# Patient Record
Sex: FEMALE | Race: WHITE | Employment: FULL TIME | URBAN - NONMETROPOLITAN AREA
[De-identification: names, ages, dates, MRNs, and addresses within clinical notes are randomized per-mention and may not be internally consistent; named-entity substitution may affect disease eponyms.]

---

## 2017-10-05 ENCOUNTER — APPOINTMENT (OUTPATIENT)
Dept: INTERVENTIONAL RADIOLOGY/VASCULAR | Age: 47
End: 2017-10-05
Payer: COMMERCIAL

## 2017-10-05 ENCOUNTER — HOSPITAL ENCOUNTER (EMERGENCY)
Age: 47
Discharge: HOME | End: 2017-10-05
Attending: FAMILY MEDICINE
Payer: COMMERCIAL

## 2017-10-05 ENCOUNTER — APPOINTMENT (OUTPATIENT)
Dept: GENERAL RADIOLOGY | Age: 47
End: 2017-10-05
Payer: COMMERCIAL

## 2017-10-05 VITALS
TEMPERATURE: 97.9 F | DIASTOLIC BLOOD PRESSURE: 79 MMHG | SYSTOLIC BLOOD PRESSURE: 101 MMHG | RESPIRATION RATE: 17 BRPM | HEART RATE: 69 BPM | OXYGEN SATURATION: 100 %

## 2017-10-05 DIAGNOSIS — M79.604 PAIN OF RIGHT LOWER EXTREMITY: Primary | ICD-10-CM

## 2017-10-05 LAB
ANION GAP SERPL CALCULATED.3IONS-SCNC: 12 MEQ/L (ref 8–16)
BASOPHILS # BLD: 0.4 %
BASOPHILS ABSOLUTE: 0 THOU/MM3 (ref 0–0.1)
BUN BLDV-MCNC: 12 MG/DL (ref 7–22)
CALCIUM SERPL-MCNC: 9.1 MG/DL (ref 8.5–10.5)
CHLORIDE BLD-SCNC: 105 MEQ/L (ref 98–111)
CO2: 28 MEQ/L (ref 23–33)
CREAT SERPL-MCNC: 0.8 MG/DL (ref 0.4–1.2)
D-DIMER QUANTITATIVE: 378 NG/ML FEU (ref 0–500)
EKG ATRIAL RATE: 70 BPM
EKG P AXIS: 75 DEGREES
EKG P-R INTERVAL: 160 MS
EKG Q-T INTERVAL: 392 MS
EKG QRS DURATION: 68 MS
EKG QTC CALCULATION (BAZETT): 423 MS
EKG R AXIS: 66 DEGREES
EKG T AXIS: 74 DEGREES
EKG VENTRICULAR RATE: 70 BPM
EOSINOPHIL # BLD: 2.9 %
EOSINOPHILS ABSOLUTE: 0.2 THOU/MM3 (ref 0–0.4)
GFR SERPL CREATININE-BSD FRML MDRD: 76 ML/MIN/1.73M2
GLUCOSE BLD-MCNC: 89 MG/DL (ref 70–108)
HCT VFR BLD CALC: 38.3 % (ref 37–47)
HEMOGLOBIN: 13 GM/DL (ref 12–16)
LYMPHOCYTES # BLD: 27.2 %
LYMPHOCYTES ABSOLUTE: 1.7 THOU/MM3 (ref 1–4.8)
MCH RBC QN AUTO: 33 PG (ref 27–31)
MCHC RBC AUTO-ENTMCNC: 33.9 GM/DL (ref 33–37)
MCV RBC AUTO: 97.2 FL (ref 81–99)
MONOCYTES # BLD: 8.8 %
MONOCYTES ABSOLUTE: 0.5 THOU/MM3 (ref 0.4–1.3)
NUCLEATED RED BLOOD CELLS: 0 /100 WBC
OSMOLALITY CALCULATION: 287.9 MOSMOL/KG (ref 275–300)
PDW BLD-RTO: 13.6 % (ref 11.5–14.5)
PLATELET # BLD: 153 THOU/MM3 (ref 130–400)
PMV BLD AUTO: 9 MCM (ref 7.4–10.4)
POTASSIUM SERPL-SCNC: 4.4 MEQ/L (ref 3.5–5.2)
RBC # BLD: 3.94 MILL/MM3 (ref 4.2–5.4)
RBC # BLD: NORMAL 10*6/UL
SEG NEUTROPHILS: 60.7 %
SEGMENTED NEUTROPHILS ABSOLUTE COUNT: 3.7 THOU/MM3 (ref 1.8–7.7)
SODIUM BLD-SCNC: 145 MEQ/L (ref 135–145)
TOTAL CK: 60 U/L (ref 30–135)
TROPONIN T: < 0.01 NG/ML
TROPONIN T: < 0.01 NG/ML
WBC # BLD: 6.1 THOU/MM3 (ref 4.8–10.8)

## 2017-10-05 PROCEDURE — 99285 EMERGENCY DEPT VISIT HI MDM: CPT

## 2017-10-05 PROCEDURE — 71010 XR CHEST PORTABLE: CPT

## 2017-10-05 PROCEDURE — 85025 COMPLETE CBC W/AUTO DIFF WBC: CPT

## 2017-10-05 PROCEDURE — 82550 ASSAY OF CK (CPK): CPT

## 2017-10-05 PROCEDURE — 93971 EXTREMITY STUDY: CPT

## 2017-10-05 PROCEDURE — 36415 COLL VENOUS BLD VENIPUNCTURE: CPT

## 2017-10-05 PROCEDURE — 85379 FIBRIN DEGRADATION QUANT: CPT

## 2017-10-05 PROCEDURE — 93005 ELECTROCARDIOGRAM TRACING: CPT | Performed by: FAMILY MEDICINE

## 2017-10-05 PROCEDURE — 84484 ASSAY OF TROPONIN QUANT: CPT

## 2017-10-05 PROCEDURE — 80048 BASIC METABOLIC PNL TOTAL CA: CPT

## 2017-10-05 RX ORDER — MELOXICAM 15 MG/1
15 TABLET ORAL DAILY
COMMUNITY
End: 2022-09-02

## 2017-10-05 ASSESSMENT — PAIN SCALES - GENERAL
PAINLEVEL_OUTOF10: 8
PAINLEVEL_OUTOF10: 8

## 2017-10-05 ASSESSMENT — ENCOUNTER SYMPTOMS
ABDOMINAL PAIN: 0
EYE DISCHARGE: 0
SORE THROAT: 0
NAUSEA: 0
DIARRHEA: 0
VOMITING: 1
SHORTNESS OF BREATH: 0
BACK PAIN: 0
RHINORRHEA: 0
EYE PAIN: 0
WHEEZING: 0
COUGH: 0

## 2017-10-05 ASSESSMENT — PAIN DESCRIPTION - LOCATION
LOCATION: LEG
LOCATION: LEG

## 2017-10-05 ASSESSMENT — PAIN DESCRIPTION - FREQUENCY: FREQUENCY: CONTINUOUS

## 2017-10-05 ASSESSMENT — PAIN DESCRIPTION - ONSET: ONSET: ON-GOING

## 2017-10-05 ASSESSMENT — PAIN DESCRIPTION - ORIENTATION
ORIENTATION: RIGHT
ORIENTATION: RIGHT

## 2017-10-05 ASSESSMENT — PAIN DESCRIPTION - DESCRIPTORS: DESCRIPTORS: CONSTANT;THROBBING

## 2017-10-05 ASSESSMENT — PAIN DESCRIPTION - PROGRESSION: CLINICAL_PROGRESSION: NOT CHANGED

## 2017-10-05 ASSESSMENT — PAIN DESCRIPTION - PAIN TYPE
TYPE: ACUTE PAIN
TYPE: ACUTE PAIN

## 2017-10-05 NOTE — ED TRIAGE NOTES
Pt presents to ED with c/o rt leg pain rated 8/10. Pt also states she is having pain in her breast area rated 2/10, pt states she is already undergoing tests to see if she has breast cancer b/c the pain in her breast have been present for several months.

## 2017-10-05 NOTE — ED AVS SNAPSHOT
Patient Information     Patient Name GREGORIO Foster 1970      Care Provided at:     Name Address Phone       [address] [phone]            Your Visit    Here you will find information about your visit, including the reason for your visit. Please take this sheet with you when you visit your doctor or other health care provider in the future. It will help determine the best possible medical care for you at that time. If you have any questions once you leave the hospital, please call the department phone number listed below. Diagnoses this visit     Your diagnosis was PAIN OF RIGHT LOWER EXTREMITY. Visit Information     Date of Visit Department Dept Phone    10/5/2017 [facility] EMERGENCY DEPT [phone]      You were seen by     You were seen by Hamzah Castro MD.       Follow-up Appointments    Below is a list of your follow-up and future appointments. This may not be a complete list as you may have made appointments directly with providers that we are not aware of or your providers may have made some for you. Please call your providers to confirm appointments. It is important to keep your appointments. Please bring your current insurance card, photo ID, co-pay, and all medication bottles to your appointment. If self-pay, payment is expected at the time of service. Follow-up Information     Follow up with Pearl Baltazar MD. Schedule an appointment as soon as possible for a visit in 3 days. Specialty:  Family Medicine    Contact information:    [facility] [address]  [phone]        Preventive Care        Date Due    HIV screening is recommended for all people regardless of risk factors  aged 15-65 years at least once (lifetime) who have never been HIV tested.  3/6/1983    Tetanus Combination Vaccine (1 - Tdap) 3/6/1987 Pneumococcal Vaccine - Pneumovax for adults aged 19-64 years with: chronic heart disease, chronic lung disease, diabetes mellitus, alcoholism, chronic liver disease, or cigarette smoking. (1 of 1 - PPSV23) 3/6/1987    Pap Smear 3/6/1989    Cholesterol Screening 3/6/2008    Yearly Flu Vaccine (1) 9/1/2017                 Care Plan Once You Return Home    This section includes instructions you will need to follow once you leave the hospital.  Your care team will discuss these with you, so you and those caring for you know how to best care for your health needs at home. This section may also include educational information about certain health topics that may be of help to you. Important Information if you smoke or are exposed to smoking       SMOKING: QUIT SMOKING. THIS IS THE MOST IMPORTANT ACTION YOU CAN TAKE TO IMPROVE YOUR CURRENT AND FUTURE HEALTH. Call the [address] [place] NOW ([phone])    Smoking harms nonsmokers. When nonsmokers are around people who smoke, they absorb nicotine, carbon monoxide, and other ingredients of tobacco smoke. DO NOT SMOKE AROUND CHILDREN     Children exposed to secondhand smoke are at an increased risk of:  Sudden Infant Death Syndrome (SIDS), acute respiratory infections, inflammation of the middle ear, and severe asthma. Over a longer time, it causes heart disease and lung cancer. There is no safe level of exposure to secondhand smoke. Important information for a smoker       SMOKING: QUIT SMOKING. THIS IS THE MOST IMPORTANT ACTION YOU CAN TAKE TO IMPROVE YOUR CURRENT AND FUTURE HEALTH. Call the [address] [facility] [place] [facility] ([phone])    Smoking harms nonsmokers. When nonsmokers are around people who smoke, they absorb nicotine, carbon monoxide, and other ingredients of tobacco smoke.      DO NOT SMOKE AROUND CHILDREN Children exposed to secondhand smoke are at an increased risk of:  Sudden Infant Death Syndrome (SIDS), acute respiratory infections, inflammation of the middle ear, and severe asthma. Over a longer time, it causes heart disease and lung cancer. There is no safe level of exposure to secondhand smoke. [employer] Signup     [employer] allows you to send messages to your doctor, view your test results, renew your prescriptions, schedule appointments, view visit notes, and more. How Do I Sign Up? 1. In your Internet browser, go to [email or web address]  2. Click on the Sign Up Now link in the Sign In box. You will see the New Member Sign Up page. 3. Enter your [employer] Access Code exactly as it appears below. You will not need to use this code after youve completed the sign-up process. If you do not sign up before the expiration date, you must request a new code. [employer] Access Code: [email or web address]  Expires: 12/4/2017  7:35 PM    4. Enter your Social Security Number ([id number]) and Date of Birth (mm/dd/yyyy) as indicated and click Submit. You will be taken to the next sign-up page. 5. Create a [employer] ID. This will be your [employer] login ID and cannot be changed, so think of one that is secure and easy to remember. 6. Create a [employer] password. You can change your password at any time. 7. Enter your Password Reset Question and Answer. This can be used at a later time if you forget your password. 8. Enter your e-mail address. You will receive e-mail notification when new information is available in [address]. 9. Click Sign Up. You can now view your medical record. Additional Information  If you have questions, please contact the physician practice where you receive care. Remember, [employer] is NOT to be used for urgent needs. For medical emergencies, dial 911. For questions regarding your [employer] account call [phone].  If you have a clinical question, please call your doctor's office. View your information online  ? Review your current list of  medications, immunization, and allergies. ? Review your future test results online . ? Review your discharge instructions provided by your caregivers at discharge    Certain functionality such as prescription refills, scheduling appointments or sending messages to your provider are not activated if your provider does not use [employer] in his/her office    For questions regarding your MyChart account call [phone]. If you have a clinical question, please call your doctor's office. The information on all pages of the After Visit Summary has been reviewed with me, the patient and/or responsible adult, by my health care provider(s). I had the opportunity to ask questions regarding this information. I understand I should dispose of my armband safely at home to protect my health information. A complete copy of the After Visit Summary has been given to me, the patient and/or responsible adult.          Patient Signature/Responsible Adult: ___________________________________    Nurse Signature: ___________________________________  Resident/MLP Signature: ___________________________________  Attending Signature: ___________________________________    Date:____________Time:____________

## 2017-10-05 NOTE — ED PROVIDER NOTES
[facility]  eMERGENCY dEPARTMENT eNCOUnter          CHIEF COMPLAINT       Chief Complaint   Patient presents with    Leg Pain       Nurses Notes reviewed and I agree except as noted in the HPI. HISTORY OF PRESENT ILLNESS    Lucy Gonzalez is a 50 y.o. female who presents to the Emergency Department for the evaluation of right leg pain. Patient states this pain started 6 days ago and has been worsening. Patient states 4 days ago she went to [facility], where they took X-rays that were normal. Patient states she has also seen her chiropractor 2 times since the start, with no relief. Patient states she had a stress test 3 days ago, and she was unable to handle walking on her leg for a long period of time. Patient reports associated symptoms to include vomiting, numbness in her toes, and chest pain. Patient states she has had this chest pain for 2 months, and describes it as stabbing intermittently. Patient denies recent fall, recent travel, or taking anticoagulants. Patient has a history of COPD and DVT in her arm. Patient reports she is supposed to have a left breast biopsy tomorrow to see if she has breast cancer. No other complaints at this time. The HPI was provided by the patient. REVIEW OF SYSTEMS     Review of Systems   Constitutional: Negative for appetite change, chills, fatigue and fever. HENT: Negative for congestion, ear pain, rhinorrhea and sore throat. Eyes: Negative for pain, discharge and visual disturbance. Respiratory: Negative for cough, shortness of breath and wheezing. Cardiovascular: Positive for chest pain (2 months intermittently). Negative for palpitations and leg swelling. Gastrointestinal: Positive for vomiting. Negative for abdominal pain, diarrhea and nausea. Genitourinary: Negative for difficulty urinating, dysuria and vaginal discharge. Musculoskeletal: Negative for arthralgias, back pain, joint swelling and neck pain. tenderness to the right calf with palpitation. Radiologic studies within the department revealed no acute findings. Laboratory work was reassuring. Ultrasound negative for DVT. The lab work as mentioned above negative d-dimer and negative troponin. Patient was advised to follow-up with her chiropractor/primary care physician for further management. Patient also is currently going under outpatient stress test to rule out coronary arteries problem. I observed the patient's condition to remain stable during the duration of the stay. I explained my proposed course of treatment to the patient, who was amenable to my treatment and discharge decisions. The patient was discharged home in stable condition, and the patient will return to the ED if the symptoms become more severe in nature or otherwise change. CRITICAL CARE:   None. CONSULTS:  None. PROCEDURES:  None. FINAL IMPRESSION      1. Pain of right lower extremity          DISPOSITION/PLAN   Patient was discharged in stable condition. Will return if symptoms change or worsen, or for any sign or symptom deemed emergent by the patient or family members. Follow up as an outpatient, sooner if symptoms warrant. PATIENT REFERRED TO:  Abena [facility]. [place] [address]  [phone]    Schedule an appointment as soon as possible for a visit in 3 days        DISCHARGE MEDICATIONS:  New Prescriptions    No medications on file       (Please note that portions of this note were completed with a voice recognition program.  Efforts were made to edit the dictations but occasionally words are mis-transcribed.)    Scribe:  Shantell Stephenson 10/5/17 5:03 PM Scribing for and in the presence of Alyson Diaz MD.    Signed by: Andria Kilgore.  10/5/17 7:36 PM    Provider:  I personally performed the services described in the documentation, reviewed and edited the documentation which was dictated to the scribe in my presence, and it accurately records my words and actions.     Nona Diana MD 10/5/17 7:36 PM       Nona Diana MD  10/06/17 8209

## 2017-10-23 ENCOUNTER — OFFICE VISIT (OUTPATIENT)
Dept: ONCOLOGY | Age: 47
End: 2017-10-23
Payer: COMMERCIAL

## 2017-10-23 ENCOUNTER — HOSPITAL ENCOUNTER (OUTPATIENT)
Dept: INFUSION THERAPY | Age: 47
Discharge: HOME | End: 2017-10-23
Payer: COMMERCIAL

## 2017-10-23 VITALS
HEIGHT: 68 IN | OXYGEN SATURATION: 99 % | DIASTOLIC BLOOD PRESSURE: 80 MMHG | WEIGHT: 170 LBS | TEMPERATURE: 97.2 F | HEART RATE: 77 BPM | BODY MASS INDEX: 25.76 KG/M2 | RESPIRATION RATE: 16 BRPM | SYSTOLIC BLOOD PRESSURE: 130 MMHG

## 2017-10-23 DIAGNOSIS — C77.9 PRIMARY MALIGNANT NEOPLASM OF LEFT BREAST WITH STAGE 2 NODAL METASTASIS PER AMERICAN JOINT COMMITTEE ON CANCER 7TH EDITION (N2) (HCC): Primary | ICD-10-CM

## 2017-10-23 DIAGNOSIS — C50.912 PRIMARY MALIGNANT NEOPLASM OF LEFT BREAST WITH STAGE 2 NODAL METASTASIS PER AMERICAN JOINT COMMITTEE ON CANCER 7TH EDITION (N2) (HCC): Primary | ICD-10-CM

## 2017-10-23 PROCEDURE — 99211 OFF/OP EST MAY X REQ PHY/QHP: CPT

## 2017-10-23 PROCEDURE — 99205 OFFICE O/P NEW HI 60 MIN: CPT | Performed by: INTERNAL MEDICINE

## 2017-10-23 RX ORDER — ALPRAZOLAM 0.5 MG/1
0.5 TABLET ORAL NIGHTLY PRN
COMMUNITY
Start: 2017-10-17

## 2017-10-23 RX ORDER — ALBUTEROL SULFATE 2.5 MG/3ML
2.5 SOLUTION RESPIRATORY (INHALATION) EVERY 4 HOURS PRN
COMMUNITY
Start: 2017-10-17

## 2017-10-23 RX ORDER — VENLAFAXINE HYDROCHLORIDE 75 MG/1
75 CAPSULE, EXTENDED RELEASE ORAL DAILY
COMMUNITY
Start: 2017-10-17

## 2017-10-23 RX ORDER — MELOXICAM 15 MG/1
15 TABLET ORAL
COMMUNITY
Start: 2017-10-17 | End: 2017-10-23 | Stop reason: CLARIF

## 2017-10-23 RX ORDER — BUTALBITAL, ACETAMINOPHEN AND CAFFEINE 50; 325; 40 MG/1; MG/1; MG/1
CAPSULE ORAL
COMMUNITY
Start: 2017-10-17

## 2017-10-23 RX ORDER — CYCLOBENZAPRINE HCL 5 MG
5 TABLET ORAL 3 TIMES DAILY PRN
COMMUNITY
Start: 2017-10-17

## 2017-10-23 RX ORDER — MOMETASONE FUROATE AND FORMOTEROL FUMARATE DIHYDRATE 200; 5 UG/1; UG/1
1 AEROSOL RESPIRATORY (INHALATION) 2 TIMES DAILY
COMMUNITY
Start: 2017-08-17 | End: 2019-06-11 | Stop reason: SDUPTHER

## 2017-10-23 NOTE — PROGRESS NOTES
[facility] PROFESSIONAL SERVS  [facility]  [address]  [address] [place] [address]  [address] [address]  Dept: [phone]  Dept Fax: [phone]: [phone]     Visit Date: 10/23/2017     Tea Almanza is a 50 y.o. female who presents today for:   Chief Complaint   Patient presents with   [facility] New Doctor     Breast cancer        HPI:   Destinee Haddad is a very pleasant 53-year-old female who comes to the clinic today for evaluation of a newly diagnosed invasive ductal carcinoma of her left breast. The patient noted a mass in the upper aspect of her left breast approximately 5 months ago. She thought it was a cyst which she has had before but since it persisted, she elected to have further evaluation. She did undergo a diagnostic mammogram at [facility] in [place], [place]. It showed a 3.8 x 2.4 x 2.7 cm malignant-appearing spiculated mass within the left breast at 11:00 position. Evaluation of the left axilla with US showed suspicious appearing lymph node with increased cortical thickening and replacement of fatty hilum. On October 9, 2017, she underwent a left breast mass and left lymph node ultrasound-guided core biopsy. The final path report did show an invasive ductal carcinoma which was ER positive at 95%, NJ positive at 95%, and a HER-2/breezy by IHC was 2+. The FISH test   Was still equivocal.  HER-2/CEP17 ratio was 1.7, however the average HER-2 copy number was 4. She met with the surgical oncologists and medical oncologists at the [address] [place] [address], however she decided to receive care closer to her home. She has not had any significant breast problems in the past except for cysts. She began menarche at age 13 and did have a BERYL in [address] [place] [address]. She's had 4 pregnancies with her 1st child being born at 21years of age. She does have a maternal aunt who had breast cancer at 72 and a paternal aunt who had breast cancer at 77.  The rest of her breast history is unremarkable. Today the patient reports that over the last 3-4 weeks she developed back pain. She also feels some sternal discomfort. She is a smoker, however she reports worsening dyspnea on exertion. Denies having any headaches. No abdominal pain. No weight loss.              HPI   Past Medical History:   Diagnosis Date    Acid reflux     Anxiety     Asthma     [facility]     COPD with asthma ([facility]     Depression     Depression     Dizziness - light-headed     HX OF:    Emphysema     History of chest pain     History of tinnitus     Joint pain     Numbness and tingling     Osteoarthritis     SOB (shortness of breath) on exertion       Past Surgical History:   Procedure Laterality Date    BLADDER SURGERY  2014    BREAST [facility]    CARDIAC CATHETERIZATION  2011    NO EVIDENCE OF PULMONARY HTN,NO EVIDENCE OF DD,NORMAL LVF    CARDIOVASCULAR STRESS TEST  2011    EF 60% MILD INFERIOR WALL REVERSIBLE STRESS-INDUCED ISCHEMIA      SECTION  1992    CHOLECYSTECTOMY  2015    CYST REMOVAL Right 2015    rt breast [facility]-benign breast cyst     ESOPHAGUS SURGERY  2014    HYSTERECTOMY  1999    TONSILLECTOMY  1973      Family History   Problem Relation Age of Onset    Heart Disease Mother     Hypertension Mother     Diabetes Mother     Heart Disease Father     No Known Problems Sister     No Known Problems Brother       Social History   Substance Use Topics    Smoking status: Current Every Day Smoker     Packs/day: 0.50     Years: 18.00    Smokeless tobacco: Never Used    Alcohol use Yes      Comment: SOCAIL      Current Outpatient Prescriptions   Medication Sig Dispense Refill    albuterol (PROVENTIL) (2.5 MG/3ML) 0.083% nebulizer solution Inhale 2.5 mg into the lungs every 4 hours as needed       butalbital-apap-caffeine -40 MG CAPS Take by mouth      ALPRAZolam (XANAX) 0.5 MG tablet Take 0.5 mg by mouth nightly as needed       cyclobenzaprine (FLEXERIL) 5 MG tablet Take 5 mg by mouth 3 times daily as needed       DULERA 200-5 MCG/ACT inhaler Inhale 1 puff into the lungs 2 times daily       venlafaxine (EFFEXOR XR) 75 MG extended release capsule Take 75 mg by mouth daily       meloxicam (MOBIC) 15 MG tablet Take 15 mg by mouth daily      Albuterol Sulfate (VENTOLIN HFA IN) Inhale 2 puffs into the lungs 4 times daily.  loratadine (CLARITIN) 10 MG tablet Take 10 mg by mouth daily. No current facility-administered medications for this visit. No Known Allergies   Health Maintenance   Topic Date Due    HIV screen  03/06/1983    DTaP/Tdap/Td vaccine (1 - Tdap) 03/06/1987    Pneumococcal highest risk (1 of 3 - PCV13) 03/06/1987    Cervical cancer screen  03/06/1989    Lipid screen  03/06/2008    Flu vaccine (1) 09/01/2017        Subjective:   Review of Systems   Constitutional: Negative for activity change, appetite change, chills, fatigue, fever and unexpected weight change. HENT: Negative for dental problem, facial swelling, hearing loss, mouth sores, nosebleeds, postnasal drip, sore throat, trouble swallowing and voice change. Eyes: Negative for redness, itching and visual disturbance. Respiratory: Positive for shortness of breath. Negative for cough, chest tightness and wheezing. Cardiovascular: Negative for chest pain, palpitations and leg swelling. Gastrointestinal: Negative for abdominal distention, abdominal pain, blood in stool, constipation, diarrhea, nausea and vomiting. Genitourinary: Negative for difficulty urinating, dysuria, flank pain, frequency and hematuria. Musculoskeletal: Positive for back pain. Negative for arthralgias, gait problem, joint swelling, myalgias and neck stiffness. Skin: Negative for color change and rash. Neurological: Negative for dizziness, seizures, syncope, speech difficulty, weakness, light-headedness, numbness and headaches. CALCIUM 9.1 10/05/2017 1700            Assessment/Plan:   1. This is a 77-year-old premenopausal patient was newly diagnosed ER positive, FL positive HER-2 equivocal left breast cancer. I had a lengthy discussion was the patient and her aunt about her current diagnosis. First we have to rule out metastatic disease. Since the patient is complaining of back pain and dyspnea on exertion she will have CT of the chest, abdomen/pelvis and bone scan. If those scans are negative, the patient should proceed with breast surgery first.  Due to extent of disease and biopsy-proven lymph node involvement, she should proceed with mastectomy and axillary lymph node dissection. Next we discussed the meaning of having equivocal HER-2. The only HER2 test results that are known to predict benefit from anti-HER2 therapy are IHC 3+ score or FISH >2 ratio. The other \"equivocal\" findings on FISH  are of unknown value in predicting benefit from therapy. Equivocal HER2 testing should trigger reflex HER2 testing using CHARLENE on the same specimen or a new test (using a different specimen with either IHC or CHARLENE). Neoadjuvant chemotherapy+ targeted therapy is not indicated for the patients with equivocal HER-2. After mastectomy we will repeat HER-2 evaluation on a different specimen. Since the patient has strong family history of breast cancer and she is diagnosed with breast cancer before age of 46 she's referred for genetic testing. 1. Primary malignant neoplasm of left breast with stage 2 raji metastasis per American Joint Committee on Cancer 7th edition (N2) [facility])  CT Chest W Contrast    CT ABDOMEN PELVIS W IV CONTRAST    NM BONE SCAN WHOLE BODY    Ambulatory referral to General Surgery    External Referral To Genetics        Plan:   No Follow-up on file.      Orders Placed:   Orders Placed This Encounter   Procedures    CT Chest W Contrast     Standing Status:   Future     Standing Expiration Date:   11/23/2017    CT

## 2017-10-23 NOTE — LETTER
[facility]  [address]  [address] [facility] [address]  [address] [facility] [address]  Phone: [phone]  Fax: [phone]    Nora Ignacio MD        October 23, 2017     Patient: Jennifer Sandhu   YOB: 1970   Date of Visit: 10/23/2017       To Whom It May Concern: It is my medical opinion that Kevin Grey should remain out of work until October 25, 2017 at this time. If you have any questions or concerns, please don't hesitate to call.     Sincerely,        Nora Ignacio MD

## 2017-10-24 ENCOUNTER — OFFICE VISIT (OUTPATIENT)
Dept: SURGERY | Age: 47
End: 2017-10-24
Payer: COMMERCIAL

## 2017-10-24 VITALS
RESPIRATION RATE: 16 BRPM | DIASTOLIC BLOOD PRESSURE: 70 MMHG | HEART RATE: 86 BPM | SYSTOLIC BLOOD PRESSURE: 112 MMHG | HEIGHT: 68 IN | TEMPERATURE: 97.2 F | BODY MASS INDEX: 26.07 KG/M2 | WEIGHT: 172 LBS | OXYGEN SATURATION: 99 %

## 2017-10-24 DIAGNOSIS — J44.9 COPD WITH ASTHMA (HCC): ICD-10-CM

## 2017-10-24 DIAGNOSIS — Z01.818 PRE-OP TESTING: ICD-10-CM

## 2017-10-24 DIAGNOSIS — C50.912 INFILTRATING DUCTAL CARCINOMA OF LEFT BREAST (HCC): Primary | ICD-10-CM

## 2017-10-24 DIAGNOSIS — M54.6 ACUTE MIDLINE THORACIC BACK PAIN: ICD-10-CM

## 2017-10-24 PROCEDURE — 99244 OFF/OP CNSLTJ NEW/EST MOD 40: CPT | Performed by: SURGERY

## 2017-10-24 ASSESSMENT — ENCOUNTER SYMPTOMS
DIARRHEA: 1
CONSTIPATION: 1
EYE REDNESS: 0
BLOOD IN STOOL: 0
COUGH: 1
SHORTNESS OF BREATH: 1
EYE PAIN: 0
BACK PAIN: 1
NAUSEA: 1
SINUS PRESSURE: 1
TROUBLE SWALLOWING: 1
SINUS PAIN: 1

## 2017-10-24 NOTE — PATIENT INSTRUCTIONS
Patient Education        Mastectomy: Before Your Surgery  What is a mastectomy? A mastectomy is surgery to remove a breast. There are many ways to do it. The type of surgery you have depends on your situation and if you plan to have surgery to reconstruct the breast.  When this surgery is done to cure or prevent cancer, the entire breast is removed. This includes the nipple. During surgery, the doctor may also check your lymph nodes. After surgery, you will probably stay overnight in the hospital. You may be able to go back to work or your normal routine in 3 to 6 weeks. It depends on the type of work you do. When you find out that you have cancer, you may feel many emotions and may need some help coping. Seek out family, friends, and counselors for support. You also can do things at home to make yourself feel better while you go through treatment. Call the [facility] ([phone]) or visit its website at [employer] [facility] for more information. Follow-up care is a key part of your treatment and safety. Be sure to make and go to all appointments, and call your doctor if you are having problems. It's also a good idea to know your test results and keep a list of the medicines you take. What happens before surgery? Surgery can be stressful. This information will help you understand what you can expect. And it will help you safely prepare for surgery. Preparing for surgery  · Understand exactly what surgery is planned, along with the risks, benefits, and other options. · Tell your doctors ALL the medicines, vitamins, supplements, and herbal remedies you take. Some of these can increase the risk of bleeding or interact with anesthesia. · If you take blood thinners, such as warfarin (Coumadin), clopidogrel (Plavix), or aspirin, be sure to talk to your doctor. He or she will tell you if you should stop taking these medicines before your surgery.  Make sure that you understand exactly what your

## 2017-10-24 NOTE — PROGRESS NOTES
Felipe Enriquez MD   General Surgery  New Patient Evaluation in Office  Pt Name: Hamzah Dawkins  Date of Birth 1970   Today's Date: 10/24/2017  Medical Record Number: [id number]  Referring Provider: Quique Vilchis MD  Primary Care Provider: Naseem Harley MD  Chief Complaint   Patient presents with   [facility] Surgical Consult     new pt-ref women's wellness-left breast        ASSESSMENT      1. Infiltrating ductal carcinoma of left breast ([address]    2. Pre-op testing    3. COPD with asthma ([address]    4. Acute midline thoracic back pain    5. T2 N1M? Staging studies pending     PLANS      1. Schedule Teresa Doip for  1. Patient is scheduled for CT scan chest abdomen and pelvis as well as bone scan to evaluate for distant metastatic disease. If negative for distant metastatic disease patient will be scheduled for left modified radical mastectomy. She declined consultation evaluation for reconstruction which was discussed. 2. In the office, I had a discussion with the patient regarding the treatment options for invasive breast cancer. We discussed lumpectomy and radiation versus mastectomy. We discussed the fact that there is no statistical difference in long term survival or recurrence between the two options. If she wanted to consider breast conservation and no distance of distant metastatic disease she should consider chemotherapy preoperatively. Otherwise mastectomy would be more appropriate. Had a lengthy discussion regarding axillary node dissection. 3. For lumpectomy, we covered the conduct of the operation, the possible use of needle localization preoperatively, the anesthetic options, the typical post op course and cosmetic outcome, and the complications including bleeding, infection, seroma, and reoperation for positive margins. 4. For mastectomy, we covered the conduct of the operation, the use of general anesthesia, the typical post op course and cosmetic outcome.  We also covered reconstruction mass was 3.8 cm. She did see a surgeon at [facility]. Her family physician recommended she go there. She got an opinion there as well. She does not want to receive her care there however she lives in [facility] and to go to [address] [place] [address] with her  being ill is very inconvenient for her. Prior to the biopsy she complained of new or changing breast lumps and denied nipple changes and nipple discharge. Risk assessment: She has a maternal aunt and a paternal aunt with a history of breast carcinoma. She is  and has 3 living children. She had her first child at age 21. She did not breast feed. She has no history of exogenous estrogen. She has had a hysterectomy her ovaries remain. She does complain of rather new acute midline thoracic back pain. Evaluation for distant metastatic disease is currently pending. Discussed the case with Dr. Alex Brito and coordinated care with her. The mass would be amenable to breast conservation if the patient has chemotherapy. It would leave quite a defect and mastectomy without chemotherapy as a first option was recommended. If she has evidence of distant metastatic disease she will start with chemotherapy.   Past Medical History  Past Medical History:   Diagnosis Date    Acid reflux     Anxiety     Asthma     [facility]     COPD with asthma ([address]     Depression     Depression     Dizziness - light-headed     HX OF:    Emphysema     History of chest pain     History of tinnitus     Joint pain     Numbness and tingling     Osteoarthritis     SOB (shortness of breath) on exertion        Past Surgical History  Past Surgical History:   Procedure Laterality Date    BLADDER SURGERY      BREAST BIOPSY  10/06/2017    Albert Tellez    CARDIAC CATHETERIZATION  2011    NO EVIDENCE OF PULMONARY HTN,NO EVIDENCE OF DD,NORMAL LVF    CARDIOVASCULAR STRESS TEST  2011    EF 60% MILD INFERIOR WALL REVERSIBLE STRESS-INDUCED ISCHEMIA [address]    CHOLECYSTECTOMY  2015    CYST REMOVAL Right 2015    rt breast [facility]-benign breast cyst     ESOPHAGUS SURGERY  2014    HYSTERECTOMY  1999    TONSILLECTOMY  1973      Family History  Family History   Problem Relation Age of Onset    Heart Disease Mother     Hypertension Mother     Diabetes Mother     Heart Disease Father     No Known Problems Sister     No Known Problems Brother      Cancer-related family history is not on file. Medications  Current Outpatient Prescriptions   Medication Sig Dispense Refill    albuterol (PROVENTIL) (2.5 MG/3ML) 0.083% nebulizer solution Inhale 2.5 mg into the lungs every 4 hours as needed       butalbital-apap-caffeine -40 MG CAPS Take by mouth      ALPRAZolam (XANAX) 0.5 MG tablet Take 0.5 mg by mouth nightly as needed       cyclobenzaprine (FLEXERIL) 5 MG tablet Take 5 mg by mouth 3 times daily as needed       DULERA 200-5 MCG/ACT inhaler Inhale 1 puff into the lungs 2 times daily       venlafaxine (EFFEXOR XR) 75 MG extended release capsule Take 75 mg by mouth daily       meloxicam (MOBIC) 15 MG tablet Take 15 mg by mouth daily      Albuterol Sulfate (VENTOLIN HFA IN) Inhale 2 puffs into the lungs 4 times daily.  loratadine (CLARITIN) 10 MG tablet Take 10 mg by mouth daily. No current facility-administered medications for this visit.       Allergies  No Known Allergies  Social History  Social History     Social History    Marital status:      Spouse name: N/A    Number of children: 4    Years of education: N/A     Occupational History          Social History Main Topics    Smoking status: Current Every Day Smoker     Packs/day: 0.50     Years: 18.00    Smokeless tobacco: Never Used    Alcohol use Yes      Comment: SOCAIL    Drug use: No    Sexual activity: Not on file     Other Topics Concern    Not on file     Social History Narrative    No narrative on file       Review of tenderness. Musculoskeletal: She exhibits deformity. She exhibits no edema. Lymphadenopathy:     She has no cervical adenopathy. Neurological: She is alert and oriented to person, place, and time. No cranial nerve deficit. Skin: Skin is warm and dry. No rash noted. She is not diaphoretic. No pallor. Multiple tattoos   Psychiatric: She has a normal mood and affect. Her behavior is normal.   Nursing note and vitals reviewed. Lab Results   Component Value Date    WBC 6.1 10/05/2017    HGB 13.0 10/05/2017    HCT 38.3 10/05/2017     10/05/2017     10/05/2017    K 4.4 10/05/2017     10/05/2017    CREATININE 0.8 10/05/2017    BUN 12 10/05/2017    CO2 28 10/05/2017   All outside imaging labs and pathology reviewed.

## 2017-10-24 NOTE — LETTER
4. For mastectomy, we covered the conduct of the operation, the use of general anesthesia, the typical post op course and cosmetic outcome. We also covered reconstruction options both immediate and delayed and referral was made if the patient desired reconstruction, or the use of a bra prosthesis. We also covered the possible complications including bleeding, infection, skin slough, seroma formation and others. 5. We also discussed staging and the importance of lymph node sampling. She has a biopsy proven lymph node metastasis and will require axillary dissection . We discussed the complications of axillary lymph node dissection and how the information is used in treatment decisions and prognosis. We also covered adjuvant chemotherapy and radiation therapy if they would be required. After these discussions, the patient's questions were answered and has elected to proceed with surgery. 6. Her need for genetic testing referral was calculated by questions asked during her mammograms and she was is not deemed to be a candidate for testing. For those who are deemed appropriate, referral to the genetic counseling service at [address] [place] [address] was made. If you have questions, please do not hesitate to call me. I look forward to following Poncho Kaur along with you.     Sincerely,          Aniyah Mejia MD

## 2017-10-25 ENCOUNTER — NURSE ONLY (OUTPATIENT)
Dept: WOMENS IMAGING | Age: 47
End: 2017-10-25

## 2017-10-25 ASSESSMENT — ENCOUNTER SYMPTOMS
CHEST TIGHTNESS: 0
TROUBLE SWALLOWING: 0
CONSTIPATION: 0
DIARRHEA: 0
VOMITING: 0
COUGH: 0
VOICE CHANGE: 0
SORE THROAT: 0
ABDOMINAL PAIN: 0
WHEEZING: 0
EYE REDNESS: 0
ABDOMINAL DISTENTION: 0
BLOOD IN STOOL: 0
COLOR CHANGE: 0
SHORTNESS OF BREATH: 1
BACK PAIN: 1
EYE ITCHING: 0
NAUSEA: 0
FACIAL SWELLING: 0

## 2017-10-26 ENCOUNTER — TELEPHONE (OUTPATIENT)
Dept: SURGERY | Age: 47
End: 2017-10-26

## 2017-10-31 RX ORDER — ALBUTEROL SULFATE 90 UG/1
1 AEROSOL, METERED RESPIRATORY (INHALATION) EVERY 6 HOURS PRN
COMMUNITY
Start: 2017-10-17 | End: 2019-06-11 | Stop reason: SDUPTHER

## 2017-10-31 NOTE — PROGRESS NOTES
In preparation for their surgical procedure above patient was screened for Obstructive Sleep Apnea (ODESSA) using the STOP-Bang Questionnaire by the Pre-Admission Testing department. This is a pre-surgical screening tool for patient safety and serves as a recommendation, this WILL NOT cause cancellation of surgery. STOP-Bang Questionnaire  * Do you currently see a pulmonologist?  No     If yes STOP, do not complete. Patient follows with  .    1. Do you snore loudly (able to be heard in the next room)? No    2. Do you often feel tired or sleepy during the daytime? No       3. Has anyone ever told you that you stop breathing during your sleep? No    4. Do you have or are you being treated for high blood pressure? No      5. BMI more than 35? BMI (Calculated): 26.2        No    6. Age over 48 years? 50 y.o. No    7. Neck Circumference greater than 17 inches for male or 16 inches for female? Measured           (visits only)            Not Applicable    8. Gender Male? No      TOTAL SCORE: 0    ODESSA - Low Risk : Yes to 0 - 2 questions  ODESSA - Intermediate Risk : Yes to 3 - 4 questions  ODESSA - High Risk : Yes to 5 - 8 questions    Adapted from:   STOP Questionnaire: A Tool to Screen Patients for Obstructive Sleep Apnea   Clearance MOHAN Mahoney.C.P.C., Nora Harkins M.B.B.S., Naomi Benjamin M.D., Ken Banda. Nancy Waldrop, Ph.D., KAITLYNN Grier.B.B.S., Lidia Obrien, M.Sc., Ottie Gitelman, M.D., Miguel Matamoros. PATRICIA Pineda.P.C.    Anesthesiology 2008; [id number] Copyright 2008, the [address] of [employer], [place] [employer].   ----------------------------------------------------------------------------------------------------------------

## 2017-11-01 ENCOUNTER — HOSPITAL ENCOUNTER (OUTPATIENT)
Dept: NUCLEAR MEDICINE | Age: 47
Discharge: HOME | End: 2017-11-01
Payer: COMMERCIAL

## 2017-11-01 ENCOUNTER — HOSPITAL ENCOUNTER (OUTPATIENT)
Dept: CT IMAGING | Age: 47
Discharge: HOME | End: 2017-11-01
Payer: COMMERCIAL

## 2017-11-01 DIAGNOSIS — C50.912 PRIMARY MALIGNANT NEOPLASM OF LEFT BREAST WITH STAGE 2 NODAL METASTASIS PER AMERICAN JOINT COMMITTEE ON CANCER 7TH EDITION (N2) (HCC): ICD-10-CM

## 2017-11-01 DIAGNOSIS — C77.9 PRIMARY MALIGNANT NEOPLASM OF LEFT BREAST WITH STAGE 2 NODAL METASTASIS PER AMERICAN JOINT COMMITTEE ON CANCER 7TH EDITION (N2) (HCC): ICD-10-CM

## 2017-11-01 PROCEDURE — A9503 TC99M MEDRONATE: HCPCS | Performed by: INTERNAL MEDICINE

## 2017-11-01 PROCEDURE — 74177 CT ABD & PELVIS W/CONTRAST: CPT

## 2017-11-01 PROCEDURE — 78306 BONE IMAGING WHOLE BODY: CPT

## 2017-11-01 PROCEDURE — 3430000000 HC RX DIAGNOSTIC RADIOPHARMACEUTICAL: Performed by: INTERNAL MEDICINE

## 2017-11-01 PROCEDURE — 71260 CT THORAX DX C+: CPT

## 2017-11-01 PROCEDURE — 6360000004 HC RX CONTRAST MEDICATION: Performed by: INTERNAL MEDICINE

## 2017-11-01 RX ORDER — TC 99M MEDRONATE 20 MG/10ML
25 INJECTION, POWDER, LYOPHILIZED, FOR SOLUTION INTRAVENOUS
Status: COMPLETED | OUTPATIENT
Start: 2017-11-01 | End: 2017-11-01

## 2017-11-01 RX ADMIN — IOPAMIDOL 85 ML: 755 INJECTION, SOLUTION INTRAVENOUS at 12:58

## 2017-11-01 RX ADMIN — Medication 26.3 MILLICURIE: at 07:40

## 2017-11-06 ENCOUNTER — ANESTHESIA EVENT (OUTPATIENT)
Dept: OPERATING ROOM | Age: 47
DRG: 582 | End: 2017-11-06
Payer: COMMERCIAL

## 2017-11-07 ENCOUNTER — HOSPITAL ENCOUNTER (INPATIENT)
Age: 47
LOS: 1 days | Discharge: HOME | DRG: 582 | End: 2017-11-08
Attending: SURGERY | Admitting: SURGERY
Payer: COMMERCIAL

## 2017-11-07 ENCOUNTER — ANESTHESIA (OUTPATIENT)
Dept: OPERATING ROOM | Age: 47
DRG: 582 | End: 2017-11-07
Payer: COMMERCIAL

## 2017-11-07 VITALS
OXYGEN SATURATION: 99 % | RESPIRATION RATE: 14 BRPM | TEMPERATURE: 96.1 F | DIASTOLIC BLOOD PRESSURE: 47 MMHG | SYSTOLIC BLOOD PRESSURE: 100 MMHG

## 2017-11-07 PROBLEM — C50.912 BREAST CANCER METASTASIZED TO AXILLARY LYMPH NODE, LEFT (HCC): Status: ACTIVE | Noted: 2017-11-07

## 2017-11-07 PROBLEM — C77.3 BREAST CANCER METASTASIZED TO AXILLARY LYMPH NODE, LEFT (HCC): Status: ACTIVE | Noted: 2017-11-07

## 2017-11-07 PROCEDURE — 3700000000 HC ANESTHESIA ATTENDED CARE: Performed by: SURGERY

## 2017-11-07 PROCEDURE — 3600000003 HC SURGERY LEVEL 3 BASE: Performed by: SURGERY

## 2017-11-07 PROCEDURE — 6360000002 HC RX W HCPCS: Performed by: SURGERY

## 2017-11-07 PROCEDURE — 0HTU0ZZ RESECTION OF LEFT BREAST, OPEN APPROACH: ICD-10-PCS | Performed by: SURGERY

## 2017-11-07 PROCEDURE — 6370000000 HC RX 637 (ALT 250 FOR IP)

## 2017-11-07 PROCEDURE — 6370000000 HC RX 637 (ALT 250 FOR IP): Performed by: SURGERY

## 2017-11-07 PROCEDURE — 88307 TISSUE EXAM BY PATHOLOGIST: CPT

## 2017-11-07 PROCEDURE — 6360000002 HC RX W HCPCS: Performed by: ANESTHESIOLOGY

## 2017-11-07 PROCEDURE — 07T60ZZ RESECTION OF LEFT AXILLARY LYMPHATIC, OPEN APPROACH: ICD-10-PCS | Performed by: SURGERY

## 2017-11-07 PROCEDURE — 94640 AIRWAY INHALATION TREATMENT: CPT

## 2017-11-07 PROCEDURE — 19307 MAST MOD RAD: CPT | Performed by: SURGERY

## 2017-11-07 PROCEDURE — 2500000003 HC RX 250 WO HCPCS: Performed by: NURSE ANESTHETIST, CERTIFIED REGISTERED

## 2017-11-07 PROCEDURE — 1200000000 HC SEMI PRIVATE

## 2017-11-07 PROCEDURE — 2580000003 HC RX 258: Performed by: SURGERY

## 2017-11-07 PROCEDURE — 7100000001 HC PACU RECOVERY - ADDTL 15 MIN: Performed by: SURGERY

## 2017-11-07 PROCEDURE — 2720000010 HC SURG SUPPLY STERILE: Performed by: SURGERY

## 2017-11-07 PROCEDURE — 3600000013 HC SURGERY LEVEL 3 ADDTL 15MIN: Performed by: SURGERY

## 2017-11-07 PROCEDURE — 2500000003 HC RX 250 WO HCPCS: Performed by: SURGERY

## 2017-11-07 PROCEDURE — 88309 TISSUE EXAM BY PATHOLOGIST: CPT

## 2017-11-07 PROCEDURE — A6252 ABSORPT DRG >16 <=48 W/O BDR: HCPCS | Performed by: SURGERY

## 2017-11-07 PROCEDURE — 2780000010 HC IMPLANT OTHER: Performed by: SURGERY

## 2017-11-07 PROCEDURE — 6360000002 HC RX W HCPCS: Performed by: NURSE ANESTHETIST, CERTIFIED REGISTERED

## 2017-11-07 PROCEDURE — 88377 M/PHMTRC ALYS ISHQUANT/SEMIQ: CPT

## 2017-11-07 PROCEDURE — 2580000003 HC RX 258: Performed by: NURSE ANESTHETIST, CERTIFIED REGISTERED

## 2017-11-07 PROCEDURE — 7100000000 HC PACU RECOVERY - FIRST 15 MIN: Performed by: SURGERY

## 2017-11-07 PROCEDURE — 3700000001 HC ADD 15 MINUTES (ANESTHESIA): Performed by: SURGERY

## 2017-11-07 RX ORDER — SODIUM CHLORIDE 9 MG/ML
INJECTION, SOLUTION INTRAVENOUS CONTINUOUS
Status: DISCONTINUED | OUTPATIENT
Start: 2017-11-07 | End: 2017-11-07

## 2017-11-07 RX ORDER — PROMETHAZINE HYDROCHLORIDE 25 MG/ML
12.5 INJECTION, SOLUTION INTRAMUSCULAR; INTRAVENOUS
Status: DISCONTINUED | OUTPATIENT
Start: 2017-11-07 | End: 2017-11-07 | Stop reason: HOSPADM

## 2017-11-07 RX ORDER — CYCLOBENZAPRINE HCL 10 MG
5 TABLET ORAL 3 TIMES DAILY PRN
Status: DISCONTINUED | OUTPATIENT
Start: 2017-11-07 | End: 2017-11-08 | Stop reason: HOSPADM

## 2017-11-07 RX ORDER — ACETAMINOPHEN 650 MG/1
650 SUPPOSITORY RECTAL EVERY 4 HOURS PRN
Status: DISCONTINUED | OUTPATIENT
Start: 2017-11-07 | End: 2017-11-08 | Stop reason: HOSPADM

## 2017-11-07 RX ORDER — FENTANYL CITRATE 50 UG/ML
50 INJECTION, SOLUTION INTRAMUSCULAR; INTRAVENOUS EVERY 5 MIN PRN
Status: DISCONTINUED | OUTPATIENT
Start: 2017-11-07 | End: 2017-11-07 | Stop reason: HOSPADM

## 2017-11-07 RX ORDER — VENLAFAXINE HYDROCHLORIDE 75 MG/1
75 CAPSULE, EXTENDED RELEASE ORAL DAILY
Status: DISCONTINUED | OUTPATIENT
Start: 2017-11-07 | End: 2017-11-08 | Stop reason: HOSPADM

## 2017-11-07 RX ORDER — PROPOFOL 10 MG/ML
INJECTION, EMULSION INTRAVENOUS PRN
Status: DISCONTINUED | OUTPATIENT
Start: 2017-11-07 | End: 2017-11-07 | Stop reason: SDUPTHER

## 2017-11-07 RX ORDER — BUPIVACAINE HYDROCHLORIDE AND EPINEPHRINE 5; 5 MG/ML; UG/ML
INJECTION, SOLUTION EPIDURAL; INTRACAUDAL; PERINEURAL PRN
Status: DISCONTINUED | OUTPATIENT
Start: 2017-11-07 | End: 2017-11-07 | Stop reason: HOSPADM

## 2017-11-07 RX ORDER — LABETALOL HYDROCHLORIDE 5 MG/ML
5 INJECTION, SOLUTION INTRAVENOUS EVERY 10 MIN PRN
Status: DISCONTINUED | OUTPATIENT
Start: 2017-11-07 | End: 2017-11-07 | Stop reason: HOSPADM

## 2017-11-07 RX ORDER — SODIUM CHLORIDE 0.9 % (FLUSH) 0.9 %
10 SYRINGE (ML) INJECTION EVERY 12 HOURS SCHEDULED
Status: DISCONTINUED | OUTPATIENT
Start: 2017-11-07 | End: 2017-11-07 | Stop reason: HOSPADM

## 2017-11-07 RX ORDER — MEPERIDINE HYDROCHLORIDE 25 MG/ML
25 INJECTION INTRAMUSCULAR; INTRAVENOUS; SUBCUTANEOUS
Status: COMPLETED | OUTPATIENT
Start: 2017-11-07 | End: 2017-11-07

## 2017-11-07 RX ORDER — MIDAZOLAM HYDROCHLORIDE 1 MG/ML
INJECTION INTRAMUSCULAR; INTRAVENOUS PRN
Status: DISCONTINUED | OUTPATIENT
Start: 2017-11-07 | End: 2017-11-07 | Stop reason: SDUPTHER

## 2017-11-07 RX ORDER — ALBUTEROL SULFATE 90 UG/1
1 AEROSOL, METERED RESPIRATORY (INHALATION) EVERY 6 HOURS PRN
Status: DISCONTINUED | OUTPATIENT
Start: 2017-11-07 | End: 2017-11-08 | Stop reason: HOSPADM

## 2017-11-07 RX ORDER — DEXAMETHASONE SODIUM PHOSPHATE 4 MG/ML
INJECTION, SOLUTION INTRA-ARTICULAR; INTRALESIONAL; INTRAMUSCULAR; INTRAVENOUS; SOFT TISSUE PRN
Status: DISCONTINUED | OUTPATIENT
Start: 2017-11-07 | End: 2017-11-07 | Stop reason: SDUPTHER

## 2017-11-07 RX ORDER — ACETAMINOPHEN 325 MG/1
650 TABLET ORAL EVERY 4 HOURS PRN
Status: DISCONTINUED | OUTPATIENT
Start: 2017-11-07 | End: 2017-11-08 | Stop reason: HOSPADM

## 2017-11-07 RX ORDER — SCOLOPAMINE TRANSDERMAL SYSTEM 1 MG/1
PATCH, EXTENDED RELEASE TRANSDERMAL
Status: DISCONTINUED
Start: 2017-11-07 | End: 2017-11-08 | Stop reason: HOSPADM

## 2017-11-07 RX ORDER — DIPHENHYDRAMINE HYDROCHLORIDE 50 MG/ML
12.5 INJECTION INTRAMUSCULAR; INTRAVENOUS
Status: DISCONTINUED | OUTPATIENT
Start: 2017-11-07 | End: 2017-11-07 | Stop reason: HOSPADM

## 2017-11-07 RX ORDER — FENTANYL CITRATE 50 UG/ML
25 INJECTION, SOLUTION INTRAMUSCULAR; INTRAVENOUS EVERY 5 MIN PRN
Status: DISCONTINUED | OUTPATIENT
Start: 2017-11-07 | End: 2017-11-07 | Stop reason: HOSPADM

## 2017-11-07 RX ORDER — HYDROCODONE BITARTRATE AND ACETAMINOPHEN 5; 325 MG/1; MG/1
2 TABLET ORAL EVERY 4 HOURS PRN
Status: DISCONTINUED | OUTPATIENT
Start: 2017-11-07 | End: 2017-11-08 | Stop reason: HOSPADM

## 2017-11-07 RX ORDER — ALBUTEROL SULFATE 2.5 MG/3ML
2.5 SOLUTION RESPIRATORY (INHALATION) EVERY 4 HOURS PRN
Status: DISCONTINUED | OUTPATIENT
Start: 2017-11-07 | End: 2017-11-08 | Stop reason: HOSPADM

## 2017-11-07 RX ORDER — SCOLOPAMINE TRANSDERMAL SYSTEM 1 MG/1
1 PATCH, EXTENDED RELEASE TRANSDERMAL ONCE
Status: DISCONTINUED | OUTPATIENT
Start: 2017-11-07 | End: 2017-11-08 | Stop reason: HOSPADM

## 2017-11-07 RX ORDER — SODIUM CHLORIDE 0.9 % (FLUSH) 0.9 %
10 SYRINGE (ML) INJECTION PRN
Status: DISCONTINUED | OUTPATIENT
Start: 2017-11-07 | End: 2017-11-08 | Stop reason: HOSPADM

## 2017-11-07 RX ORDER — MORPHINE SULFATE 2 MG/ML
2 INJECTION, SOLUTION INTRAMUSCULAR; INTRAVENOUS
Status: DISCONTINUED | OUTPATIENT
Start: 2017-11-07 | End: 2017-11-08 | Stop reason: HOSPADM

## 2017-11-07 RX ORDER — ONDANSETRON 2 MG/ML
4 INJECTION INTRAMUSCULAR; INTRAVENOUS EVERY 6 HOURS PRN
Status: DISCONTINUED | OUTPATIENT
Start: 2017-11-07 | End: 2017-11-08 | Stop reason: HOSPADM

## 2017-11-07 RX ORDER — LIDOCAINE HYDROCHLORIDE 20 MG/ML
INJECTION, SOLUTION INFILTRATION; PERINEURAL PRN
Status: DISCONTINUED | OUTPATIENT
Start: 2017-11-07 | End: 2017-11-07 | Stop reason: SDUPTHER

## 2017-11-07 RX ORDER — SODIUM CHLORIDE 0.9 % (FLUSH) 0.9 %
10 SYRINGE (ML) INJECTION EVERY 12 HOURS SCHEDULED
Status: DISCONTINUED | OUTPATIENT
Start: 2017-11-07 | End: 2017-11-08 | Stop reason: HOSPADM

## 2017-11-07 RX ORDER — MORPHINE SULFATE 4 MG/ML
4 INJECTION, SOLUTION INTRAMUSCULAR; INTRAVENOUS
Status: DISCONTINUED | OUTPATIENT
Start: 2017-11-07 | End: 2017-11-08 | Stop reason: HOSPADM

## 2017-11-07 RX ORDER — HYDROCODONE BITARTRATE AND ACETAMINOPHEN 5; 325 MG/1; MG/1
1 TABLET ORAL EVERY 4 HOURS PRN
Status: DISCONTINUED | OUTPATIENT
Start: 2017-11-07 | End: 2017-11-08 | Stop reason: HOSPADM

## 2017-11-07 RX ORDER — ONDANSETRON 2 MG/ML
INJECTION INTRAMUSCULAR; INTRAVENOUS PRN
Status: DISCONTINUED | OUTPATIENT
Start: 2017-11-07 | End: 2017-11-07 | Stop reason: SDUPTHER

## 2017-11-07 RX ORDER — SODIUM CHLORIDE 9 MG/ML
INJECTION, SOLUTION INTRAVENOUS CONTINUOUS
Status: DISCONTINUED | OUTPATIENT
Start: 2017-11-07 | End: 2017-11-08

## 2017-11-07 RX ORDER — SODIUM CHLORIDE, SODIUM LACTATE, POTASSIUM CHLORIDE, CALCIUM CHLORIDE 600; 310; 30; 20 MG/100ML; MG/100ML; MG/100ML; MG/100ML
INJECTION, SOLUTION INTRAVENOUS CONTINUOUS PRN
Status: DISCONTINUED | OUTPATIENT
Start: 2017-11-07 | End: 2017-11-07 | Stop reason: SDUPTHER

## 2017-11-07 RX ORDER — SODIUM CHLORIDE 0.9 % (FLUSH) 0.9 %
10 SYRINGE (ML) INJECTION PRN
Status: DISCONTINUED | OUTPATIENT
Start: 2017-11-07 | End: 2017-11-07 | Stop reason: HOSPADM

## 2017-11-07 RX ORDER — FENTANYL CITRATE 50 UG/ML
INJECTION, SOLUTION INTRAMUSCULAR; INTRAVENOUS PRN
Status: DISCONTINUED | OUTPATIENT
Start: 2017-11-07 | End: 2017-11-07 | Stop reason: SDUPTHER

## 2017-11-07 RX ORDER — SODIUM CHLORIDE 9 MG/ML
INJECTION, SOLUTION INTRAVENOUS CONTINUOUS PRN
Status: DISCONTINUED | OUTPATIENT
Start: 2017-11-07 | End: 2017-11-07 | Stop reason: SDUPTHER

## 2017-11-07 RX ORDER — ALPRAZOLAM 0.5 MG/1
0.5 TABLET ORAL NIGHTLY PRN
Status: DISCONTINUED | OUTPATIENT
Start: 2017-11-07 | End: 2017-11-08 | Stop reason: HOSPADM

## 2017-11-07 RX ADMIN — DEXAMETHASONE SODIUM PHOSPHATE 4 MG: 4 INJECTION, SOLUTION INTRAMUSCULAR; INTRAVENOUS at 08:20

## 2017-11-07 RX ADMIN — Medication 1 PUFF: at 21:03

## 2017-11-07 RX ADMIN — MORPHINE SULFATE 2 MG: 2 INJECTION, SOLUTION INTRAMUSCULAR; INTRAVENOUS at 19:59

## 2017-11-07 RX ADMIN — FENTANYL CITRATE 50 MCG: 50 INJECTION INTRAMUSCULAR; INTRAVENOUS at 08:50

## 2017-11-07 RX ADMIN — SODIUM CHLORIDE: 9 INJECTION, SOLUTION INTRAVENOUS at 08:00

## 2017-11-07 RX ADMIN — WATER 2 G: 1 INJECTION INTRAMUSCULAR; INTRAVENOUS; SUBCUTANEOUS at 17:30

## 2017-11-07 RX ADMIN — MORPHINE SULFATE 4 MG: 4 INJECTION INTRAVENOUS at 11:27

## 2017-11-07 RX ADMIN — HYDROCODONE BITARTRATE AND ACETAMINOPHEN 2 TABLET: 5; 325 TABLET ORAL at 17:38

## 2017-11-07 RX ADMIN — ONDANSETRON 4 MG: 2 INJECTION INTRAMUSCULAR; INTRAVENOUS at 09:00

## 2017-11-07 RX ADMIN — MIDAZOLAM HYDROCHLORIDE 2 MG: 1 INJECTION, SOLUTION INTRAMUSCULAR; INTRAVENOUS at 08:00

## 2017-11-07 RX ADMIN — HYDROMORPHONE HYDROCHLORIDE 0.5 MG: 1 INJECTION, SOLUTION INTRAMUSCULAR; INTRAVENOUS; SUBCUTANEOUS at 09:56

## 2017-11-07 RX ADMIN — MORPHINE SULFATE 2 MG: 2 INJECTION, SOLUTION INTRAMUSCULAR; INTRAVENOUS at 23:06

## 2017-11-07 RX ADMIN — SODIUM CHLORIDE: 9 INJECTION, SOLUTION INTRAVENOUS at 15:42

## 2017-11-07 RX ADMIN — LIDOCAINE HYDROCHLORIDE 100 MG: 20 INJECTION, SOLUTION INFILTRATION; PERINEURAL at 08:06

## 2017-11-07 RX ADMIN — MEPERIDINE HYDROCHLORIDE 25 MG: 25 INJECTION, SOLUTION INTRAMUSCULAR; INTRAVENOUS; SUBCUTANEOUS at 09:53

## 2017-11-07 RX ADMIN — WATER 2 G: 1 INJECTION INTRAMUSCULAR; INTRAVENOUS; SUBCUTANEOUS at 08:14

## 2017-11-07 RX ADMIN — SODIUM CHLORIDE, POTASSIUM CHLORIDE, SODIUM LACTATE AND CALCIUM CHLORIDE: 600; 310; 30; 20 INJECTION, SOLUTION INTRAVENOUS at 08:50

## 2017-11-07 RX ADMIN — HYDROMORPHONE HYDROCHLORIDE 0.5 MG: 1 INJECTION, SOLUTION INTRAMUSCULAR; INTRAVENOUS; SUBCUTANEOUS at 10:02

## 2017-11-07 RX ADMIN — FENTANYL CITRATE 50 MCG: 50 INJECTION INTRAMUSCULAR; INTRAVENOUS at 08:20

## 2017-11-07 RX ADMIN — PROPOFOL 200 MG: 10 INJECTION, EMULSION INTRAVENOUS at 08:06

## 2017-11-07 RX ADMIN — VENLAFAXINE HYDROCHLORIDE 75 MG: 75 CAPSULE, EXTENDED RELEASE ORAL at 19:59

## 2017-11-07 RX ADMIN — FENTANYL CITRATE 50 MCG: 50 INJECTION INTRAMUSCULAR; INTRAVENOUS at 10:09

## 2017-11-07 RX ADMIN — MORPHINE SULFATE 4 MG: 4 INJECTION INTRAVENOUS at 15:04

## 2017-11-07 RX ADMIN — FENTANYL CITRATE 50 MCG: 50 INJECTION INTRAMUSCULAR; INTRAVENOUS at 09:20

## 2017-11-07 RX ADMIN — SODIUM CHLORIDE: 9 INJECTION, SOLUTION INTRAVENOUS at 07:40

## 2017-11-07 RX ADMIN — FENTANYL CITRATE 50 MCG: 50 INJECTION INTRAMUSCULAR; INTRAVENOUS at 08:06

## 2017-11-07 RX ADMIN — FENTANYL CITRATE 25 MCG: 50 INJECTION INTRAMUSCULAR; INTRAVENOUS at 08:40

## 2017-11-07 RX ADMIN — FENTANYL CITRATE 25 MCG: 50 INJECTION INTRAMUSCULAR; INTRAVENOUS at 08:14

## 2017-11-07 ASSESSMENT — PAIN DESCRIPTION - DESCRIPTORS
DESCRIPTORS: SORE
DESCRIPTORS: SHARP
DESCRIPTORS: SORE
DESCRIPTORS: SORE
DESCRIPTORS: TENDER
DESCRIPTORS: SORE

## 2017-11-07 ASSESSMENT — PULMONARY FUNCTION TESTS
PIF_VALUE: 3
PIF_VALUE: 2
PIF_VALUE: 11
PIF_VALUE: 20
PIF_VALUE: 2
PIF_VALUE: 11
PIF_VALUE: 10
PIF_VALUE: 4
PIF_VALUE: 10
PIF_VALUE: 11
PIF_VALUE: 11
PIF_VALUE: 3
PIF_VALUE: 10
PIF_VALUE: 12
PIF_VALUE: 2
PIF_VALUE: 10
PIF_VALUE: 3
PIF_VALUE: 10
PIF_VALUE: 11
PIF_VALUE: 10
PIF_VALUE: 2
PIF_VALUE: 11
PIF_VALUE: 10
PIF_VALUE: 10
PIF_VALUE: 11
PIF_VALUE: 11
PIF_VALUE: 10
PIF_VALUE: 2
PIF_VALUE: 10
PIF_VALUE: 11
PIF_VALUE: 10
PIF_VALUE: 10
PIF_VALUE: 3
PIF_VALUE: 2
PIF_VALUE: 11
PIF_VALUE: 3
PIF_VALUE: 10
PIF_VALUE: 3
PIF_VALUE: 11
PIF_VALUE: 10
PIF_VALUE: 10
PIF_VALUE: 2
PIF_VALUE: 3
PIF_VALUE: 10
PIF_VALUE: 2
PIF_VALUE: 11
PIF_VALUE: 2
PIF_VALUE: 29
PIF_VALUE: 10
PIF_VALUE: 11
PIF_VALUE: 11
PIF_VALUE: 10
PIF_VALUE: 11
PIF_VALUE: 11
PIF_VALUE: 10
PIF_VALUE: 11
PIF_VALUE: 10
PIF_VALUE: 10
PIF_VALUE: 11
PIF_VALUE: 10
PIF_VALUE: 2
PIF_VALUE: 10
PIF_VALUE: 4
PIF_VALUE: 12
PIF_VALUE: 3
PIF_VALUE: -13
PIF_VALUE: 1
PIF_VALUE: 12
PIF_VALUE: 20
PIF_VALUE: 1
PIF_VALUE: 3
PIF_VALUE: 10
PIF_VALUE: 3
PIF_VALUE: 11
PIF_VALUE: 3
PIF_VALUE: 11
PIF_VALUE: 10
PIF_VALUE: 2
PIF_VALUE: 12
PIF_VALUE: 2
PIF_VALUE: 10
PIF_VALUE: 10
PIF_VALUE: 3

## 2017-11-07 ASSESSMENT — PAIN SCALES - GENERAL
PAINLEVEL_OUTOF10: 8
PAINLEVEL_OUTOF10: 10
PAINLEVEL_OUTOF10: 3
PAINLEVEL_OUTOF10: 4
PAINLEVEL_OUTOF10: 2
PAINLEVEL_OUTOF10: 10
PAINLEVEL_OUTOF10: 7
PAINLEVEL_OUTOF10: 5
PAINLEVEL_OUTOF10: 7
PAINLEVEL_OUTOF10: 2
PAINLEVEL_OUTOF10: 7

## 2017-11-07 ASSESSMENT — PAIN DESCRIPTION - PROGRESSION
CLINICAL_PROGRESSION: NOT CHANGED
CLINICAL_PROGRESSION: GRADUALLY IMPROVING

## 2017-11-07 ASSESSMENT — PAIN DESCRIPTION - PAIN TYPE
TYPE: SURGICAL PAIN

## 2017-11-07 ASSESSMENT — PAIN DESCRIPTION - ORIENTATION
ORIENTATION: LEFT

## 2017-11-07 ASSESSMENT — PAIN DESCRIPTION - LOCATION
LOCATION: CHEST
LOCATION: BREAST
LOCATION: BREAST

## 2017-11-07 ASSESSMENT — PAIN DESCRIPTION - ONSET
ONSET: ON-GOING
ONSET: ON-GOING

## 2017-11-07 ASSESSMENT — PAIN - FUNCTIONAL ASSESSMENT: PAIN_FUNCTIONAL_ASSESSMENT: 0-10

## 2017-11-07 ASSESSMENT — PAIN DESCRIPTION - FREQUENCY
FREQUENCY: CONTINUOUS
FREQUENCY: CONTINUOUS

## 2017-11-07 ASSESSMENT — LIFESTYLE VARIABLES: SMOKING_STATUS: 1

## 2017-11-07 ASSESSMENT — COPD QUESTIONNAIRES: CAT_SEVERITY: MODERATE

## 2017-11-07 NOTE — ANESTHESIA PRE PROCEDURE
Department of Anesthesiology  Preprocedure Note       Name:  Hugo Oconnell   Age:  50 y.o.  :  1970                                          MRN:  [id number]         Date:  2017      Surgeon: Laly Braswell):  Martin Brown MD    Procedure: Procedure(s):  LEFT MODIFIED RADICAL MASTECTOMY    Medications prior to admission:   Prior to Admission medications    Medication Sig Start Date End Date Taking?  Authorizing Provider   albuterol sulfate  (90 Base) MCG/ACT inhaler Inhale 1 puff into the lungs every 6 hours as needed for Wheezing or Shortness of Breath  10/17/17  Yes Historical Provider, MD   albuterol (PROVENTIL) (2.5 MG/3ML) 0.083% nebulizer solution Inhale 2.5 mg into the lungs every 4 hours as needed  10/17/17  Yes Historical Provider, MD   butalbital-apap-caffeine -40 MG CAPS Take by mouth 10/17/17  Yes Historical Provider, MD   ALPRAZolam Hadley Graces) 0.5 MG tablet Take 0.5 mg by mouth nightly as needed  10/17/17  Yes Historical Provider, MD Power Reynoso 200-5 MCG/ACT inhaler Inhale 1 puff into the lungs 2 times daily  17  Yes Historical Provider, MD   venlafaxine (EFFEXOR XR) 75 MG extended release capsule Take 75 mg by mouth daily  10/17/17  Yes Historical Provider, MD   meloxicam (MOBIC) 15 MG tablet Take 15 mg by mouth daily   Yes Historical Provider, MD   cyclobenzaprine (FLEXERIL) 5 MG tablet Take 5 mg by mouth 3 times daily as needed  10/17/17   Historical Provider, MD       Current medications:    Current Facility-Administered Medications   Medication Dose Route Frequency Provider Last Rate Last Dose    0.9 % sodium chloride infusion   Intravenous Continuous Martin Brown MD        sodium chloride flush 0.9 % injection 10 mL  10 mL Intravenous 2 times per day Martin Brown MD        sodium chloride flush 0.9 % injection 10 mL  10 mL Intravenous PRN Martin Brown MD        ceFAZolin (ANCEF) 2 g in sterile water 20 mL IV syringe  2 g Intravenous On Call to Thomas Santiago MD Allergies:  No Known Allergies    Problem List:    Patient Active Problem List   Diagnosis Code    Asthma J45.909    COPD with asthma ([facility] J44.9    Osteoarthritis M19.90    Depression F32.9    History of chest pain Z87.898    Pulmonary emphysema ([facility] J43.9    Acid reflux K21.9    History of tinnitus Z86.69    Depression F32.9    Anxiety F41.9    Joint pain M25.50    Numbness and tingling R20.0, R20.2    Light headed R42       Past Medical History:        Diagnosis Date    Acid reflux     Anxiety     Asthma     Breast cancer ([facility]     COPD with asthma ([facility]     Depression     Depression     Dizziness - light-headed     HX OF:    Emphysema     History of chest pain     History of tinnitus     Hx of blood clots     Joint pain     Numbness and tingling     Osteoarthritis     PONV (postoperative nausea and vomiting)     SOB (shortness of breath) on exertion        Past Surgical History:        Procedure Laterality Date    BLADDER SURGERY  2014    BREAST BIOPSY  10/06/2017    [facility] CATHETERIZATION  2011    NO EVIDENCE OF PULMONARY HTN,NO EVIDENCE OF DD,NORMAL LVF    CARDIOVASCULAR STRESS TEST  2011    EF 60% MILD INFERIOR WALL REVERSIBLE STRESS-INDUCED ISCHEMIA      SECTION  1992    CHOLECYSTECTOMY  2015    CYST REMOVAL Right 2015    rt breast [facility]-benign breast cyst     ESOPHAGUS SURGERY  2014    HYSTERECTOMY  1999    TONSILLECTOMY  1973       Social History:    Social History   Substance Use Topics    Smoking status: Current Every Day Smoker     Packs/day: 0.50     Years: 18.00     Types: Cigarettes    Smokeless tobacco: Never Used    Alcohol use Yes      Comment: SOCAIL                                Ready to quit: Not Answered  Counseling given: Not Answered      Vital Signs (Current):   Vitals:    10/31/17 1003 17 0700   BP:  107/69   Pulse:  73   Resp:  16   Temp:  97.6 °F (36.4 °C)   TempSrc:  Temporal CHF    ECG reviewed               Beta Blocker:  Not on Beta Blocker         Neuro/Psych:   (+) psychiatric history:            GI/Hepatic/Renal:   (+) GERD: well controlled,           Endo/Other: negative ROS                    Abdominal:           Vascular:                                      Anesthesia Plan      general     ASA 2       Induction: intravenous. MIPS: Postoperative opioids intended and Prophylactic antiemetics administered. Anesthetic plan and risks discussed with patient and spouse. Plan discussed with CRNA. Heidy Lancaster MD   11/7/2017

## 2017-11-07 NOTE — H&P
[address] [address]  History and Physical Update    Pt Name: Adrian Aguiar  MRN: [id number]  YOB: 1970  Date of evaluation: 11/7/2017    [x] I have examined the patient and reviewed the H&P/Consult and there are no changes to the patient or plans.     [] I have examined the patient and reviewed the H&P/Consult and have noted the following changes:        Vale Palafox MD  Electronically signed 11/7/2017 at 8:07 AM

## 2017-11-08 VITALS
TEMPERATURE: 97.8 F | BODY MASS INDEX: 25.79 KG/M2 | SYSTOLIC BLOOD PRESSURE: 106 MMHG | DIASTOLIC BLOOD PRESSURE: 59 MMHG | RESPIRATION RATE: 16 BRPM | HEIGHT: 68 IN | OXYGEN SATURATION: 93 % | HEART RATE: 59 BPM | WEIGHT: 170.19 LBS

## 2017-11-08 PROCEDURE — 6370000000 HC RX 637 (ALT 250 FOR IP): Performed by: SURGERY

## 2017-11-08 PROCEDURE — 94640 AIRWAY INHALATION TREATMENT: CPT

## 2017-11-08 PROCEDURE — A6252 ABSORPT DRG >16 <=48 W/O BDR: HCPCS

## 2017-11-08 PROCEDURE — 6360000002 HC RX W HCPCS: Performed by: SURGERY

## 2017-11-08 PROCEDURE — 99024 POSTOP FOLLOW-UP VISIT: CPT | Performed by: SURGERY

## 2017-11-08 PROCEDURE — 2580000003 HC RX 258: Performed by: SURGERY

## 2017-11-08 RX ORDER — HYDROCODONE BITARTRATE AND ACETAMINOPHEN 5; 325 MG/1; MG/1
1 TABLET ORAL EVERY 4 HOURS PRN
Qty: 42 TABLET | Refills: 0 | Status: SHIPPED | OUTPATIENT
Start: 2017-11-08 | End: 2017-11-15

## 2017-11-08 RX ADMIN — ENOXAPARIN SODIUM 40 MG: 40 INJECTION SUBCUTANEOUS at 08:22

## 2017-11-08 RX ADMIN — MORPHINE SULFATE 4 MG: 4 INJECTION INTRAVENOUS at 03:54

## 2017-11-08 RX ADMIN — HYDROCODONE BITARTRATE AND ACETAMINOPHEN 2 TABLET: 5; 325 TABLET ORAL at 07:34

## 2017-11-08 RX ADMIN — HYDROCODONE BITARTRATE AND ACETAMINOPHEN 2 TABLET: 5; 325 TABLET ORAL at 01:19

## 2017-11-08 RX ADMIN — SODIUM CHLORIDE: 9 INJECTION, SOLUTION INTRAVENOUS at 00:01

## 2017-11-08 RX ADMIN — WATER 2 G: 1 INJECTION INTRAMUSCULAR; INTRAVENOUS; SUBCUTANEOUS at 00:01

## 2017-11-08 RX ADMIN — Medication 1 PUFF: at 08:15

## 2017-11-08 ASSESSMENT — PAIN DESCRIPTION - PAIN TYPE
TYPE: SURGICAL PAIN

## 2017-11-08 ASSESSMENT — PAIN DESCRIPTION - ORIENTATION
ORIENTATION: LEFT

## 2017-11-08 ASSESSMENT — PAIN DESCRIPTION - PROGRESSION
CLINICAL_PROGRESSION: NOT CHANGED

## 2017-11-08 ASSESSMENT — PAIN DESCRIPTION - ONSET
ONSET: ON-GOING

## 2017-11-08 ASSESSMENT — PAIN DESCRIPTION - DESCRIPTORS
DESCRIPTORS: ACHING

## 2017-11-08 ASSESSMENT — PAIN DESCRIPTION - FREQUENCY
FREQUENCY: CONTINUOUS

## 2017-11-08 ASSESSMENT — PAIN SCALES - GENERAL
PAINLEVEL_OUTOF10: 7

## 2017-11-08 ASSESSMENT — PAIN DESCRIPTION - LOCATION
LOCATION: BREAST

## 2017-11-08 NOTE — OP NOTE
[address] [place] [address]                               OPERATIVE REPORT    PATIENT NAME: Justin Zuñiga                 :         1970  MED REC NO:   [id number]                           ROOM:       9052  ACCOUNT NO:   [de-identified]                           ADMIT DATE:  2017  PROVIDER:     Renato Kanner, M.D.    DATE OF PROCEDURE:  2017    SURGEON:  Renato Kanner, M.D. PREPROCEDURE DIAGNOSIS:  Invasive ductal carcinoma, left breast  T2N1M0, upper inner quadrant. POSTOPERATIVE DIAGNOSIS:  Invasive ductal carcinoma, left breast  T2N1M0, upper inner quadrant. PROCEDURE:  Left modified radical mastectomy. ANESTHESIA:  General.    ESTIMATED BLOOD LOSS:  20 mL. INDICATIONS:  The patient is a 64-year-old female who presented with  palpable mass in the upper inner quadrant of her left breast.  She  also had a palpable left axillary lymph node. Image-guided biopsies  were performed of the mass and the lymph node, was ER positive. CO  positive. HER-2 indeterminate. Staging studies revealed no evidence  of distant metastatic disease. She did have metastatic disease in the  palpable lymph node. The patient opted to proceed with mastectomy. She may consider delayed reconstruction. She has also seen Dr. Mary Watson of Medical Oncology. Risks of procedure were discussed. All  questions answered. She wishes to proceed. DESCRIPTION OF PROCEDURE:  The patient was brought to the operating  room and placed supine on the operating table. She had pneumatic  sequential compression devices on her lower extremities. She was  given Ancef intravenously. After induction of general anesthetic via  endotracheal intubation, left breast and axilla were prepped and  draped. Elliptical incision including the mass and the nipple areolar  complex was made and extended out to the axilla.   Superior and  inferior skin flaps were

## 2017-11-08 NOTE — PROGRESS NOTES
Discharge teaching and instructions for diagnosis/procedure of left radical mastectomy completed with patient using teachback method. AVS reviewed. Patient voiced understanding regarding prescriptions, follow up appointments, and care of self at home. Discharged in a wheelchair to  home with support per family. Patient demonstrated wound drain care to nurse.

## 2017-11-08 NOTE — PLAN OF CARE
Problem: Pain:  Goal: Pain level will decrease  Pain level will decrease   Outcome: Ongoing  Patient rating surgical breast pain 4/10, controlled to 2/10 with morphine. Meeting pain goal of 4/10    Problem: Respiratory  Goal: No pulmonary complications  Outcome: Ongoing  Lungs clear/diminished, no cough. 02 sats mid 90's on room air  Goal: O2 Sat > 90%  Outcome: Completed Date Met: 11/07/17    Goal: Supplemental O2 requirements decreased  Outcome: Completed Date Met: 11/07/17  02 weaned off  Goal: Pneumonia vaccine at discharge as needed  Outcome: Completed Date Met: 11/07/17      Problem: GI  Goal: No bowel complications  Outcome: Ongoing  Abdomen soft/non-tender, bowel sounds active. Patient is passing gas, denies nausea or vomiting    Problem:   Goal: Adequate urinary output  Outcome: Ongoing  Patient voiding at elast 30 ml/hr clear yellow urine    Problem: Safety:  Goal: Free from accidental physical injury  Free from accidental physical injury   Outcome: Ongoing  Patient free from falls this shift. Gait steady with 1 assist and iv pole. Alert and oriented x 4, using call light appropriately. Problem: Discharge Planning:  Goal: Patients continuum of care needs are met  Patients continuum of care needs are met   Outcome: Ongoing  Patient anticipates being discharged to home with , denies needs at this time. Comments: Care plan reviewed with patient . Patient verbalized  understanding of the plan of care and contribute to goal setting.

## 2017-11-10 NOTE — DISCHARGE SUMMARY
Discharge Summary     Patient Identification:  Rock Martins  : 1970  MRN: [id number]   Account: [de-identified]     Admit date: 2017  Discharge date: 2017   Attending provider: No att. providers found        Primary care provider: Abena Luis MD     Discharge Diagnoses:   Principal Problem:    Breast cancer metastasized to axillary lymph node, left [facility])       Hospital Course:   Rock Martins is a 50 y.o. female admitted to [facility] on 2017 for surgical treatment of left breast cancer. Patient presented with a palpable mass in her left breast about 4 months ago. She had imaging and biopsy performed in [place]. Pathology revealed an invasive ductal carcinoma. Core biopsy demonstrated an intermediate grade invasive ductal carcinoma. ER positive, W[place] positive, Her 2 breezy was indeterminate. On THE MASS WAS 3.8 CM. SHE WAS ALSO SEEN BY DR. Lul Toledo. STAGING STUDIES REVEALED NO EVIDENCE OF DISTANT METASTATIC DISEASE. PATIENT OPTED FOR MASTECTOMY. CHEMOTHERAPY PLANNED TO FOLLOW SURGICAL TREATMENT. She underwent a left modified radical mastectomy. She had no complications. She was discharged home on post operative day #1 with good pain control. She had one drain. Pathology pending at time of discharge. She declined home health at discharge.         Discharge Medications:   [facility] Medication Instructions CVN:[id number]    Printed on:11/10/17 1402   Medication Information                      albuterol (PROVENTIL) (2.5 MG/3ML) 0.083% nebulizer solution  Inhale 2.5 mg into the lungs every 4 hours as needed              albuterol sulfate  (90 Base) MCG/ACT inhaler  Inhale 1 puff into the lungs every 6 hours as needed for Wheezing or Shortness of Breath              ALPRAZolam (XANAX) 0.5 MG tablet  Take 0.5 mg by mouth nightly as needed              butalbital-apap-caffeine -40 MG CAPS  Take by mouth CT scans at this facility use dose modulation, iterative reconstruction, and/or weight-based dosing when appropriate to reduce radiation dose to as low as reasonably achievable. FINDINGS: Thyroid gland is normal. There is no mediastinal or hilar adenopathy. There is no axillary lymphadenopathy. Heart size normal no pericardial effusion. Calcified granuloma left lower lung. No lung masses are identified. 2 mm nodule right upper lobe on the prior examination is not currently identified. Mild apical parenchymal scarring. No infiltrates or effusions. Upper abdomen Please refer to the dedicated exam done the same day Bones No suspicious bone lesions. Unremarkable CT chest stable. **This report has been created using voice recognition software. It may contain minor errors which are inherent in voice recognition technology. ** Final report electronically signed by Dr. Ruddy Millard on 11/1/2017 3:35 PM    Ct Abdomen Pelvis W Iv Contrast    Result Date: 11/1/2017  PROCEDURE: CT ABDOMEN PELVIS W IV CONTRAST CLINICAL INFORMATION: Primary malignant neoplasm of left breast with stage 2 raji metastasis per American Joint Committee on Cancer 7th edition (N2) [facility]), Primary malignant neoplasm of left breast with stage 2 raji metastasis per American Joint Committee on Cancer 7th . COMPARISON: No prior study. TECHNIQUE: Images of the abdomen and pelvis after IV and oral contrast. Multiplanar reconstructions. [id number]. All CT scans at this facility use dose modulation, iterative reconstruction, and/or weight-based dosing when appropriate to reduce radiation dose to as low as reasonably achievable. FINDINGS: Lung bases Please refer to the dedicated exam of the same day. Abdomen pelvis The liver is unremarkable. Prior cholecystectomy. Tiny low-density lesion in the spleen midportion. Measures 5 mm. This can't be further assessed likely a small cyst possibly hemangioma medical static disease is unlikely but not entirely excluded. The pancreas is normal The adrenals and kidneys are normal. Minimal atherosclerotic plaquing of the distal aorta. There is no retroperitoneal or mesenteric adenopathy identified. Pelvis Left ovary is prominent and has a 2 x 1.1 cm peripheral low density lesion consistent with a physiologic cyst. Urinary bladder is unremarkable. No abnormal fluid collections. Appendix is not definitively identified. Bowel loops are of normal caliber. Bones There are no suspicious bone lesions. Probable physiologic cyst left ovary. Tiny low-density lesion in the spleen as detailed above. Unremarkable CT scan abdomen pelvis otherwise. **This report has been created using voice recognition software. It may contain minor errors which are inherent in voice recognition technology. ** Final report electronically signed by Dr. Loulou Davis on 11/1/2017 3:43 PM    Nm Bone Scan Whole Body    Result Date: 11/1/2017  PROCEDURE: NM BONE SCAN WHOLE BODY CLINICAL INFORMATION: Left breast cancer Radiopharmaceutical: 26.3 mCi technetium 99m MDP, intravenously. TECHNIQUE: Delayed and anterior posterior whole-body planar images were obtained following radiopharmaceutical administration. COMPARISON: None FINDINGS: There are no foci of abnormal radiotracer accumulation in the visualized skeleton to suggest osseous metastatic disease. Inter orbital, maxillary and mandibular activity is likely infectious or inflammatory. Probable degenerative arthropathic changes are present in the thoracolumbar spine, bilateral shoulders, elbows, wrists, hands, hips, knees, ankles and feet. Physiologic activity is present in the soft tissues, kidneys and urinary bladder. 1. No scintigraphic evidence of osseous metastatic disease. 2. Probable degenerative arthropathic changes as detailed above.  Final report electronically signed by Dr. Neil Hoang on 11/1/2017 1:34 PM    /9/2017 12:35 PM - Bakari Cleary Incoming Lab Results From [place] [address] Pathology     Lee Velez               [id number]  Assoc.                                              Page 1 of 7 [phone]  [facility], [place] [address]                                                      PROC: 2017  [facility]                                    RECV: 2017  [address]                                    RPTD: 2017  [facility], [place] [address]                      MRN:  [id number]    LOC: 5E                      ACCT: [de-identified]  SEX: F                      : 1970  AGE: 50 Y                         PATHOLOGY REPORT                      ATTN: TANNER Bills                  REQ: TANNER BENNETT        Clinical Information: LEFT INVASIVE DUCTAL CARCINOMA    FINAL DIAGNOSIS:  A. Left breast with portion of axillary contents, mastectomy:      Invasive ductal carcinoma with lobular features involving nipple      and all 4 quadrants of breast.  Maximum tumor dimension estimated      6.8 cm. pT3.  Margins negative.      3 of 9 axillary lymph nodes positive for metastatic carcinoma,      pN1a. B. Left level II axillary lymph nodes, dissection:   5 lymph nodes negative for metastatic carcinoma.       Specimen:  A) EXCISION OF BREAST, LEFT  B) LYMPH NODE(S), LEFT LEVEL 2 AXILLARY      Gross Examination:  A - The container is labeled Madeline Olvera, left breast.  Received  fresh is a modified radical mastectomy.  There are attached axillary  lymph nodes.  The specimen measures 16 x 15 x about 6 cm.  The skin  surface is tan.  There is a firm palpable nodule at the nipple.  The  deep margin is inked blue.  Sections through the specimen reveals a  well-circumscribed firm white nodule just superior to the nipple.  The  nodule grossly measures 0.9 x 0.9 x about 1.6 cm.  Additional sections  through the specimen reveal a yellow fatty cut surface. Benjamen Lipps is a  second larger mass just superior to the nipple and extending grossly  into both the upper inner and upper outer quadrants.  This mass grossly  measures 4 x 2.4 x 4 cm.  This larger mass is 1.5 cm from the deep  margin.   Just inferior to the larger mass in the lower outer quadrant  is a third area which is yellow-white and firm to palpation.  No  discrete well-circumscribed mass is grossly identified.  This area  measures 2.2 x about 1.8 x 1.5 cm and is 2 cm from the second described  larger nodule.  Tissue between these two areas is firm, possibly  representing the same lesion.  In the lower outer quadrant are  additional areas of denser fibrous tissue which are firm to palpation. No discrete additional masses.  The entire area involving the three  described lesions grossly occupy a space of approximately 6.8 x 4.5 x  about 4.5 cm.  Sections through the axillary tail reveal several firm  tentatively identified lymph nodes.  Representative sections are  submitted as follows:  #1 through #3 - nipple mass;  #4 - deep margin;  #5 through #7 - second described larger mass;  #8 - smaller lower outer  quadrant mass;  #9 - tissue between second largest mass and lower outer  quadrant mass;  #10 - tissue between largest mass and nipple lesion;  #11 - lower outer quadrant;  #12 - upper inner quadrant;  #13 - lower  inner quadrant;  #14 through #16 - lymph nodes.   ss. Fixative:  10% neutral buffered formalin  Tissue removal time: 8:56  Tissue fixation time: 9:13  Total fixation time:  34 hr 47 min    B - The container is labeled [facility], level 2 axillary node. Received fresh is a fragment of yellow fatty tissue measuring 5.5 x 3 x  1 cm.  Sections through the fragment reveal five lymph nodes.  The  nodes are submitted in two cassettes.  ss.    AMG/MICAH:iglesia    Microscopic Examination:  A. Procedure: Total mastectomy including skin and nipple.   Lymph node sampling: Attached axillary dissection (partial)  Specimen laterality: Left  Tumor site: Upper outer quadrant, upper inner quadrant, lower outer  quadrant, lower inner quadrant, and (Collected: 11/7/2017 09:42) Provider Status: Reviewed   Reviewed By [place] V[place] MD on 11/10/2017 13:31   Susan Mendieta MD on 11/10/2017 09:39   Susan Mendieta MD on 11/10/2017 09:39   Routing History     Priority Sent On From To Message Type    11/10/2017  9:39 AM MD Jani Lewis MD Results    11/9/2017 12:35 PM Evin, Ariadne Eng Incoming Lab Results From Kumar Mojica MD          Discharge condition: good  Disposition: Home  Time spent on discharge: 20 minutes    Electronically signed by Susan Mendieta MD on 11/10/2017 at 2:02 PM

## 2017-11-13 ENCOUNTER — OFFICE VISIT (OUTPATIENT)
Dept: SURGERY | Age: 47
End: 2017-11-13

## 2017-11-13 VITALS
RESPIRATION RATE: 18 BRPM | BODY MASS INDEX: 25.46 KG/M2 | OXYGEN SATURATION: 98 % | TEMPERATURE: 96 F | WEIGHT: 168 LBS | SYSTOLIC BLOOD PRESSURE: 104 MMHG | HEIGHT: 68 IN | HEART RATE: 100 BPM | DIASTOLIC BLOOD PRESSURE: 70 MMHG

## 2017-11-13 DIAGNOSIS — C50.912 INFILTRATING DUCTAL CARCINOMA OF LEFT BREAST (HCC): Primary | ICD-10-CM

## 2017-11-13 PROCEDURE — 99024 POSTOP FOLLOW-UP VISIT: CPT | Performed by: SURGERY

## 2017-11-13 NOTE — PROGRESS NOTES
Maite Diaz MD  General Surgery  Postprocedure Evaluation in Office  Pt Name: Rock Martins  Date of Birth 1970   Today's Date: 11/13/2017  Medical Record Number: [id number]  Primary Care Provider: Abena Luis MD  Chief Complaint   Patient presents with   [employer] Post-Op Check     s/p Left modified radical mastectomy-11/7/17    Drain check/removal     ASSESSMENT      1. Infiltrating ductal carcinoma of left breast (HCC)     T3 N1 aM0 invasive ductal carcinoma with lobular features involving all 4 quadrants 3 out of 14lymph nodes positive for metastatic disease     PLANS       1. Pathology reviewed with the patient who understands. All questions were answered. Requested her 2to be run on surgical specimen. Equivocal on biopsies. 2. Follow up: Return in about 2 weeks (around 11/27/2017). 3.  Drain removed  4. Patient scheduled to follow-up with medical oncology. Celeste Craft is seen today for post-op follow-up. She is status post Left modified radical mastectomy. She is tolerating a regular diet, having regular bowel movements. Symptoms and activity have gradually improved compared to preoperative. The surgical site is clean and has no drainage. Pain is fairly significant in the left axilla. I think this is probably neuropathic. She was instructed in range of motion exercises. I counseled her if it doesn't improve we will refer her to physical/occupational therapy.   Medications    Current Outpatient Prescriptions:     HYDROcodone-acetaminophen (NORCO) 5-325 MG per tablet, Take 1 tablet by mouth every 4 hours as needed for Pain ., Disp: 42 tablet, Rfl: 0    albuterol sulfate  (90 Base) MCG/ACT inhaler, Inhale 1 puff into the lungs every 6 hours as needed for Wheezing or Shortness of Breath , Disp: , Rfl:     albuterol (PROVENTIL) (2.5 MG/3ML) 0.083% nebulizer solution, Inhale 2.5 mg into the lungs every 4 hours as needed , Disp: , Rfl:     butalbital-apap-caffeine -40 MG CAPS, Take by mouth, Disp: , Rfl:     ALPRAZolam (XANAX) 0.5 MG tablet, Take 0.5 mg by mouth nightly as needed , Disp: , Rfl:     cyclobenzaprine (FLEXERIL) 5 MG tablet, Take 5 mg by mouth 3 times daily as needed , Disp: , Rfl:     DULERA 200-5 MCG/ACT inhaler, Inhale 1 puff into the lungs 2 times daily , Disp: , Rfl:     venlafaxine (EFFEXOR XR) 75 MG extended release capsule, Take 75 mg by mouth daily , Disp: , Rfl:     meloxicam (MOBIC) 15 MG tablet, Take 15 mg by mouth daily, Disp: , Rfl:   Allergies    No Known Allergies    Review of Systems  History obtained from the patient. Constitutional: Denies any fever, chills, fatigue. Has some headaches  Wound: Denies any rash, skin color changes or wound problems. Has pain in her left arm no swelling  Resp: Denies any cough, shortness of breath. CV: Denies any chest pain, orthopnea or syncope. GI: Denies any nausea, vomiting, blood in the stool, complains of constipation    OBJECTIVE     VITALS: /70 (Site: Right Arm, Position: Sitting, Cuff Size: Medium Adult)   Pulse 100   Temp 96 °F (35.6 °C) (Tympanic)   Resp 18   Ht 5' 8\" (1.727 m)   Wt 168 lb (76.2 kg)   SpO2 98%   Breastfeeding? No   BMI 25.54 kg/m²     CONSTITUTIONAL: Alert and oriented times 3, no acute distress and cooperative to examination. SKIN: Skin color, texture, turgor normal. No rashes or lesions. INCISION: wound margins intact and healing well. No signs of infection. No drainage. NECK: Soft, trachea midline and straight  BREAST: Mastectomy left No evidence of seroma or hematoma. LUNGS: Chest expands equally bilaterally upon respiration, no accessory muscle used. Ausculation reveals no wheezes, rales or rhonchi. CARDIOVASCULAR: Heart sounds are normal.  Regular rate and rhythm without murmur, gallop or rub. NEUROLOGIC: No sensory or motor nerve irritation  EXTREMITIES: no cyanosis, no clubbing and no edema. Decreased range of motion left upper extremity. quadrant, and nipple. Tumor size: 6.8 x 4.5 x 4.5 cm (estimated)  Histologic type: Invasive mammary carcinoma of no special type (ductal,  not otherwise specified) with lobular features. Histologic grade:      Glandular differentiation - score 3 (less than 10% of tumor area      forming glandular/tubular structures)      Nuclear pleomorphism-score 3      Mitotic rate-score 2      Overall grade: Grade 3  Tumor focality: Multifocal invasive carcinoma (contiguous and  noncontiguous)  Ductal carcinoma in situ: DCIS is present.  Negative for extensive  intraductal component. Margins:      Margins uninvolved by invasive carcinoma.   Distance from closest margin: 15 mm, deep.   Margins uninvolved by DCIS.   Distance from closest margin: 15 mm, deep  Lymph nodes:   Total number of lymph nodes examined (sentinel and non-sentinel)   Specimen A - 9 lymph nodes   Specimen B (left level II axillary lymph nodes)-5 lymph nodes  Number of lymph nodes with macrometastases : 3 (specimen A). Extranodal extension: Not identified    Pathologic staging:  Primary tumor:   pT3 - tumor greater than 50 mm in greatest dimension. Regional lymph nodes:      pN1a - metastases in 1-3 axillary lymph nodes, at least one  metastasis greater than 2 mm  Ancillary studies: A panel of prognostic indicators was performed on a  previous left breast biopsy at [facility] in [place]. B. 5 lymph nodes are negative for metastatic carcinoma.  These are  included in the total number of lymph nodes reported in the synoptic  with specimen A. [id number]  [id number]                                                      <Sign Out Dr. Navdeep James M.D., F.C.A.P.       [facility]/ [address] [address]  Printed on:  11/9/2017  [facility]  Minnie Trujillo, [facility] [employer]  Original print date: 11/09/2017   Lab and Collection     Surgical Pathology on 11/7/2017

## 2017-11-21 ENCOUNTER — HOSPITAL ENCOUNTER (OUTPATIENT)
Dept: INFUSION THERAPY | Age: 47
Discharge: HOME | End: 2017-11-21
Payer: COMMERCIAL

## 2017-11-21 ENCOUNTER — NURSE ONLY (OUTPATIENT)
Dept: ONCOLOGY | Age: 47
End: 2017-11-21

## 2017-11-21 ENCOUNTER — OFFICE VISIT (OUTPATIENT)
Dept: ONCOLOGY | Age: 47
End: 2017-11-21
Payer: COMMERCIAL

## 2017-11-21 VITALS
WEIGHT: 167.6 LBS | HEIGHT: 68 IN | HEART RATE: 64 BPM | RESPIRATION RATE: 18 BRPM | BODY MASS INDEX: 25.4 KG/M2 | DIASTOLIC BLOOD PRESSURE: 77 MMHG | TEMPERATURE: 97 F | SYSTOLIC BLOOD PRESSURE: 114 MMHG | OXYGEN SATURATION: 100 %

## 2017-11-21 DIAGNOSIS — Z17.0 MALIGNANT NEOPLASM OF OVERLAPPING SITES OF LEFT BREAST IN FEMALE, ESTROGEN RECEPTOR POSITIVE (HCC): ICD-10-CM

## 2017-11-21 DIAGNOSIS — C50.912 BREAST CANCER METASTASIZED TO AXILLARY LYMPH NODE, LEFT (HCC): Primary | ICD-10-CM

## 2017-11-21 DIAGNOSIS — C50.812 MALIGNANT NEOPLASM OF OVERLAPPING SITES OF LEFT BREAST IN FEMALE, ESTROGEN RECEPTOR POSITIVE (HCC): ICD-10-CM

## 2017-11-21 DIAGNOSIS — C77.3 BREAST CANCER METASTASIZED TO AXILLARY LYMPH NODE, LEFT (HCC): Primary | ICD-10-CM

## 2017-11-21 DIAGNOSIS — M79.602 LEFT ARM PAIN: ICD-10-CM

## 2017-11-21 PROCEDURE — 99215 OFFICE O/P EST HI 40 MIN: CPT | Performed by: INTERNAL MEDICINE

## 2017-11-21 PROCEDURE — 99211 OFF/OP EST MAY X REQ PHY/QHP: CPT

## 2017-11-21 RX ORDER — DIPHENHYDRAMINE HYDROCHLORIDE 50 MG/ML
50 INJECTION INTRAMUSCULAR; INTRAVENOUS ONCE
Status: CANCELLED | OUTPATIENT
Start: 2017-12-13 | End: 2017-12-13

## 2017-11-21 RX ORDER — HEPARIN SODIUM (PORCINE) LOCK FLUSH IV SOLN 100 UNIT/ML 100 UNIT/ML
500 SOLUTION INTRAVENOUS PRN
Status: CANCELLED | OUTPATIENT
Start: 2017-12-13

## 2017-11-21 RX ORDER — SODIUM CHLORIDE 9 MG/ML
INJECTION, SOLUTION INTRAVENOUS CONTINUOUS
Status: CANCELLED | OUTPATIENT
Start: 2017-12-13

## 2017-11-21 RX ORDER — TRAMADOL HYDROCHLORIDE 50 MG/1
50 TABLET ORAL EVERY 6 HOURS PRN
Qty: 60 TABLET | Refills: 0 | Status: SHIPPED | OUTPATIENT
Start: 2017-11-21 | End: 2018-01-24 | Stop reason: SDUPTHER

## 2017-11-21 RX ORDER — SODIUM CHLORIDE 9 MG/ML
INJECTION, SOLUTION INTRAVENOUS ONCE
Status: CANCELLED | OUTPATIENT
Start: 2017-12-13 | End: 2017-12-13

## 2017-11-21 RX ORDER — METHYLPREDNISOLONE SODIUM SUCCINATE 125 MG/2ML
125 INJECTION, POWDER, LYOPHILIZED, FOR SOLUTION INTRAMUSCULAR; INTRAVENOUS ONCE
Status: CANCELLED | OUTPATIENT
Start: 2017-12-13 | End: 2017-12-13

## 2017-11-21 RX ORDER — SODIUM CHLORIDE 0.9 % (FLUSH) 0.9 %
5 SYRINGE (ML) INJECTION PRN
Status: CANCELLED | OUTPATIENT
Start: 2017-12-13

## 2017-11-21 RX ORDER — SODIUM CHLORIDE 0.9 % (FLUSH) 0.9 %
10 SYRINGE (ML) INJECTION PRN
Status: CANCELLED | OUTPATIENT
Start: 2017-12-13

## 2017-11-21 RX ORDER — DOXORUBICIN HYDROCHLORIDE 2 MG/ML
60 INJECTION, SOLUTION INTRAVENOUS ONCE
Status: CANCELLED | OUTPATIENT
Start: 2017-12-13

## 2017-11-21 RX ORDER — 0.9 % SODIUM CHLORIDE 0.9 %
10 VIAL (ML) INJECTION ONCE
Status: CANCELLED | OUTPATIENT
Start: 2017-12-13 | End: 2017-12-13

## 2017-11-21 RX ORDER — PALONOSETRON 0.05 MG/ML
0.25 INJECTION, SOLUTION INTRAVENOUS ONCE
Status: CANCELLED | OUTPATIENT
Start: 2017-12-13

## 2017-11-21 ASSESSMENT — ENCOUNTER SYMPTOMS
ABDOMINAL DISTENTION: 0
COUGH: 0
FACIAL SWELLING: 0
NAUSEA: 0
SORE THROAT: 0
BACK PAIN: 1
TROUBLE SWALLOWING: 0
CONSTIPATION: 0
EYE ITCHING: 0
DIARRHEA: 0
EYE REDNESS: 0
BLOOD IN STOOL: 0
VOICE CHANGE: 0
VOMITING: 0
SHORTNESS OF BREATH: 1
CHEST TIGHTNESS: 0
COLOR CHANGE: 0
WHEEZING: 0
ABDOMINAL PAIN: 0

## 2017-11-21 NOTE — PROGRESS NOTES
[facility] PROFESSIONAL SERVS  [facility] OF [facility]  [facility] [address]  [address] [address],[address]  [address] [address]  Dept: [phone]  Dept Fax: [phone]: [phone]     Visit Date: 11/21/2017     Tea Almanza is a 50 y.o. female who presents today for:   Chief Complaint   Patient presents with    Follow-up     Breast Ca    Results     RV Scans        HPI:   Destinee Haddad is a very pleasant 64-year-old female who comes to the clinic today for evaluation of a newly diagnosed invasive ductal carcinoma of her left breast. The patient noted a mass in the upper aspect of her left breast approximately 5 months ago. She thought it was a cyst which she has had before but since it persisted, she elected to have further evaluation. She did undergo a diagnostic mammogram at [facility] in [place], [place]. It showed a 3.8 x 2.4 x 2.7 cm malignant-appearing spiculated mass within the left breast at 11:00 position. Evaluation of the left axilla with US showed suspicious appearing lymph node with increased cortical thickening and replacement of fatty hilum. On October 9, 2017, she underwent a left breast mass and left lymph node ultrasound-guided core biopsy. The final path report did show an invasive ductal carcinoma which was ER positive at 95%, AK positive at 95%, and a HER-2/breezy by IHC was 2+. The FISH test   Was still equivocal.  HER-2/CEP17 ratio was 1.7, however the average HER-2 copy number was 4. She met with the surgical oncologists and medical oncologists at the [facility], however she decided to receive care closer to her home. She has not had any significant breast problems in the past except for cysts. She began menarche at age 13 and did have a BERYL in 34. She's had 4 pregnancies with her 1st child being born at 21years of age. She does have a maternal aunt who had breast cancer at 72 and a paternal aunt who had breast cancer at 77.  The rest of her breast history is REVERSIBLE STRESS-INDUCED ISCHEMIA      SECTION  1992    CHOLECYSTECTOMY  2015    CYST REMOVAL Right 2015    rt breast [facility]-benign breast cyst     ESOPHAGUS SURGERY  2014    HYSTERECTOMY  1999    MASTECTOMY Left 2017    LEFT MODIFIED RADICAL MASTECTOMY performed by Mali Ba MD at [address] [place] [address],[address]      Family History   Problem Relation Age of Onset    Heart Disease Mother     Hypertension Mother     Diabetes Mother     Heart Disease Father     No Known Problems Sister     No Known Problems Brother       Social History   Substance Use Topics    Smoking status: Current Every Day Smoker     Packs/day: 0.50     Years: 18.00     Types: Cigarettes    Smokeless tobacco: Never Used    Alcohol use Yes      Comment: SOCAIL      Current Outpatient Prescriptions   Medication Sig Dispense Refill    traMADol (ULTRAM) 50 MG tablet Take 1 tablet by mouth every 6 hours as needed for Pain . 60 tablet 0    albuterol sulfate  (90 Base) MCG/ACT inhaler Inhale 1 puff into the lungs every 6 hours as needed for Wheezing or Shortness of Breath       albuterol (PROVENTIL) (2.5 MG/3ML) 0.083% nebulizer solution Inhale 2.5 mg into the lungs every 4 hours as needed       butalbital-apap-caffeine -40 MG CAPS Take by mouth      ALPRAZolam (XANAX) 0.5 MG tablet Take 0.5 mg by mouth nightly as needed       cyclobenzaprine (FLEXERIL) 5 MG tablet Take 5 mg by mouth 3 times daily as needed       DULERA 200-5 MCG/ACT inhaler Inhale 1 puff into the lungs 2 times daily       venlafaxine (EFFEXOR XR) 75 MG extended release capsule Take 75 mg by mouth daily       meloxicam (MOBIC) 15 MG tablet Take 15 mg by mouth daily       No current facility-administered medications for this visit.        No Known Allergies   Health Maintenance   Topic Date Due    HIV screen  1983    DTaP/Tdap/Td vaccine (1 - Tdap) 1987    Pneumococcal highest risk (1 of 3 - PCV13) 1987  Cervical cancer screen  03/06/1989    Lipid screen  03/06/2008    Flu vaccine (1) 09/01/2017        Subjective:   Review of Systems   Constitutional: Negative for activity change, appetite change, chills, fatigue, fever and unexpected weight change. HENT: Negative for dental problem, facial swelling, hearing loss, mouth sores, nosebleeds, postnasal drip, sore throat, trouble swallowing and voice change. Eyes: Negative for redness, itching and visual disturbance. Respiratory: Positive for shortness of breath. Negative for cough, chest tightness and wheezing. Cardiovascular: Negative for chest pain, palpitations and leg swelling. Gastrointestinal: Negative for abdominal distention, abdominal pain, blood in stool, constipation, diarrhea, nausea and vomiting. Genitourinary: Negative for difficulty urinating, dysuria, flank pain, frequency and hematuria. Musculoskeletal: Positive for back pain. Negative for arthralgias, gait problem, joint swelling, myalgias and neck stiffness. Skin: Negative for color change and rash. Neurological: Negative for dizziness, seizures, syncope, speech difficulty, weakness, light-headedness, numbness and headaches. Hematological: Negative for adenopathy. Does not bruise/bleed easily. Psychiatric/Behavioral: Negative for confusion and sleep disturbance. The patient is not nervous/anxious. Objective:   Physical Exam   Constitutional: She is oriented to person, place, and time. She appears well-developed and well-nourished. No distress. HENT:   Head: Normocephalic. Mouth/Throat: Oropharynx is clear and moist. No oropharyngeal exudate. Eyes: EOM are normal. Pupils are equal, round, and reactive to light. Right eye exhibits no discharge. Left eye exhibits no discharge. No scleral icterus. Neck: Normal range of motion. Neck supple. No JVD present. No tracheal deviation present. No thyromegaly present.    Cardiovascular: Normal rate and normal heart sounds. Exam reveals no gallop and no friction rub. No murmur heard. Pulmonary/Chest: Effort normal and breath sounds normal. No stridor. No respiratory distress. She has no wheezes. She has no rales. She exhibits no tenderness. Left breast exhibits mass (status post left mastectomy. Incision nicely healed). Abdominal: Soft. Bowel sounds are normal. She exhibits no distension and no mass. There is no tenderness. There is no rebound. Musculoskeletal: Normal range of motion. She exhibits no edema. Good range of motion in all four extremities. Lymphadenopathy:     She has no cervical adenopathy. Neurological: She is alert and oriented to person, place, and time. She has normal reflexes. No cranial nerve deficit. She exhibits normal muscle tone. Skin: Skin is warm. No rash noted. No erythema. Psychiatric: She has a normal mood and affect. Her behavior is normal. Judgment and thought content normal.   Vitals reviewed. /77 (Site: Right Arm, Position: Sitting, Cuff Size: Medium Adult)   Pulse 64   Temp 97 °F (36.1 °C) (Oral)   Resp 18   Ht 5' 7.99\" (1.727 m)   Wt 167 lb 9.6 oz (76 kg)   SpO2 100%   BMI 25.49 kg/m²      Imaging studies and labs:   Xr Chest Portable    Result Date: 10/5/2017  PROCEDURE: XR CHEST PORTABLE CLINICAL INFORMATION: Intermittent left-sided chest pain for 1 month. COPD. COMPARISON: CT thorax 8/23/2011 TECHNIQUE: A single mobile view of the chest was obtained. 1. Cardiac silhouette relatively small in size, consistent with a history of COPD. Small calcified granuloma left lung base, better seen on prior CT scan. 2. No acute findings. No infiltrates or effusions are seen. **This report has been created using voice recognition software. It may contain minor errors which are inherent in voice recognition technology. ** Final report electronically signed by Dr. Julia Fitch on 10/5/2017 4:34 PM    Vl Dup Lower Extremity Venous Right    Result Date: 10/5/2017  PROCEDURE: VL LOWER EXTREMITY VENOUS RIGHT CLINICAL INFORMATION: calf tenderness, attention DVT,  . COMPARISON: No prior study. TECHNIQUE: The right lower extremity deep venous system including the common femoral, profunda femoris, femoral vein, popliteal vein, anterior tibial, posterior tibial, and peroneal veins were assessed with the graded compression, gray scale, color and spectral duplex ultrasound FINDINGS: Normal compressibility augmentation and color flow in all of the deep venous system. Examination is negative for DVT **This report has been created using voice recognition software. It may contain minor errors which are inherent in voice recognition technology. ** Final report electronically signed by Dr. Doshi Pass on 10/5/2017 8:03 PM      Lab Results   Component Value Date    WBC 6.1 10/05/2017    HGB 13.0 10/05/2017    HCT 38.3 10/05/2017    MCV 97.2 10/05/2017     10/05/2017       Chemistry        Component Value Date/Time     10/05/2017 1700    K 4.4 10/05/2017 1700     10/05/2017 1700    CO2 28 10/05/2017 1700    BUN 12 10/05/2017 1700    CREATININE 0.8 10/05/2017 1700        Component Value Date/Time    CALCIUM 9.1 10/05/2017 1700            Assessment/Plan:   1. This is a 64-year-old premenopausal patient with newly diagnosed ER positive, MT positive HER-2 negative left breast cancer. She is status post left modified mastectomy with axillary lymph node dissection. Before her breast surgery the patient had CT scans that were negative for evidence of distant metastatic disease. She is diagnosed with pT3, pN1, M0 left breast cancer. I had a lengthy discussion was the patient and her significant other about stage of her breast cancer and recommended treatment. Since she had  lymph nodes involved and her tumor was pretty large, my recommendation is to proceed with adjuvant chemotherapy: 4 cycles of Adriamycin and Cytoxan followed by 12 weekly infusions of Taxol. After chemotherapy due to 3 lymph nodes involved she will benefit from postmastectomy radiation treatment. Her final step in breast cancer treatment will be adjuvant hormonal therapy. Since the patient has strong family history of breast cancer and she is diagnosed with breast cancer before age of 46 she had genetic testing that showed variant of unknown significance. The patient had echo in October 2017 that showed normal left ventricular ejection fraction. Next I discussed with the patient's schedule and possible side effects of chemotherapy. They include nausea and vomiting, risk of infection, hair loss. Long term side effects of chemotherapy with Adriamycin include risk of cardiomyopathy and secondary leukemia. The patient opted to have port placed. Referral made to a surgeon Dr. Coral Cedeno. Since she had full axillary lymph node dissection and she will proceed with radiation treatment, we're making referral to physical therapy for lymphedema assessment and prophylaxis. The patient had a hysterectomy however her ovaries were not removed. Therefore we will check her estrogen, LH and FSH on the return to clinic. The patient continues to have pain in the left arm I wrote prescription for Ultram. OARS prescription monitoring report for this patient was reviewed today. No signs of potential drug abuse or divergent fashion identified. I spent 40 minutes face-to-face with the patient and her family. Greater than 50% of time was spent on counseling and coordinating her care. Face to face counseling included prognosis, risks, benefits of treatment, compliance and risk reduction. 1. Breast cancer metastasized to axillary lymph node, left [facility])  Ambulatory referral to Physical Therapy    POC PANEL BMP W/IOCA    CBC Auto Differential    Hepatic Function Panel    [facility]   2. Malignant neoplasm of overlapping sites of left breast in female, estrogen receptor positive ([facility] [address]     3.

## 2017-11-21 NOTE — PATIENT INSTRUCTIONS
you will need antibiotics or the catheter will be removed. · Blockage or kinking of the catheter. Regular flushing of the catheter helps reduce blockage. A kinked catheter must be repositioned or replaced. · Pain. You may experience pain at the place where the catheter is inserted or where it lies under your skin. · Collapsed lung (pneumothorax). This is rare. It is most likely to happen during placement of a catheter in the chest.  · Shifting of the catheter. A catheter that has moved out of place can sometimes be repositioned. If this does not work, it must be replaced. What happens when you get a central venous catheter? Your catheter will be inserted by your doctor. Your doctor may use ultrasound to locate the blood vessel and help with the placement of the catheter. When you are in the hospital, a nursing team will take care of you and your CVC. Insertion and care of the CVC  Your nursing team will:  · Check the catheter site and dressing regularly. How often this is done depends on the situation. · Wash their hands before and after handling the catheter. · Replace the catheter when needed. · Clean or replace the catheter components when needed. Changing the dressing  Your team will:  · Use clean and proper materials for the dressing, which covers the catheter site. · Clean the insertion site and area whenever they change the dressing. · Replace the dressing when it is damp, loose, or dirty. It will be changed at least once a week. At home  If you go home with a CVC, your team will give you detailed instructions on how to care for it. In general:  · Always wash your hands before you touch your CVC. Make sure anyone who touches the catheter also washes his or her hands. · Try to keep the exit site dry. This can help prevent infection. When you shower, cover the site with waterproof material, such as plastic wrap. Be sure you cover both the exit site and the central line cap(s).   · Fasten or tape body.  Doxorubicin is used to treat different types of cancers that affect the breast, bladder, ovary, thyroid, stomach, lungs, bones, nerve tissues, muscles, joints, and soft tissues. Doxorubicin is also used to treat Hodgkin's disease and certain types of leukemia. Doxorubicin may also be used for purposes not listed in this medication guide. What should I discuss with my healthcare provider before receiving doxorubicin? You should not use this medicine if you are allergic to doxorubicin or similar medications (doxorubicin, daunorubicin, epirubicin, idarubicin, mitoxantrone), or if you have:  · an untreated or uncontrolled infection (including mouth sores);  · severe liver disease;  · severe heart problems; or  · if you have recently had a heart attack. To make sure doxorubicin is safe for you, tell your doctor if you have:  · liver or kidney disease;  · bone marrow suppression;  · heart disease or a history of heart failure; or  · if you have been treated before with doxorubicin, daunorubicin, epirubicin, idarubicin, or mitoxantrone. Tell your doctor about all other cancer medicines or radiation treatments you have received in the past.  Using doxorubicin may increase your risk of developing a bone marrow disease or other types of leukemia later in life. Ask your doctor about your specific risk. Do not use doxorubicin if you are pregnant. It could harm the unborn baby or cause birth defects. Use birth control to prevent pregnancy while you are using this medicine, whether you are a man or a woman. Doxorubicin use by either parent may cause birth defects. If you are a woman, you should avoid pregnancy while you are using this medicine and for at least 6 months after your last dose. If you are a man, use effective birth control if your sexual partner is able to get pregnant. An unborn baby can be harmed if a man fathers the child while he is using doxorubicin.  Keep using birth control for at least 6 months after your last dose. Tell your doctor right away if a pregnancy occurs while either the mother or the father is using doxorubicin. This medicine may affect fertility (your ability to have children), whether you are a man or a woman. Ask your doctor about your specific risk. Doxorubicin can pass into breast milk and may harm a nursing baby. You should not breast-feed while you are using doxorubicin. How is doxorubicin given? Doxorubicin is injected into a vein through an IV. A healthcare provider will give you this injection. Doxorubicin is sometimes given together with other cancer medications. You may be given other medications to prevent nausea, vomiting, or infections. Tell your caregivers if you feel any burning, pain, or swelling around the IV needle when doxorubicin is injected. If any of this medication accidentally gets on your skin, wash it thoroughly with soap and warm water. Doxorubicin can lower blood cells that help your body fight infections and help your blood to clot. Your blood will need to be tested often. Your cancer treatments may be delayed based on the results of these tests. What happens if I miss a dose? Call your doctor for instructions if you miss an appointment for your doxorubicin injection. What happens if I overdose? Since this medication is given by a healthcare professional in a medical setting, an overdose is unlikely to occur. What should I avoid while taking doxorubicin? Avoid being near people who are sick or have infections. Tell your doctor at once if you develop signs of infection. Avoid activities that may increase your risk of bleeding or injury. Use extra care to prevent bleeding while shaving or brushing your teeth. This medicine can pass into body fluids (urine, feces, vomit). For at least 48 hours after you receive a dose, avoid allowing your body fluids to come into contact with your hands or other surfaces.  Caregivers should wear rubber gloves while cleaning up a patient's body fluids, handling contaminated trash or laundry or changing diapers. Wash hands before and after removing gloves. Wash soiled clothing and linens separately from other laundry. Do not receive a \"live\" vaccine while using doxorubicin, or you could develop a serious infection. Live vaccines include measles, mumps, rubella (MMR), polio, rotavirus, typhoid, yellow fever, varicella (chickenpox), zoster (shingles), and nasal flu (influenza) vaccine. What are the possible side effects of doxorubicin? Get emergency medical help if you have signs of an allergic reaction: hives; difficult breathing; swelling of your face, lips, tongue, or throat. Some side effects may occur during the injection. Tell your caregiver right away if you feel dizzy, nauseated, light-headed, sweaty, or have a headache, chest tightness, back pain, trouble breathing, or swelling in your face. Call your doctor at once if you have:  · pain, blisters, or skin sores where the injection was given;  · missed menstrual periods;  · easy bruising, unusual bleeding (nose, mouth, vagina, or rectum), purple or red pinpoint spots under your skin;  · low white blood cell counts --fever, swollen gums, painful mouth sores, pain when swallowing, skin sores, cold or flu symptoms, cough, trouble breathing; or  · signs of heart problems --fast heartbeats, shortness of breath (even with mild exertion), swelling in your ankles or feet. Doxorubicin may cause your urine to turn a reddish-orange color. This side effect by itself is usually not harmful. However, call your doctor if you also have upper stomach pain, gilbert-colored stools, or jaundice (yellowing of your skin or the whites of your eyes). Common side effects may include:  · nausea, vomiting; or  · hair loss. This is not a complete list of side effects and others may occur. Call your doctor for medical advice about side effects.  You may report side effects to FDA at adverse effects. If you have questions about the drugs you are taking, check with your doctor, nurse or pharmacist.  Copyright 8536-2305 [address]: 7.06. Revision date: 12/28/2016. Care instructions adapted under license by [facility] ([facility]). If you have questions about a medical condition or this instruction, always ask your healthcare professional. [place] [address] any warranty or liability for your use of this information. Patient Education          paclitaxel  Pronunciation:  VANDANA gaviria  Brand:  Onxol  What is the most important information I should know about paclitaxel? Get emergency medical help if you have signs of an allergic reaction: hives; difficult breathing; feeling like you might pass out; swelling of your face, lips, tongue, or throat. Paclitaxel can lower blood cells that help your body fight infections and help your blood to clot. You may get an infection or bleed more easily. Call your doctor if you have unusual bruising or bleeding, or signs of infection (fever, chills, body aches). What is paclitaxel? Paclitaxel is a cancer medication that interferes with the growth and spread of cancer cells in the body. Paclitaxel is used to treat breast cancer, ovarian cancer, and lung cancer. It is also used to treat AIDS-related Kaposi's sarcoma. Paclitaxel may also be used for purposes not listed in this medication guide. What should I discuss with my healthcare provider before receiving paclitaxel? You should not receive this medication if you are allergic to paclitaxel, or to other medications that contain an ingredient called Cremophor EL (polyoxyethylated castor oil). This includes cyclosporine and teniposide. To make sure paclitaxel is safe for you, tell your doctor if you have:  · HIV, AIDS, or Kaposi's sarcoma;  · heart disease;  · high blood pressure; or  · liver disease. Do not receive paclitaxel if you are pregnant.  It could harm the unborn baby. Use effective birth control, and tell your doctor if you become pregnant during treatment. It is not known whether paclitaxel passes into breast milk or if it could harm a nursing baby. You should not breast-feed while you are using paclitaxel. How is paclitaxel given? Paclitaxel is injected into a vein through an IV. A healthcare provider will give you this injection. You may be given other medications to prevent an allergic reaction while you are receiving paclitaxel. Paclitaxel is usually given once every 3 weeks. Follow your doctor's dosing instructions very carefully. Your breathing, blood pressure, oxygen levels, kidney function, and other vital signs will be watched closely while you are receiving paclitaxel. Tell your caregivers if you feel any burning, pain, or swelling around the IV needle when paclitaxel is injected. Paclitaxel can lower blood cells that help your body fight infections and help your blood to clot. Your blood will need to be tested often. Your cancer treatments may be delayed based on the results of these tests. What happens if I miss a dose? Call your doctor for instructions if you miss an appointment for your paclitaxel injection. What happens if I overdose? Seek emergency medical attention or call the Poison Help line at 1-819.737.8965. What should I avoid while using paclitaxel? Drinking alcohol can increase certain side effects of paclitaxel. This medicine can pass into body fluids (urine, feces, vomit). For at least 48 hours after you receive a dose, avoid allowing your body fluids to come into contact with your hands or other surfaces. Caregivers should wear rubber gloves while cleaning up a patient's body fluids, handling contaminated trash or laundry or changing diapers. Wash hands before and after removing gloves. Wash soiled clothing and linens separately from other laundry. Avoid being near people who are sick or have infections.  Tell your doctor at once if you develop signs of infection. Avoid activities that may increase your risk of bleeding or injury. Use extra care to prevent bleeding while shaving or brushing your teeth. Do not receive a \"live\" vaccine while using paclitaxel. The vaccine may not work as well during this time, and may not fully protect you from disease. Live vaccines include measles, mumps, rubella (MMR), rotavirus, typhoid, yellow fever, varicella (chickenpox), zoster (shingles), and nasal flu (influenza) vaccine. What are the possible side effects of paclitaxel? Get emergency medical help if you have signs of an allergic reaction: hives; difficult breathing; feeling like you might pass out; swelling of your face, lips, tongue, or throat. Call your doctor at once if you have:  · fever, chills, body aches, flu symptoms, sores in your mouth and throat;  · pale skin, feeling light-headed or short of breath, rapid heart rate, trouble concentrating;  · easy bruising, unusual bleeding (nose, mouth, vagina, or rectum), purple or red pinpoint spots under your skin;  · flushing (warmth, redness, or tingly feeling);  · slow heart rate, feeling like you might pass out;  · seizure (convulsions);  · chest pain, dry cough, wheezing, feeling short of breath;  · numbness, tingling, or burning pain in your hands or feet;  · jaundice (yellowing of the skin or eyes); or  · severe redness or swelling, severe irritation, a hard lump, or skin changes where the injection was given. Common side effects may include:  · mild nausea, vomiting, diarrhea, constipation;  · weakness;  · joint or muscle pain;  · darkening of your skin or nails; or  · temporary hair loss. This is not a complete list of side effects and others may occur. Call your doctor for medical advice about side effects. You may report side effects to FDA at 2-919-FDA-3290. What other drugs will affect paclitaxel? Many drugs can interact with paclitaxel.  Not all possible interactions are listed patients and/or to serve consumers viewing this service as a supplement to, and not a substitute for, the expertise, skill, knowledge and judgment of healthcare practitioners. The absence of a warning for a given drug or drug combination in no way should be construed to indicate that the drug or drug combination is safe, effective or appropriate for any given patient. [facility] does not assume any responsibility for any aspect of healthcare administered with the aid of information [facility] provides. The information contained herein is not intended to cover all possible uses, directions, precautions, warnings, drug interactions, allergic reactions, or adverse effects. If you have questions about the drugs you are taking, check with your doctor, nurse or pharmacist.  Copyright 8731-6821 [address]: 4.04. Revision date: 8/4/2015. Care instructions adapted under license by [facility] ([facility]). If you have questions about a medical condition or this instruction, always ask your healthcare professional. [place] [address] any warranty or liability for your use of this information.

## 2017-11-27 ENCOUNTER — OFFICE VISIT (OUTPATIENT)
Dept: SURGERY | Age: 47
End: 2017-11-27
Payer: COMMERCIAL

## 2017-11-27 VITALS
DIASTOLIC BLOOD PRESSURE: 66 MMHG | HEIGHT: 68 IN | HEART RATE: 80 BPM | SYSTOLIC BLOOD PRESSURE: 120 MMHG | TEMPERATURE: 97.8 F | OXYGEN SATURATION: 97 % | WEIGHT: 168 LBS | RESPIRATION RATE: 16 BRPM | BODY MASS INDEX: 25.46 KG/M2

## 2017-11-27 DIAGNOSIS — Z98.890 POSTOPERATIVE STATE: ICD-10-CM

## 2017-11-27 DIAGNOSIS — C77.3 BREAST CANCER METASTASIZED TO AXILLARY LYMPH NODE, LEFT (HCC): Primary | ICD-10-CM

## 2017-11-27 DIAGNOSIS — C50.912 BREAST CANCER METASTASIZED TO AXILLARY LYMPH NODE, LEFT (HCC): Primary | ICD-10-CM

## 2017-11-27 DIAGNOSIS — N64.89 SEROMA OF BREAST: ICD-10-CM

## 2017-11-27 PROCEDURE — 19000 PUNCTURE ASPIR CYST BREAST: CPT | Performed by: SURGERY

## 2017-11-27 PROCEDURE — 99024 POSTOP FOLLOW-UP VISIT: CPT | Performed by: SURGERY

## 2017-11-27 NOTE — PATIENT INSTRUCTIONS
Patient Education        Implanted [facility] for Chemotherapy: Care Instructions  Your Care Instructions  An implanted port is a device placed, in most cases, under the skin of your chest below your collarbone. It is made of plastic, stainless steel, or titanium. The port is about the size of a quarter, but thicker. A thin, flexible tube called a catheter runs from the port into a large vein. A membrane (septum) similar to a pencil eraser is in the center of the port. A nurse uses a needle to put chemotherapy or other medicine and fluids through the septum into a blood vessel. A health professional also can take blood for tests through the port. An implanted port can be used for months. A special needle (called a Stearns needle) may stay in the port for a short time. The port and catheter need regular care to make sure they do not get blocked. Tell your doctor if you take aspirin or some other blood thinner. These medicines can increase the chance of bleeding inside your body. Follow-up care is a key part of your treatment and safety. Be sure to make and go to all appointments, and call your doctor if you are having problems. It's also a good idea to know your test results and keep a list of the medicines you take. How can you care for yourself at home? · You will probably need to take 1 day off from work and will be able return to normal activities shortly after. This depends on the type of work you do, why you have the port, and how you feel. · You probably will be able to bathe and swim. But you may need to avoid some activities if a Stearns needle is left in the port. Talk to your doctor about any limits on your activity. · Some clothes may rub the skin over the port. Do not wear a bra or suspenders that irritate your skin near the port. Do not have your blood pressure taken on the arm with the port. · You will get a medical alert card with information about your port. Carry this with you.  It will tell health care workers you have a port in case you need emergency care. · Your port will need regular flushing to keep it open. A nurse or other health professional will do this for you. When should you call for help? Call your doctor now or seek immediate medical care if:  · You have signs of infection, such as:  ¨ Increased pain, swelling, warmth, or redness near the port. ¨ Red streaks leading from the port. ¨ Pus draining from the port. ¨ A fever. · You have pain or swelling in your neck or arm. · You have trouble breathing. Watch closely for changes in your health, and be sure to contact your doctor if:  · You have any problems with your port. Where can you learn more? Go to https://Quick2LAUNCHeb.[employer]. org and sign in to your [employer] account. Enter [id number] in the [facility] box to learn more about \"Implanted [facility] PROVIDERS [facility] for Chemotherapy: Care Instructions. \"     If you do not have an account, please click on the \"Sign Up Now\" link. Current as of: March 20, 2017  Content Version: 11.3  © 8432-9584 [employer]. Care instructions adapted under license by [facility] ([facility]). If you have questions about a medical condition or this instruction, always ask your healthcare professional. [place] [address] any warranty or liability for your use of this information. Surgery scheduled 12/1/18. Arrive to [facility]'s 2nd floor registration desk at 8 am. Nothing to eat or drink after midnight. Bring a .

## 2017-11-28 ENCOUNTER — HOSPITAL ENCOUNTER (OUTPATIENT)
Age: 47
Discharge: HOME | End: 2017-11-28
Payer: COMMERCIAL

## 2017-11-28 ENCOUNTER — HOSPITAL ENCOUNTER (OUTPATIENT)
Dept: PHYSICAL THERAPY | Age: 47
Setting detail: THERAPIES SERIES
Discharge: HOME | End: 2017-11-28
Payer: COMMERCIAL

## 2017-11-28 DIAGNOSIS — C50.912 BREAST CANCER METASTASIZED TO AXILLARY LYMPH NODE, LEFT (HCC): ICD-10-CM

## 2017-11-28 DIAGNOSIS — C77.3 BREAST CANCER METASTASIZED TO AXILLARY LYMPH NODE, LEFT (HCC): ICD-10-CM

## 2017-11-28 LAB
ALBUMIN SERPL-MCNC: 4.3 G/DL (ref 3.5–5.1)
ALP BLD-CCNC: 50 U/L (ref 38–126)
ALT SERPL-CCNC: 10 U/L (ref 11–66)
ANION GAP SERPL CALCULATED.3IONS-SCNC: 17 MEQ/L (ref 8–16)
AST SERPL-CCNC: 12 U/L (ref 5–40)
BASOPHILS # BLD: 0.5 %
BASOPHILS ABSOLUTE: 0 THOU/MM3 (ref 0–0.1)
BILIRUB SERPL-MCNC: 0.2 MG/DL (ref 0.3–1.2)
BILIRUBIN DIRECT: < 0.2 MG/DL (ref 0–0.3)
BUN BLDV-MCNC: 12 MG/DL (ref 7–22)
CALCIUM SERPL-MCNC: 9.4 MG/DL (ref 8.5–10.5)
CHLORIDE BLD-SCNC: 107 MEQ/L (ref 98–111)
CO2: 26 MEQ/L (ref 23–33)
CREAT SERPL-MCNC: 0.7 MG/DL (ref 0.4–1.2)
EOSINOPHIL # BLD: 4 %
EOSINOPHILS ABSOLUTE: 0.2 THOU/MM3 (ref 0–0.4)
GFR SERPL CREATININE-BSD FRML MDRD: 89 ML/MIN/1.73M2
GLUCOSE BLD-MCNC: 83 MG/DL (ref 70–108)
HCT VFR BLD CALC: 40.1 % (ref 37–47)
HEMOGLOBIN: 13.6 GM/DL (ref 12–16)
LYMPHOCYTES # BLD: 32.9 %
LYMPHOCYTES ABSOLUTE: 1.8 THOU/MM3 (ref 1–4.8)
MCH RBC QN AUTO: 33.1 PG (ref 27–31)
MCHC RBC AUTO-ENTMCNC: 34 GM/DL (ref 33–37)
MCV RBC AUTO: 97.4 FL (ref 81–99)
MONOCYTES # BLD: 7.9 %
MONOCYTES ABSOLUTE: 0.4 THOU/MM3 (ref 0.4–1.3)
NUCLEATED RED BLOOD CELLS: 0 /100 WBC
PDW BLD-RTO: 12.7 % (ref 11.5–14.5)
PLATELET # BLD: 162 THOU/MM3 (ref 130–400)
PMV BLD AUTO: 8.9 MCM (ref 7.4–10.4)
POTASSIUM SERPL-SCNC: 4.4 MEQ/L (ref 3.5–5.2)
RBC # BLD: 4.12 MILL/MM3 (ref 4.2–5.4)
SEG NEUTROPHILS: 54.7 %
SEGMENTED NEUTROPHILS ABSOLUTE COUNT: 3 THOU/MM3 (ref 1.8–7.7)
SODIUM BLD-SCNC: 150 MEQ/L (ref 135–145)
TOTAL PROTEIN: 6.7 G/DL (ref 6.1–8)
WBC # BLD: 5.4 THOU/MM3 (ref 4.8–10.8)

## 2017-11-28 PROCEDURE — 97162 PT EVAL MOD COMPLEX 30 MIN: CPT

## 2017-11-28 PROCEDURE — 82248 BILIRUBIN DIRECT: CPT

## 2017-11-28 PROCEDURE — 85025 COMPLETE CBC W/AUTO DIFF WBC: CPT

## 2017-11-28 PROCEDURE — 36415 COLL VENOUS BLD VENIPUNCTURE: CPT

## 2017-11-28 PROCEDURE — 80053 COMPREHEN METABOLIC PANEL: CPT

## 2017-11-28 ASSESSMENT — PAIN SCALES - GENERAL: PAINLEVEL_OUTOF10: 3

## 2017-11-28 NOTE — PROGRESS NOTES
Mariann Gil MD  General Surgery  Postprocedure Evaluation in Office  Pt Name: Quin Skaggs  Date of Birth 1970   Today's Date: 11/27/2017  Medical Record Number: [id number]  Primary Care Provider: Pearl Baltazar MD  Chief Complaint   Patient presents with   [facility] Post-Op Check     s/p Left modified radical mastectomy 11/7/17-needs port      ASSESSMENT      1. Breast cancer metastasized to axillary lymph node, left ([address]    2. Postoperative state    3. Seroma of breast     T3 N1 aM0 invasive ductal carcinoma with lobular features involving all 4 quadrants 3 out of 14lymph nodes positive for metastatic disease     PLANS       1. Pathology reviewed with the patient who understands. HER-2 negative on surgical specimen  2. Schedule for port placement right sided per oncology requests for chemotherapy  3. Risks of procedure including but not limited to bleeding, infection, pneumothorax hemothorax and infection with need for removal of port were all discussed. All questions were answered. Jovita Castaneda is seen today for post-op follow-up. She is status post Left modified radical mastectomy. Discussed with the patient HER-2 negative. She's been seen by medical oncology and request was made for port placement for her chemotherapy. She had a small seroma left breast which was sterilely aspirated as well. Techniques and risks of port placement were discussed. All questions answered. Her range of motion has improved. She is being seen by the lymphedema clinic as well. She is seeing physical therapy as well. She denies any fever nor chills. She has had no redness of her wound.       Medications    Current Outpatient Prescriptions:     traMADol (ULTRAM) 50 MG tablet, Take 1 tablet by mouth every 6 hours as needed for Pain ., Disp: 60 tablet, Rfl: 0    albuterol sulfate  (90 Base) MCG/ACT inhaler, Inhale 1 puff into the lungs every 6 hours as needed for Wheezing or Shortness of Breath , Disp: , Rfl:     albuterol (PROVENTIL) (2.5 MG/3ML) 0.083% nebulizer solution, Inhale 2.5 mg into the lungs every 4 hours as needed , Disp: , Rfl:     butalbital-apap-caffeine -40 MG CAPS, Take by mouth, Disp: , Rfl:     ALPRAZolam (XANAX) 0.5 MG tablet, Take 0.5 mg by mouth nightly as needed , Disp: , Rfl:     cyclobenzaprine (FLEXERIL) 5 MG tablet, Take 5 mg by mouth 3 times daily as needed , Disp: , Rfl:     DULERA 200-5 MCG/ACT inhaler, Inhale 1 puff into the lungs 2 times daily , Disp: , Rfl:     venlafaxine (EFFEXOR XR) 75 MG extended release capsule, Take 75 mg by mouth daily , Disp: , Rfl:     meloxicam (MOBIC) 15 MG tablet, Take 15 mg by mouth daily, Disp: , Rfl:   Allergies    No Known Allergies    Review of Systems  History obtained from the patient. Constitutional: Denies any fever, chills, fatigue. Headaches have resolved. Wound: Denies any rash, skin color changes or wound problems. Pain in left arm has resolved. Resp: Denies any cough, shortness of breath. CV: Denies any chest pain, orthopnea or syncope. GI: Denies any nausea, vomiting, blood in the stool, complains of constipation    OBJECTIVE     VITALS: /66 (Site: Right Arm, Position: Sitting, Cuff Size: Medium Adult)   Pulse 80   Temp 97.8 °F (36.6 °C) (Tympanic)   Resp 16   Ht 5' 8\" (1.727 m)   Wt 168 lb (76.2 kg)   SpO2 97%   Breastfeeding? No   BMI 25.54 kg/m²     CONSTITUTIONAL: Alert and oriented times 3, no acute distress and cooperative to examination. SKIN: Skin color, texture, turgor normal. No rashes or lesions. INCISION: wound margins intact and healing well. No signs of infection. No drainage. NECK: Soft, trachea midline and straight  BREAST: Mastectomy left some seroma evident below flaps. LUNGS: Chest expands equally bilaterally upon respiration, no accessory muscle used. Ausculation reveals no wheezes, rales or rhonchi.   CARDIOVASCULAR: Heart sounds are normal.  Regular rate and rhythm without murmur, gallop or rub. NEUROLOGIC: No sensory or motor nerve irritation  EXTREMITIES: no cyanosis, no clubbing and no edema. Decreased range of motion left upper extremity. Narrative     [facility]     Yonathan Nguyen               [phone]  Assoc.                                              Page 1 of 1  [address], [place] [address]                                                      PROC: 2017  [facility]/ [facility]                                    RECV: 2017  [address]                                    RPTD: 2017  [address], [place] [address]                      MRN:  [id number]    LOC: 5E                      ACCT: [de-identified]  SEX: F                      : 1970  AGE: 50 Y                         PATHOLOGY REPORT                      ATTN: TANNER Serrano                  REQ: TANNER SABRINA        Clinical Information: LEFT INVASIVE DUCTAL CARCINOMA    FINAL DIAGNOSIS:  A. Left breast with portion of axillary contents, mastectomy:      Invasive ductal carcinoma with lobular features involving nipple      and all 4 quadrants of breast.  Maximum tumor dimension estimated      6.8 cm. pT3.  Margins negative.      3 of 9 axillary lymph nodes positive for metastatic carcinoma,      pN1a. B. Left level II axillary lymph nodes, dissection:   5 lymph nodes negative for metastatic carcinoma.       Specimen:  A) EXCISION OF BREAST, LEFT  B) LYMPH NODE(S), LEFT LEVEL 2 AXILLARY      Gross Examination:  A - The container is labeled Lula Ma, left breast.  Received  fresh is a modified radical mastectomy.  There are attached axillary  lymph nodes.  The specimen measures 16 x 15 x about 6 cm.  The skin  surface is tan.  There is a firm palpable nodule at the nipple.  The  deep margin is inked blue.  Sections through the specimen reveals a  well-circumscribed firm white nodule just superior to the nipple.  The  nodule grossly measures 0.9 x 0.9 x about 1.6 cm.  Additional Center  Printed on:  11/9/2017  [address]  Original print date: 11/09/2017   Lab and Collection     Surgical Pathology on 11/7/2017       Ambulatory Procedure Note    Patient name: Tess Larios  Medical Record Number: [id number]  Primary Care Physician: Dai Chavez MD    Date of Procedure: 11/27/2017    Pre-operative Diagnosis:Seroma left breast    Post-operative Diagnosis: Same    Procedure: Aspiration seroma left breast    Anesthesia: Local     Surgeons/Assistants: Olt    Estimated Blood Loss:2  ml    Complications: none immediately appreciated    Specimens: Clear seroma fluid disposed    Procedure: In the office, the pt was placed supine on the procedure table. Consent was given by the patient. The left breast mastectomy site was prepped and draped. Utilizing a 60 mL syringe with an 18-gauge needle. After prepping and draping. With the patient seated the seroma cavity was entered with a needle and aspirated approximately 50 mL's of clear yellow fluid. The needle was withdrawn. A Band-Aid dressing was applied. Patient tolerated the procedure well.         Sarah Cesar MD

## 2017-11-28 NOTE — PROGRESS NOTES
week. Had seroma drained yesterday and will go back on Friday to have port placed then. Reports pain extends down arm to forearm but also admits to trying to incorporate using her arm but has more pain by the end of the day. She admits to mild swelling at upper arm. Numbness reported at axilla and bicep region with pain at tricep region. She states the pain worsens with bathing, dressing, lifting and cleaning her home. Pain: Pain Assessment  Patient Currently in Pain: Yes  Pain Assessment: 0-10  Pain Level: 3 (at left arm, worst 7/10)    Social/Functional History and Current Status:  Isa Preston lives with their significant other in a multi-floor home, with the ability to perform ADL's on main floor with a level surface to enter. Task Previous Current   ADLs  Independent Modified Independent. - Pain and difficulty with washing hair and dressing   Homemaking Independent Modified Independent. - Pain and difficulty with left arm movements   Ambulation Independent Independent    Transfers Independent Independent    Driving Independent Modified Independent. 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 - Enjoys reading and spending time with grandchildren, has left arm pain with various activities   [address] [place] [address]. - Pain at left arm and guards when in the community. OBJECTIVE:   Posture: Protruded Head, Protracted Scapula, Guarding of left arm  Palpation: Tenderness reported at posterior/lateral aspect of left upper arm  Observation: No arm swing with ambulation. Mastectomy well healing with swelling evident at incision, distal and lateral to incision.     Range of Motion/Strength (Range of Motion in degrees) Right Left Comments   Shoulder Flexion PROM 158 60 R UE dominant   Shoulder ABDuction PROM 152 35    Shoulder Strength 4+/5 2-/5    Elbow Strength 5/5 3+/5    Hip Flexion AROM [facility]    Hip Strength 4+/5 4+/5    Knee Extension/Flexion AROM [facility]    Knee Strength 5/5 5/5    Ankle AROM [facility]    Ankle Strength 5/5 5/5      Circumferential Measurements: See below     Upper Extremity Circumferential Measurements    Right (cm) Left (cm) Comments   Met Heads 19 19    Ulnar Styloid Process 15.5 16.5    10 cm distal to Ulnar Styloid Process 23.5 23.5    Elbow Crease 25.5 25    15 cm proximal from Elbow Crease 33 33.5    Axilla 36 36      Reintegration to Normal Living Index   Scale: 0 (No Reintegration) <<<<>>>> 10 (Complete Reintegration)   1. I move around my living quarters as I feel necessary. 10   2. I move around my community as I feel necessary. 10   3. I am able to take trips out of town as I feel are necessary. 9   4. I am comfortable with how my self-care needs (dressing, feeding, toileting, bathing) are met. 3   5. I spend most of my days occupied in work activity that is necessary or important to me. 3   6. I am able to participate in recreational activities (hobbies, crafts, sports, reading, TV, computers, games, etc.) as I want to. 5   7. I participate in social activities with family, friends, and/or business acquaintances as is necessary or desirable to me. 5   8. I assume a role in my family which meets my needs and those of other family members. 5   9. In general, I am comfortable with my personal relationships. 10   10. In general, I am comfortable with myself when I am in the company of others. 10   11. I feel that I can deal with life events as they happen. 10   Total Score: (sum of points for all 11 items)/110 x100 73%   *The higher the score, the better the patients perceived reintegration.      Quick Dash total score 32, Disability score 47.73%      Activity Tolerance: Patient tolerated factors or comorbidities that impact plan of care - High Complexity: 3 or more personal factors or comorbidities. See history section above for details. Examination: Body structures and functions, activity limitations, participation restrictions; using standardized tests and measures - High Complexity: 4 or more body structures and functional, activity limitations and/or participation restrictions. See restrictions and objective section above for details. Clinical Presentation: Moderate - Evolving with Changing Characteristics: pain is fluctuating at times and extends distally without pattern of provocation    Decision Making: Moderate Complexity due to above reasons. Decision making was based on patient assessment and decision making process in determining plan of care and establishing reasonable expectations for measurable functional outcomes. Evaluation Complexity: Based on the findings of patient history, examination, clinical presentation, and decision making during this evaluation, the evaluation of Irma Weaver  is of medium complexity. Goals:  Patient goals : Improve left arm pain and strength, and shoulder ROM    Short term goals  Time Frame for Short term goals: 4 weeks  Short term goal 1: Pt to demo left shoulder PROM flexion improved from 60 deg to 110 deg for greater ease with grooming and bathing. Short term goal 2: Pt to demo left shoulder PROM abduction improved from 35 deg to 100 deg for greater ease with dressing. Short term goal 3: Pt to demo left shoulder strength improved from 2-/5 to 4-/5 for improved ease with completing household chores. Short term goal 4: Pt to demo left elbow strength improved from 3+/5 to 4+/5 for improved ease with carrying objects. Long term goals  Time Frame for Long term goals : 12 weeks  Long term goal 1: Pt to demo left shoulder PROM flexion improved from 60 deg to 155 deg for ease with reaching overhead.   Long term goal 2: Pt to demo

## 2017-12-01 ENCOUNTER — APPOINTMENT (OUTPATIENT)
Dept: GENERAL RADIOLOGY | Age: 47
End: 2017-12-01
Attending: SURGERY
Payer: COMMERCIAL

## 2017-12-01 ENCOUNTER — HOSPITAL ENCOUNTER (OUTPATIENT)
Age: 47
Setting detail: OUTPATIENT SURGERY
Discharge: HOME | End: 2017-12-01
Attending: SURGERY | Admitting: SURGERY
Payer: COMMERCIAL

## 2017-12-01 ENCOUNTER — ANESTHESIA EVENT (OUTPATIENT)
Dept: OPERATING ROOM | Age: 47
End: 2017-12-01
Payer: COMMERCIAL

## 2017-12-01 ENCOUNTER — ANESTHESIA (OUTPATIENT)
Dept: OPERATING ROOM | Age: 47
End: 2017-12-01
Payer: COMMERCIAL

## 2017-12-01 VITALS
OXYGEN SATURATION: 98 % | HEIGHT: 68 IN | BODY MASS INDEX: 25.13 KG/M2 | RESPIRATION RATE: 16 BRPM | DIASTOLIC BLOOD PRESSURE: 55 MMHG | SYSTOLIC BLOOD PRESSURE: 108 MMHG | HEART RATE: 76 BPM | WEIGHT: 165.8 LBS | TEMPERATURE: 97.7 F

## 2017-12-01 VITALS — SYSTOLIC BLOOD PRESSURE: 92 MMHG | DIASTOLIC BLOOD PRESSURE: 55 MMHG | TEMPERATURE: 96.8 F | OXYGEN SATURATION: 100 %

## 2017-12-01 PROCEDURE — 6360000002 HC RX W HCPCS

## 2017-12-01 PROCEDURE — 7100000010 HC PHASE II RECOVERY - FIRST 15 MIN: Performed by: SURGERY

## 2017-12-01 PROCEDURE — A6402 STERILE GAUZE <= 16 SQ IN: HCPCS | Performed by: SURGERY

## 2017-12-01 PROCEDURE — 2500000003 HC RX 250 WO HCPCS: Performed by: NURSE ANESTHETIST, CERTIFIED REGISTERED

## 2017-12-01 PROCEDURE — 2500000003 HC RX 250 WO HCPCS

## 2017-12-01 PROCEDURE — 6370000000 HC RX 637 (ALT 250 FOR IP): Performed by: SURGERY

## 2017-12-01 PROCEDURE — 3700000000 HC ANESTHESIA ATTENDED CARE: Performed by: SURGERY

## 2017-12-01 PROCEDURE — 7100000011 HC PHASE II RECOVERY - ADDTL 15 MIN: Performed by: SURGERY

## 2017-12-01 PROCEDURE — 6360000002 HC RX W HCPCS: Performed by: NURSE ANESTHETIST, CERTIFIED REGISTERED

## 2017-12-01 PROCEDURE — C1788 PORT, INDWELLING, IMP: HCPCS | Performed by: SURGERY

## 2017-12-01 PROCEDURE — 3600000002 HC SURGERY LEVEL 2 BASE: Performed by: SURGERY

## 2017-12-01 PROCEDURE — 3600000012 HC SURGERY LEVEL 2 ADDTL 15MIN: Performed by: SURGERY

## 2017-12-01 PROCEDURE — 77001 FLUOROGUIDE FOR VEIN DEVICE: CPT

## 2017-12-01 PROCEDURE — 3700000001 HC ADD 15 MINUTES (ANESTHESIA): Performed by: SURGERY

## 2017-12-01 PROCEDURE — 2580000003 HC RX 258: Performed by: SURGERY

## 2017-12-01 PROCEDURE — 77001 FLUOROGUIDE FOR VEIN DEVICE: CPT | Performed by: SURGERY

## 2017-12-01 PROCEDURE — 2500000003 HC RX 250 WO HCPCS: Performed by: SURGERY

## 2017-12-01 PROCEDURE — 2580000003 HC RX 258: Performed by: NURSE ANESTHETIST, CERTIFIED REGISTERED

## 2017-12-01 PROCEDURE — A6258 TRANSPARENT FILM >16<=48 IN: HCPCS | Performed by: SURGERY

## 2017-12-01 PROCEDURE — 36561 INSERT TUNNELED CV CATH: CPT | Performed by: SURGERY

## 2017-12-01 PROCEDURE — 6360000002 HC RX W HCPCS: Performed by: SURGERY

## 2017-12-01 PROCEDURE — 71010 XR CHEST PORTABLE: CPT

## 2017-12-01 DEVICE — PORT INFUS 8FR PLAS ATTCH OPN END POLYUR CATH SIL FILL SUT: Type: IMPLANTABLE DEVICE | Site: CHEST | Status: FUNCTIONAL

## 2017-12-01 RX ORDER — LIDOCAINE HYDROCHLORIDE 20 MG/ML
INJECTION, SOLUTION INFILTRATION; PERINEURAL PRN
Status: DISCONTINUED | OUTPATIENT
Start: 2017-12-01 | End: 2017-12-01 | Stop reason: SDUPTHER

## 2017-12-01 RX ORDER — ONDANSETRON 2 MG/ML
4 INJECTION INTRAMUSCULAR; INTRAVENOUS EVERY 6 HOURS PRN
Status: DISCONTINUED | OUTPATIENT
Start: 2017-12-01 | End: 2017-12-01 | Stop reason: HOSPADM

## 2017-12-01 RX ORDER — MIDAZOLAM HYDROCHLORIDE 1 MG/ML
INJECTION INTRAMUSCULAR; INTRAVENOUS PRN
Status: DISCONTINUED | OUTPATIENT
Start: 2017-12-01 | End: 2017-12-01 | Stop reason: SDUPTHER

## 2017-12-01 RX ORDER — CEFAZOLIN SODIUM 1 G/3ML
INJECTION, POWDER, FOR SOLUTION INTRAMUSCULAR; INTRAVENOUS PRN
Status: DISCONTINUED | OUTPATIENT
Start: 2017-12-01 | End: 2017-12-01 | Stop reason: SDUPTHER

## 2017-12-01 RX ORDER — TRAMADOL HYDROCHLORIDE 50 MG/1
50 TABLET ORAL EVERY 6 HOURS PRN
Status: DISCONTINUED | OUTPATIENT
Start: 2017-12-01 | End: 2017-12-01 | Stop reason: HOSPADM

## 2017-12-01 RX ORDER — HEPARIN SODIUM (PORCINE) LOCK FLUSH IV SOLN 100 UNIT/ML 100 UNIT/ML
SOLUTION INTRAVENOUS PRN
Status: DISCONTINUED | OUTPATIENT
Start: 2017-12-01 | End: 2017-12-01 | Stop reason: HOSPADM

## 2017-12-01 RX ORDER — LIDOCAINE HYDROCHLORIDE 10 MG/ML
INJECTION, SOLUTION EPIDURAL; INFILTRATION; INTRACAUDAL; PERINEURAL PRN
Status: DISCONTINUED | OUTPATIENT
Start: 2017-12-01 | End: 2017-12-01 | Stop reason: HOSPADM

## 2017-12-01 RX ORDER — PROPOFOL 10 MG/ML
INJECTION, EMULSION INTRAVENOUS PRN
Status: DISCONTINUED | OUTPATIENT
Start: 2017-12-01 | End: 2017-12-01 | Stop reason: SDUPTHER

## 2017-12-01 RX ORDER — SODIUM CHLORIDE 9 MG/ML
INJECTION, SOLUTION INTRAVENOUS CONTINUOUS PRN
Status: DISCONTINUED | OUTPATIENT
Start: 2017-12-01 | End: 2017-12-01 | Stop reason: SDUPTHER

## 2017-12-01 RX ORDER — ACETAMINOPHEN 325 MG/1
650 TABLET ORAL EVERY 4 HOURS PRN
Status: DISCONTINUED | OUTPATIENT
Start: 2017-12-01 | End: 2017-12-01 | Stop reason: HOSPADM

## 2017-12-01 RX ORDER — ONDANSETRON 2 MG/ML
INJECTION INTRAMUSCULAR; INTRAVENOUS PRN
Status: DISCONTINUED | OUTPATIENT
Start: 2017-12-01 | End: 2017-12-01 | Stop reason: SDUPTHER

## 2017-12-01 RX ORDER — MORPHINE SULFATE 2 MG/ML
2 INJECTION, SOLUTION INTRAMUSCULAR; INTRAVENOUS
Status: DISCONTINUED | OUTPATIENT
Start: 2017-12-01 | End: 2017-12-01 | Stop reason: HOSPADM

## 2017-12-01 RX ORDER — SODIUM CHLORIDE 0.9 % (FLUSH) 0.9 %
10 SYRINGE (ML) INJECTION PRN
Status: DISCONTINUED | OUTPATIENT
Start: 2017-12-01 | End: 2017-12-01 | Stop reason: HOSPADM

## 2017-12-01 RX ORDER — SODIUM CHLORIDE 0.9 % (FLUSH) 0.9 %
10 SYRINGE (ML) INJECTION EVERY 12 HOURS SCHEDULED
Status: DISCONTINUED | OUTPATIENT
Start: 2017-12-01 | End: 2017-12-01 | Stop reason: HOSPADM

## 2017-12-01 RX ORDER — PROPOFOL 10 MG/ML
INJECTION, EMULSION INTRAVENOUS CONTINUOUS PRN
Status: DISCONTINUED | OUTPATIENT
Start: 2017-12-01 | End: 2017-12-01 | Stop reason: SDUPTHER

## 2017-12-01 RX ORDER — ACETAMINOPHEN 650 MG/1
650 SUPPOSITORY RECTAL EVERY 4 HOURS PRN
Status: DISCONTINUED | OUTPATIENT
Start: 2017-12-01 | End: 2017-12-01 | Stop reason: HOSPADM

## 2017-12-01 RX ORDER — TRAMADOL HYDROCHLORIDE 50 MG/1
100 TABLET ORAL EVERY 6 HOURS PRN
Status: DISCONTINUED | OUTPATIENT
Start: 2017-12-01 | End: 2017-12-01 | Stop reason: HOSPADM

## 2017-12-01 RX ORDER — FENTANYL CITRATE 50 UG/ML
INJECTION, SOLUTION INTRAMUSCULAR; INTRAVENOUS PRN
Status: DISCONTINUED | OUTPATIENT
Start: 2017-12-01 | End: 2017-12-01 | Stop reason: SDUPTHER

## 2017-12-01 RX ORDER — SODIUM CHLORIDE 9 MG/ML
INJECTION, SOLUTION INTRAVENOUS ONCE
Status: COMPLETED | OUTPATIENT
Start: 2017-12-01 | End: 2017-12-01

## 2017-12-01 RX ORDER — SODIUM CHLORIDE 9 MG/ML
INJECTION, SOLUTION INTRAVENOUS CONTINUOUS
Status: DISCONTINUED | OUTPATIENT
Start: 2017-12-01 | End: 2017-12-01 | Stop reason: HOSPADM

## 2017-12-01 RX ADMIN — PROPOFOL 20 MG: 10 INJECTION, EMULSION INTRAVENOUS at 10:55

## 2017-12-01 RX ADMIN — FENTANYL CITRATE 50 MCG: 50 INJECTION INTRAMUSCULAR; INTRAVENOUS at 10:55

## 2017-12-01 RX ADMIN — PROPOFOL 120 MCG/KG/MIN: 10 INJECTION, EMULSION INTRAVENOUS at 10:52

## 2017-12-01 RX ADMIN — LIDOCAINE HYDROCHLORIDE 60 MG: 20 INJECTION, SOLUTION INFILTRATION; PERINEURAL at 10:52

## 2017-12-01 RX ADMIN — MIDAZOLAM HYDROCHLORIDE 2 MG: 1 INJECTION, SOLUTION INTRAMUSCULAR; INTRAVENOUS at 10:47

## 2017-12-01 RX ADMIN — SODIUM CHLORIDE: 9 INJECTION, SOLUTION INTRAVENOUS at 10:45

## 2017-12-01 RX ADMIN — SODIUM CHLORIDE: 9 INJECTION, SOLUTION INTRAVENOUS at 08:52

## 2017-12-01 RX ADMIN — TRAMADOL HYDROCHLORIDE 50 MG: 50 TABLET, FILM COATED ORAL at 12:21

## 2017-12-01 RX ADMIN — PROPOFOL 20 MG: 10 INJECTION, EMULSION INTRAVENOUS at 10:53

## 2017-12-01 RX ADMIN — ONDANSETRON 4 MG: 2 INJECTION INTRAMUSCULAR; INTRAVENOUS at 11:10

## 2017-12-01 RX ADMIN — FENTANYL CITRATE 50 MCG: 50 INJECTION INTRAMUSCULAR; INTRAVENOUS at 10:50

## 2017-12-01 RX ADMIN — CEFAZOLIN 1000 MG: 1 INJECTION, POWDER, FOR SOLUTION INTRAMUSCULAR; INTRAVENOUS; PARENTERAL at 11:05

## 2017-12-01 RX ADMIN — PROPOFOL 20 MG: 10 INJECTION, EMULSION INTRAVENOUS at 10:52

## 2017-12-01 RX ADMIN — SODIUM CHLORIDE: 9 INJECTION, SOLUTION INTRAVENOUS at 11:08

## 2017-12-01 ASSESSMENT — PULMONARY FUNCTION TESTS
PIF_VALUE: 0
PIF_VALUE: 1
PIF_VALUE: 0

## 2017-12-01 ASSESSMENT — ENCOUNTER SYMPTOMS: SHORTNESS OF BREATH: 1

## 2017-12-01 ASSESSMENT — PAIN - FUNCTIONAL ASSESSMENT: PAIN_FUNCTIONAL_ASSESSMENT: 0-10

## 2017-12-01 ASSESSMENT — PAIN SCALES - GENERAL
PAINLEVEL_OUTOF10: 5
PAINLEVEL_OUTOF10: 3
PAINLEVEL_OUTOF10: 0

## 2017-12-01 ASSESSMENT — LIFESTYLE VARIABLES: SMOKING_STATUS: 1

## 2017-12-01 NOTE — PROGRESS NOTES
Patient ambulated to the bathroom x 1 assist.  Able to void. Back to the room. Dr Sarmiento Holes in to see the patient. Also spoke with the spouse regarding healing time for the patient and showering instructions.

## 2017-12-01 NOTE — OP NOTE
introducer  sheath placed over-the-top of the guidewire, again in the superior vena cava  via fluoroscopic guidance. The guidewire and dilator were then removed. Flushed catheter was placed down through the center of the introducer again in the  superior vena cava via fluoroscopic guidance. The sheath was then removed. After infiltrating with local anesthesia a pocket was fashioned on the right anterior chest wall after skin incision was made with #15 scalpel blade. The catheter was tunneled into the pocket, cut to appropriate length and attached to the port with the provided flange. The port was secured to the chest wall with 2-0 vicryl suture. Both ports aspirated, flushed appropriately. The skin incisions were then closed using 4-0 Vicryl suture. The wound was then cleansed, sterile dressings were applied. The patient was brought out of sedation. The patient tolerated the procedure well without any immediate  complications evident. All sponge, instrument and needle counts were correct  at the end of the procedure. Postprocedure chest x-ray reveals was obtained  . Postop findings were discussed with the patient's family. she was given  discharge instructions. She already had prescriptions for analgesics. she  will follow up in my office in a 2 week period of time for evaluation.         Key Holliday MD  Electronically signed 12/1/2017 at 11:26 AM

## 2017-12-01 NOTE — PROGRESS NOTES
3013:  Patient admitted to [facility] room 4. Boyfriend at bedside. Arm bands applied. Bilat. Socks, SCD's applied. Call light in reach.

## 2017-12-01 NOTE — ANESTHESIA POSTPROCEDURE EVALUATION
Department of Anesthesiology  Postprocedure Note    Patient: Jessika Robb  MRN: [id number]  YOB: 1970  Date of evaluation: 12/1/2017  Time:  3:42 PM     Procedure Summary     Date:  12/01/17 Room / Location:  [facility] 01 / [facility]    Anesthesia Start:  1050 Anesthesia Stop:  1134    Procedure:  RIGHT SIDE SINGLE LUMEN POWERPORT INSERTION (Right Chest) Diagnosis:  (LEFT BREAST CANCER)    Surgeon:  Norma Perez MD Responsible Provider:  Bethanie Steen DO    Anesthesia Type:  MAC ASA Status:  3          Anesthesia Type: No value filed. Malcolm Phase I: Malcolm Score: 10    Malcolm Phase II: Malcolm Score: 10    Last vitals: Reviewed and per EMR flowsheets.        Anesthesia Post Evaluation    Patient location during evaluation: bedside  Patient participation: complete - patient participated  Level of consciousness: awake and alert  Pain score: 0  Airway patency: patent  Nausea & Vomiting: no nausea and no vomiting  Complications: no  Cardiovascular status: hemodynamically stable and blood pressure returned to baseline  Respiratory status: spontaneous ventilation, room air and acceptable  Hydration status: stable

## 2017-12-01 NOTE — ANESTHESIA PRE PROCEDURE
2 mg at 17 1047    fentaNYL (SUBLIMAZE) injection    PRN Rodney Crow, CRNA   50 mcg at 17 1055    propofol injection    PRN Rodney Crow, CRNA   20 mg at 17 1055    lidocaine 2 % injection    PRN Rodney Crow, CRNA   60 mg at 17 1052    propofol injection    Continuous PRN Rodney Crow, CRNA 54.1 mL/hr at 17 1052 120 mcg/kg/min at 17 1052    0.9 % sodium chloride infusion    Continuous PRN [place] Crow, CRNA 100 mL/hr at 17 1108      ondansetron (ZOFRAN) injection    PRN Rodney Crow, CRNA   4 mg at 17 1110       Allergies:  No Known Allergies    Problem List:    Patient Active Problem List   Diagnosis Code    Asthma J45.909    COPD with asthma ([facility] J44.9    Osteoarthritis M19.90    Depression F32.9    History of chest pain Z87.898    Pulmonary emphysema ([facility] J43.9    Acid reflux K21.9    History of tinnitus Z86.69    Depression F32.9    Anxiety F41.9    Joint pain M25.50    Numbness and tingling R20.0, R20.2    Light headed R42    Breast cancer metastasized to axillary lymph node, left ([facility] C50.912, C77.3       Past Medical History:        Diagnosis Date    Acid reflux     Anxiety     Asthma     Breast cancer ([facility]     COPD with asthma ([facility]     Depression     Depression     Dizziness - light-headed     HX OF:    Emphysema     History of chest pain     History of tinnitus     Hx of blood clots     Joint pain     Numbness and tingling     Osteoarthritis     PONV (postoperative nausea and vomiting)     SOB (shortness of breath) on exertion        Past Surgical History:        Procedure Laterality Date    BLADDER SURGERY  2014    BREAST BIOPSY  10/06/2017    [facility] CATHETERIZATION  2011    NO EVIDENCE OF PULMONARY HTN,NO EVIDENCE OF DD,NORMAL LVF    CARDIOVASCULAR STRESS TEST  2011    EF 60% MILD INFERIOR WALL REVERSIBLE STRESS-INDUCED ISCHEMIA      SECTION      PROT 6.7 11/28/2017    CALCIUM 9.4 11/28/2017    BILITOT 0.2 11/28/2017    ALKPHOS 50 11/28/2017    AST 12 11/28/2017    ALT 10 11/28/2017       POC Tests: No results for input(s): POCGLU, POCNA, POCK, POCCL, POCBUN, POCHEMO, POCHCT in the last 72 hours. Coags: No results found for: PROTIME, INR, APTT    HCG (If Applicable): No results found for: PREGTESTUR, PREGSERUM, HCG, HCGQUANT     ABGs: No results found for: PHART, PO2ART, HLM6UPG, YAE5CTR, BEART, Z4POOYGY     Type & Screen (If Applicable):  No results found for: LABABO, [address]    Anesthesia Evaluation  Patient summary reviewed and Nursing notes reviewed   history of anesthetic complications: PONV. Airway: Mallampati: II  TM distance: >3 FB   Neck ROM: full  Mouth opening: > = 3 FB Dental:          Pulmonary:   (+) COPD:  shortness of breath: chronic,  decreased breath sounds,  asthma: current smoker          Patient did not smoke on day of surgery. Cardiovascular:  Exercise tolerance: good (>4 METS),   (+) HERNANDEZ:,                   Neuro/Psych:   (+) psychiatric history:            GI/Hepatic/Renal:   (+) GERD:,           Endo/Other:              Pt had no PAT visit        ROS comment: Breast cancer Abdominal:           Vascular: negative vascular ROS. Anesthesia Plan      MAC     ASA 3       Induction: intravenous. MIPS: Postoperative opioids intended and Prophylactic antiemetics administered. Anesthetic plan and risks discussed with patient and spouse. Plan discussed with CRNA.                   Andrés Keane DO   12/1/2017

## 2017-12-05 ENCOUNTER — HOSPITAL ENCOUNTER (OUTPATIENT)
Dept: PHYSICAL THERAPY | Age: 47
Setting detail: THERAPIES SERIES
Discharge: HOME | End: 2017-12-05
Payer: COMMERCIAL

## 2017-12-07 ENCOUNTER — HOSPITAL ENCOUNTER (OUTPATIENT)
Dept: INFUSION THERAPY | Age: 47
Discharge: HOME | End: 2017-12-07
Payer: COMMERCIAL

## 2017-12-07 ENCOUNTER — HOSPITAL ENCOUNTER (OUTPATIENT)
Dept: INFUSION THERAPY | Age: 47
Discharge: HOME | End: 2017-12-07

## 2017-12-07 ENCOUNTER — HOSPITAL ENCOUNTER (OUTPATIENT)
Dept: PHYSICAL THERAPY | Age: 47
Setting detail: THERAPIES SERIES
Discharge: HOME | End: 2017-12-07
Payer: COMMERCIAL

## 2017-12-07 PROCEDURE — 97140 MANUAL THERAPY 1/> REGIONS: CPT

## 2017-12-07 PROCEDURE — 99212 OFFICE O/P EST SF 10 MIN: CPT

## 2017-12-07 PROCEDURE — 97110 THERAPEUTIC EXERCISES: CPT

## 2017-12-07 ASSESSMENT — PAIN SCALES - GENERAL: PAINLEVEL_OUTOF10: 2

## 2017-12-07 NOTE — PROGRESS NOTES
tolerable left shoulder range of motion. Swelling improved by end of session. Patient Education/Home Exercise Program:  Pendulums, rolls, retractions and table slides 2x per day in pain free range. To perform desensitization activities to left posterior upper arm for nerve pain. Response to Treatment:  Patient tolerated treatment well    Progression Toward Functional Goals: Progressing toward goals    Specific Interventions for Next Treatment:  Manual therapy to left arm and chest wall for lymphatic drainage and myofascial release (utilizing PORi protocol), gentle left shoulder stretches progressing to gentle strengthening as tolerated. Lymphedema education and precautions. Plan: Continue established plan of care    Goals:  Short term goals  Time Frame for Short term goals: 4 weeks  Short term goal 1: Pt to demo left shoulder PROM flexion improved from 60 deg to 110 deg for greater ease with grooming and bathing. Short term goal 2: Pt to demo left shoulder PROM abduction improved from 35 deg to 100 deg for greater ease with dressing. Short term goal 3: Pt to demo left shoulder strength improved from 2-/5 to 4-/5 for improved ease with completing household chores. Short term goal 4: Pt to demo left elbow strength improved from 3+/5 to 4+/5 for improved ease with carrying objects. Long term goals  Time Frame for Long term goals : 12 weeks  Long term goal 1: Pt to demo left shoulder PROM flexion improved from 60 deg to 155 deg for ease with reaching overhead. Long term goal 2: Pt to demo left shoulder PROM abduction improved from 35 deg to 150 deg for ease with dressing and grooming. Long term goal 3: Pt to demo left shoulder strength improved from 2-/5 to 4+/5 for full return to work and household chores. 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   Long term goal 5: Pt to demo sustained left arm girth measurments with return to previous level of activities with good understanding of lymphedema risk factors and precautions.          Smooth Cruz, DPT, [place] [id number] 12/7/2017

## 2017-12-07 NOTE — PLAN OF CARE
Problem: Intellectual/Education/Knowledge Deficit  Intervention: Verbal/written education provided  Chemotherapy Teaching     What is Chemotherapy   Drug action [x]   Method of Administration [x]   Handouts given []     Side Effects  Nausea/vomiting [x]   Diarrhea [x]   Fatigue [x]   Signs / Symptoms of infection [x]   Neutropenia [x]   Thrombocytopenia [x]   Alopecia [x]   neuropathy [x]   [place] diet &  the importance of fluids [x]       Micellaneous  Importance of nutrition [x]   Importance of oral hygiene [x]   When to call the MD [x]   Monitoring labs [x]   Use of supportive services []     Explanation of Drug Regimen / Frequency  Adriamycin, cytoxan and taxol     Comments  Verbalized understanding to drug,action,side effects and when to call MD       Goal: Teaching initiated upon admission  Outcome: Met This Shift  Patient verbalizes understanding to verbal information given on taxol, cytoxan and adriamycin,action and possible side effects. Aware to call MD if develop complications. Problem: Discharge Planning  Intervention: Interaction with patient/family and care team  Discuss discharge instructions, follow ups and when to call doctor. Goal: Knowledge of discharge instructions  Knowledge of discharge instructions    Outcome: Met This Shift  Verbalized understanding of discharge instructions, follow ups and when to call doctor    Comments: Care plan reviewed with patient. Patient verbalized understanding of the plan of care and contribute to goal setting.

## 2017-12-07 NOTE — PROGRESS NOTES
Routine Distress Screening  [facility] Outpatient Oncology distress screening measure administered on 17    Patient Assessment  Overall Distress Today on 0-10 scale:  Patient reports a \"3\"  Overall Distress past week on 0-10 scale: Patient indicates a \"4\"    Practical Issues:  Some financial issues related to not knowing how much of her treatment bills she will be responsible for after her insurance covers. Family Issues:  None discussed    Emotional Issues:  Patient's son  at the age of 34; the anniversary of his death coincides with the holidays. Patient also has a nephew who committed suicide 2 years ago during the holidays. Patient acknowledges her grief related to these events. Patient reports depression is at a \"2\" today and anxiety is at a \"4\"    Spiritual Concerns:  No concerns    Physical Issues: Patient continues to receive physical therapy    Additional Concerns: None    Intervention Plan:  Processed difficult feelings with patient. Provided patient with cancer resource guide and SW contact information. Contact information for [facility] for financial and emotional support also given. Will follow-up with patient after treatment begins.           Manasa Burch MSW,LAZARO

## 2017-12-12 ENCOUNTER — HOSPITAL ENCOUNTER (OUTPATIENT)
Dept: PHYSICAL THERAPY | Age: 47
Setting detail: THERAPIES SERIES
Discharge: HOME | End: 2017-12-12
Payer: COMMERCIAL

## 2017-12-12 PROCEDURE — 97140 MANUAL THERAPY 1/> REGIONS: CPT

## 2017-12-12 ASSESSMENT — PAIN SCALES - GENERAL: PAINLEVEL_OUTOF10: 3

## 2017-12-13 ENCOUNTER — HOSPITAL ENCOUNTER (OUTPATIENT)
Dept: INFUSION THERAPY | Age: 47
Discharge: HOME | End: 2017-12-13
Payer: COMMERCIAL

## 2017-12-13 ENCOUNTER — OFFICE VISIT (OUTPATIENT)
Dept: ONCOLOGY | Age: 47
End: 2017-12-13
Payer: COMMERCIAL

## 2017-12-13 VITALS
TEMPERATURE: 97.7 F | RESPIRATION RATE: 16 BRPM | SYSTOLIC BLOOD PRESSURE: 112 MMHG | DIASTOLIC BLOOD PRESSURE: 58 MMHG | OXYGEN SATURATION: 100 % | HEART RATE: 75 BPM

## 2017-12-13 VITALS
HEART RATE: 65 BPM | TEMPERATURE: 98.5 F | HEIGHT: 68 IN | OXYGEN SATURATION: 100 % | BODY MASS INDEX: 25.64 KG/M2 | DIASTOLIC BLOOD PRESSURE: 68 MMHG | SYSTOLIC BLOOD PRESSURE: 108 MMHG | WEIGHT: 169.2 LBS | RESPIRATION RATE: 16 BRPM

## 2017-12-13 DIAGNOSIS — Z17.0 MALIGNANT NEOPLASM OF LEFT BREAST IN FEMALE, ESTROGEN RECEPTOR POSITIVE, UNSPECIFIED SITE OF BREAST (HCC): ICD-10-CM

## 2017-12-13 DIAGNOSIS — Z51.11 CHEMOTHERAPY MANAGEMENT, ENCOUNTER FOR: ICD-10-CM

## 2017-12-13 DIAGNOSIS — C50.912 MALIGNANT NEOPLASM OF LEFT BREAST IN FEMALE, ESTROGEN RECEPTOR POSITIVE, UNSPECIFIED SITE OF BREAST (HCC): ICD-10-CM

## 2017-12-13 DIAGNOSIS — C50.912 BREAST CANCER METASTASIZED TO AXILLARY LYMPH NODE, LEFT (HCC): ICD-10-CM

## 2017-12-13 DIAGNOSIS — C77.3 BREAST CANCER METASTASIZED TO AXILLARY LYMPH NODE, LEFT (HCC): Primary | ICD-10-CM

## 2017-12-13 DIAGNOSIS — C77.3 BREAST CANCER METASTASIZED TO AXILLARY LYMPH NODE, LEFT (HCC): ICD-10-CM

## 2017-12-13 DIAGNOSIS — C50.912 BREAST CANCER METASTASIZED TO AXILLARY LYMPH NODE, LEFT (HCC): Primary | ICD-10-CM

## 2017-12-13 DIAGNOSIS — Z90.12 STATUS POST MASTECTOMY, LEFT: ICD-10-CM

## 2017-12-13 LAB
ALBUMIN SERPL-MCNC: 3.9 G/DL (ref 3.5–5.1)
ALP BLD-CCNC: 49 U/L (ref 38–126)
ALT SERPL-CCNC: 13 U/L (ref 11–66)
AST SERPL-CCNC: 12 U/L (ref 5–40)
BASINOPHIL, AUTOMATED: 0 % (ref 0–12)
BILIRUB SERPL-MCNC: 0.3 MG/DL (ref 0.3–1.2)
BILIRUBIN DIRECT: < 0.2 MG/DL (ref 0–0.3)
BUN, WHOLE BLOOD: 17 MG/DL (ref 8–26)
CHLORIDE, WHOLE BLOOD: 106 MEQ/L (ref 98–109)
CREATININE, WHOLE BLOOD: 0.7 MG/DL (ref 0.5–1.2)
EOSINOPHILS RELATIVE PERCENT: 4 % (ref 0–12)
GFR, ESTIMATED: > 90 ML/MIN/1.73M2
GLUCOSE, WHOLE BLOOD: 96 MG/DL (ref 70–108)
HCT VFR BLD CALC: 36.8 % (ref 37–47)
HEMOGLOBIN: 12.5 GM/DL (ref 12–16)
IONIZED CALCIUM, WHOLE BLOOD: 1.16 MMOL/L (ref 1.12–1.32)
LYMPHOCYTES # BLD: 29 % (ref 15–47)
MCH RBC QN AUTO: 33 PG (ref 27–31)
MCHC RBC AUTO-ENTMCNC: 33.9 GM/DL (ref 33–37)
MCV RBC AUTO: 97 FL (ref 81–99)
MONOCYTES: 7 % (ref 0–12)
PDW BLD-RTO: 10.6 % (ref 11.5–14.5)
PLATELET # BLD: 158 THOU/MM3 (ref 130–400)
PMV BLD AUTO: 8.5 MCM (ref 7.4–10.4)
POTASSIUM, WHOLE BLOOD: 3.9 MEQ/L (ref 3.5–4.9)
RBC # BLD: 3.78 MILL/MM3 (ref 4.2–5.4)
SEG NEUTROPHILS: 60 % (ref 43–75)
SODIUM, WHOLE BLOOD: 142 MEQ/L (ref 138–146)
TOTAL CO2, WHOLE BLOOD: 24 MEQ/L (ref 23–33)
TOTAL PROTEIN: 6.1 G/DL (ref 6.1–8)
WBC # BLD: 4.8 THOU/MM3 (ref 4.8–10.8)

## 2017-12-13 PROCEDURE — 96375 TX/PRO/DX INJ NEW DRUG ADDON: CPT

## 2017-12-13 PROCEDURE — 85025 COMPLETE CBC W/AUTO DIFF WBC: CPT

## 2017-12-13 PROCEDURE — 99215 OFFICE O/P EST HI 40 MIN: CPT | Performed by: INTERNAL MEDICINE

## 2017-12-13 PROCEDURE — G0463 HOSPITAL OUTPT CLINIC VISIT: HCPCS

## 2017-12-13 PROCEDURE — 96413 CHEMO IV INFUSION 1 HR: CPT

## 2017-12-13 PROCEDURE — 80076 HEPATIC FUNCTION PANEL: CPT

## 2017-12-13 PROCEDURE — 2580000003 HC RX 258: Performed by: INTERNAL MEDICINE

## 2017-12-13 PROCEDURE — 80047 BASIC METABLC PNL IONIZED CA: CPT

## 2017-12-13 PROCEDURE — 96367 TX/PROPH/DG ADDL SEQ IV INF: CPT

## 2017-12-13 PROCEDURE — 96411 CHEMO IV PUSH ADDL DRUG: CPT

## 2017-12-13 PROCEDURE — 36591 DRAW BLOOD OFF VENOUS DEVICE: CPT

## 2017-12-13 PROCEDURE — 6360000002 HC RX W HCPCS: Performed by: INTERNAL MEDICINE

## 2017-12-13 RX ORDER — LIDOCAINE AND PRILOCAINE 25; 25 MG/G; MG/G
CREAM TOPICAL
Qty: 30 G | Refills: 1 | Status: SHIPPED | OUTPATIENT
Start: 2017-12-13 | End: 2019-09-05 | Stop reason: ALTCHOICE

## 2017-12-13 RX ORDER — DEXAMETHASONE 4 MG/1
8 TABLET ORAL
Qty: 6 TABLET | Refills: 3 | Status: SHIPPED | OUTPATIENT
Start: 2017-12-14 | End: 2018-02-14 | Stop reason: SDUPTHER

## 2017-12-13 RX ORDER — SODIUM CHLORIDE 9 MG/ML
INJECTION, SOLUTION INTRAVENOUS ONCE
Status: COMPLETED | OUTPATIENT
Start: 2017-12-13 | End: 2017-12-13

## 2017-12-13 RX ORDER — SODIUM CHLORIDE 0.9 % (FLUSH) 0.9 %
10 SYRINGE (ML) INJECTION PRN
Status: DISCONTINUED | OUTPATIENT
Start: 2017-12-13 | End: 2017-12-14 | Stop reason: HOSPADM

## 2017-12-13 RX ORDER — SODIUM CHLORIDE 0.9 % (FLUSH) 0.9 %
20 SYRINGE (ML) INJECTION PRN
Status: CANCELLED | OUTPATIENT
Start: 2017-12-13

## 2017-12-13 RX ORDER — DOXORUBICIN HYDROCHLORIDE 2 MG/ML
60 INJECTION, SOLUTION INTRAVENOUS ONCE
Status: COMPLETED | OUTPATIENT
Start: 2017-12-13 | End: 2017-12-13

## 2017-12-13 RX ORDER — HEPARIN SODIUM (PORCINE) LOCK FLUSH IV SOLN 100 UNIT/ML 100 UNIT/ML
500 SOLUTION INTRAVENOUS PRN
Status: CANCELLED | OUTPATIENT
Start: 2017-12-13

## 2017-12-13 RX ORDER — HEPARIN SODIUM (PORCINE) LOCK FLUSH IV SOLN 100 UNIT/ML 100 UNIT/ML
500 SOLUTION INTRAVENOUS PRN
Status: DISCONTINUED | OUTPATIENT
Start: 2017-12-13 | End: 2017-12-14 | Stop reason: HOSPADM

## 2017-12-13 RX ORDER — SODIUM CHLORIDE 0.9 % (FLUSH) 0.9 %
10 SYRINGE (ML) INJECTION PRN
Status: CANCELLED | OUTPATIENT
Start: 2017-12-13

## 2017-12-13 RX ORDER — PALONOSETRON 0.05 MG/ML
0.25 INJECTION, SOLUTION INTRAVENOUS ONCE
Status: COMPLETED | OUTPATIENT
Start: 2017-12-13 | End: 2017-12-13

## 2017-12-13 RX ORDER — PROCHLORPERAZINE MALEATE 10 MG
10 TABLET ORAL EVERY 6 HOURS PRN
Qty: 30 TABLET | Refills: 1 | Status: SHIPPED | OUTPATIENT
Start: 2017-12-13 | End: 2018-01-05 | Stop reason: SDUPTHER

## 2017-12-13 RX ADMIN — Medication 10 ML: at 12:44

## 2017-12-13 RX ADMIN — CYCLOPHOSPHAMIDE 1140 MG: 1 INJECTION, POWDER, FOR SOLUTION INTRAVENOUS; ORAL at 11:45

## 2017-12-13 RX ADMIN — DEXAMETHASONE SODIUM PHOSPHATE 12 MG: 4 INJECTION, SOLUTION INTRAMUSCULAR; INTRAVENOUS at 11:20

## 2017-12-13 RX ADMIN — DOXORUBICIN HYDROCHLORIDE 114 MG: 2 INJECTION, SOLUTION INTRAVENOUS at 11:30

## 2017-12-13 RX ADMIN — SODIUM CHLORIDE 150 MG: 9 INJECTION, SOLUTION INTRAVENOUS at 10:30

## 2017-12-13 RX ADMIN — PALONOSETRON HYDROCHLORIDE 0.25 MG: 0.25 INJECTION INTRAVENOUS at 11:26

## 2017-12-13 RX ADMIN — Medication 10 ML: at 08:50

## 2017-12-13 RX ADMIN — SODIUM CHLORIDE: 900 INJECTION, SOLUTION INTRAVENOUS at 10:25

## 2017-12-13 RX ADMIN — Medication 10 ML: at 08:51

## 2017-12-13 RX ADMIN — Medication 500 UNITS: at 12:44

## 2017-12-13 ASSESSMENT — ENCOUNTER SYMPTOMS
NAUSEA: 0
SORE THROAT: 0
CHEST TIGHTNESS: 0
FACIAL SWELLING: 0
EYE REDNESS: 0
VOICE CHANGE: 0
SHORTNESS OF BREATH: 1
BLOOD IN STOOL: 0
BACK PAIN: 1
EYE ITCHING: 0
WHEEZING: 0
DIARRHEA: 0
ABDOMINAL DISTENTION: 0
ABDOMINAL PAIN: 0
VOMITING: 0
TROUBLE SWALLOWING: 0
CONSTIPATION: 0
COUGH: 0
COLOR CHANGE: 0

## 2017-12-13 ASSESSMENT — PAIN DESCRIPTION - LOCATION: LOCATION: CHEST

## 2017-12-13 ASSESSMENT — PAIN DESCRIPTION - PAIN TYPE: TYPE: SURGICAL PAIN

## 2017-12-13 ASSESSMENT — PAIN SCALES - GENERAL: PAINLEVEL_OUTOF10: 2

## 2017-12-13 ASSESSMENT — PAIN DESCRIPTION - DESCRIPTORS: DESCRIPTORS: ACHING

## 2017-12-13 ASSESSMENT — PAIN DESCRIPTION - ONSET: ONSET: ON-GOING

## 2017-12-13 ASSESSMENT — PAIN DESCRIPTION - ORIENTATION: ORIENTATION: LEFT

## 2017-12-13 ASSESSMENT — PAIN DESCRIPTION - PROGRESSION: CLINICAL_PROGRESSION: NOT CHANGED

## 2017-12-13 ASSESSMENT — PAIN DESCRIPTION - FREQUENCY: FREQUENCY: CONTINUOUS

## 2017-12-13 NOTE — PROGRESS NOTES
[facility] PROFESSIONAL SERVS  ONCOL[facility] SPECIALISTS OF [facility]  [address] [facility] [address]  [address] [address],[address]  [address] [address]  Dept: [phone]  Dept Fax: [phone]: [phone]     Visit Date: 12/13/2017     Nikole Uribe is a 50 y.o. female who presents today for:   Chief Complaint   Patient presents with    Follow-up     METS BREAST CA    Start of adjuvant AC    HPI:   Kiki Watson is a very pleasant 78-year-old female who comes to the clinic today for evaluation of a newly diagnosed invasive ductal carcinoma of her left breast. The patient noted a mass in the upper aspect of her left breast approximately 5 months ago. She thought it was a cyst which she has had before but since it persisted, she elected to have further evaluation. She did undergo a diagnostic mammogram at [facility] in [place], [place]. It showed a 3.8 x 2.4 x 2.7 cm malignant-appearing spiculated mass within the left breast at 11:00 position. Evaluation of the left axilla with US showed suspicious appearing lymph node with increased cortical thickening and replacement of fatty hilum. On October 9, 2017, she underwent a left breast mass and left lymph node ultrasound-guided core biopsy. The final path report did show an invasive ductal carcinoma which was ER positive at 95%, OH positive at 95%, and a HER-2/breezy by IHC was 2+. The FISH test   Was still equivocal.  HER-2/CEP17 ratio was 1.7, however the average HER-2 copy number was 4. She met with the surgical oncologists and medical oncologists at the [facility], however she decided to receive care closer to her home. She has not had any significant breast problems in the past except for cysts. She began menarche at age 13 and did have a BERYL in 34. She's had 4 pregnancies with her 1st child being born at 21years of age. She does have a maternal aunt who had breast cancer at 72 and a paternal aunt who had breast cancer at 77.  The rest of her breast history is unremarkable. The patient had metastatic workup: CT of the chest, abdomen and bone scan were negative for evidence of metastatic disease. On November 7, 2017 she underwent right breast mastectomy with axillary lymph node dissection. Final pathology report showed: FINAL DIAGNOSIS:  A. Left breast with portion of axillary contents, mastectomy:      Invasive ductal carcinoma with lobular features involving nipple      and all 4 quadrants of breast.  Maximum tumor dimension estimated      6.8 cm. pT3.  Margins negative.      3 of 9 axillary lymph nodes positive for metastatic carcinoma,      pN1a. B. Left level II axillary lymph nodes, dissection:   5 lymph nodes negative for metastatic carcinoma. BREAST BIOMARKERS (performed on Block A5)*  Inform HER-2 Dual CHARLENE ([address])  ( X )  Nonamplified - HER2/CEP17 ratio  < 2.0 AND average HER2         copy number  < 4.0 signals/cell    Interim history on December 13, 2017:  The patient presents to [address] [place] [address] to start adjuvant [facility] [place], operated by [facility]. She underwent succesful and uncomplicated Port placement. She reports much improved pain in her left arm. She denies having any heaviness or swelling of the left arm. Mastectomy incision is healing nicely. She denies having feveres, no recent infection.     HPI   Past Medical History:   Diagnosis Date    Acid reflux     Anxiety     Asthma     Breast cancer ([address]     COPD with asthma ([address]     Depression     Depression     Dizziness - light-headed     HX OF:    Emphysema     History of chest pain     History of tinnitus     Hx of blood clots     Joint pain     Numbness and tingling     Osteoarthritis     PONV (postoperative nausea and vomiting)     SOB (shortness of breath) on exertion       Past Surgical History:   Procedure Laterality Date    BLADDER SURGERY  2014    BREAST BIOPSY  10/06/2017    Rosy Bachelor    CARDIAC CATHETERIZATION  05/24/2011    NO EVIDENCE OF PULMONARY HTN,NO EVIDENCE (EFFEXOR XR) 75 MG extended release capsule Take 75 mg by mouth daily       meloxicam (MOBIC) 15 MG tablet Take 15 mg by mouth daily       No current facility-administered medications for this visit. Facility-Administered Medications Ordered in Other Visits   Medication Dose Route Frequency Provider Last Rate Last Dose    sodium chloride flush 0.9 % injection 10 mL  10 mL Intercatheter PRN May Johnson MD        heparin flush 100 UNIT/ML injection 500 Units  500 Units Intercatheter PRN May Johnson MD          No Known Allergies   Health Maintenance   Topic Date Due    HIV screen  03/06/1983    DTaP/Tdap/Td vaccine (1 - Tdap) 03/06/1987    Pneumococcal highest risk (1 of 3 - PCV13) 03/06/1987    Cervical cancer screen  03/06/1989    Lipid screen  03/06/2008    Flu vaccine (1) 09/01/2017    Breast cancer screen  10/06/2018        Subjective:   Review of Systems   Constitutional: Negative for activity change, appetite change, chills, fatigue, fever and unexpected weight change. HENT: Negative for dental problem, facial swelling, hearing loss, mouth sores, nosebleeds, postnasal drip, sore throat, trouble swallowing and voice change. Eyes: Negative for redness, itching and visual disturbance. Respiratory: Positive for shortness of breath. Negative for cough, chest tightness and wheezing. Cardiovascular: Negative for chest pain, palpitations and leg swelling. Gastrointestinal: Negative for abdominal distention, abdominal pain, blood in stool, constipation, diarrhea, nausea and vomiting. Genitourinary: Negative for difficulty urinating, dysuria, flank pain, frequency and hematuria. Musculoskeletal: Positive for back pain. Negative for arthralgias, gait problem, joint swelling, myalgias and neck stiffness. Skin: Negative for color change and rash. Neurological: Negative for dizziness, seizures, syncope, speech difficulty, weakness, light-headedness, numbness and headaches.    Hematological: Negative for adenopathy. Does not bruise/bleed easily. Psychiatric/Behavioral: Negative for confusion and sleep disturbance. The patient is not nervous/anxious. Objective:   Physical Exam   Constitutional: She is oriented to person, place, and time. She appears well-developed and well-nourished. No distress. HENT:   Head: Normocephalic. Mouth/Throat: Oropharynx is clear and moist. No oropharyngeal exudate. Eyes: EOM are normal. Pupils are equal, round, and reactive to light. Right eye exhibits no discharge. Left eye exhibits no discharge. No scleral icterus. Neck: Normal range of motion. Neck supple. No JVD present. No tracheal deviation present. No thyromegaly present. Cardiovascular: Normal rate and normal heart sounds. Exam reveals no gallop and no friction rub. No murmur heard. Pulmonary/Chest: Effort normal and breath sounds normal. No stridor. No respiratory distress. She has no wheezes. She has no rales. She exhibits no tenderness. Left breast exhibits mass (status post left mastectomy. Incision nicely healed). Abdominal: Soft. Bowel sounds are normal. She exhibits no distension and no mass. There is no tenderness. There is no rebound. Musculoskeletal: Normal range of motion. She exhibits no edema. Good range of motion in all four extremities. Lymphadenopathy:     She has no cervical adenopathy. Neurological: She is alert and oriented to person, place, and time. She has normal reflexes. No cranial nerve deficit. She exhibits normal muscle tone. Skin: Skin is warm. No rash noted. No erythema. Psychiatric: She has a normal mood and affect. Her behavior is normal. Judgment and thought content normal.   Vitals reviewed.      /68 (Site: Right Arm, Position: Sitting, Cuff Size: Medium Adult)   Pulse 65   Temp 98.5 °F (36.9 °C) (Oral)   Resp 16   Ht 5' 7.99\" (1.727 m)   Wt 169 lb 3.2 oz (76.7 kg)   SpO2 100%   BMI 25.73 kg/m²      Imaging studies and labs:     Lab WNL, she underwent uncomplicated port placement, therefore we will proceed with first infusion of AC. Side effects of chemotherapy were reviewed again, they include nausea and vomiting, risk of infection, hair loss. Long term side effects of chemotherapy with Adriamycin include risk of cardiomyopathy and secondary leukemia. For ACIN prophylaxis, the patient was instructed to take Dexamethasone 8 mg daily for 3 days starting tomorow. For delayed nausea, she received prescription for Compazine with instruction to take every 6 hours if needed. The patient was instructed to call us with temperature above 100.4 and uncontrolled nausea/vomiting. I spent 40 minutes face-to-face with the patient and her family. Greater than 50% of time was spent on counseling and coordinating her care. Face to face counseling included prognosis, risks, benefits of treatment, compliance and risk reduction.

## 2017-12-13 NOTE — PLAN OF CARE
Problem: Pain:  Intervention: Promote participation in pain management plan  Patient instructed to take pain med as prescribed and to call the physician if pain is uncontrolled. Goal: Control of acute pain  Control of acute pain   Outcome: Met This Shift  Patient rates surgical pain @ \"2\" upon admission and discharge. Problem: Intellectual/Education/Knowledge Deficit  Intervention: Verbal/written education provided  Chemotherapy Teaching     What is Chemotherapy   Drug action [x]   Method of Administration [x]   Handouts given []     Side Effects  Nausea/vomiting [x]   Diarrhea [x]   Fatigue [x]   Signs / Symptoms of infection [x]   Neutropenia [x]   Thrombocytopenia [x]   Alopecia [x]   neuropathy [x]   [place] diet &  the importance of fluids [x]       Micellaneous  Importance of nutrition [x]   Importance of oral hygiene [x]   When to call the MD [x]   Monitoring labs [x]   Use of supportive services []     Explanation of Drug Regimen / Frequency  Adriamycin and cytoxan cycle#1 day#1     Comments  Verbalized understanding to drug,action,side effects and when to call MD       Goal: Teaching initiated upon admission  Outcome: Met This Shift  Patient verbalizes understanding to verbal information given on adriamycin and cytoxan,action and possible side effects. Aware to call MD if develop complications. Problem: Discharge Planning  Intervention: Interaction with patient/family and care team  Provide discharge instructions. Goal: Knowledge of discharge instructions  Knowledge of discharge instructions     Outcome: Met This Shift  Verbalized understanding of discharge instructions, follow-up appointments, and when to call the physician. Problem: Infection - Central Venous Catheter-Associated Bloodstream Infection:  Intervention: Infection risk assessment  Discussed mediport maintenance, infection prevention, signs and symptoms of infection, and when to call the physician.  Labs drawn per mediport. Goal: Will show no infection signs and symptoms  Will show no infection signs and symptoms  Outcome: Met This Shift  Mediport site with no redness or warmth. Skin over port intact with no signs of breakdown noted. Patient verbalizes signs/symptoms of port infection and when to notify the physician. Comments: Care plan reviewed with patient and spouse. Patient and spouse verbalize understanding of the plan of care and contribute to goal setting.

## 2017-12-13 NOTE — PROGRESS NOTES
Labs drawn via central venous catheter, then patient escorted to exam room accompanied by Eulogio Prajapati

## 2017-12-13 NOTE — PROGRESS NOTES
Patient assessed for the following post chemotherapy:    Dizziness              No  Lightheadedness  No      Acute nausea/vomiting No  Headache   No  Chest pain/pressure  No  Rash/itching   No  Shortness of breath  No    Patient kept for 20 minutes observation post infusion chemotherapy. Patient tolerated chemotherapy treatment adriamycin and cytoxan infusions without any complications. Labs drawn per mediport. Last vital signs:   BP (!) 112/58   Pulse 75   Temp 97.7 °F (36.5 °C) (Oral)   Resp 16   SpO2 100%     Patient instructed if experience any of the above symptoms following today's infusion,he/she is to notify MD immediately or go to the emergency department. Discharge instructions given to patient. Verbalizes understanding. Ambulated off unit per self with spouse with belongings.

## 2017-12-15 ENCOUNTER — TELEPHONE (OUTPATIENT)
Dept: INFUSION THERAPY | Age: 47
End: 2017-12-15

## 2017-12-28 ENCOUNTER — HOSPITAL ENCOUNTER (OUTPATIENT)
Dept: PHYSICAL THERAPY | Age: 47
Setting detail: THERAPIES SERIES
Discharge: HOME | End: 2017-12-28
Payer: COMMERCIAL

## 2018-01-02 ENCOUNTER — HOSPITAL ENCOUNTER (OUTPATIENT)
Dept: PHYSICAL THERAPY | Age: 48
Setting detail: THERAPIES SERIES
Discharge: HOME | End: 2018-01-02
Payer: COMMERCIAL

## 2018-01-02 PROCEDURE — 97110 THERAPEUTIC EXERCISES: CPT

## 2018-01-02 PROCEDURE — 97140 MANUAL THERAPY 1/> REGIONS: CPT

## 2018-01-02 RX ORDER — METHYLPREDNISOLONE SODIUM SUCCINATE 125 MG/2ML
125 INJECTION, POWDER, LYOPHILIZED, FOR SOLUTION INTRAMUSCULAR; INTRAVENOUS ONCE
Status: CANCELLED | OUTPATIENT
Start: 2018-01-03 | End: 2018-01-03

## 2018-01-02 RX ORDER — SODIUM CHLORIDE 0.9 % (FLUSH) 0.9 %
10 SYRINGE (ML) INJECTION PRN
Status: CANCELLED | OUTPATIENT
Start: 2018-01-03

## 2018-01-02 RX ORDER — 0.9 % SODIUM CHLORIDE 0.9 %
10 VIAL (ML) INJECTION ONCE
Status: CANCELLED | OUTPATIENT
Start: 2018-01-03 | End: 2018-01-03

## 2018-01-02 RX ORDER — DOXORUBICIN HYDROCHLORIDE 2 MG/ML
110 INJECTION, SOLUTION INTRAVENOUS ONCE
Status: CANCELLED | OUTPATIENT
Start: 2018-01-03

## 2018-01-02 RX ORDER — SODIUM CHLORIDE 9 MG/ML
INJECTION, SOLUTION INTRAVENOUS CONTINUOUS
Status: CANCELLED | OUTPATIENT
Start: 2018-01-03

## 2018-01-02 RX ORDER — PALONOSETRON 0.05 MG/ML
0.25 INJECTION, SOLUTION INTRAVENOUS ONCE
Status: CANCELLED | OUTPATIENT
Start: 2018-01-03

## 2018-01-02 RX ORDER — SODIUM CHLORIDE 0.9 % (FLUSH) 0.9 %
5 SYRINGE (ML) INJECTION PRN
Status: CANCELLED | OUTPATIENT
Start: 2018-01-03

## 2018-01-02 RX ORDER — DIPHENHYDRAMINE HYDROCHLORIDE 50 MG/ML
50 INJECTION INTRAMUSCULAR; INTRAVENOUS ONCE
Status: CANCELLED | OUTPATIENT
Start: 2018-01-03 | End: 2018-01-03

## 2018-01-02 RX ORDER — SODIUM CHLORIDE 9 MG/ML
INJECTION, SOLUTION INTRAVENOUS ONCE
Status: CANCELLED | OUTPATIENT
Start: 2018-01-03 | End: 2018-01-03

## 2018-01-02 RX ORDER — HEPARIN SODIUM (PORCINE) LOCK FLUSH IV SOLN 100 UNIT/ML 100 UNIT/ML
500 SOLUTION INTRAVENOUS PRN
Status: CANCELLED | OUTPATIENT
Start: 2018-01-03

## 2018-01-02 ASSESSMENT — ENCOUNTER SYMPTOMS
TROUBLE SWALLOWING: 0
WHEEZING: 0
EYE ITCHING: 0
FACIAL SWELLING: 0
SHORTNESS OF BREATH: 1
CHEST TIGHTNESS: 0
NAUSEA: 0
VOMITING: 0
BLOOD IN STOOL: 0
ABDOMINAL PAIN: 0
SORE THROAT: 0
ABDOMINAL DISTENTION: 0
COLOR CHANGE: 0
COUGH: 0
BACK PAIN: 1
VOICE CHANGE: 0
CONSTIPATION: 0
DIARRHEA: 0
EYE REDNESS: 0

## 2018-01-02 ASSESSMENT — PAIN SCALES - GENERAL: PAINLEVEL_OUTOF10: 3

## 2018-01-02 NOTE — PROGRESS NOTES
-40 MG CAPS Take by mouth      ALPRAZolam (XANAX) 0.5 MG tablet Take 0.5 mg by mouth nightly as needed       cyclobenzaprine (FLEXERIL) 5 MG tablet Take 5 mg by mouth 3 times daily as needed       DULERA 200-5 MCG/ACT inhaler Inhale 1 puff into the lungs 2 times daily       venlafaxine (EFFEXOR XR) 75 MG extended release capsule Take 75 mg by mouth daily       meloxicam (MOBIC) 15 MG tablet Take 15 mg by mouth daily       No current facility-administered medications for this visit. Facility-Administered Medications Ordered in Other Visits   Medication Dose Route Frequency Provider Last Rate Last Dose    sodium chloride flush 0.9 % injection 10 mL  10 mL Intercatheter LAKIA Parra MD        heparin flush 100 UNIT/ML injection 500 Units  500 Units Intercatheter LAKIA Parra MD          No Known Allergies   Health Maintenance   Topic Date Due    HIV screen  03/06/1983    DTaP/Tdap/Td vaccine (1 - Tdap) 03/06/1987    Pneumococcal highest risk (1 of 3 - PCV13) 03/06/1987    Cervical cancer screen  03/06/1989    Lipid screen  03/06/2008    Flu vaccine (1) 09/01/2017    Breast cancer screen  10/06/2018        Subjective:   Review of Systems   Constitutional: Negative for activity change, appetite change, chills, fatigue, fever and unexpected weight change. HENT: Negative for dental problem, facial swelling, hearing loss, mouth sores, nosebleeds, postnasal drip, sore throat, trouble swallowing and voice change. Eyes: Negative for redness, itching and visual disturbance. Respiratory: Positive for shortness of breath. Negative for cough, chest tightness and wheezing. Cardiovascular: Negative for chest pain, palpitations and leg swelling. Gastrointestinal: Negative for abdominal distention, abdominal pain, blood in stool, constipation, diarrhea, nausea and vomiting. Genitourinary: Negative for difficulty urinating, dysuria, flank pain, frequency and hematuria.    Musculoskeletal:

## 2018-01-03 ENCOUNTER — NURSE ONLY (OUTPATIENT)
Dept: WOMENS IMAGING | Age: 48
End: 2018-01-03

## 2018-01-03 ENCOUNTER — HOSPITAL ENCOUNTER (OUTPATIENT)
Dept: INFUSION THERAPY | Age: 48
Discharge: HOME | End: 2018-01-03
Payer: COMMERCIAL

## 2018-01-03 ENCOUNTER — OFFICE VISIT (OUTPATIENT)
Dept: ONCOLOGY | Age: 48
End: 2018-01-03
Payer: COMMERCIAL

## 2018-01-03 VITALS
SYSTOLIC BLOOD PRESSURE: 110 MMHG | WEIGHT: 169.2 LBS | RESPIRATION RATE: 16 BRPM | TEMPERATURE: 98.3 F | OXYGEN SATURATION: 99 % | DIASTOLIC BLOOD PRESSURE: 62 MMHG | HEART RATE: 80 BPM | HEIGHT: 68 IN | BODY MASS INDEX: 25.64 KG/M2

## 2018-01-03 VITALS
SYSTOLIC BLOOD PRESSURE: 106 MMHG | TEMPERATURE: 98.1 F | RESPIRATION RATE: 16 BRPM | HEART RATE: 85 BPM | OXYGEN SATURATION: 98 % | DIASTOLIC BLOOD PRESSURE: 62 MMHG

## 2018-01-03 DIAGNOSIS — Z51.11 CHEMOTHERAPY MANAGEMENT, ENCOUNTER FOR: ICD-10-CM

## 2018-01-03 DIAGNOSIS — C50.912 BREAST CANCER METASTASIZED TO AXILLARY LYMPH NODE, LEFT (HCC): ICD-10-CM

## 2018-01-03 DIAGNOSIS — C50.912 BREAST CANCER METASTASIZED TO AXILLARY LYMPH NODE, LEFT (HCC): Primary | ICD-10-CM

## 2018-01-03 DIAGNOSIS — Z90.12 STATUS POST MASTECTOMY, LEFT: ICD-10-CM

## 2018-01-03 DIAGNOSIS — C77.3 BREAST CANCER METASTASIZED TO AXILLARY LYMPH NODE, LEFT (HCC): Primary | ICD-10-CM

## 2018-01-03 DIAGNOSIS — C77.3 BREAST CANCER METASTASIZED TO AXILLARY LYMPH NODE, LEFT (HCC): ICD-10-CM

## 2018-01-03 LAB
ALBUMIN SERPL-MCNC: 4 G/DL (ref 3.5–5.1)
ALP BLD-CCNC: 69 U/L (ref 38–126)
ALT SERPL-CCNC: 16 U/L (ref 11–66)
AST SERPL-CCNC: 12 U/L (ref 5–40)
BASINOPHIL, AUTOMATED: 1 % (ref 0–12)
BILIRUB SERPL-MCNC: < 0.2 MG/DL (ref 0.3–1.2)
BILIRUBIN DIRECT: < 0.2 MG/DL (ref 0–0.3)
BUN, WHOLE BLOOD: 13 MG/DL (ref 8–26)
CHLORIDE, WHOLE BLOOD: 104 MEQ/L (ref 98–109)
CREATININE, WHOLE BLOOD: 0.6 MG/DL (ref 0.5–1.2)
EOSINOPHILS RELATIVE PERCENT: 1 % (ref 0–12)
GFR, ESTIMATED: > 90 ML/MIN/1.73M2
GLUCOSE, WHOLE BLOOD: 103 MG/DL (ref 70–108)
HCT VFR BLD CALC: 34.4 % (ref 37–47)
HEMOGLOBIN: 11.8 GM/DL (ref 12–16)
IONIZED CALCIUM, WHOLE BLOOD: 1.18 MMOL/L (ref 1.12–1.32)
LYMPHOCYTES # BLD: 20 % (ref 15–47)
MCH RBC QN AUTO: 33.1 PG (ref 27–31)
MCHC RBC AUTO-ENTMCNC: 34.2 GM/DL (ref 33–37)
MCV RBC AUTO: 97 FL (ref 81–99)
MONOCYTES: 7 % (ref 0–12)
PDW BLD-RTO: 10.8 % (ref 11.5–14.5)
PLATELET # BLD: 252 THOU/MM3 (ref 130–400)
PMV BLD AUTO: 7.4 MCM (ref 7.4–10.4)
POTASSIUM, WHOLE BLOOD: 3.9 MEQ/L (ref 3.5–4.9)
RBC # BLD: 3.56 MILL/MM3 (ref 4.2–5.4)
SEG NEUTROPHILS: 72 % (ref 43–75)
SODIUM, WHOLE BLOOD: 141 MEQ/L (ref 138–146)
TOTAL CO2, WHOLE BLOOD: 26 MEQ/L (ref 23–33)
TOTAL PROTEIN: 6.7 G/DL (ref 6.1–8)
WBC # BLD: 6.7 THOU/MM3 (ref 4.8–10.8)

## 2018-01-03 PROCEDURE — 36591 DRAW BLOOD OFF VENOUS DEVICE: CPT

## 2018-01-03 PROCEDURE — 96411 CHEMO IV PUSH ADDL DRUG: CPT

## 2018-01-03 PROCEDURE — 96367 TX/PROPH/DG ADDL SEQ IV INF: CPT

## 2018-01-03 PROCEDURE — 99214 OFFICE O/P EST MOD 30 MIN: CPT | Performed by: INTERNAL MEDICINE

## 2018-01-03 PROCEDURE — 96413 CHEMO IV INFUSION 1 HR: CPT

## 2018-01-03 PROCEDURE — 80047 BASIC METABLC PNL IONIZED CA: CPT

## 2018-01-03 PROCEDURE — 80076 HEPATIC FUNCTION PANEL: CPT

## 2018-01-03 PROCEDURE — 2580000003 HC RX 258: Performed by: INTERNAL MEDICINE

## 2018-01-03 PROCEDURE — 85025 COMPLETE CBC W/AUTO DIFF WBC: CPT

## 2018-01-03 PROCEDURE — 6360000002 HC RX W HCPCS: Performed by: INTERNAL MEDICINE

## 2018-01-03 PROCEDURE — G0463 HOSPITAL OUTPT CLINIC VISIT: HCPCS

## 2018-01-03 PROCEDURE — 96375 TX/PRO/DX INJ NEW DRUG ADDON: CPT

## 2018-01-03 RX ORDER — HEPARIN SODIUM (PORCINE) LOCK FLUSH IV SOLN 100 UNIT/ML 100 UNIT/ML
500 SOLUTION INTRAVENOUS PRN
Status: DISCONTINUED | OUTPATIENT
Start: 2018-01-03 | End: 2018-01-04 | Stop reason: HOSPADM

## 2018-01-03 RX ORDER — SODIUM CHLORIDE 0.9 % (FLUSH) 0.9 %
10 SYRINGE (ML) INJECTION PRN
Status: DISCONTINUED | OUTPATIENT
Start: 2018-01-03 | End: 2018-01-04 | Stop reason: HOSPADM

## 2018-01-03 RX ORDER — HEPARIN SODIUM (PORCINE) LOCK FLUSH IV SOLN 100 UNIT/ML 100 UNIT/ML
500 SOLUTION INTRAVENOUS PRN
Status: CANCELLED | OUTPATIENT
Start: 2018-01-03

## 2018-01-03 RX ORDER — SODIUM CHLORIDE 0.9 % (FLUSH) 0.9 %
20 SYRINGE (ML) INJECTION PRN
Status: CANCELLED | OUTPATIENT
Start: 2018-01-03

## 2018-01-03 RX ORDER — PALONOSETRON 0.05 MG/ML
0.25 INJECTION, SOLUTION INTRAVENOUS ONCE
Status: COMPLETED | OUTPATIENT
Start: 2018-01-03 | End: 2018-01-03

## 2018-01-03 RX ORDER — DEXAMETHASONE 4 MG/1
8 TABLET ORAL
Qty: 12 TABLET | Refills: 0 | Status: SHIPPED | OUTPATIENT
Start: 2018-01-04 | End: 2018-01-07

## 2018-01-03 RX ORDER — SODIUM CHLORIDE 0.9 % (FLUSH) 0.9 %
10 SYRINGE (ML) INJECTION PRN
Status: CANCELLED | OUTPATIENT
Start: 2018-01-03

## 2018-01-03 RX ORDER — DOXORUBICIN HYDROCHLORIDE 2 MG/ML
110 INJECTION, SOLUTION INTRAVENOUS ONCE
Status: COMPLETED | OUTPATIENT
Start: 2018-01-03 | End: 2018-01-03

## 2018-01-03 RX ORDER — SODIUM CHLORIDE 9 MG/ML
INJECTION, SOLUTION INTRAVENOUS ONCE
Status: COMPLETED | OUTPATIENT
Start: 2018-01-03 | End: 2018-01-03

## 2018-01-03 RX ADMIN — PALONOSETRON HYDROCHLORIDE 0.25 MG: 0.25 INJECTION INTRAVENOUS at 10:53

## 2018-01-03 RX ADMIN — Medication 30 ML: at 09:20

## 2018-01-03 RX ADMIN — CYCLOPHOSPHAMIDE 1140 MG: 1 INJECTION, POWDER, FOR SOLUTION INTRAVENOUS; ORAL at 12:16

## 2018-01-03 RX ADMIN — Medication 10 ML: at 10:32

## 2018-01-03 RX ADMIN — DEXAMETHASONE SODIUM PHOSPHATE 12 MG: 4 INJECTION, SOLUTION INTRAMUSCULAR; INTRAVENOUS at 10:54

## 2018-01-03 RX ADMIN — Medication 10 ML: at 13:17

## 2018-01-03 RX ADMIN — SODIUM CHLORIDE: 9 INJECTION, SOLUTION INTRAVENOUS at 10:32

## 2018-01-03 RX ADMIN — DOXORUBICIN HYDROCHLORIDE 110 MG: 2 INJECTION, SOLUTION INTRAVENOUS at 12:02

## 2018-01-03 RX ADMIN — SODIUM CHLORIDE 150 MG: 0.9 INJECTION, SOLUTION INTRAVENOUS at 11:00

## 2018-01-03 RX ADMIN — Medication 500 UNITS: at 13:17

## 2018-01-03 ASSESSMENT — PAIN DESCRIPTION - PROGRESSION: CLINICAL_PROGRESSION: NOT CHANGED

## 2018-01-03 ASSESSMENT — PAIN DESCRIPTION - PAIN TYPE: TYPE: SURGICAL PAIN

## 2018-01-03 ASSESSMENT — PAIN DESCRIPTION - LOCATION: LOCATION: CHEST

## 2018-01-03 ASSESSMENT — PAIN DESCRIPTION - DESCRIPTORS: DESCRIPTORS: ACHING

## 2018-01-03 ASSESSMENT — PAIN SCALES - GENERAL
PAINLEVEL_OUTOF10: 2
PAINLEVEL_OUTOF10: 2

## 2018-01-03 ASSESSMENT — PAIN DESCRIPTION - FREQUENCY: FREQUENCY: INTERMITTENT

## 2018-01-03 ASSESSMENT — PAIN DESCRIPTION - ORIENTATION: ORIENTATION: LEFT

## 2018-01-03 ASSESSMENT — PAIN DESCRIPTION - ONSET: ONSET: ON-GOING

## 2018-01-03 NOTE — ONCOLOGY
Chemotherapy Administration    Pre-assessment Data: Antineoplastic Agents  Other:   See toxicity flow sheet for assessment [x]     Physician Notification of Concerns Related to Chemotherapy Administration:   Physician Notified Heydi Quintero / Time of Notification      Interventions:   Lab work assessed  [x]   Height / Weight verified for dose [x]   Current MAR reviewed [x]   Emergency drugs available as appropriate [x]   Anaphylaxis assessment completed [x]   Pre-medications administered as ordered [x]   Blood return noted upon initiation of chemotherapy [x]   Blood return noted each 1-2ml of a vesicant medication if given IV push [x]   Blood return noted each 2-3ml of a non-vesicant medication if given IV push []   Monitor for signs / symptoms of hypersensitivity reaction [x]   Chemotherapy orders (drug/dose/rate) verified by 2 Chemo certified RNs [x]   Monitor IV site and blood return throughout the infusion of the medication [x]   Document IV site checks on the IV assessment form [x]   Document chemotherapy teaching on the Patient Education tab [x]   Document patient verbalizes understanding of medications being administered [x]   If IV infiltration, see ONS Guidelines []   Other:      []

## 2018-01-03 NOTE — PLAN OF CARE
Problem: Infection - Central Venous Catheter-Associated Bloodstream Infection:  Intervention: Infection risk assessment  Instructed to monitor for signs/symptoms of infection at [address] [facility] and call MD if problems develop. Goal: Will show no infection signs and symptoms  Will show no infection signs and symptoms   Outcome: Met This Shift  Mediport site with no redness or warmth. Skin over port site intact with no signs of breakdown noted. Patient verbalizes signs/symptoms of port infection and when to notify the physician. Problem: Discharge Planning  Intervention: Interaction with patient/family and care team  Verbalized understanding of discharge instructions, follow-up appointments, and when to call the physician. Goal: Knowledge of discharge instructions  Knowledge of discharge instructions     Outcome: Met This Shift  Discuss understanding of discharge instructions,follow-up appointments, and when to call the physician. Problem: Intellectual/Education/Knowledge Deficit  Intervention: Verbal/written education provided  Chemotherapy Teaching     What is Chemotherapy   Drug action [x]   Method of Administration [x]   Handouts given []     Side Effects  Nausea/vomiting [x]   Diarrhea [x]   Fatigue [x]   Signs / Symptoms of infection [x]   Neutropenia [x]   Thrombocytopenia [x]   Alopecia [x]   neuropathy [x]   [place] diet &  the importance of fluids [x]       Micellaneous  Importance of nutrition [x]   Importance of oral hygiene [x]   When to call the MD [x]   Monitoring labs [x]   Use of supportive services []     Explanation of Drug Regimen / Frequency  Adriamycin/Cytoxan- C2D1     Comments  Verbalized understanding to drug,action,side effects and when to call MD       Goal: Teaching initiated upon admission  Outcome: Met This Shift  Patient verbalizes understanding to verbal information given on adriamycin/cytoxan,action and possible side effects. Aware to call MD if develop complications.      Comments: Care plan reviewed with patient . Patient  verbalize understanding of the plan of care and contribute to goal setting.

## 2018-01-03 NOTE — PROGRESS NOTES
Name: Raynelle Goldberg  : 1970  MRN: [id number]    Oncology Navigation Follow-Up Note    Contact Type:  Medical Oncology    Notes: Patient seen prior to visit with Dr. Arlyn Duenas. Emotional about loosing hair. Voices tender scalp. Encouraged to use baby shampoo on scalp. Emotional support given. Voices some nausea with cycle 1 chemo, 1 emesis. States \"I tolerated it pretty well. \"  Refer to Dr Mckenna Baum notes for further information. Will continue to follow.

## 2018-01-03 NOTE — PROGRESS NOTES
Lab specimen obtained from [facility] without any problems.  Ambulated off unit per self to examination room for appointment with Dr. Teresa Alejandre escorted by [place] [facility] [place]

## 2018-01-03 NOTE — PROGRESS NOTES
Patient assessed for the following post chemotherapy:    Dizziness   No  Lightheadedness  No      Acute nausea/vomiting No  Headache   No  Chest pain/pressure  No  Rash/itching   No  Shortness of breath  No    Patient kept for 20 minutes observation post infusion chemotherapy. Patient tolerated chemotherapy treatment Adriamycin/Cytoxan without any complications. Last vital signs:   /62   Pulse 85   Temp 98.1 °F (36.7 °C) (Oral)   Resp 16   SpO2 98%         Patient instructed if experience any of the above symptoms following today's infusion,he/she is to notify MD immediately or go to the emergency department. Discharge instructions given to patient. Verbalizes understanding. Ambulated off unit per self with belongings.

## 2018-01-05 RX ORDER — PROCHLORPERAZINE MALEATE 10 MG
10 TABLET ORAL EVERY 6 HOURS PRN
Qty: 30 TABLET | Refills: 1 | Status: SHIPPED | OUTPATIENT
Start: 2018-01-05 | End: 2018-12-13 | Stop reason: SDUPTHER

## 2018-01-05 NOTE — TELEPHONE ENCOUNTER
Patient is calling in stating that she needs another [facility] [facility] [place]. Patient picked up her last refill today and would like this so she has it for the next round of chemo.

## 2018-01-18 ENCOUNTER — HOSPITAL ENCOUNTER (OUTPATIENT)
Dept: PHYSICAL THERAPY | Age: 48
Setting detail: THERAPIES SERIES
Discharge: HOME | End: 2018-01-18
Payer: COMMERCIAL

## 2018-01-23 ENCOUNTER — HOSPITAL ENCOUNTER (OUTPATIENT)
Dept: PHYSICAL THERAPY | Age: 48
Setting detail: THERAPIES SERIES
Discharge: HOME | End: 2018-01-23
Payer: COMMERCIAL

## 2018-01-23 PROCEDURE — 97140 MANUAL THERAPY 1/> REGIONS: CPT

## 2018-01-23 ASSESSMENT — PAIN SCALES - GENERAL: PAINLEVEL_OUTOF10: 2

## 2018-01-23 NOTE — PROGRESS NOTES
153.9 Slight increase in last 2 measurements on left arm compared to right but stable from evaluation. Pt reports shirt sleeves feel tight. Patient Education/Home Exercise Program:  Monitor for change in swelling and educated to self massage for swelling at chest wall. Response to Treatment:  Patient tolerated treatment well    Progression Toward Functional Goals: Progressing toward goals    Specific Interventions for Next Treatment:  Manual therapy to left arm and chest wall for lymphatic drainage and myofascial release (utilizing PORi protocol), gentle left shoulder stretches progressing to gentle strengthening as tolerated. Lymphedema education and precautions. Plan: Continue established plan of care    Goals:  Short term goals  Time Frame for Short term goals: 4 weeks  Short term goal 1: Pt to demo left shoulder PROM flexion improved from 60 deg to 110 deg for greater ease with grooming and bathing. 136  Short term goal 2: Pt to demo left shoulder PROM abduction improved from 35 deg to 100 deg for greater ease with dressing. 80  Short term goal 3: Pt to demo left shoulder strength improved from 2-/5 to 4-/5 for improved ease with completing household chores. 4/5  Short term goal 4: Pt to demo left elbow strength improved from 3+/5 to 4+/5 for improved ease with carrying objects. 4+/5  Long term goals  Time Frame for Long term goals : 12 weeks  Long term goal 1: Pt to demo left shoulder PROM flexion improved from 60 deg to 155 deg for ease with reaching overhead. Long term goal 2: Pt to demo left shoulder PROM abduction improved from 35 deg to 150 deg for ease with dressing and grooming. Long term goal 3: Pt to demo left shoulder strength improved from 2-/5 to 4+/5 for full return to work and household chores. 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   Long term goal 5: Pt to demo sustained left arm girth

## 2018-01-24 ENCOUNTER — CLINICAL DOCUMENTATION (OUTPATIENT)
Dept: CASE MANAGEMENT | Age: 48
End: 2018-01-24

## 2018-01-24 ENCOUNTER — HOSPITAL ENCOUNTER (OUTPATIENT)
Dept: INFUSION THERAPY | Age: 48
Discharge: HOME | End: 2018-01-24
Payer: COMMERCIAL

## 2018-01-24 ENCOUNTER — OFFICE VISIT (OUTPATIENT)
Dept: ONCOLOGY | Age: 48
End: 2018-01-24
Payer: COMMERCIAL

## 2018-01-24 VITALS
SYSTOLIC BLOOD PRESSURE: 107 MMHG | BODY MASS INDEX: 26.56 KG/M2 | OXYGEN SATURATION: 98 % | HEIGHT: 67 IN | TEMPERATURE: 97.7 F | DIASTOLIC BLOOD PRESSURE: 60 MMHG | WEIGHT: 169.2 LBS | HEART RATE: 87 BPM | RESPIRATION RATE: 16 BRPM

## 2018-01-24 VITALS
HEIGHT: 68 IN | OXYGEN SATURATION: 98 % | TEMPERATURE: 98.3 F | WEIGHT: 169.2 LBS | RESPIRATION RATE: 16 BRPM | HEART RATE: 92 BPM | DIASTOLIC BLOOD PRESSURE: 62 MMHG | BODY MASS INDEX: 25.64 KG/M2 | SYSTOLIC BLOOD PRESSURE: 119 MMHG

## 2018-01-24 DIAGNOSIS — Z51.11 CHEMOTHERAPY MANAGEMENT, ENCOUNTER FOR: ICD-10-CM

## 2018-01-24 DIAGNOSIS — C77.3 BREAST CANCER METASTASIZED TO AXILLARY LYMPH NODE, LEFT (HCC): ICD-10-CM

## 2018-01-24 DIAGNOSIS — C50.912 BREAST CANCER METASTASIZED TO AXILLARY LYMPH NODE, LEFT (HCC): ICD-10-CM

## 2018-01-24 DIAGNOSIS — Z51.11 ENCOUNTER FOR CHEMOTHERAPY MANAGEMENT: Primary | ICD-10-CM

## 2018-01-24 LAB
ALBUMIN SERPL-MCNC: 3.8 G/DL (ref 3.5–5.1)
ALP BLD-CCNC: 49 U/L (ref 38–126)
ALT SERPL-CCNC: 13 U/L (ref 11–66)
AST SERPL-CCNC: 13 U/L (ref 5–40)
BASINOPHIL, AUTOMATED: 0 % (ref 0–12)
BILIRUB SERPL-MCNC: < 0.2 MG/DL (ref 0.3–1.2)
BILIRUBIN DIRECT: < 0.2 MG/DL (ref 0–0.3)
BUN, WHOLE BLOOD: 11 MG/DL (ref 8–26)
CHLORIDE, WHOLE BLOOD: 106 MEQ/L (ref 98–109)
CREATININE, WHOLE BLOOD: 0.7 MG/DL (ref 0.5–1.2)
EOSINOPHILS RELATIVE PERCENT: 1 % (ref 0–12)
GFR, ESTIMATED: > 90 ML/MIN/1.73M2
GLUCOSE, WHOLE BLOOD: 133 MG/DL (ref 70–108)
HCT VFR BLD CALC: 34.1 % (ref 37–47)
HEMOGLOBIN: 11.4 GM/DL (ref 12–16)
IONIZED CALCIUM, WHOLE BLOOD: 1.16 MMOL/L (ref 1.12–1.32)
LYMPHOCYTES # BLD: 19 % (ref 15–47)
MCH RBC QN AUTO: 32.6 PG (ref 27–31)
MCHC RBC AUTO-ENTMCNC: 33.4 GM/DL (ref 33–37)
MCV RBC AUTO: 98 FL (ref 81–99)
MONOCYTES: 8 % (ref 0–12)
PDW BLD-RTO: 12.4 % (ref 11.5–14.5)
PLATELET # BLD: 220 THOU/MM3 (ref 130–400)
PMV BLD AUTO: 7.8 MCM (ref 7.4–10.4)
POTASSIUM, WHOLE BLOOD: 3.7 MEQ/L (ref 3.5–4.9)
RBC # BLD: 3.49 MILL/MM3 (ref 4.2–5.4)
SEG NEUTROPHILS: 72 % (ref 43–75)
SODIUM, WHOLE BLOOD: 141 MEQ/L (ref 138–146)
TOTAL CO2, WHOLE BLOOD: 25 MEQ/L (ref 23–33)
TOTAL PROTEIN: 6.1 G/DL (ref 6.1–8)
WBC # BLD: 6.1 THOU/MM3 (ref 4.8–10.8)

## 2018-01-24 PROCEDURE — 36591 DRAW BLOOD OFF VENOUS DEVICE: CPT

## 2018-01-24 PROCEDURE — 99213 OFFICE O/P EST LOW 20 MIN: CPT | Performed by: PHYSICIAN ASSISTANT

## 2018-01-24 PROCEDURE — 80047 BASIC METABLC PNL IONIZED CA: CPT

## 2018-01-24 PROCEDURE — 80076 HEPATIC FUNCTION PANEL: CPT

## 2018-01-24 PROCEDURE — G0463 HOSPITAL OUTPT CLINIC VISIT: HCPCS

## 2018-01-24 PROCEDURE — 85025 COMPLETE CBC W/AUTO DIFF WBC: CPT

## 2018-01-24 PROCEDURE — 96367 TX/PROPH/DG ADDL SEQ IV INF: CPT

## 2018-01-24 PROCEDURE — 36593 DECLOT VASCULAR DEVICE: CPT

## 2018-01-24 PROCEDURE — 96417 CHEMO IV INFUS EACH ADDL SEQ: CPT

## 2018-01-24 PROCEDURE — 2580000003 HC RX 258: Performed by: INTERNAL MEDICINE

## 2018-01-24 PROCEDURE — 96411 CHEMO IV PUSH ADDL DRUG: CPT

## 2018-01-24 PROCEDURE — 96375 TX/PRO/DX INJ NEW DRUG ADDON: CPT

## 2018-01-24 PROCEDURE — 6360000002 HC RX W HCPCS: Performed by: INTERNAL MEDICINE

## 2018-01-24 RX ORDER — DIPHENHYDRAMINE HYDROCHLORIDE 50 MG/ML
50 INJECTION INTRAMUSCULAR; INTRAVENOUS ONCE
Status: CANCELLED | OUTPATIENT
Start: 2018-01-24 | End: 2018-01-24

## 2018-01-24 RX ORDER — HEPARIN SODIUM (PORCINE) LOCK FLUSH IV SOLN 100 UNIT/ML 100 UNIT/ML
500 SOLUTION INTRAVENOUS PRN
Status: DISCONTINUED | OUTPATIENT
Start: 2018-01-24 | End: 2018-01-25 | Stop reason: HOSPADM

## 2018-01-24 RX ORDER — TRAMADOL HYDROCHLORIDE 50 MG/1
50 TABLET ORAL EVERY 6 HOURS PRN
COMMUNITY
End: 2018-01-24 | Stop reason: SDUPTHER

## 2018-01-24 RX ORDER — SODIUM CHLORIDE 0.9 % (FLUSH) 0.9 %
10 SYRINGE (ML) INJECTION PRN
Status: CANCELLED | OUTPATIENT
Start: 2018-01-24

## 2018-01-24 RX ORDER — SODIUM CHLORIDE 0.9 % (FLUSH) 0.9 %
10 SYRINGE (ML) INJECTION PRN
Status: DISCONTINUED | OUTPATIENT
Start: 2018-01-24 | End: 2018-01-25 | Stop reason: HOSPADM

## 2018-01-24 RX ORDER — SODIUM CHLORIDE 0.9 % (FLUSH) 0.9 %
20 SYRINGE (ML) INJECTION PRN
Status: CANCELLED | OUTPATIENT
Start: 2018-01-24

## 2018-01-24 RX ORDER — METHYLPREDNISOLONE SODIUM SUCCINATE 125 MG/2ML
125 INJECTION, POWDER, LYOPHILIZED, FOR SOLUTION INTRAMUSCULAR; INTRAVENOUS ONCE
Status: CANCELLED | OUTPATIENT
Start: 2018-01-24 | End: 2018-01-24

## 2018-01-24 RX ORDER — SODIUM CHLORIDE 9 MG/ML
INJECTION, SOLUTION INTRAVENOUS ONCE
Status: COMPLETED | OUTPATIENT
Start: 2018-01-24 | End: 2018-01-24

## 2018-01-24 RX ORDER — 0.9 % SODIUM CHLORIDE 0.9 %
10 VIAL (ML) INJECTION ONCE
Status: CANCELLED | OUTPATIENT
Start: 2018-01-24 | End: 2018-01-24

## 2018-01-24 RX ORDER — PALONOSETRON 0.05 MG/ML
0.25 INJECTION, SOLUTION INTRAVENOUS ONCE
Status: COMPLETED | OUTPATIENT
Start: 2018-01-24 | End: 2018-01-24

## 2018-01-24 RX ORDER — HEPARIN SODIUM (PORCINE) LOCK FLUSH IV SOLN 100 UNIT/ML 100 UNIT/ML
500 SOLUTION INTRAVENOUS PRN
Status: CANCELLED | OUTPATIENT
Start: 2018-01-24

## 2018-01-24 RX ORDER — EPINEPHRINE 1 MG/ML
0.3 INJECTION, SOLUTION, CONCENTRATE INTRAVENOUS PRN
Status: CANCELLED | OUTPATIENT
Start: 2018-01-24

## 2018-01-24 RX ORDER — ONDANSETRON 4 MG/1
4 TABLET, ORALLY DISINTEGRATING ORAL EVERY 8 HOURS PRN
Qty: 12 TABLET | Refills: 0 | Status: SHIPPED | OUTPATIENT
Start: 2018-01-24 | End: 2018-02-14 | Stop reason: SDUPTHER

## 2018-01-24 RX ORDER — TRAMADOL HYDROCHLORIDE 50 MG/1
50 TABLET ORAL EVERY 6 HOURS PRN
Status: CANCELLED | OUTPATIENT
Start: 2018-01-24

## 2018-01-24 RX ORDER — DOXORUBICIN HYDROCHLORIDE 2 MG/ML
110 INJECTION, SOLUTION INTRAVENOUS ONCE
Status: COMPLETED | OUTPATIENT
Start: 2018-01-24 | End: 2018-01-24

## 2018-01-24 RX ORDER — TRAMADOL HYDROCHLORIDE 50 MG/1
50 TABLET ORAL EVERY 6 HOURS PRN
Qty: 60 TABLET | Refills: 0 | Status: SHIPPED | OUTPATIENT
Start: 2018-01-24 | End: 2018-03-28 | Stop reason: SDUPTHER

## 2018-01-24 RX ORDER — SODIUM CHLORIDE 9 MG/ML
INJECTION, SOLUTION INTRAVENOUS CONTINUOUS
Status: CANCELLED | OUTPATIENT
Start: 2018-01-24

## 2018-01-24 RX ORDER — SODIUM CHLORIDE 0.9 % (FLUSH) 0.9 %
5 SYRINGE (ML) INJECTION PRN
Status: CANCELLED | OUTPATIENT
Start: 2018-01-24

## 2018-01-24 RX ADMIN — SODIUM CHLORIDE: 9 INJECTION, SOLUTION INTRAVENOUS at 11:12

## 2018-01-24 RX ADMIN — WATER 2.2 ML: 1 INJECTION INTRAMUSCULAR; INTRAVENOUS; SUBCUTANEOUS at 09:54

## 2018-01-24 RX ADMIN — Medication 10 ML: at 10:37

## 2018-01-24 RX ADMIN — PALONOSETRON HYDROCHLORIDE 0.25 MG: 0.25 INJECTION INTRAVENOUS at 11:58

## 2018-01-24 RX ADMIN — Medication 10 ML: at 09:42

## 2018-01-24 RX ADMIN — Medication 10 ML: at 10:36

## 2018-01-24 RX ADMIN — SODIUM CHLORIDE 150 MG: 9 INJECTION, SOLUTION INTRAVENOUS at 11:18

## 2018-01-24 RX ADMIN — CYCLOPHOSPHAMIDE 1140 MG: 1 INJECTION, POWDER, FOR SOLUTION INTRAVENOUS; ORAL at 12:35

## 2018-01-24 RX ADMIN — Medication 10 ML: at 09:40

## 2018-01-24 RX ADMIN — DOXORUBICIN HYDROCHLORIDE 110 MG: 2 INJECTION, SOLUTION INTRAVENOUS at 12:16

## 2018-01-24 RX ADMIN — DEXAMETHASONE SODIUM PHOSPHATE 12 MG: 4 INJECTION, SOLUTION INTRAMUSCULAR; INTRAVENOUS at 12:00

## 2018-01-24 RX ADMIN — Medication 500 UNITS: at 13:21

## 2018-01-24 RX ADMIN — Medication 10 ML: at 09:41

## 2018-01-24 RX ADMIN — Medication 10 ML: at 13:21

## 2018-01-24 RX ADMIN — ALTEPLASE 2 MG: 2.2 INJECTION, POWDER, LYOPHILIZED, FOR SOLUTION INTRAVENOUS at 09:54

## 2018-01-24 ASSESSMENT — ENCOUNTER SYMPTOMS
VOICE CHANGE: 0
NAUSEA: 0
CHEST TIGHTNESS: 0
CONSTIPATION: 0
EYE REDNESS: 0
TROUBLE SWALLOWING: 0
VOMITING: 0
SHORTNESS OF BREATH: 0
COUGH: 0
WHEEZING: 0
ABDOMINAL PAIN: 0
BLOOD IN STOOL: 0
ABDOMINAL DISTENTION: 0
FACIAL SWELLING: 0
DIARRHEA: 0
SORE THROAT: 0
COLOR CHANGE: 0
EYE ITCHING: 0

## 2018-01-24 NOTE — PLAN OF CARE
Problem: Intellectual/Education/Knowledge Deficit  Intervention: Verbal/written education provided  Chemotherapy Teaching     What is Chemotherapy   Drug action [x]   Method of Administration [x]   Handouts given []     Side Effects  Nausea/vomiting [x]   Diarrhea [x]   Fatigue [x]   Signs / Symptoms of infection [x]   Neutropenia [x]   Thrombocytopenia [x]   Alopecia [x]   neuropathy [x]   [place] diet &  the importance of fluids [x]       Micellaneous  Importance of nutrition [x]   Importance of oral hygiene [x]   When to call the MD [x]   Monitoring labs [x]   Use of supportive services []     Explanation of Drug Regimen / Frequency  Adriamycin and cytoxan     Comments  Verbalized understanding to drug,action,side effects and when to call MD       Goal: Teaching initiated upon admission  Outcome: Met This Shift  Patient verbalizes understanding to verbal information given on adriamycin and cytoxan,action and possible side effects. Aware to call MD if develop complications. Problem: Discharge Planning  Intervention: Interaction with patient/family and care team  Provide discharge instructions. Goal: Knowledge of discharge instructions  Knowledge of discharge instructions     Outcome: Met This Shift  Verbalized understanding of discharge instructions, follow-up appointments, and when to call the physician. Problem: Infection - Central Venous Catheter-Associated Bloodstream Infection:  Intervention: Infection risk assessment  Discussed mediport maintenance, infection prevention, signs and symptoms of infection, and when to call the physician. Cath dagoberto instrilled into mediport. Labs drawn per mediport. Goal: Will show no infection signs and symptoms  Will show no infection signs and symptoms   Outcome: Met This Shift  Mediport site with no redness or warmth. Skin over port intact with no signs of breakdown noted. Patient verbalizes signs/symptoms of port infection and when to notify the physician. Comments: Care plan reviewed with patient and family. Patient and family verbalize understanding of the plan of care and contribute to goal setting.

## 2018-01-24 NOTE — PROGRESS NOTES
Name: Reanna Benson  : 1970  MRN: [id number]    Oncology Navigation Follow-Up Note    Contact Type:  Medical Oncology    Subjective: Receiving Cycle #3 of chemotherapy today, continues to see Sammie for Oncology Rehab, scheduled to return to work 2018-occupation,     Objective: Complaint of nausea and fatigue, \"just started feeling better from last Cycle on Tuesday\". States \"PT is helping but having hot flashes now\" Admits to forgetting to take her Effexor every night. Assistance Needed: Denies additional needs at this time. Referrals: None    Notes: Dr. Jaqui Kapaln was updated by MAURISIO Lundberg on Lauren's complaints. See her dictation for further information. Patient willing to try to take Effexor nightly as prescribed. Currently more stress in personal life. Daughter and two grandchildren, ages 1 and 3 recently moved in. States her daughter will be moving once she gets her income tax back. \"She needed to get out of the place she was at. \" Melburn Hannah her grandchildren one evening but it was too much. \"Very tired the next day\". Daughter is taking them to  now. Yola Flynnjuan states that her significant other helps if she's not feeling well. Other stressor is plan to return to work 2018. MAURISIO Lundberg states she will address with Dr. Jaqui Kaplan tomorrow and let patient know. She has a physical job and risk of injury or infection discussed. Will continue to follow through continuum of care. Encouraged Yola Gaines to contact me if any issues or concerns arise. Verbalized understanding of all information.

## 2018-01-24 NOTE — PROGRESS NOTES
Patient assessed for the following post chemotherapy:    Dizziness   No  Lightheadedness  No      Acute nausea/vomiting No  Headache   No  Chest pain/pressure  No  Rash/itching   No  Shortness of breath  No    Patient kept for 20 minutes observation post infusion chemotherapy. Patient tolerated chemotherapy treatment adriamycin and cytoxan without any complications. Cath dagoberto instilled to mediport. Labs drawn per mediport. Last vital signs:   /60   Pulse 87   Temp 97.7 °F (36.5 °C) (Oral)   Resp 16   Ht 5' 7\" (1.702 m)   Wt 169 lb 3.2 oz (76.7 kg)   SpO2 98%   BMI 26.50 kg/m²     Patient instructed if experience any of the above symptoms following today's infusion,he/she is to notify MD immediately or go to the emergency department. Discharge instructions given to patient. Verbalizes understanding. Ambulated off unit per self with family with belongings.

## 2018-01-24 NOTE — PROGRESS NOTES
[facility] PROFESSIONAL SERVS  [facility]  [address] [facility] [address]  [address] [address],[address]  [address] [address]  Dept: [phone]  Dept Fax: [phone]: [phone]     Visit Date: 1/24/2018     Vivienne Archuleta is a 50 y.o. female who presents today for:   Chief Complaint   Patient presents with    Follow-up     METS BREAST CA    Adjuvant AC#2    HPI:   Marylen Bearded is a very pleasant 40-year-old female who comes to the clinic today for evaluation of a newly diagnosed invasive ductal carcinoma of her left breast. The patient noted a mass in the upper aspect of her left breast approximately 5 months ago. She thought it was a cyst which she has had before but since it persisted, she elected to have further evaluation. She did undergo a diagnostic mammogram at [facility] in [place], [place]. It showed a 3.8 x 2.4 x 2.7 cm malignant-appearing spiculated mass within the left breast at 11:00 position. Evaluation of the left axilla with US showed suspicious appearing lymph node with increased cortical thickening and replacement of fatty hilum. On October 9, 2017, she underwent a left breast mass and left lymph node ultrasound-guided core biopsy. The final path report did show an invasive ductal carcinoma which was ER positive at 95%, CA positive at 95%, and a HER-2/breezy by IHC was 2+. The FISH test   Was still equivocal.  HER-2/CEP17 ratio was 1.7, however the average HER-2 copy number was 4. She met with the surgical oncologists and medical oncologists at the [facility], however she decided to receive care closer to her home. She has not had any significant breast problems in the past except for cysts. She began menarche at age 13 and did have a BERYL in 34. She's had 4 pregnancies with her 1st child being born at 21years of age. She does have a maternal aunt who had breast cancer at 72 and a paternal aunt who had breast cancer at 77.  The rest of her breast history is nosebleeds, postnasal drip, sore throat, trouble swallowing and voice change. Eyes: Negative for redness, itching and visual disturbance. Respiratory: Negative for cough, chest tightness, shortness of breath and wheezing. Cardiovascular: Negative for chest pain, palpitations and leg swelling. Gastrointestinal: Negative for abdominal distention, abdominal pain, blood in stool, constipation, diarrhea, nausea and vomiting. Endocrine:        Hot flashes   Genitourinary: Negative for difficulty urinating, dysuria, flank pain, frequency and hematuria. Musculoskeletal: Negative for arthralgias, gait problem, joint swelling, myalgias and neck stiffness. Back pain: (this is chronic back and unchanged)   Skin: Negative for color change and rash. Neurological: Negative for dizziness, seizures, syncope, speech difficulty, weakness, light-headedness, numbness and headaches. Hematological: Negative for adenopathy. Does not bruise/bleed easily. Psychiatric/Behavioral: Negative for confusion and sleep disturbance. The patient is not nervous/anxious. Objective:   Physical Exam   Constitutional: She is oriented to person, place, and time. She appears well-developed and well-nourished. No distress. HENT:   Head: Normocephalic. Mouth/Throat: Oropharynx is clear and moist. No oropharyngeal exudate. Eyes: EOM are normal. Pupils are equal, round, and reactive to light. Right eye exhibits no discharge. Left eye exhibits no discharge. No scleral icterus. Neck: Normal range of motion. Neck supple. No JVD present. No tracheal deviation present. No thyromegaly present. Cardiovascular: Normal rate and normal heart sounds. Exam reveals no gallop and no friction rub. No murmur heard. Pulmonary/Chest: Effort normal and breath sounds normal. No stridor. No respiratory distress. She has no wheezes. She has no rales. She exhibits no tenderness. Left breast exhibits mass (status post left mastectomy. Incision nicely healed). Abdominal: Soft. Bowel sounds are normal. She exhibits no distension and no mass. There is no tenderness. There is no rebound. Musculoskeletal: Normal range of motion. She exhibits no edema. Good range of motion in all four extremities. Lymphadenopathy:     She has no cervical adenopathy. Neurological: She is alert and oriented to person, place, and time. She has normal reflexes. No cranial nerve deficit. She exhibits normal muscle tone. Skin: Skin is warm. No rash noted. No erythema. Psychiatric: She has a normal mood and affect. Her behavior is normal. Judgment and thought content normal.   Vitals reviewed. /62 (Site: Right Arm, Position: Sitting, Cuff Size: Medium Adult)   Pulse 92   Temp 98.3 °F (36.8 °C) (Oral)   Resp 16   Ht 5' 7.99\" (1.727 m)   Wt 169 lb 3.2 oz (76.7 kg)   SpO2 98%   BMI 25.73 kg/m²      Imaging studies and labs:     Lab Results   Component Value Date    WBC 6.1 01/24/2018    HGB 11.4 (L) 01/24/2018    HCT 34.1 (L) 01/24/2018    MCV 98 01/24/2018     01/24/2018       Chemistry        Component Value Date/Time     01/24/2018 1036     (H) 11/28/2017 1250    K 3.7 01/24/2018 1036    K 4.4 11/28/2017 1250     11/28/2017 1250    CO2 26 11/28/2017 1250    BUN 12 11/28/2017 1250    CREATININE 0.7 01/24/2018 1036    CREATININE 0.7 11/28/2017 1250        Component Value Date/Time    CALCIUM 9.4 11/28/2017 1250    ALKPHOS 49 01/24/2018 1036    AST 13 01/24/2018 1036    ALT 13 01/24/2018 1036    BILITOT <0.2 (L) 01/24/2018 1036            Assessment/Plan:   1. This is a 49-year-old premenopausal patient with newly diagnosed ER positive, [place] positive HER-2 negative left breast cancer. She is status post left modified mastectomy with axillary lymph node dissection. Before her breast surgery the patient had CT scans that were negative for evidence of distant metastatic disease.   She is diagnosed with pT3, pN1, M0 left breast cancer. Dr. Hoa Manning had a lengthy discussion with the patient and her significant other about stage of her breast cancer and recommended treatment. Since she had lymph nodes involved and her tumor was quite large, Dr. Robbie Flores recommendation was to proceed with adjuvant chemotherapy: 4 cycles of Adriamycin and Cytoxan followed by 12 weekly infusions of Taxol. After chemotherapy due to having 3 lymph nodes involved by cancer, she will benefit from postmastectomy radiation treatment. Her final step in breast cancer treatment will be adjuvant hormonal therapy. Since the patient has strong family history of breast cancer and she is diagnosed with breast cancer before age of 46 she had genetic testing that showed variant of unknown significance. The patient had echo in October 2017 that showed normal left ventricular ejection fraction. On 12/13/2017 she started adjuvant AC. 2. Adjuvant chemotherapy. Overall she tolerated first AC well, no major side effects. Very mild nausea controlled with Compazine. With second AC reported increased, persistent nausea. She denies fever, chills, signs/symptoms of infection so she will proceed with 3rd cycle of AC. For ACIN prophylaxis, the patient will continue Dexamethasone 8 mg daily for 3 days starting tomorrow. For delayed nausea, she will continue Compazine every 6 hours if needed. Zofran was added to attempt to better control nausea. 3. Hot Flashes. Discussed with Dr. Hoa Manning, first recommendation is to begin low dose Effexor at 37.5 mg. The patient is already on Effexor XR at 75 mg daily but reported poor compliance. She was instructed to take medication as prescribed and to call if symptoms worsen. Follow up in three weeks. Labs at follow up: CBC, BMP, LFT. Discussed with Dr. Hoa Manning  Electronically signed by Juan Marshlal PA-C on 1/24/2018 at 2:28 PM   Update:   Spoke with Dr. Hoa Manning regarding Lauren's request to return to work on 2/5/2018.  Due to

## 2018-02-09 ENCOUNTER — HOSPITAL ENCOUNTER (OUTPATIENT)
Dept: INFUSION THERAPY | Age: 48
Discharge: HOME | End: 2018-02-09
Payer: COMMERCIAL

## 2018-02-09 ENCOUNTER — HOSPITAL ENCOUNTER (OUTPATIENT)
Dept: PHYSICAL THERAPY | Age: 48
Setting detail: THERAPIES SERIES
Discharge: HOME | End: 2018-02-09
Payer: COMMERCIAL

## 2018-02-09 ENCOUNTER — OFFICE VISIT (OUTPATIENT)
Dept: ONCOLOGY | Age: 48
End: 2018-02-09
Payer: COMMERCIAL

## 2018-02-09 VITALS
SYSTOLIC BLOOD PRESSURE: 115 MMHG | TEMPERATURE: 97.9 F | DIASTOLIC BLOOD PRESSURE: 60 MMHG | RESPIRATION RATE: 18 BRPM | OXYGEN SATURATION: 95 % | HEART RATE: 79 BPM

## 2018-02-09 DIAGNOSIS — L03.818 CELLULITIS OF OTHER SPECIFIED SITE: ICD-10-CM

## 2018-02-09 DIAGNOSIS — C50.912 MALIGNANT NEOPLASM OF LEFT BREAST IN FEMALE, ESTROGEN RECEPTOR POSITIVE, UNSPECIFIED SITE OF BREAST (HCC): Primary | ICD-10-CM

## 2018-02-09 DIAGNOSIS — C50.912 BREAST CANCER METASTASIZED TO AXILLARY LYMPH NODE, LEFT (HCC): Primary | ICD-10-CM

## 2018-02-09 DIAGNOSIS — C77.3 BREAST CANCER METASTASIZED TO AXILLARY LYMPH NODE, LEFT (HCC): Primary | ICD-10-CM

## 2018-02-09 DIAGNOSIS — Z17.0 MALIGNANT NEOPLASM OF LEFT BREAST IN FEMALE, ESTROGEN RECEPTOR POSITIVE, UNSPECIFIED SITE OF BREAST (HCC): Primary | ICD-10-CM

## 2018-02-09 LAB
BASINOPHIL, AUTOMATED: 1 % (ref 0–12)
EOSINOPHILS RELATIVE PERCENT: 3 % (ref 0–12)
HCT VFR BLD CALC: 31.9 % (ref 37–47)
HEMOGLOBIN: 10.6 GM/DL (ref 12–16)
LYMPHOCYTES # BLD: 34 % (ref 15–47)
MCH RBC QN AUTO: 32.7 PG (ref 27–31)
MCHC RBC AUTO-ENTMCNC: 33.4 GM/DL (ref 33–37)
MCV RBC AUTO: 98 FL (ref 81–99)
MONOCYTES: 18 % (ref 0–12)
PDW BLD-RTO: 13.2 % (ref 11.5–14.5)
PLATELET # BLD: 234 THOU/MM3 (ref 130–400)
PMV BLD AUTO: 7.4 FL (ref 7.4–10.4)
RBC # BLD: 3.25 MILL/MM3 (ref 4.2–5.4)
SEG NEUTROPHILS: 45 % (ref 43–75)
WBC # BLD: 2.5 THOU/MM3 (ref 4.8–10.8)

## 2018-02-09 PROCEDURE — 99211 OFF/OP EST MAY X REQ PHY/QHP: CPT

## 2018-02-09 PROCEDURE — 97140 MANUAL THERAPY 1/> REGIONS: CPT

## 2018-02-09 PROCEDURE — 85025 COMPLETE CBC W/AUTO DIFF WBC: CPT

## 2018-02-09 PROCEDURE — 36415 COLL VENOUS BLD VENIPUNCTURE: CPT

## 2018-02-09 PROCEDURE — 99212 OFFICE O/P EST SF 10 MIN: CPT | Performed by: PHYSICIAN ASSISTANT

## 2018-02-09 RX ORDER — CEPHALEXIN 500 MG/1
500 CAPSULE ORAL 4 TIMES DAILY
Qty: 20 CAPSULE | Refills: 0 | Status: SHIPPED | OUTPATIENT
Start: 2018-02-09 | End: 2018-02-14

## 2018-02-09 ASSESSMENT — PAIN DESCRIPTION - ONSET: ONSET: ON-GOING

## 2018-02-09 ASSESSMENT — PAIN DESCRIPTION - LOCATION: LOCATION: SHOULDER

## 2018-02-09 ASSESSMENT — PAIN SCALES - GENERAL
PAINLEVEL_OUTOF10: 1
PAINLEVEL_OUTOF10: 1

## 2018-02-09 ASSESSMENT — PAIN DESCRIPTION - DESCRIPTORS: DESCRIPTORS: TIGHTNESS;DISCOMFORT

## 2018-02-09 ASSESSMENT — PAIN DESCRIPTION - ORIENTATION: ORIENTATION: LEFT

## 2018-02-09 ASSESSMENT — PAIN DESCRIPTION - FREQUENCY: FREQUENCY: INTERMITTENT

## 2018-02-09 ASSESSMENT — PAIN DESCRIPTION - PAIN TYPE: TYPE: CHRONIC PAIN

## 2018-02-09 ASSESSMENT — PAIN DESCRIPTION - PROGRESSION: CLINICAL_PROGRESSION: NOT CHANGED

## 2018-02-09 NOTE — PROGRESS NOTES
[facility] [address] [place] [address],[address]  [address] [address]  Dept: [phone]  Dept Fax: [phone]  Loc: [phone]      Reva Chong is a 50 y.o. female who presents today for evaluation of skin redness. HPI:     This patient is followed by Dr. Keith Curling for history of breast cancer. She has ER positive, ND positive, HER-2 negative left breast cancer. She is S/P left modified mastectomy with axillary lymph node dissection. The patient is currently on [facility], just finished her 3rd cycle two weeks ago. Today the patient was at rehab when she told her physical therapist about an area of erythema just above her port side on her right chest wall. The patient was directed to Oncology Clinic for further evaluation. Patient states she noted small area, the size of a pencil eraser, approximately one inch superiolateral from her port on her right chest wall for several days. The patient states she was able to express some pus out of the area yesterday and noted some oozing. She denies any drainage today. Patient denies fever, chills, shortness of breath, cough or recent illness. She states otherwise she has been feeling better this week. The patient has No Known Allergies. Past Medical History  Roberto ANTUNEZ  has a past medical history of Acid reflux; Anxiety; Asthma; Breast cancer ([address] COPD with asthma ([address] Depression; Depression; Dizziness - light-headed; Emphysema; History of chest pain; History of tinnitus; Hx of blood clots; Joint pain; Numbness and tingling; Osteoarthritis; PONV (postoperative nausea and vomiting); and SOB (shortness of breath) on exertion. Medications    Current Outpatient Prescriptions:     cephALEXin (KEFLEX) 500 MG capsule, Take 1 capsule by mouth 4 times daily for 5 days, Disp: 20 capsule, Rfl: 0    traMADol (ULTRAM) 50 MG tablet, Take 1 tablet by mouth every 6 hours as needed for Pain for up to 60 days. , Disp: 60 tablet, Rfl: 0    ondansetron expressed. No warmth noted. Please refer to photo below. Neurologic:  Alert and oriented, no focal deficits. Assessment/Plan:      Malignant neoplasm of left breast in female, estrogen receptor positive, unspecified site of breast ([facility] [address]  -  Follow up at previously scheduled appointment on 2/14/18 for next dose of AC. Cellulitis of other specified site  -     cephALEXin (KEFLEX) 500 MG capsule; Take 1 capsule by mouth 4 times daily for 5 days. - will treat with mild cellulitis in setting of neutropenia. Neutropenia - CBC obtained today and WBC 2.5, ANC 1125.00. Advised the patient to check temperature daily and call if febrile or worsening erythema, or drainage. Return in about 5 days (around 2/14/2018). Discussed with Dr. Katty Joaquin      Patient instructions given and reviewed.    Electronically signed by Jasson Guerrero PA-C on 2/9/2018 at 1:40 PM

## 2018-02-09 NOTE — PROGRESS NOTES
Patient came up from oncology rehab and states that had \"pussy drainage\" from red spot near [facility]. Elza FORDE came and saw patient and this area. Labs drawn per orders and instructions give per [facility]    .

## 2018-02-09 NOTE — PROGRESS NOTES
Pt discharged in stable condition with verbalization of discharge instructions all questions answered and all  belongings sent with patient. Patient tolerated nurse assessment without any complications or signs of a reaction. Patient accompanied off unit by family.

## 2018-02-09 NOTE — PLAN OF CARE
Problem: Pain:  Intervention: Opioid analgesia side-effects  Patient well versed on currently pain medication   Intervention: Assess barriers to pain control  Patient is reluctant to take pain medication when needed waits until really strong. Goal: Control of chronic pain  Control of chronic pain   Outcome: Met This Shift  Patient pain is controlled when takes pain medication    Problem: Musculor/Skeletal Functional Status  Intervention: Fall precautions  Patient currently denies any needs or concerns     Goal: Absence of falls  Outcome: Met This Shift  No falls this admission     Problem: Intellectual/Education/Knowledge Deficit  Intervention: Verbal/written education provided  Discharge instruction sheets    Goal: Teaching initiated upon admission  Outcome: Met This Shift  Reviewed patient needs to  prescriptions and and apply compress and check temp daily  Goal: Written Disposition Instruction form completed  Outcome: Met This Shift  Discharge instructions given and reviewed with patient. All questions answered. Patient verbalized understanding    Problem: Discharge Planning  Intervention: Interaction with patient/family and care team  Patient currently denies any needs or concerns  Intervention: Discharge to appropriate level of care  Discharge home    Goal: Knowledge of discharge instructions  Knowledge of discharge instructions     Outcome: Met This Shift  Patient and family member able to teach back follow up appointments and when to call the doctor. Patient offers no questions at this time    Problem: Infection - Central Venous Catheter-Associated Bloodstream Infection:  Intervention: Central line needs assessment  Patient currently under going chemotherapy with veisicant  Intervention: Infection risk assessment  Patient aware that is of increased risk for infection due to receiving chemotherapy and having a central venous catheter.  Patient aware of signs and symptoms of infection and when to call the doctor    Goal: Will show no infection signs and symptoms  Will show no infection signs and symptoms   Outcome: Met This Shift  Patient has no signs of infection at exit site but does has have small red area at entrance site    Comments: Care plan reviewed with patient and she verbalized understanding of the plan of care and contributed to goal setting.

## 2018-02-12 ENCOUNTER — HOSPITAL ENCOUNTER (OUTPATIENT)
Dept: PHYSICAL THERAPY | Age: 48
Setting detail: THERAPIES SERIES
Discharge: HOME | End: 2018-02-12
Payer: COMMERCIAL

## 2018-02-13 RX ORDER — EPINEPHRINE 1 MG/ML
0.3 INJECTION, SOLUTION, CONCENTRATE INTRAVENOUS PRN
Status: CANCELLED | OUTPATIENT
Start: 2018-02-14

## 2018-02-13 RX ORDER — 0.9 % SODIUM CHLORIDE 0.9 %
10 VIAL (ML) INJECTION ONCE
Status: CANCELLED | OUTPATIENT
Start: 2018-02-14 | End: 2018-02-14

## 2018-02-13 RX ORDER — SODIUM CHLORIDE 9 MG/ML
INJECTION, SOLUTION INTRAVENOUS CONTINUOUS
Status: CANCELLED | OUTPATIENT
Start: 2018-02-14

## 2018-02-13 RX ORDER — SODIUM CHLORIDE 0.9 % (FLUSH) 0.9 %
5 SYRINGE (ML) INJECTION PRN
Status: CANCELLED | OUTPATIENT
Start: 2018-02-14

## 2018-02-13 RX ORDER — METHYLPREDNISOLONE SODIUM SUCCINATE 125 MG/2ML
125 INJECTION, POWDER, LYOPHILIZED, FOR SOLUTION INTRAMUSCULAR; INTRAVENOUS ONCE
Status: CANCELLED | OUTPATIENT
Start: 2018-02-14 | End: 2018-02-14

## 2018-02-13 RX ORDER — DIPHENHYDRAMINE HYDROCHLORIDE 50 MG/ML
50 INJECTION INTRAMUSCULAR; INTRAVENOUS ONCE
Status: CANCELLED | OUTPATIENT
Start: 2018-02-14 | End: 2018-02-14

## 2018-02-14 ENCOUNTER — OFFICE VISIT (OUTPATIENT)
Dept: ONCOLOGY | Age: 48
End: 2018-02-14
Payer: COMMERCIAL

## 2018-02-14 ENCOUNTER — CLINICAL DOCUMENTATION (OUTPATIENT)
Dept: CASE MANAGEMENT | Age: 48
End: 2018-02-14

## 2018-02-14 ENCOUNTER — HOSPITAL ENCOUNTER (OUTPATIENT)
Dept: INFUSION THERAPY | Age: 48
Discharge: HOME | End: 2018-02-14
Payer: COMMERCIAL

## 2018-02-14 VITALS
RESPIRATION RATE: 18 BRPM | DIASTOLIC BLOOD PRESSURE: 67 MMHG | OXYGEN SATURATION: 96 % | SYSTOLIC BLOOD PRESSURE: 112 MMHG | HEIGHT: 68 IN | TEMPERATURE: 97.8 F | WEIGHT: 170.4 LBS | BODY MASS INDEX: 25.82 KG/M2 | HEART RATE: 85 BPM

## 2018-02-14 VITALS
HEIGHT: 68 IN | OXYGEN SATURATION: 96 % | BODY MASS INDEX: 25.82 KG/M2 | DIASTOLIC BLOOD PRESSURE: 58 MMHG | HEART RATE: 90 BPM | WEIGHT: 170.4 LBS | RESPIRATION RATE: 18 BRPM | TEMPERATURE: 98 F | SYSTOLIC BLOOD PRESSURE: 110 MMHG

## 2018-02-14 DIAGNOSIS — Z51.11 ENCOUNTER FOR CHEMOTHERAPY MANAGEMENT: ICD-10-CM

## 2018-02-14 DIAGNOSIS — C50.912 BREAST CANCER METASTASIZED TO AXILLARY LYMPH NODE, LEFT (HCC): ICD-10-CM

## 2018-02-14 DIAGNOSIS — C77.3 BREAST CANCER METASTASIZED TO AXILLARY LYMPH NODE, LEFT (HCC): ICD-10-CM

## 2018-02-14 LAB
ALBUMIN SERPL-MCNC: 3.8 G/DL (ref 3.5–5.1)
ALP BLD-CCNC: 59 U/L (ref 38–126)
ALT SERPL-CCNC: 16 U/L (ref 11–66)
AST SERPL-CCNC: 16 U/L (ref 5–40)
BASINOPHIL, AUTOMATED: 1 % (ref 0–12)
BILIRUB SERPL-MCNC: 0.2 MG/DL (ref 0.3–1.2)
BILIRUBIN DIRECT: < 0.2 MG/DL (ref 0–0.3)
BUN, WHOLE BLOOD: 14 MG/DL (ref 8–26)
CHLORIDE, WHOLE BLOOD: 96 MEQ/L (ref 98–109)
CREATININE, WHOLE BLOOD: 0.7 MG/DL (ref 0.5–1.2)
EOSINOPHILS RELATIVE PERCENT: 1 % (ref 0–12)
GFR, ESTIMATED: > 90 ML/MIN/1.73M2
GLUCOSE, WHOLE BLOOD: 103 MG/DL (ref 70–108)
HCT VFR BLD CALC: 35.1 % (ref 37–47)
HEMOGLOBIN: 11.8 GM/DL (ref 12–16)
IONIZED CALCIUM, WHOLE BLOOD: 1.17 MMOL/L (ref 1.12–1.32)
LYMPHOCYTES # BLD: 20 % (ref 15–47)
MCH RBC QN AUTO: 33.2 PG (ref 27–31)
MCHC RBC AUTO-ENTMCNC: 33.7 GM/DL (ref 33–37)
MCV RBC AUTO: 98 FL (ref 81–99)
MONOCYTES: 9 % (ref 0–12)
PDW BLD-RTO: 13.5 % (ref 11.5–14.5)
PLATELET # BLD: 290 THOU/MM3 (ref 130–400)
PMV BLD AUTO: 7.6 FL (ref 7.4–10.4)
POTASSIUM, WHOLE BLOOD: 3.5 MEQ/L (ref 3.5–4.9)
RBC # BLD: 3.57 MILL/MM3 (ref 4.2–5.4)
SEG NEUTROPHILS: 69 % (ref 43–75)
SODIUM, WHOLE BLOOD: 141 MEQ/L (ref 138–146)
TOTAL CO2, WHOLE BLOOD: 26 MEQ/L (ref 23–33)
TOTAL PROTEIN: 6.3 G/DL (ref 6.1–8)
WBC # BLD: 6.5 THOU/MM3 (ref 4.8–10.8)

## 2018-02-14 PROCEDURE — 36591 DRAW BLOOD OFF VENOUS DEVICE: CPT

## 2018-02-14 PROCEDURE — 2580000003 HC RX 258: Performed by: INTERNAL MEDICINE

## 2018-02-14 PROCEDURE — 80076 HEPATIC FUNCTION PANEL: CPT

## 2018-02-14 PROCEDURE — 96413 CHEMO IV INFUSION 1 HR: CPT

## 2018-02-14 PROCEDURE — 6360000002 HC RX W HCPCS: Performed by: INTERNAL MEDICINE

## 2018-02-14 PROCEDURE — G0463 HOSPITAL OUTPT CLINIC VISIT: HCPCS

## 2018-02-14 PROCEDURE — 80047 BASIC METABLC PNL IONIZED CA: CPT

## 2018-02-14 PROCEDURE — 85025 COMPLETE CBC W/AUTO DIFF WBC: CPT

## 2018-02-14 PROCEDURE — 96411 CHEMO IV PUSH ADDL DRUG: CPT

## 2018-02-14 PROCEDURE — 96375 TX/PRO/DX INJ NEW DRUG ADDON: CPT

## 2018-02-14 PROCEDURE — 99213 OFFICE O/P EST LOW 20 MIN: CPT | Performed by: PHYSICIAN ASSISTANT

## 2018-02-14 PROCEDURE — 96367 TX/PROPH/DG ADDL SEQ IV INF: CPT

## 2018-02-14 RX ORDER — HEPARIN SODIUM (PORCINE) LOCK FLUSH IV SOLN 100 UNIT/ML 100 UNIT/ML
500 SOLUTION INTRAVENOUS PRN
Status: DISCONTINUED | OUTPATIENT
Start: 2018-02-14 | End: 2018-02-15 | Stop reason: HOSPADM

## 2018-02-14 RX ORDER — SODIUM CHLORIDE 0.9 % (FLUSH) 0.9 %
10 SYRINGE (ML) INJECTION PRN
Status: DISCONTINUED | OUTPATIENT
Start: 2018-02-14 | End: 2018-02-15 | Stop reason: HOSPADM

## 2018-02-14 RX ORDER — PALONOSETRON 0.05 MG/ML
0.25 INJECTION, SOLUTION INTRAVENOUS ONCE
Status: COMPLETED | OUTPATIENT
Start: 2018-02-14 | End: 2018-02-14

## 2018-02-14 RX ORDER — DEXAMETHASONE 4 MG/1
8 TABLET ORAL
Qty: 6 TABLET | Refills: 0 | Status: SHIPPED | OUTPATIENT
Start: 2018-02-14 | End: 2018-03-07 | Stop reason: SDUPTHER

## 2018-02-14 RX ORDER — ONDANSETRON 4 MG/1
4 TABLET, ORALLY DISINTEGRATING ORAL EVERY 8 HOURS PRN
Qty: 20 TABLET | Refills: 0 | Status: SHIPPED | OUTPATIENT
Start: 2018-02-14 | End: 2018-12-13 | Stop reason: SDUPTHER

## 2018-02-14 RX ORDER — SODIUM CHLORIDE 9 MG/ML
INJECTION, SOLUTION INTRAVENOUS ONCE
Status: COMPLETED | OUTPATIENT
Start: 2018-02-14 | End: 2018-02-14

## 2018-02-14 RX ORDER — DOXORUBICIN HYDROCHLORIDE 2 MG/ML
110 INJECTION, SOLUTION INTRAVENOUS ONCE
Status: COMPLETED | OUTPATIENT
Start: 2018-02-14 | End: 2018-02-14

## 2018-02-14 RX ADMIN — CYCLOPHOSPHAMIDE 1140 MG: 500 INJECTION, POWDER, FOR SOLUTION INTRAVENOUS; ORAL at 10:51

## 2018-02-14 RX ADMIN — Medication 10 ML: at 12:00

## 2018-02-14 RX ADMIN — SODIUM CHLORIDE 150 MG: 9 INJECTION, SOLUTION INTRAVENOUS at 09:49

## 2018-02-14 RX ADMIN — SODIUM CHLORIDE: 9 INJECTION, SOLUTION INTRAVENOUS at 09:34

## 2018-02-14 RX ADMIN — Medication 10 ML: at 09:34

## 2018-02-14 RX ADMIN — DOXORUBICIN HYDROCHLORIDE 110 MG: 2 INJECTION, SOLUTION INTRAVENOUS at 10:32

## 2018-02-14 RX ADMIN — Medication 30 ML: at 08:35

## 2018-02-14 RX ADMIN — Medication 500 UNITS: at 12:00

## 2018-02-14 RX ADMIN — PALONOSETRON HYDROCHLORIDE 0.25 MG: 0.25 INJECTION INTRAVENOUS at 09:38

## 2018-02-14 RX ADMIN — SODIUM CHLORIDE 12 MG: 9 INJECTION INTRAMUSCULAR; INTRAVENOUS; SUBCUTANEOUS at 09:41

## 2018-02-14 ASSESSMENT — PAIN DESCRIPTION - ORIENTATION: ORIENTATION: LEFT

## 2018-02-14 ASSESSMENT — ENCOUNTER SYMPTOMS
EYE ITCHING: 0
TROUBLE SWALLOWING: 0
DIARRHEA: 0
VOMITING: 0
NAUSEA: 0
CHEST TIGHTNESS: 0
WHEEZING: 0
SORE THROAT: 0
COUGH: 0
ABDOMINAL PAIN: 0
SHORTNESS OF BREATH: 0
ABDOMINAL DISTENTION: 0
BLOOD IN STOOL: 0
FACIAL SWELLING: 0
EYE REDNESS: 0
COLOR CHANGE: 0
VOICE CHANGE: 0
CONSTIPATION: 0

## 2018-02-14 ASSESSMENT — PAIN DESCRIPTION - PROGRESSION: CLINICAL_PROGRESSION: NOT CHANGED

## 2018-02-14 ASSESSMENT — PAIN DESCRIPTION - DESCRIPTORS: DESCRIPTORS: DISCOMFORT;TIGHTNESS

## 2018-02-14 ASSESSMENT — PAIN DESCRIPTION - PAIN TYPE: TYPE: CHRONIC PAIN

## 2018-02-14 ASSESSMENT — PAIN SCALES - GENERAL
PAINLEVEL_OUTOF10: 1
PAINLEVEL_OUTOF10: 1

## 2018-02-14 ASSESSMENT — PAIN DESCRIPTION - LOCATION: LOCATION: SHOULDER

## 2018-02-14 ASSESSMENT — PAIN DESCRIPTION - ONSET: ONSET: ON-GOING

## 2018-02-14 ASSESSMENT — PAIN DESCRIPTION - FREQUENCY: FREQUENCY: INTERMITTENT

## 2018-02-14 NOTE — PATIENT INSTRUCTIONS
1. Proceed with 4th cycle of AC today. 2. Follow up with Dr. Leann Landeros in three weeks to begin weekly Taxol infusions. 3. Labs on RTC: CBC, BMP, LFT. 4. Refills of Zofran and Decadron sent to pharmacy.

## 2018-02-14 NOTE — PLAN OF CARE
Problem: Musculor/Skeletal Functional Status  Intervention: Fall precautions  Verbalized understanding of fall prevention to ask for assistance with ambulation. Call light within reach. Goal: Absence of falls  Outcome: Met This Shift  No falls occurred during this visit    Problem: Intellectual/Education/Knowledge Deficit  Intervention: Verbal/written education provided  Chemotherapy Teaching     What is Chemotherapy   Drug action [x]   Method of Administration [x]   Handouts given []     Side Effects  Nausea/vomiting [x]   Diarrhea [x]   Fatigue [x]   Signs / Symptoms of infection [x]   Neutropenia [x]   Thrombocytopenia [x]   Alopecia [x]   neuropathy [x]   [place] diet &  the importance of fluids [x]       Micellaneous  Importance of nutrition [x]   Importance of oral hygiene [x]   When to call the MD [x]   Monitoring labs [x]   Use of supportive services []     Explanation of Drug Regimen / Frequency  Adriamycin/Cytoxan- C4D1     Comments  Verbalized understanding to drug,action,side effects and when to call MD       Goal: Teaching initiated upon admission  Outcome: Met This Shift  Patient verbalizes understanding to verbal information given on Adriamycin/Cytoxan,action and possible side effects. Aware to call MD if develop complications. Problem: Discharge Planning  Intervention: Interaction with patient/family and care team  Discuss understanding of discharge instructions,follow-up appointments, and when to call the physician. Goal: Knowledge of discharge instructions  Knowledge of discharge instructions     Outcome: Met This Shift  Verbalized understanding of discharge instructions, follow-up appointments, and when to call the physician. Problem: Infection - Central Venous Catheter-Associated Bloodstream Infection:  Intervention: Infection risk assessment  Instructed to monitor for signs/symptoms of infection at [address] [facility] [address] [facility] and call MD if problems develop.     Goal: Will show no infection signs and symptoms  Will show no infection signs and symptoms   Outcome: Met This Shift  Mediport site with no redness or warmth. Skin over port site intact with no signs of breakdown noted. Patient verbalizes signs/symptoms of port infection and when to notify the physician. Problem: Pain:  Intervention: Assess barriers to pain control  Instructed to call Md if pain becomes intolerable. Goal: Control of chronic pain  Control of chronic pain   Outcome: Met This Shift  Patient complains of chronic pain in shoulder- \"1\" on scale on admission and discharge. Patient states tolerable with medication. Comments: Care plan reviewed with patient and sister. Patient and sister verbalize understanding of the plan of care and contribute to goal setting.

## 2018-02-14 NOTE — PROGRESS NOTES
Patient assessed for the following post chemotherapy:    Dizziness   No  Lightheadedness  No      Acute nausea/vomiting No  Headache   No  Chest pain/pressure  No  Rash/itching   No  Shortness of breath  No    Patient kept for 20 minutes observation post infusion chemotherapy. Patient tolerated chemotherapy treatment Adriamycin/Cytoxan without any complications. Last vital signs:   BP (!) 110/58   Pulse 90   Temp 98 °F (36.7 °C) (Oral)   Resp 18   Ht 5' 7.99\" (1.727 m)   Wt 170 lb 6.4 oz (77.3 kg)   SpO2 96%   BMI 25.92 kg/m²         Patient instructed if experience any of the above symptoms following today's infusion,he/she is to notify MD immediately or go to the emergency department. Discharge instructions given to patient. Verbalizes understanding. Ambulated off unit per self with belongings accompanied by sister.

## 2018-02-14 NOTE — PROGRESS NOTES
Name: Alberto Bonilla  : 1970  MRN: [id number]    Oncology Navigation Follow-Up Note    Contact Type:  Medical Oncology    Notes: In infusion clinic for cycle #4 of chemotherapy. States first cycle \"was a breeze, last 2 were rough. \"  States she has fatigue and loss of appetite following chemo, drinks Ensure when appetite is poor. States she feels better the week before chemo. Denies any fever. Support from family and friends. Denies any needs at present. Will continue to follow through the continuum of care.

## 2018-02-14 NOTE — PROGRESS NOTES
Lab specimen obtained from [facility] without any problems. Ambulated off unit per self to examination room for appointment with Shelli Balderrama escorted by [facility].

## 2018-02-14 NOTE — PROGRESS NOTES
and time. She has normal reflexes. No cranial nerve deficit. She exhibits normal muscle tone. Skin: Skin is warm. No rash noted. No erythema. Psychiatric: She has a normal mood and affect. Her behavior is normal. Judgment and thought content normal.   Vitals reviewed. /67 (Site: Right Arm, Position: Sitting, Cuff Size: Medium Adult)   Pulse 85   Temp 97.8 °F (36.6 °C) (Oral)   Resp 18   Ht 5' 7.99\" (1.727 m)   Wt 170 lb 6.4 oz (77.3 kg)   SpO2 96%   BMI 25.92 kg/m²      Imaging studies and labs:     Lab Results   Component Value Date    WBC 6.5 02/14/2018    HGB 11.8 (L) 02/14/2018    HCT 35.1 (L) 02/14/2018    MCV 98 02/14/2018     02/14/2018       Chemistry        Component Value Date/Time     02/14/2018 0835     (H) 11/28/2017 1250    K 3.5 02/14/2018 0835    K 4.4 11/28/2017 1250     11/28/2017 1250    CO2 26 11/28/2017 1250    BUN 12 11/28/2017 1250    CREATININE 0.7 02/14/2018 0835    CREATININE 0.7 11/28/2017 1250        Component Value Date/Time    CALCIUM 9.4 11/28/2017 1250    ALKPHOS 49 01/24/2018 1036    AST 13 01/24/2018 1036    ALT 13 01/24/2018 1036    BILITOT <0.2 (L) 01/24/2018 1036            Assessment/Plan:   1. This is a 75-year-old premenopausal patient with newly diagnosed ER positive, GA positive HER-2 negative left breast cancer. She is status post left modified mastectomy with axillary lymph node dissection. Before her breast surgery the patient had CT scans that were negative for evidence of distant metastatic disease. She is diagnosed with pT3, pN1, M0 left breast cancer. Dr. Victor Manuel Coronado had a lengthy discussion with the patient and her significant other about stage of her breast cancer and recommended treatment. Since she had lymph nodes involved and her tumor was quite large, Dr. Rigo Santillan recommendation was to proceed with adjuvant chemotherapy: 4 cycles of Adriamycin and Cytoxan followed by 12 weekly infusions of Taxol.   After chemotherapy

## 2018-02-14 NOTE — ONCOLOGY
Chemotherapy Administration    Pre-assessment Data: Antineoplastic Agents  Other:   See toxicity flow sheet for assessment [x]     Physician Notification of Concerns Related to Chemotherapy Administration:   Physician Notified Tomy Boop / Time of Notification      Interventions:   Lab work assessed  [x]   Height / Weight verified for dose [x]   Current MAR reviewed [x]   Emergency drugs available as appropriate [x]   Anaphylaxis assessment completed [x]   Pre-medications administered as ordered [x]   Blood return noted upon initiation of chemotherapy [x]   Blood return noted each 1-2ml of a vesicant medication if given IV push [x]   Blood return noted each 2-3ml of a non-vesicant medication if given IV push []   Monitor for signs / symptoms of hypersensitivity reaction [x]   Chemotherapy orders (drug/dose/rate) verified by 2 Chemo certified RNs [x]   Monitor IV site and blood return throughout the infusion of the medication [x]   Document IV site checks on the IV assessment form [x]   Document chemotherapy teaching on the Patient Education tab [x]   Document patient verbalizes understanding of medications being administered [x]   If IV infiltration, see ONS Guidelines []   Other:      []

## 2018-02-20 ENCOUNTER — CLINICAL DOCUMENTATION (OUTPATIENT)
Dept: SPIRITUAL SERVICES | Facility: CLINIC | Age: 48
End: 2018-02-20

## 2018-02-20 NOTE — PROGRESS NOTES
Ambulatory  Consult Note     Patient Name:  Nathan Negrete   YOB: 1970   Age/Gender: 50 y.o. / female     Consultation Requested By:  Karley Trimble had encounter with the patient during rounding in the unit on 2/14/18. Advanced Care Planning:  Patient does not have Advance Care Planning documents scanned or on file. Hospital Problem List:    Patient Active Problem List   Diagnosis    Asthma    COPD with [facility] ([facility]    Osteoarthritis    Depression    History of chest pain    [facility]    Acid reflux    History of tinnitus    Depression    Anxiety    Joint pain    Numbness and tingling    Light headed    Breast cancer metastasized to axillary lymph node, left ([facility]    Malignant neoplasm of left female breast [facility])       Patient Demographics    Address: [address] [place] [address]   Contact Numbers: [phone] (home)    Next of Kin: Extended Emergency Contact Information  Primary Emergency Contact: Austen [address] [place] [address] Phone: [phone]  Mobile Phone: [phone]  Relation: Other   MyChart Status:    Baptism Affiliation/: Nondenominational - No Baptism   Mobility/Ability to leave home-Do they Drive? Yes   PCP's Name and Contact Info: Cherilynn Riedel, [address]  [phone]     Scheduled appointments:  Future Appointments  Date Time Provider Tayler Balbuena   3/1/2018 10:00 AM Parviz Frias MD Adv Surg [facility] [address]   3/7/2018 8:00 AM STR OUT PT ONC INJ RM 01 STRZ OP ONC None   3/7/2018 8:30 AM Larry Morris MD Oncology [facility] [address]        Subjective:      Patient was \"hanging in there\" with her situation, and is \"hopeful\" for a positive outcome.       Objective:       Calm   [] Approachable   [] Grieving   []   Anxious   [x] Angry   [] Loneliness   []   Hopeless   [] Coping   [] Despair   []   Passive   [] Tearful   [] Fearful  []    Hopeful  [x]  Peaceful   [] Sleeping  []    Guilt   [] Other   []

## 2018-02-26 ENCOUNTER — HOSPITAL ENCOUNTER (OUTPATIENT)
Dept: PHYSICAL THERAPY | Age: 48
Setting detail: THERAPIES SERIES
Discharge: HOME | End: 2018-02-26
Payer: COMMERCIAL

## 2018-02-26 PROCEDURE — 97140 MANUAL THERAPY 1/> REGIONS: CPT

## 2018-02-26 PROCEDURE — 97110 THERAPEUTIC EXERCISES: CPT

## 2018-02-26 ASSESSMENT — PAIN SCALES - GENERAL: PAINLEVEL_OUTOF10: 3

## 2018-02-26 NOTE — PROGRESS NOTES
her auth date   Date of Physician Follow-Up: Dr. Mike Huertas 3/1/18, Dr. Ector Kaur 2/14/18 Chart Reviewed: Yes     Subjective: Subjective: Reports stinging sensation at left upper underarm region. Pt has completed the A+C cycles and will be starting weekly Taxol for 12 weeks started 3/7/18. Notes increased fatigue and nausea from chemo. Pain: Pain Assessment  Patient Currently in Pain: Yes  Pain Level: 3 (left upper underarm)    Manual Treatments/Provider Interaction: PORi protocol to left arm and trunk x50 minutes for manual lymph drainage and myofascial release. Pt only tolerated minimal pressure to sternal node pumps due to tenderness. Upper Extremity Circumferential Measurements    Right (cm) Left (cm) Comments   Met Heads 19.1 19.1    Ulnar Styloid Process 15.8 15.5    10 cm distal to Ulnar Styloid Process 23.6 23.6    Elbow Crease 25 25    10 cm proximal from Lateral Epicondyle 34 33.6    Axilla 34.4 35    Total 151.9 151.8      Brief Fatigue Inventory   Throughout our lives, most of us have times when we feel very tired or fatigued. Have you felt unusually tired or fatigued in the last week? Yes   Scale: 0 (No Fatigue)  <<<<>>>>  10 (As Bad as You Can Imagine)   1. How would you rate your fatigue (weariness, tiredness) right NOW? 5   2. How would you rate your USUAL level of fatigue during the past 24 hours? 8   3. How would you rate your WORST level of fatigue during the past 24 hours? 10   Scale: 0 (Does Not Interfere) <<<<>>>> 10 (Completely Interferes)   4. How would you rate how your fatigue has interfered with your GENERAL ACTIVITY in the past 24 hours? 8   5. How would you rate how your fatigue has interfered with your MOOD in the past 24 hours? 9   6. How would you rate how your fatigue has interfered with your WALKING ABILITY in the past 24 hours? 8   7. How would you rate how your fatigue has interfered with your NORMAL WORK (both outside and inside the home) in the past 24 hours? 6   8.  How would you 4+/5 left elbow. NO NEW GOAL. Long term goals  Time Frame for Long term goals : 12 weeks  Long term goal 1: Pt to demo left shoulder PROM flexion improved from 60 deg to 155 deg for ease with reaching overhead. - GOAL MET. 165 deg this date. NEW GOAL: Pt to demo Brief Fatigue Inventory score improved from 7.5 to 5 for improved tolerance of participation in activities of choice. Long term goal 2: Pt to demo left shoulder PROM abduction improved from 35 deg to 150 deg for ease with dressing and grooming. - GOAL NOT MET, CONTINUE. 119 deg with pain this date. Long term goal 3: Pt to demo left shoulder strength improved from 2-/5 to 4+/5 for full return to work and household chores. GOAL NOT MET, CONTINUE. 4/5 left shoulder. 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 - GOAL NOT MET, CONTINUE. 4+/5 left elbow. Long term goal 5: Pt to demo sustained left arm girth measurments with return to previous level of activities with good understanding of lymphedema risk factors and precautions. -  GOAL NOT MET, CONTINUE. Pt's arm circumference has remained stable however completing minimal activities at this time.          Smooth Cruz, [facility], [place] [id number] 2/26/2018

## 2018-02-28 NOTE — PROGRESS NOTES
Barbi Gomez MD   General Surgery  Follow up Patient Evaluation in Office  Pt Name: Ana Paula Pierre  Date of Birth 1970   Today's Date: 3/1/2018  Medical Record Number: [id number]  Referring Provider: No ref. provider found  Primary Care Provider: Raine Palacios MD  Chief Complaint:  Chief Complaint   Patient presents with    3 Month Follow-Up     s/p Left modified radical mastectomy 11/7/17-s/p right subclavian purple power port 12/1/17       ASSESSMENT      1. Breast cancer metastasized to axillary lymph node, left ([address]    2. Infiltrating ductal carcinoma of left breast (HCC)         PLANS      1. Follow-up surgical clinic 4 months  2. Breast cancer survivorship plan discussed with patient after completion of treatment. Radiation therapy to follow completion of chemotherapy. 3.  Continue therapy for decreased range of motion left upper extremity  4. Prescription for breast prosthesis written and provided to patient      Pam Bagley is a 50y.o. year old female who is presenting today in the office for follow-up of left breast carcinoma. She underwent a left modified radical mastectomy in November 2017. She currently is getting chemotherapy with Dr. Erick Ryan. She had 4 cycles of Adriamycin and Cytoxan and is now scheduled to start Taxotere therapy. She has had some decreased range of motion of her left arm and is seeing therapy for that with improvement. She has no visible lymphedema of her left upper extremity. Her mastectomy site is well healed. She stated that recently at the insertion site of the catheter portion of report there is an area of erythema. She was treated with antibiotics and this resolved. She's had no fever nor chills. She originally presented with a palpable mass in her left breast.  She had a prior history of benign cysts.   She had imaging and a biopsy performed at an outside institution which revealed an intermediate grade invasive ductal carcinoma left VENOUS ACCESS CATHETER Right 12/1/2017    RIGHT SIDE SINGLE LUMEN POWERPORT INSERTION performed by David Vickers MD at [address] Left 11/7/2017    LEFT MODIFIED RADICAL MASTECTOMY performed by David Vickers MD at [address] TUNNELED VENOUS PORT PLACEMENT Right 12/01/2017    Single Luman Smart Port Insertion- Right side. Medications  Current Outpatient Prescriptions   Medication Sig Dispense Refill    ondansetron (ZOFRAN-ODT) 4 MG disintegrating tablet Take 1 tablet by mouth every 8 hours as needed for Nausea or Vomiting 20 tablet 0    traMADol (ULTRAM) 50 MG tablet Take 1 tablet by mouth every 6 hours as needed for Pain for up to 60 days. 60 tablet 0    prochlorperazine (COMPAZINE) 10 MG tablet Take 1 tablet by mouth every 6 hours as needed (for nausea) 30 tablet 1    lidocaine-prilocaine (EMLA) 2.5-2.5 % cream Apply topically as needed. 30 g 1    albuterol sulfate  (90 Base) MCG/ACT inhaler Inhale 1 puff into the lungs every 6 hours as needed for Wheezing or Shortness of Breath       albuterol (PROVENTIL) (2.5 MG/3ML) 0.083% nebulizer solution Inhale 2.5 mg into the lungs every 4 hours as needed       butalbital-apap-caffeine -40 MG CAPS Take by mouth      ALPRAZolam (XANAX) 0.5 MG tablet Take 0.5 mg by mouth nightly as needed       cyclobenzaprine (FLEXERIL) 5 MG tablet Take 5 mg by mouth 3 times daily as needed       DULERA 200-5 MCG/ACT inhaler Inhale 1 puff into the lungs 2 times daily       venlafaxine (EFFEXOR XR) 75 MG extended release capsule Take 75 mg by mouth daily       meloxicam (MOBIC) 15 MG tablet Take 15 mg by mouth daily       No current facility-administered medications for this visit.       Allergies  No Known Allergies    Family History  Family History   Problem Relation Age of Onset    Heart Disease Mother     Hypertension Mother     Diabetes Mother     Heart Disease Father     No Known Problems Sister     No Known Problems Brother        Social History  Social History     Social History    Marital status:      Spouse name: N/A    Number of children: 3    Years of education: N/A     Occupational History          Social History Main Topics    Smoking status: Current Every Day Smoker     Packs/day: 0.50     Years: 18.00     Types: Cigarettes    Smokeless tobacco: Never Used      Comment: down to less than 0.25 aday    Alcohol use Yes      Comment: SOCAIL    Drug use: No    Sexual activity: Not on file     Other Topics Concern    Not on file     Social History Narrative    No narrative on file           Review of Systems  Review of Systems   Constitutional: Positive for activity change, chills, diaphoresis and fatigue. HENT: Positive for congestion, rhinorrhea and sore throat. Eyes: Negative for pain and redness. Respiratory: Positive for cough. Cardiovascular: Negative for chest pain and palpitations. Endocrine: Negative for polydipsia. Genitourinary: Negative for decreased urine volume, dysuria and vaginal bleeding. Musculoskeletal: Positive for arthralgias, back pain and myalgias. Skin: Negative for color change and wound. Allergic/Immunologic: Positive for immunocompromised state. Neurological: Positive for dizziness. Negative for syncope and headaches. Psychiatric/Behavioral: Positive for confusion. Negative for agitation and behavioral problems. The patient is nervous/anxious. OBJECTIVE     /68 (Site: Right Arm, Position: Sitting, Cuff Size: Medium Adult)   Pulse 112   Temp 99.5 °F (37.5 °C) (Tympanic)   Resp 20   Ht 5' 8\" (1.727 m)   Wt 178 lb 3.2 oz (80.8 kg)   SpO2 98%   BMI 27.10 kg/m²     Physical Exam   Constitutional: She is oriented to person, place, and time. Cardiovascular: Normal rate and regular rhythm. Pulmonary/Chest: Effort normal and breath sounds normal. No respiratory distress.  Right breast exhibits no inverted nipple, no mass, no nipple discharge and no skin change. Abdominal: Soft. She exhibits no distension. There is no tenderness. Musculoskeletal: She exhibits no edema or deformity. Decreased range of motion left upper extremity   Neurological: She is alert and oriented to person, place, and time. Skin: Skin is warm and dry. No rash noted. No erythema. Psychiatric: She has a normal mood and affect. Her behavior is normal.   Nursing note and vitals reviewed.       Lab Results   Component Value Date    WBC 6.5 02/14/2018    HGB 11.8 (L) 02/14/2018    HCT 35.1 (L) 02/14/2018     02/14/2018    ALT 16 02/14/2018    AST 16 02/14/2018     02/14/2018    K 3.5 02/14/2018     11/28/2017    CREATININE 0.7 02/14/2018    BUN 12 11/28/2017    CO2 26 11/28/2017

## 2018-03-01 ENCOUNTER — OFFICE VISIT (OUTPATIENT)
Dept: SURGERY | Age: 48
End: 2018-03-01
Payer: COMMERCIAL

## 2018-03-01 VITALS
RESPIRATION RATE: 20 BRPM | BODY MASS INDEX: 27.01 KG/M2 | HEART RATE: 112 BPM | DIASTOLIC BLOOD PRESSURE: 68 MMHG | WEIGHT: 178.2 LBS | HEIGHT: 68 IN | OXYGEN SATURATION: 98 % | SYSTOLIC BLOOD PRESSURE: 116 MMHG | TEMPERATURE: 99.5 F

## 2018-03-01 DIAGNOSIS — C50.912 INFILTRATING DUCTAL CARCINOMA OF LEFT BREAST (HCC): ICD-10-CM

## 2018-03-01 DIAGNOSIS — C50.912 BREAST CANCER METASTASIZED TO AXILLARY LYMPH NODE, LEFT (HCC): Primary | ICD-10-CM

## 2018-03-01 DIAGNOSIS — C77.3 BREAST CANCER METASTASIZED TO AXILLARY LYMPH NODE, LEFT (HCC): Primary | ICD-10-CM

## 2018-03-01 PROCEDURE — 99214 OFFICE O/P EST MOD 30 MIN: CPT | Performed by: SURGERY

## 2018-03-01 ASSESSMENT — ENCOUNTER SYMPTOMS
SORE THROAT: 1
EYE PAIN: 0
BACK PAIN: 1
COLOR CHANGE: 0
EYE REDNESS: 0
RHINORRHEA: 1
COUGH: 1

## 2018-03-05 ENCOUNTER — HOSPITAL ENCOUNTER (OUTPATIENT)
Dept: PHYSICAL THERAPY | Age: 48
Setting detail: THERAPIES SERIES
Discharge: HOME | End: 2018-03-05
Payer: COMMERCIAL

## 2018-03-05 PROCEDURE — 97140 MANUAL THERAPY 1/> REGIONS: CPT

## 2018-03-05 PROCEDURE — 97110 THERAPEUTIC EXERCISES: CPT

## 2018-03-05 NOTE — PROGRESS NOTES
[facility]  OUTPATIENT REHABILITATION  PHYSICAL THERAPY   ONCOLOGY REHABILITATION   DAILY NOTE  Time In: 0830   Time Out: 8302  Total timed code minutes: 55  Total treatment minutes: 55    Date: 3/5/2018  Patient Name: Singh Landeros        CSN: [id number]   : 1970  (50 y.o.)  Gender: female   Referring Practitioner: Dr. Renetta Anguiano  Diagnosis: Z85.981 Breast cancer metastasized to axillary lymph node, left  Additional Pertinent Hx: COPD, OA, smoker, asthma, diagnosed with ER/MS+, HER2- invasive ductal carcinoma on 10/9/17 with mastectomy on 17 with SLNB 3/14 +, will be undergoing 4 cycles of Adriamycin and cytoxan with 12 weekly cycles of Taxol starting 17, then radiation and then hormonal therapy       Insurance:  Raul Medrano  lou and 12 sessions approved thru **3/9/18**, has up to 20 visits per calendar year after pre-auth   Total # Visits Approved: 13 Total # Visits to Date: 8   Certification Date: 15/11/61 Progress Note Counter:0/10 for PN, last PN performed on 18 as pt had to cancel her previous appt due to illness and then had to wait to extend her auth date   Date of Physician Follow-Up: Dr. Barak Barreto 18, Dr. Charly Gayle 3/7/18 Chart Reviewed: Yes     Subjective: Subjective: Notes her pain has improved since her last session however feels like she is coming down with a cold. Followed up with surgeon and reports no issues there. Pain: Pain Assessment  Patient Currently in Pain: Yes      Exercise/Equipment/Modalities Sets   Repetitions Comments   Nustep  L1 x8 minutes Seat 9, arms 10    Pulleys 1 1.5 minutes  Flexion and abduction   Biceps 1 10 No resistance   Triceps 1 10 No reistance   Horizontal shoulder abduction 1 10 No resistance   Pec stretch 3 15 seconds Hands cheek height and with elbows extended     Manual Treatments/Provider Interaction: PORi protocol to left arm and trunk x30 minutes for manual lymph drainage and myofascial release.  Note tenderness at tricep region -

## 2018-03-06 ENCOUNTER — HOSPITAL ENCOUNTER (OUTPATIENT)
Dept: PHYSICAL THERAPY | Age: 48
Setting detail: THERAPIES SERIES
Discharge: HOME | End: 2018-03-06
Payer: COMMERCIAL

## 2018-03-06 PROCEDURE — 97140 MANUAL THERAPY 1/> REGIONS: CPT

## 2018-03-06 PROCEDURE — 97110 THERAPEUTIC EXERCISES: CPT

## 2018-03-06 NOTE — PROGRESS NOTES
drainage and myofascial release. Note reduced tenderness at tricep region - nerve pain type presentation. Patient Education/Home Exercise Program:  Monitor for change after today's progression. Response to Treatment:  Patient tolerated treatment well    Progression Toward Functional Goals: Progressing toward goals    Specific Interventions for Next Treatment:  Manual therapy to left arm and chest wall for lymphatic drainage and myfascial release (utilizing PORi protocol), gentle left shoulder stretches progressing to gentle strengthening as tolerated. Lymphedema education and precautions. Plan: Continue established plan of care up to 2x per week for 10 weeks    Goals:  Short term goals  Time Frame for Short term goals: 5 weeks  Short term goal 1: PREVIOUS GOAL MET, NO NEW GOAL. Short term goal 2: PREVIOUS GOAL MET, NO NEW GOAL. Short term goal 3: PREVIOUS GOAL MET. NO NEW GOAL. Short term goal 4: PREVIOUS GOAL MET. NO NEW GOAL. Long term goals  Time Frame for Long term goals : 10 weeks  Long term goal 1:  Pt to demo Brief Fatigue Inventory score improved from 7.5 to 5 for improved tolerance of participation in activities of choice. - GOAL NOT MET, CONTINUE. Scored 7.5. Long term goal 2: Pt to demo left shoulder PROM abduction improved from 35 deg to 150 deg for ease with dressing and grooming. - GOAL NOT MET, CONTINUE. 119 deg with pain. Long term goal 3: Pt to demo left shoulder strength improved from 2-/5 to 4+/5 for full return to work and household chores. - GOAL NOT MET, CONTINUE. 4/5 left shoulder. 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 - GOAL NOT MET, CONTINUE. 4+/5 left elbow  Long term goal 5: Pt to demo sustained left arm girth measurments with return to previous level of activities with good understanding of lymphedema risk factors and precautions. - GOAL NOT MET, CONTINUE.  Arm circumference has

## 2018-03-07 ENCOUNTER — HOSPITAL ENCOUNTER (OUTPATIENT)
Dept: INFUSION THERAPY | Age: 48
Discharge: HOME | End: 2018-03-07
Payer: COMMERCIAL

## 2018-03-07 ENCOUNTER — OFFICE VISIT (OUTPATIENT)
Dept: ONCOLOGY | Age: 48
End: 2018-03-07
Payer: COMMERCIAL

## 2018-03-07 VITALS
TEMPERATURE: 98.1 F | WEIGHT: 172.4 LBS | SYSTOLIC BLOOD PRESSURE: 109 MMHG | DIASTOLIC BLOOD PRESSURE: 56 MMHG | HEIGHT: 68 IN | HEART RATE: 84 BPM | OXYGEN SATURATION: 93 % | BODY MASS INDEX: 26.13 KG/M2 | RESPIRATION RATE: 20 BRPM

## 2018-03-07 VITALS
DIASTOLIC BLOOD PRESSURE: 55 MMHG | RESPIRATION RATE: 20 BRPM | WEIGHT: 172.4 LBS | BODY MASS INDEX: 26.13 KG/M2 | OXYGEN SATURATION: 95 % | TEMPERATURE: 98.1 F | HEART RATE: 92 BPM | HEIGHT: 68 IN | SYSTOLIC BLOOD PRESSURE: 109 MMHG

## 2018-03-07 DIAGNOSIS — C77.3 BREAST CANCER METASTASIZED TO AXILLARY LYMPH NODE, LEFT (HCC): ICD-10-CM

## 2018-03-07 DIAGNOSIS — Z51.11 ENCOUNTER FOR CHEMOTHERAPY MANAGEMENT: ICD-10-CM

## 2018-03-07 DIAGNOSIS — Z90.12 STATUS POST MASTECTOMY, LEFT: Primary | ICD-10-CM

## 2018-03-07 DIAGNOSIS — C50.912 BREAST CANCER METASTASIZED TO AXILLARY LYMPH NODE, LEFT (HCC): ICD-10-CM

## 2018-03-07 LAB
ALBUMIN SERPL-MCNC: 3.4 G/DL (ref 3.5–5.1)
ALP BLD-CCNC: 83 U/L (ref 38–126)
ALT SERPL-CCNC: 17 U/L (ref 11–66)
AST SERPL-CCNC: 12 U/L (ref 5–40)
BASINOPHIL, AUTOMATED: 0 % (ref 0–12)
BILIRUB SERPL-MCNC: 0.2 MG/DL (ref 0.3–1.2)
BILIRUBIN DIRECT: < 0.2 MG/DL (ref 0–0.3)
BUN, WHOLE BLOOD: 14 MG/DL (ref 8–26)
CHLORIDE, WHOLE BLOOD: 105 MEQ/L (ref 98–109)
CREATININE, WHOLE BLOOD: 0.6 MG/DL (ref 0.5–1.2)
EOSINOPHILS RELATIVE PERCENT: 2 % (ref 0–12)
GFR, ESTIMATED: > 90 ML/MIN/1.73M2
GLUCOSE, WHOLE BLOOD: 112 MG/DL (ref 70–108)
HCT VFR BLD CALC: 29.1 % (ref 37–47)
HEMOGLOBIN: 9.9 GM/DL (ref 12–16)
IONIZED CALCIUM, WHOLE BLOOD: 1.15 MMOL/L (ref 1.12–1.32)
LYMPHOCYTES # BLD: 12 % (ref 15–47)
MCH RBC QN AUTO: 33.9 PG (ref 27–31)
MCHC RBC AUTO-ENTMCNC: 34.2 GM/DL (ref 33–37)
MCV RBC AUTO: 99 FL (ref 81–99)
MONOCYTES: 7 % (ref 0–12)
PDW BLD-RTO: 13.8 % (ref 11.5–14.5)
PLATELET # BLD: 299 THOU/MM3 (ref 130–400)
PMV BLD AUTO: 7.2 FL (ref 7.4–10.4)
POTASSIUM, WHOLE BLOOD: 3.9 MEQ/L (ref 3.5–4.9)
RBC # BLD: 2.94 MILL/MM3 (ref 4.2–5.4)
SEG NEUTROPHILS: 79 % (ref 43–75)
SODIUM, WHOLE BLOOD: 141 MEQ/L (ref 138–146)
TOTAL CO2, WHOLE BLOOD: 25 MEQ/L (ref 23–33)
TOTAL PROTEIN: 6 G/DL (ref 6.1–8)
WBC # BLD: 7.6 THOU/MM3 (ref 4.8–10.8)

## 2018-03-07 PROCEDURE — 99214 OFFICE O/P EST MOD 30 MIN: CPT | Performed by: INTERNAL MEDICINE

## 2018-03-07 PROCEDURE — 6360000002 HC RX W HCPCS: Performed by: INTERNAL MEDICINE

## 2018-03-07 PROCEDURE — 96413 CHEMO IV INFUSION 1 HR: CPT

## 2018-03-07 PROCEDURE — 80047 BASIC METABLC PNL IONIZED CA: CPT

## 2018-03-07 PROCEDURE — 96367 TX/PROPH/DG ADDL SEQ IV INF: CPT

## 2018-03-07 PROCEDURE — G0463 HOSPITAL OUTPT CLINIC VISIT: HCPCS

## 2018-03-07 PROCEDURE — 96375 TX/PRO/DX INJ NEW DRUG ADDON: CPT

## 2018-03-07 PROCEDURE — S0028 INJECTION, FAMOTIDINE, 20 MG: HCPCS | Performed by: INTERNAL MEDICINE

## 2018-03-07 PROCEDURE — 85025 COMPLETE CBC W/AUTO DIFF WBC: CPT

## 2018-03-07 PROCEDURE — 36591 DRAW BLOOD OFF VENOUS DEVICE: CPT

## 2018-03-07 PROCEDURE — 2500000003 HC RX 250 WO HCPCS: Performed by: INTERNAL MEDICINE

## 2018-03-07 PROCEDURE — 2580000003 HC RX 258: Performed by: INTERNAL MEDICINE

## 2018-03-07 PROCEDURE — 80076 HEPATIC FUNCTION PANEL: CPT

## 2018-03-07 RX ORDER — METHYLPREDNISOLONE SODIUM SUCCINATE 125 MG/2ML
125 INJECTION, POWDER, LYOPHILIZED, FOR SOLUTION INTRAMUSCULAR; INTRAVENOUS ONCE
Status: CANCELLED | OUTPATIENT
Start: 2018-03-07 | End: 2018-03-07

## 2018-03-07 RX ORDER — 0.9 % SODIUM CHLORIDE 0.9 %
10 VIAL (ML) INJECTION ONCE
Status: COMPLETED | OUTPATIENT
Start: 2018-03-07 | End: 2018-03-07

## 2018-03-07 RX ORDER — SODIUM CHLORIDE 0.9 % (FLUSH) 0.9 %
10 SYRINGE (ML) INJECTION PRN
Status: CANCELLED | OUTPATIENT
Start: 2018-03-07

## 2018-03-07 RX ORDER — HEPARIN SODIUM (PORCINE) LOCK FLUSH IV SOLN 100 UNIT/ML 100 UNIT/ML
500 SOLUTION INTRAVENOUS PRN
Status: CANCELLED | OUTPATIENT
Start: 2018-03-07

## 2018-03-07 RX ORDER — SODIUM CHLORIDE 0.9 % (FLUSH) 0.9 %
10 SYRINGE (ML) INJECTION PRN
Status: DISCONTINUED | OUTPATIENT
Start: 2018-03-07 | End: 2018-03-08 | Stop reason: HOSPADM

## 2018-03-07 RX ORDER — HEPARIN SODIUM (PORCINE) LOCK FLUSH IV SOLN 100 UNIT/ML 100 UNIT/ML
500 SOLUTION INTRAVENOUS PRN
Status: DISCONTINUED | OUTPATIENT
Start: 2018-03-07 | End: 2018-03-08 | Stop reason: HOSPADM

## 2018-03-07 RX ORDER — 0.9 % SODIUM CHLORIDE 0.9 %
10 VIAL (ML) INJECTION ONCE
Status: CANCELLED | OUTPATIENT
Start: 2018-03-07 | End: 2018-03-07

## 2018-03-07 RX ORDER — DEXAMETHASONE 4 MG/1
8 TABLET ORAL
Qty: 12 TABLET | Refills: 1 | Status: SHIPPED | OUTPATIENT
Start: 2018-03-08 | End: 2018-03-28 | Stop reason: SDUPTHER

## 2018-03-07 RX ORDER — DIPHENHYDRAMINE HYDROCHLORIDE 50 MG/ML
50 INJECTION INTRAMUSCULAR; INTRAVENOUS ONCE
Status: CANCELLED | OUTPATIENT
Start: 2018-03-07 | End: 2018-03-07

## 2018-03-07 RX ORDER — SODIUM CHLORIDE 9 MG/ML
INJECTION, SOLUTION INTRAVENOUS ONCE
Status: COMPLETED | OUTPATIENT
Start: 2018-03-07 | End: 2018-03-07

## 2018-03-07 RX ORDER — DIPHENHYDRAMINE HYDROCHLORIDE 50 MG/ML
50 INJECTION INTRAMUSCULAR; INTRAVENOUS ONCE
Status: COMPLETED | OUTPATIENT
Start: 2018-03-07 | End: 2018-03-07

## 2018-03-07 RX ORDER — SODIUM CHLORIDE 9 MG/ML
INJECTION, SOLUTION INTRAVENOUS CONTINUOUS
Status: CANCELLED | OUTPATIENT
Start: 2018-03-07

## 2018-03-07 RX ORDER — SODIUM CHLORIDE 0.9 % (FLUSH) 0.9 %
20 SYRINGE (ML) INJECTION PRN
Status: CANCELLED | OUTPATIENT
Start: 2018-03-07

## 2018-03-07 RX ORDER — SODIUM CHLORIDE 9 MG/ML
INJECTION, SOLUTION INTRAVENOUS ONCE
Status: CANCELLED | OUTPATIENT
Start: 2018-03-07 | End: 2018-03-07

## 2018-03-07 RX ORDER — SODIUM CHLORIDE 0.9 % (FLUSH) 0.9 %
5 SYRINGE (ML) INJECTION PRN
Status: CANCELLED | OUTPATIENT
Start: 2018-03-07

## 2018-03-07 RX ADMIN — FAMOTIDINE 20 MG: 10 INJECTION, SOLUTION INTRAVENOUS at 12:19

## 2018-03-07 RX ADMIN — Medication 10 ML: at 10:16

## 2018-03-07 RX ADMIN — SODIUM CHLORIDE: 0.9 INJECTION, SOLUTION INTRAVENOUS at 11:25

## 2018-03-07 RX ADMIN — Medication 10 ML: at 10:15

## 2018-03-07 RX ADMIN — PACLITAXEL 153.6 MG: 300 INJECTION, SOLUTION INTRAVENOUS at 12:30

## 2018-03-07 RX ADMIN — DIPHENHYDRAMINE HYDROCHLORIDE 50 MG: 50 INJECTION, SOLUTION INTRAMUSCULAR; INTRAVENOUS at 12:21

## 2018-03-07 RX ADMIN — DEXAMETHASONE SODIUM PHOSPHATE: 4 INJECTION, SOLUTION INTRAMUSCULAR; INTRAVENOUS at 11:45

## 2018-03-07 RX ADMIN — Medication 10 ML: at 12:19

## 2018-03-07 RX ADMIN — Medication 500 UNITS: at 13:43

## 2018-03-07 RX ADMIN — Medication 10 ML: at 13:43

## 2018-03-07 ASSESSMENT — ENCOUNTER SYMPTOMS
ABDOMINAL DISTENTION: 0
VOMITING: 0
VOICE CHANGE: 0
EYE ITCHING: 0
NAUSEA: 0
CONSTIPATION: 0
BLOOD IN STOOL: 0
CHEST TIGHTNESS: 0
SHORTNESS OF BREATH: 1
EYE REDNESS: 0
SORE THROAT: 0
BACK PAIN: 1
COUGH: 0
FACIAL SWELLING: 0
COLOR CHANGE: 0
DIARRHEA: 0
TROUBLE SWALLOWING: 0
ABDOMINAL PAIN: 0
WHEEZING: 0

## 2018-03-07 NOTE — PLAN OF CARE
Problem: Musculor/Skeletal Functional Status  Intervention: Fall precautions  Discussed fall precautions, fall risks, and instructed patient to ask for assistance with ambulation. Call light within reach. Goal: Absence of falls  Outcome: Met This Shift  Free from falls during infusion. Problem: Intellectual/Education/Knowledge Deficit  Intervention: Verbal/written education provided  Chemotherapy Teaching     What is Chemotherapy   Drug action [x]   Method of Administration [x]   Handouts given []     Side Effects  Nausea/vomiting [x]   Diarrhea [x]   Fatigue [x]   Signs / Symptoms of infection [x]   Neutropenia [x]   Thrombocytopenia [x]   Alopecia [x]   neuropathy [x]   [place] diet &  the importance of fluids [x]       Micellaneous  Importance of nutrition [x]   Importance of oral hygiene [x]   When to call the MD [x]   Monitoring labs [x]   Use of supportive services []     Explanation of Drug Regimen / Frequency  Taxol day#l cycle#1     Comments  Verbalized understanding to drug,action,side effects and when to call MD       Goal: Teaching initiated upon admission  Outcome: Met This Shift  Patient verbalizes understanding to verbal information given on taxol,action and possible side effects. Aware to call MD if develop complications. Problem: Discharge Planning  Intervention: Interaction with patient/family and care team  Provide discharge instructions. Goal: Knowledge of discharge instructions  Knowledge of discharge instructions     Outcome: Met This Shift  Verbalized understanding of discharge instructions, follow-up appointments, and when to call the physician. Problem: Infection - Central Venous Catheter-Associated Bloodstream Infection:  Intervention: Infection risk assessment  Discussed mediport maintenance, infection prevention, and when to call the physician. Labs drawn per mediport.     Goal: Will show no infection signs and symptoms  Will show no infection signs and symptoms   Outcome: Met This Shift  Mediport site with no redness or warmth. Skin over port intact with no signs of breakdown noted. Patient verbalizes signs/symptoms of port infection and when to notify the physician. Comments: Care plan reviewed with patient and spouse. Patient and spouse verbalize understanding of the plan of care and contribute to goal setting.

## 2018-03-07 NOTE — PROGRESS NOTES
chills, fatigue, fever and unexpected weight change. HENT: Negative for dental problem, facial swelling, hearing loss, mouth sores, nosebleeds, postnasal drip, sore throat, trouble swallowing and voice change. Eyes: Negative for redness, itching and visual disturbance. Respiratory: Positive for shortness of breath. Negative for cough, chest tightness and wheezing. Cardiovascular: Negative for chest pain, palpitations and leg swelling. Gastrointestinal: Negative for abdominal distention, abdominal pain, blood in stool, constipation, diarrhea, nausea and vomiting. Genitourinary: Negative for difficulty urinating, dysuria, flank pain, frequency and hematuria. Musculoskeletal: Positive for back pain. Negative for arthralgias, gait problem, joint swelling, myalgias and neck stiffness. Skin: Negative for color change and rash. Neurological: Negative for dizziness, seizures, syncope, speech difficulty, weakness, light-headedness, numbness and headaches. Hematological: Negative for adenopathy. Does not bruise/bleed easily. Psychiatric/Behavioral: Negative for confusion and sleep disturbance. The patient is not nervous/anxious. Objective:   Physical Exam   Constitutional: She is oriented to person, place, and time. She appears well-developed and well-nourished. No distress. HENT:   Head: Normocephalic. Mouth/Throat: Oropharynx is clear and moist. No oropharyngeal exudate. Eyes: EOM are normal. Pupils are equal, round, and reactive to light. Right eye exhibits no discharge. Left eye exhibits no discharge. No scleral icterus. Neck: Normal range of motion. Neck supple. No JVD present. No tracheal deviation present. No thyromegaly present. Cardiovascular: Normal rate and normal heart sounds. Exam reveals no gallop and no friction rub. No murmur heard. Pulmonary/Chest: Effort normal and breath sounds normal. No stridor. No respiratory distress. She has no wheezes. She has no rales. chemotherapy: 4 cycles of Adriamycin and Cytoxan followed by 12 weekly infusions of Taxol. After chemotherapy due to having 3 lymph nodes involved by cancer, she will benefit from postmastectomy radiation treatment. Her final step in breast cancer treatment will be adjuvant hormonal therapy. Since the patient has strong family history of breast cancer and she is diagnosed with breast cancer before age of 46 she had genetic testing that showed variant of unknown significance. The patient had echo in October 2017 that showed normal left ventricular ejection fraction. On 12/13/2017 she started adjuvant AC. 2. Adjuvant chemotherapy. Today the patient will start second part of adjuvant chemotherapy with weekly Taxol. Side effects of Taxol were discussed with the patient. Taxol side effects occurring in greater than 30% of patients taking Taxol include:  Low blood counts: increased risk for infection, anemia and/or bleeding. Hair loss  Pain in the joints and muscles. Numbness and tingling of the hands and feet   Nausea, vomiting, diarrhea  Mouth sores   Hypersensitivity reaction: fever, facial flushing, chills, shortness of breath, or hives after Taxol is given. The majority of these reactions occur within the first 10 minutes of an infusion. The following are less common side effects:  Swelling of the feet or ankles (edema). Increases in blood tests measuring liver function. Nail changes (discoloration of nail beds - rare) (see skin reactions). The patient will was instructed to continue Decadron 8 mg for 2 days after her Taxol infusion. 3.  Mild normocytic anemia. Secondary to chemotherapy.   Will continue monitoring

## 2018-03-07 NOTE — PROGRESS NOTES
Patient assessed for the following post chemotherapy:    Dizziness   No  Lightheadedness  No      Acute nausea/vomiting No  Headache   No  Chest pain/pressure  No  Rash/itching   No  Shortness of breath  No    Patient kept for 20 minutes observation post infusion chemotherapy. Patient tolerated chemotherapy treatment taxol without any complications. Labs drawn per mediport    Last vital signs:   BP (!) 109/56   Pulse 84   Temp 98.1 °F (36.7 °C) (Oral)   Resp 20   Ht 5' 8\" (1.727 m)   Wt 172 lb 6.4 oz (78.2 kg)   SpO2 93%   BMI 26.21 kg/m²     Patient instructed if experience any of the above symptoms following today's infusion,he/she is to notify MD immediately or go to the emergency department. Discharge instructions given to patient. Verbalizes understanding. Ambulated off unit per self with spouse with belongings.

## 2018-03-07 NOTE — PROGRESS NOTES
Labs drawn via central venous catheter, then patient escorted to exam room accompanied by Ethel Hunter

## 2018-03-13 DIAGNOSIS — C50.912 BREAST CANCER METASTASIZED TO AXILLARY LYMPH NODE, LEFT (HCC): Primary | ICD-10-CM

## 2018-03-13 DIAGNOSIS — C77.3 BREAST CANCER METASTASIZED TO AXILLARY LYMPH NODE, LEFT (HCC): Primary | ICD-10-CM

## 2018-03-14 ENCOUNTER — HOSPITAL ENCOUNTER (OUTPATIENT)
Dept: INFUSION THERAPY | Age: 48
Discharge: HOME | End: 2018-03-14
Payer: COMMERCIAL

## 2018-03-14 ENCOUNTER — TELEPHONE (OUTPATIENT)
Dept: INFUSION THERAPY | Age: 48
End: 2018-03-14

## 2018-03-14 VITALS
HEART RATE: 86 BPM | BODY MASS INDEX: 27.18 KG/M2 | WEIGHT: 173.2 LBS | OXYGEN SATURATION: 93 % | SYSTOLIC BLOOD PRESSURE: 105 MMHG | TEMPERATURE: 98.2 F | HEIGHT: 67 IN | DIASTOLIC BLOOD PRESSURE: 59 MMHG | RESPIRATION RATE: 18 BRPM

## 2018-03-14 DIAGNOSIS — C77.3 BREAST CANCER METASTASIZED TO AXILLARY LYMPH NODE, LEFT (HCC): ICD-10-CM

## 2018-03-14 DIAGNOSIS — C50.912 BREAST CANCER METASTASIZED TO AXILLARY LYMPH NODE, LEFT (HCC): ICD-10-CM

## 2018-03-14 LAB
ALBUMIN SERPL-MCNC: 3.7 G/DL (ref 3.5–5.1)
ALP BLD-CCNC: 62 U/L (ref 38–126)
ALT SERPL-CCNC: 16 U/L (ref 11–66)
AST SERPL-CCNC: 13 U/L (ref 5–40)
BASINOPHIL, AUTOMATED: 0 % (ref 0–12)
BILIRUB SERPL-MCNC: 0.2 MG/DL (ref 0.3–1.2)
BILIRUBIN DIRECT: < 0.2 MG/DL (ref 0–0.3)
BUN, WHOLE BLOOD: 15 MG/DL (ref 8–26)
CHLORIDE, WHOLE BLOOD: 105 MEQ/L (ref 98–109)
CREATININE, WHOLE BLOOD: 0.7 MG/DL (ref 0.5–1.2)
EOSINOPHILS RELATIVE PERCENT: 5 % (ref 0–12)
GFR, ESTIMATED: > 90 ML/MIN/1.73M2
GLUCOSE, WHOLE BLOOD: 92 MG/DL (ref 70–108)
HCT VFR BLD CALC: 29.9 % (ref 37–47)
HEMOGLOBIN: 10.2 GM/DL (ref 12–16)
IONIZED CALCIUM, WHOLE BLOOD: 1.14 MMOL/L (ref 1.12–1.32)
LYMPHOCYTES # BLD: 17 % (ref 15–47)
MCH RBC QN AUTO: 33.6 PG (ref 27–31)
MCHC RBC AUTO-ENTMCNC: 34 GM/DL (ref 33–37)
MCV RBC AUTO: 99 FL (ref 81–99)
MONOCYTES: 6 % (ref 0–12)
PDW BLD-RTO: 13.6 % (ref 11.5–14.5)
PLATELET # BLD: 313 THOU/MM3 (ref 130–400)
PMV BLD AUTO: 7.6 FL (ref 7.4–10.4)
POTASSIUM, WHOLE BLOOD: 3.9 MEQ/L (ref 3.5–4.9)
RBC # BLD: 3.02 MILL/MM3 (ref 4.2–5.4)
SEG NEUTROPHILS: 72 % (ref 43–75)
SODIUM, WHOLE BLOOD: 140 MEQ/L (ref 138–146)
TOTAL CO2, WHOLE BLOOD: 25 MEQ/L (ref 23–33)
TOTAL PROTEIN: 6.5 G/DL (ref 6.1–8)
WBC # BLD: 4.3 THOU/MM3 (ref 4.8–10.8)

## 2018-03-14 PROCEDURE — 80047 BASIC METABLC PNL IONIZED CA: CPT

## 2018-03-14 PROCEDURE — 85025 COMPLETE CBC W/AUTO DIFF WBC: CPT

## 2018-03-14 PROCEDURE — S0028 INJECTION, FAMOTIDINE, 20 MG: HCPCS | Performed by: INTERNAL MEDICINE

## 2018-03-14 PROCEDURE — 80076 HEPATIC FUNCTION PANEL: CPT

## 2018-03-14 PROCEDURE — 96413 CHEMO IV INFUSION 1 HR: CPT

## 2018-03-14 PROCEDURE — 2580000003 HC RX 258: Performed by: INTERNAL MEDICINE

## 2018-03-14 PROCEDURE — 2500000003 HC RX 250 WO HCPCS: Performed by: INTERNAL MEDICINE

## 2018-03-14 PROCEDURE — 96375 TX/PRO/DX INJ NEW DRUG ADDON: CPT

## 2018-03-14 PROCEDURE — 36591 DRAW BLOOD OFF VENOUS DEVICE: CPT

## 2018-03-14 PROCEDURE — 96367 TX/PROPH/DG ADDL SEQ IV INF: CPT

## 2018-03-14 PROCEDURE — 6360000002 HC RX W HCPCS: Performed by: INTERNAL MEDICINE

## 2018-03-14 RX ORDER — SODIUM CHLORIDE 0.9 % (FLUSH) 0.9 %
20 SYRINGE (ML) INJECTION PRN
Status: CANCELLED | OUTPATIENT
Start: 2018-03-14

## 2018-03-14 RX ORDER — 0.9 % SODIUM CHLORIDE 0.9 %
10 VIAL (ML) INJECTION ONCE
Status: CANCELLED | OUTPATIENT
Start: 2018-03-14 | End: 2018-03-14

## 2018-03-14 RX ORDER — SODIUM CHLORIDE 9 MG/ML
INJECTION, SOLUTION INTRAVENOUS CONTINUOUS
Status: CANCELLED | OUTPATIENT
Start: 2018-03-14

## 2018-03-14 RX ORDER — HEPARIN SODIUM (PORCINE) LOCK FLUSH IV SOLN 100 UNIT/ML 100 UNIT/ML
500 SOLUTION INTRAVENOUS PRN
Status: DISCONTINUED | OUTPATIENT
Start: 2018-03-14 | End: 2018-03-15 | Stop reason: HOSPADM

## 2018-03-14 RX ORDER — METHYLPREDNISOLONE SODIUM SUCCINATE 125 MG/2ML
125 INJECTION, POWDER, LYOPHILIZED, FOR SOLUTION INTRAMUSCULAR; INTRAVENOUS ONCE
Status: CANCELLED | OUTPATIENT
Start: 2018-03-14 | End: 2018-03-14

## 2018-03-14 RX ORDER — HEPARIN SODIUM (PORCINE) LOCK FLUSH IV SOLN 100 UNIT/ML 100 UNIT/ML
500 SOLUTION INTRAVENOUS PRN
Status: CANCELLED | OUTPATIENT
Start: 2018-03-14

## 2018-03-14 RX ORDER — DIPHENHYDRAMINE HYDROCHLORIDE 50 MG/ML
50 INJECTION INTRAMUSCULAR; INTRAVENOUS ONCE
Status: COMPLETED | OUTPATIENT
Start: 2018-03-14 | End: 2018-03-14

## 2018-03-14 RX ORDER — DIPHENHYDRAMINE HYDROCHLORIDE 50 MG/ML
50 INJECTION INTRAMUSCULAR; INTRAVENOUS ONCE
Status: CANCELLED | OUTPATIENT
Start: 2018-03-14 | End: 2018-03-14

## 2018-03-14 RX ORDER — SODIUM CHLORIDE 0.9 % (FLUSH) 0.9 %
5 SYRINGE (ML) INJECTION PRN
Status: CANCELLED | OUTPATIENT
Start: 2018-03-14

## 2018-03-14 RX ORDER — SODIUM CHLORIDE 0.9 % (FLUSH) 0.9 %
10 SYRINGE (ML) INJECTION PRN
Status: CANCELLED | OUTPATIENT
Start: 2018-03-14

## 2018-03-14 RX ORDER — SODIUM CHLORIDE 9 MG/ML
INJECTION, SOLUTION INTRAVENOUS ONCE
Status: COMPLETED | OUTPATIENT
Start: 2018-03-14 | End: 2018-03-14

## 2018-03-14 RX ORDER — 0.9 % SODIUM CHLORIDE 0.9 %
10 VIAL (ML) INJECTION ONCE
Status: COMPLETED | OUTPATIENT
Start: 2018-03-14 | End: 2018-03-14

## 2018-03-14 RX ORDER — EPINEPHRINE 1 MG/ML
0.3 INJECTION, SOLUTION, CONCENTRATE INTRAVENOUS PRN
Status: CANCELLED | OUTPATIENT
Start: 2018-03-14

## 2018-03-14 RX ORDER — SODIUM CHLORIDE 0.9 % (FLUSH) 0.9 %
10 SYRINGE (ML) INJECTION PRN
Status: DISCONTINUED | OUTPATIENT
Start: 2018-03-14 | End: 2018-03-15 | Stop reason: HOSPADM

## 2018-03-14 RX ADMIN — Medication 10 ML: at 14:30

## 2018-03-14 RX ADMIN — DEXAMETHASONE SODIUM PHOSPHATE: 4 INJECTION, SOLUTION INTRAMUSCULAR; INTRAVENOUS at 12:25

## 2018-03-14 RX ADMIN — Medication 10 ML: at 11:31

## 2018-03-14 RX ADMIN — DIPHENHYDRAMINE HYDROCHLORIDE 50 MG: 50 INJECTION, SOLUTION INTRAMUSCULAR; INTRAVENOUS at 12:23

## 2018-03-14 RX ADMIN — SODIUM CHLORIDE: 9 INJECTION, SOLUTION INTRAVENOUS at 12:15

## 2018-03-14 RX ADMIN — PACLITAXEL 153.6 MG: 6 INJECTION, SOLUTION, CONCENTRATE INTRAVENOUS at 13:00

## 2018-03-14 RX ADMIN — FAMOTIDINE 20 MG: 10 INJECTION, SOLUTION INTRAVENOUS at 12:20

## 2018-03-14 RX ADMIN — Medication 500 UNITS: at 14:30

## 2018-03-14 RX ADMIN — Medication 10 ML: at 12:20

## 2018-03-14 RX ADMIN — Medication 10 ML: at 11:30

## 2018-03-14 ASSESSMENT — PAIN DESCRIPTION - FREQUENCY: FREQUENCY: INTERMITTENT

## 2018-03-14 ASSESSMENT — PAIN DESCRIPTION - LOCATION: LOCATION: LEG

## 2018-03-14 ASSESSMENT — PAIN SCALES - GENERAL: PAINLEVEL_OUTOF10: 2

## 2018-03-14 ASSESSMENT — PAIN DESCRIPTION - DESCRIPTORS: DESCRIPTORS: ACHING;DISCOMFORT

## 2018-03-14 ASSESSMENT — PAIN DESCRIPTION - ORIENTATION: ORIENTATION: RIGHT;LEFT

## 2018-03-14 ASSESSMENT — PAIN DESCRIPTION - PAIN TYPE: TYPE: CHRONIC PAIN

## 2018-03-14 NOTE — PLAN OF CARE
Problem: Pain:  Intervention: Promote participation in pain management plan  Patient encouraged to take pain med as prescribed and to call the physician if pain is uncontrolled. Goal: Control of chronic pain  Control of chronic pain   Outcome: Met This Shift  Patient rates chronic pain @ \"2\" upon admission and \"0\" @ discharge. Problem: Musculor/Skeletal Functional Status  Intervention: Fall precautions  Discussed fall precautions, fall risks, and instructed patient to ask for assistance with ambulation. Call light within reach. Goal: Absence of falls  Outcome: Met This Shift  Free from falls during infusion. Problem: Intellectual/Education/Knowledge Deficit  Intervention: Verbal/written education provided  Chemotherapy Teaching     What is Chemotherapy   Drug action [x]   Method of Administration [x]   Handouts given []     Side Effects  Nausea/vomiting [x]   Diarrhea [x]   Fatigue [x]   Signs / Symptoms of infection [x]   Neutropenia [x]   Thrombocytopenia [x]   Alopecia [x]   neuropathy [x]   [place] diet &  the importance of fluids [x]       Micellaneous  Importance of nutrition [x]   Importance of oral hygiene [x]   When to call the MD [x]   Monitoring labs [x]   Use of supportive services []     Explanation of Drug Regimen / Frequency  taxol     Comments  Verbalized understanding to drug,action,side effects and when to call MD       Goal: Teaching initiated upon admission  Outcome: Met This Shift  Patient verbalizes understanding to verbal information given on taxol,action and possible side effects. Aware to call MD if develop complications. Problem: Discharge Planning  Intervention: Interaction with patient/family and care team  Provide discharge instructions. Goal: Knowledge of discharge instructions  Knowledge of discharge instructions     Outcome: Met This Shift  Verbalized understanding of discharge instructions, follow-up appointments, and when to call the physician.     Problem: Infection - Central Venous Catheter-Associated Bloodstream Infection:  Intervention: Infection risk assessment  Discussed mediport maintenance, infection prevention, and when to call the physician. Labs drawn per mediport. Goal: Will show no infection signs and symptoms  Will show no infection signs and symptoms   Outcome: Met This Shift  Mediport site with no redness or warmth. Skin over port intact with no signs of breakdown noted. Patient verbalizes signs/symptoms of port infection and when to notify the physician. Comments: Care plan reviewed with patient and spouse. Patient and spouse verbalize understanding of the plan of care and contribute to goal setting.

## 2018-03-14 NOTE — PROGRESS NOTES
Patient assessed for the following post chemotherapy:    Dizziness   No  Lightheadedness  No      Acute nausea/vomiting No  Headache              No  Chest pain/pressure  No  Rash/itching   No  Shortness of breath  No    Patient kept for 20 minutes observation post infusion chemotherapy. Patient tolerated chemotherapy treatment taxol without any complications. Labs drawn per mediport. Last vital signs:   BP (!) 105/59   Pulse 86   Temp 98.2 °F (36.8 °C) (Oral)   Resp 18   Ht 5' 7\" (1.702 m)   Wt 173 lb 3.2 oz (78.6 kg)   SpO2 93%   BMI 27.13 kg/m²       Patient instructed if experience any of the above symptoms following today's infusion,he/she is to notify MD immediately or go to the emergency department. Discharge instructions given to patient. Verbalizes understanding. Ambulated off unit per self with spouse with belongings.

## 2018-03-14 NOTE — TELEPHONE ENCOUNTER
Patient did not answer phone when called 2times earlier in the week. Here in OP clinic today. Patient states had 1 day of nausea; took 1 antiemetic. Patient had diarrhea on Saturday x1. Chronic pain continues. Eating and drinking well.

## 2018-03-14 NOTE — ONCOLOGY
Taxol Administration:  Taxol is filtered, use specific tubing for administration. If hypersensitivity reaction occurs, STOP TAXOL, maintain plain IV and notify physician for additional orders. Taxol is considered an irritant in low doses. High dose Taxol is considered a vesicant. Check with physician if infusion should be through a central line.    Neurological assessment completed pre administration [x]   Pre-medications administered as ordered [x]   Document baseline vital signs before administration [x]   For 3 hour Taxol: Document blood pressure, pulse, respiratory rate every 15 min times 1 hour after the start of Taxol []   For 1 hour Taxol: Document blood pressure, pulse, respiratory rate 15 min after the start of Taxol [x]   Document blood pressure, pulse, respiratory rate at the completion of Taxol [x]   Other:     []

## 2018-03-20 ENCOUNTER — HOSPITAL ENCOUNTER (OUTPATIENT)
Dept: PHYSICAL THERAPY | Age: 48
Setting detail: THERAPIES SERIES
Discharge: HOME | End: 2018-03-20
Payer: COMMERCIAL

## 2018-03-20 DIAGNOSIS — C50.912 MALIGNANT NEOPLASM OF LEFT BREAST IN FEMALE, ESTROGEN RECEPTOR POSITIVE, UNSPECIFIED SITE OF BREAST (HCC): Primary | ICD-10-CM

## 2018-03-20 DIAGNOSIS — Z17.0 MALIGNANT NEOPLASM OF LEFT BREAST IN FEMALE, ESTROGEN RECEPTOR POSITIVE, UNSPECIFIED SITE OF BREAST (HCC): Primary | ICD-10-CM

## 2018-03-21 ENCOUNTER — HOSPITAL ENCOUNTER (OUTPATIENT)
Dept: INFUSION THERAPY | Age: 48
Discharge: HOME | End: 2018-03-21
Payer: COMMERCIAL

## 2018-03-21 VITALS
OXYGEN SATURATION: 95 % | HEIGHT: 68 IN | HEART RATE: 86 BPM | TEMPERATURE: 98.6 F | SYSTOLIC BLOOD PRESSURE: 102 MMHG | BODY MASS INDEX: 26.52 KG/M2 | WEIGHT: 175 LBS | DIASTOLIC BLOOD PRESSURE: 65 MMHG | RESPIRATION RATE: 18 BRPM

## 2018-03-21 DIAGNOSIS — C50.912 MALIGNANT NEOPLASM OF LEFT BREAST IN FEMALE, ESTROGEN RECEPTOR POSITIVE, UNSPECIFIED SITE OF BREAST (HCC): ICD-10-CM

## 2018-03-21 DIAGNOSIS — C77.3 BREAST CANCER METASTASIZED TO AXILLARY LYMPH NODE, LEFT (HCC): ICD-10-CM

## 2018-03-21 DIAGNOSIS — C50.912 BREAST CANCER METASTASIZED TO AXILLARY LYMPH NODE, LEFT (HCC): ICD-10-CM

## 2018-03-21 DIAGNOSIS — Z17.0 MALIGNANT NEOPLASM OF LEFT BREAST IN FEMALE, ESTROGEN RECEPTOR POSITIVE, UNSPECIFIED SITE OF BREAST (HCC): ICD-10-CM

## 2018-03-21 LAB
ALBUMIN SERPL-MCNC: 3.7 G/DL (ref 3.5–5.1)
ALP BLD-CCNC: 60 U/L (ref 38–126)
ALT SERPL-CCNC: 16 U/L (ref 11–66)
AST SERPL-CCNC: 15 U/L (ref 5–40)
BASINOPHIL, AUTOMATED: 0 % (ref 0–12)
BILIRUB SERPL-MCNC: 0.2 MG/DL (ref 0.3–1.2)
BILIRUBIN DIRECT: < 0.2 MG/DL (ref 0–0.3)
BUN, WHOLE BLOOD: 13 MG/DL (ref 8–26)
CHLORIDE, WHOLE BLOOD: 104 MEQ/L (ref 98–109)
CREATININE, WHOLE BLOOD: 0.7 MG/DL (ref 0.5–1.2)
EOSINOPHILS RELATIVE PERCENT: 8 % (ref 0–12)
GFR, ESTIMATED: > 90 ML/MIN/1.73M2
GLUCOSE, WHOLE BLOOD: 119 MG/DL (ref 70–108)
HCT VFR BLD CALC: 32 % (ref 37–47)
HEMOGLOBIN: 10.8 GM/DL (ref 12–16)
IONIZED CALCIUM, WHOLE BLOOD: 1.17 MMOL/L (ref 1.12–1.32)
LYMPHOCYTES # BLD: 13 % (ref 15–47)
MCH RBC QN AUTO: 34 PG (ref 27–31)
MCHC RBC AUTO-ENTMCNC: 33.7 GM/DL (ref 33–37)
MCV RBC AUTO: 101 FL (ref 81–99)
MONOCYTES: 6 % (ref 0–12)
PDW BLD-RTO: 13.4 % (ref 11.5–14.5)
PLATELET # BLD: 261 THOU/MM3 (ref 130–400)
PMV BLD AUTO: 8.1 FL (ref 7.4–10.4)
POTASSIUM, WHOLE BLOOD: 3.9 MEQ/L (ref 3.5–4.9)
RBC # BLD: 3.17 MILL/MM3 (ref 4.2–5.4)
SEG NEUTROPHILS: 73 % (ref 43–75)
SODIUM, WHOLE BLOOD: 141 MEQ/L (ref 138–146)
TOTAL CO2, WHOLE BLOOD: 26 MEQ/L (ref 23–33)
TOTAL PROTEIN: 6.2 G/DL (ref 6.1–8)
WBC # BLD: 6.1 THOU/MM3 (ref 4.8–10.8)

## 2018-03-21 PROCEDURE — 2580000003 HC RX 258: Performed by: INTERNAL MEDICINE

## 2018-03-21 PROCEDURE — 96367 TX/PROPH/DG ADDL SEQ IV INF: CPT

## 2018-03-21 PROCEDURE — 2500000003 HC RX 250 WO HCPCS: Performed by: INTERNAL MEDICINE

## 2018-03-21 PROCEDURE — 80076 HEPATIC FUNCTION PANEL: CPT

## 2018-03-21 PROCEDURE — 80047 BASIC METABLC PNL IONIZED CA: CPT

## 2018-03-21 PROCEDURE — S0028 INJECTION, FAMOTIDINE, 20 MG: HCPCS | Performed by: INTERNAL MEDICINE

## 2018-03-21 PROCEDURE — 36591 DRAW BLOOD OFF VENOUS DEVICE: CPT

## 2018-03-21 PROCEDURE — 85025 COMPLETE CBC W/AUTO DIFF WBC: CPT

## 2018-03-21 PROCEDURE — 96375 TX/PRO/DX INJ NEW DRUG ADDON: CPT

## 2018-03-21 PROCEDURE — 96413 CHEMO IV INFUSION 1 HR: CPT

## 2018-03-21 PROCEDURE — 6360000002 HC RX W HCPCS: Performed by: INTERNAL MEDICINE

## 2018-03-21 RX ORDER — SODIUM CHLORIDE 0.9 % (FLUSH) 0.9 %
10 SYRINGE (ML) INJECTION PRN
Status: CANCELLED | OUTPATIENT
Start: 2018-03-21

## 2018-03-21 RX ORDER — EPINEPHRINE 1 MG/ML
0.3 INJECTION, SOLUTION, CONCENTRATE INTRAVENOUS PRN
Status: CANCELLED | OUTPATIENT
Start: 2018-03-21

## 2018-03-21 RX ORDER — HEPARIN SODIUM (PORCINE) LOCK FLUSH IV SOLN 100 UNIT/ML 100 UNIT/ML
500 SOLUTION INTRAVENOUS PRN
Status: CANCELLED | OUTPATIENT
Start: 2018-03-21

## 2018-03-21 RX ORDER — SODIUM CHLORIDE 0.9 % (FLUSH) 0.9 %
5 SYRINGE (ML) INJECTION PRN
Status: CANCELLED | OUTPATIENT
Start: 2018-03-21

## 2018-03-21 RX ORDER — SODIUM CHLORIDE 9 MG/ML
INJECTION, SOLUTION INTRAVENOUS CONTINUOUS
Status: CANCELLED | OUTPATIENT
Start: 2018-03-21

## 2018-03-21 RX ORDER — METHYLPREDNISOLONE SODIUM SUCCINATE 125 MG/2ML
125 INJECTION, POWDER, LYOPHILIZED, FOR SOLUTION INTRAMUSCULAR; INTRAVENOUS ONCE
Status: CANCELLED | OUTPATIENT
Start: 2018-03-21 | End: 2018-03-21

## 2018-03-21 RX ORDER — HEPARIN SODIUM (PORCINE) LOCK FLUSH IV SOLN 100 UNIT/ML 100 UNIT/ML
500 SOLUTION INTRAVENOUS PRN
Status: DISCONTINUED | OUTPATIENT
Start: 2018-03-21 | End: 2018-03-22 | Stop reason: HOSPADM

## 2018-03-21 RX ORDER — 0.9 % SODIUM CHLORIDE 0.9 %
10 VIAL (ML) INJECTION ONCE
Status: CANCELLED | OUTPATIENT
Start: 2018-03-21 | End: 2018-03-21

## 2018-03-21 RX ORDER — SODIUM CHLORIDE 9 MG/ML
INJECTION, SOLUTION INTRAVENOUS ONCE
Status: COMPLETED | OUTPATIENT
Start: 2018-03-21 | End: 2018-03-21

## 2018-03-21 RX ORDER — DIPHENHYDRAMINE HYDROCHLORIDE 50 MG/ML
50 INJECTION INTRAMUSCULAR; INTRAVENOUS ONCE
Status: CANCELLED | OUTPATIENT
Start: 2018-03-21 | End: 2018-03-21

## 2018-03-21 RX ORDER — 0.9 % SODIUM CHLORIDE 0.9 %
10 VIAL (ML) INJECTION ONCE
Status: COMPLETED | OUTPATIENT
Start: 2018-03-21 | End: 2018-03-21

## 2018-03-21 RX ORDER — SODIUM CHLORIDE 0.9 % (FLUSH) 0.9 %
10 SYRINGE (ML) INJECTION PRN
Status: DISCONTINUED | OUTPATIENT
Start: 2018-03-21 | End: 2018-03-22 | Stop reason: HOSPADM

## 2018-03-21 RX ORDER — SODIUM CHLORIDE 0.9 % (FLUSH) 0.9 %
20 SYRINGE (ML) INJECTION PRN
Status: CANCELLED | OUTPATIENT
Start: 2018-03-21

## 2018-03-21 RX ORDER — DIPHENHYDRAMINE HYDROCHLORIDE 50 MG/ML
50 INJECTION INTRAMUSCULAR; INTRAVENOUS ONCE
Status: COMPLETED | OUTPATIENT
Start: 2018-03-21 | End: 2018-03-21

## 2018-03-21 RX ADMIN — Medication 500 UNITS: at 14:40

## 2018-03-21 RX ADMIN — Medication 10 ML: at 14:40

## 2018-03-21 RX ADMIN — Medication 10 ML: at 12:14

## 2018-03-21 RX ADMIN — DEXAMETHASONE SODIUM PHOSPHATE: 4 INJECTION, SOLUTION INTRAMUSCULAR; INTRAVENOUS at 12:25

## 2018-03-21 RX ADMIN — DIPHENHYDRAMINE HYDROCHLORIDE 50 MG: 50 INJECTION, SOLUTION INTRAMUSCULAR; INTRAVENOUS at 12:20

## 2018-03-21 RX ADMIN — Medication 10 ML: at 12:18

## 2018-03-21 RX ADMIN — FAMOTIDINE 20 MG: 10 INJECTION, SOLUTION INTRAVENOUS at 12:18

## 2018-03-21 RX ADMIN — Medication 30 ML: at 11:20

## 2018-03-21 RX ADMIN — SODIUM CHLORIDE: 0.9 INJECTION, SOLUTION INTRAVENOUS at 12:14

## 2018-03-21 RX ADMIN — PACLITAXEL 153.6 MG: 300 INJECTION, SOLUTION INTRAVENOUS at 13:12

## 2018-03-21 ASSESSMENT — PAIN DESCRIPTION - INTENSITY
RATING_2: 3
RATING_2: 3

## 2018-03-21 ASSESSMENT — PAIN DESCRIPTION - DESCRIPTORS
DESCRIPTORS_2: PRESSURE
DESCRIPTORS: ACHING;SPASM
DESCRIPTORS: ACHING;SPASM
DESCRIPTORS_2: PRESSURE

## 2018-03-21 ASSESSMENT — PAIN DESCRIPTION - ONSET
ONSET_2: ON-GOING
ONSET_2: ON-GOING
ONSET: ON-GOING
ONSET: ON-GOING

## 2018-03-21 ASSESSMENT — PAIN DESCRIPTION - PAIN TYPE
TYPE: CHRONIC PAIN
TYPE_2: CHRONIC PAIN
TYPE: CHRONIC PAIN
TYPE_2: CHRONIC PAIN

## 2018-03-21 ASSESSMENT — PAIN SCALES - GENERAL
PAINLEVEL_OUTOF10: 3
PAINLEVEL_OUTOF10: 3

## 2018-03-21 ASSESSMENT — PAIN DESCRIPTION - PROGRESSION
CLINICAL_PROGRESSION_2: NOT CHANGED
CLINICAL_PROGRESSION: NOT CHANGED
CLINICAL_PROGRESSION_2: NOT CHANGED
CLINICAL_PROGRESSION: NOT CHANGED

## 2018-03-21 ASSESSMENT — PAIN DESCRIPTION - LOCATION
LOCATION: LEG
LOCATION: LEG
LOCATION_2: CHEST
LOCATION_2: CHEST

## 2018-03-21 ASSESSMENT — PAIN DESCRIPTION - ORIENTATION
ORIENTATION: RIGHT;LEFT
ORIENTATION: RIGHT;LEFT

## 2018-03-21 ASSESSMENT — PAIN DESCRIPTION - FREQUENCY
FREQUENCY: CONTINUOUS
FREQUENCY: CONTINUOUS

## 2018-03-21 ASSESSMENT — PAIN DESCRIPTION - DURATION
DURATION_2: CONTINUOUS
DURATION_2: CONTINUOUS

## 2018-03-21 NOTE — PLAN OF CARE
Problem: Pain:  Intervention: Promote participation in pain management plan  Patient encouraged to take pain med as prescribed and to call the physician if pain is uncontrolled. Goal: Control of chronic pain  Control of chronic pain   Outcome: Met This Shift  Patient rates chronic pain @ \"3\" upon admission and discharge. Problem: Musculor/Skeletal Functional Status  Intervention: Fall precautions  Discussed fall precautions, fall risks, and instructed patient to ask for assistance with ambulation. Call light within reach. Goal: Absence of falls  Outcome: Met This Shift  Free from falls during infusion. Problem: Intellectual/Education/Knowledge Deficit  Intervention: Verbal/written education provided  Chemotherapy Teaching     What is Chemotherapy   Drug action [x]   Method of Administration [x]   Handouts given []     Side Effects  Nausea/vomiting [x]   Diarrhea [x]   Fatigue [x]   Signs / Symptoms of infection [x]   Neutropenia [x]   Thrombocytopenia [x]   Alopecia [x]   neuropathy [x]   [place] diet &  the importance of fluids [x]       Micellaneous  Importance of nutrition [x]   Importance of oral hygiene [x]   When to call the MD [x]   Monitoring labs [x]   Use of supportive services []     Explanation of Drug Regimen / Frequency  taxol     Comments  Verbalized understanding to drug,action,side effects and when to call MD       Goal: Teaching initiated upon admission  Outcome: Met This Shift  Patient verbalizes understanding to verbal information given on taxol,action and possible side effects. Aware to call MD if develop complications. Problem: Discharge Planning  Intervention: Interaction with patient/family and care team  Provide discharge instructions. Goal: Knowledge of discharge instructions  Knowledge of discharge instructions     Outcome: Met This Shift  Verbalized understanding of discharge instructions, follow-up appointments, and when to call the physician.     Problem: Infection - Central Venous Catheter-Associated Bloodstream Infection:  Intervention: Infection risk assessment  Discussed mediport maintenance, infection prevention, and when to call the physician. Labs drawn per mediport    Goal: Will show no infection signs and symptoms  Will show no infection signs and symptoms   Outcome: Met This Shift  Mediport site with no redness or warmth. Skin over port intact with no signs of breakdown noted. Patient verbalizes signs/symptoms of port infection and when to notify the physician. Comments: Care plan reviewed with patient and spouse. Patient and spouse verbalize understanding of the plan of care and contribute to goal setting.

## 2018-03-21 NOTE — PROGRESS NOTES
Patient assessed for the following post chemotherapy:    Dizziness   No  Lightheadedness  No      Acute nausea/vomiting No  Headache   No  Chest pain/pressure  No  Rash/itching   No  Shortness of breath  No    Patient kept for 20 minutes observation post infusion chemotherapy. Patient tolerated chemotherapy treatment taxol without any complications. Labs drawn per mediport    Last vital signs:   /65   Pulse 86   Temp 98.6 °F (37 °C) (Oral)   Resp 18   Ht 5' 8\" (1.727 m)   Wt 175 lb (79.4 kg)   SpO2 95%   BMI 26.61 kg/m²       Patient instructed if experience any of the above symptoms following today's infusion,he/she is to notify MD immediately or go to the emergency department. Discharge instructions given to patient. Verbalizes understanding. Ambulated off unit per self with spouse with belongings.

## 2018-03-21 NOTE — ONCOLOGY
Taxol Administration:  Taxol is filtered, use specific tubing for administration. If hypersensitivity reaction occurs, STOP TAXOL, maintain plain IV and notify physician for additional orders. Taxol is considered an irritant in low doses. High dose Taxol is considered a vesicant. Check with physician if infusion should be through a central line.    Neurological assessment completed pre administration [x]   Pre-medications administered as ordered [x]   Document baseline vital signs before administration [x]   For 3 hour Taxol: Document blood pressure, pulse, respiratory rate every 15 min times 1 hour after the start of Taxol []   For 1 hour Taxol: Document blood pressure, pulse, respiratory rate 15 min after the start of Taxol [x]   Document blood pressure, pulse, respiratory rate at the completion of Taxol [x]   Other:     [x]

## 2018-03-27 RX ORDER — SODIUM CHLORIDE 0.9 % (FLUSH) 0.9 %
10 SYRINGE (ML) INJECTION PRN
Status: CANCELLED | OUTPATIENT
Start: 2018-03-28

## 2018-03-27 RX ORDER — SODIUM CHLORIDE 9 MG/ML
INJECTION, SOLUTION INTRAVENOUS CONTINUOUS
Status: CANCELLED | OUTPATIENT
Start: 2018-03-28

## 2018-03-27 RX ORDER — 0.9 % SODIUM CHLORIDE 0.9 %
10 VIAL (ML) INJECTION ONCE
Status: CANCELLED | OUTPATIENT
Start: 2018-03-28 | End: 2018-03-28

## 2018-03-27 RX ORDER — SODIUM CHLORIDE 0.9 % (FLUSH) 0.9 %
5 SYRINGE (ML) INJECTION PRN
Status: CANCELLED | OUTPATIENT
Start: 2018-03-28

## 2018-03-27 RX ORDER — DIPHENHYDRAMINE HYDROCHLORIDE 50 MG/ML
50 INJECTION INTRAMUSCULAR; INTRAVENOUS ONCE
Status: CANCELLED | OUTPATIENT
Start: 2018-03-28 | End: 2018-03-28

## 2018-03-27 RX ORDER — HEPARIN SODIUM (PORCINE) LOCK FLUSH IV SOLN 100 UNIT/ML 100 UNIT/ML
500 SOLUTION INTRAVENOUS PRN
Status: CANCELLED | OUTPATIENT
Start: 2018-03-28

## 2018-03-27 RX ORDER — METHYLPREDNISOLONE SODIUM SUCCINATE 125 MG/2ML
125 INJECTION, POWDER, LYOPHILIZED, FOR SOLUTION INTRAMUSCULAR; INTRAVENOUS ONCE
Status: CANCELLED | OUTPATIENT
Start: 2018-03-28 | End: 2018-03-28

## 2018-03-27 RX ORDER — SODIUM CHLORIDE 9 MG/ML
INJECTION, SOLUTION INTRAVENOUS ONCE
Status: CANCELLED | OUTPATIENT
Start: 2018-03-28 | End: 2018-03-28

## 2018-03-27 ASSESSMENT — ENCOUNTER SYMPTOMS
BLOOD IN STOOL: 0
CHEST TIGHTNESS: 0
TROUBLE SWALLOWING: 0
SORE THROAT: 0
WHEEZING: 0
FACIAL SWELLING: 0
EYE REDNESS: 0
CONSTIPATION: 0
EYE ITCHING: 0
COUGH: 0
BACK PAIN: 1
ABDOMINAL DISTENTION: 0
ABDOMINAL PAIN: 0
VOICE CHANGE: 0
SHORTNESS OF BREATH: 1
COLOR CHANGE: 0
VOMITING: 0
DIARRHEA: 0
NAUSEA: 0

## 2018-03-28 ENCOUNTER — HOSPITAL ENCOUNTER (OUTPATIENT)
Dept: INFUSION THERAPY | Age: 48
Discharge: HOME | End: 2018-03-28
Payer: COMMERCIAL

## 2018-03-28 ENCOUNTER — OFFICE VISIT (OUTPATIENT)
Dept: ONCOLOGY | Age: 48
End: 2018-03-28
Payer: COMMERCIAL

## 2018-03-28 ENCOUNTER — CLINICAL DOCUMENTATION (OUTPATIENT)
Dept: SPIRITUAL SERVICES | Facility: CLINIC | Age: 48
End: 2018-03-28

## 2018-03-28 VITALS
HEIGHT: 68 IN | HEART RATE: 88 BPM | WEIGHT: 172.2 LBS | DIASTOLIC BLOOD PRESSURE: 56 MMHG | OXYGEN SATURATION: 93 % | RESPIRATION RATE: 18 BRPM | BODY MASS INDEX: 26.1 KG/M2 | SYSTOLIC BLOOD PRESSURE: 106 MMHG | TEMPERATURE: 98.6 F

## 2018-03-28 VITALS
WEIGHT: 172.2 LBS | HEART RATE: 92 BPM | SYSTOLIC BLOOD PRESSURE: 110 MMHG | OXYGEN SATURATION: 95 % | HEIGHT: 68 IN | TEMPERATURE: 98.4 F | DIASTOLIC BLOOD PRESSURE: 70 MMHG | RESPIRATION RATE: 18 BRPM | BODY MASS INDEX: 26.1 KG/M2

## 2018-03-28 DIAGNOSIS — C77.3 BREAST CANCER METASTASIZED TO AXILLARY LYMPH NODE, LEFT (HCC): ICD-10-CM

## 2018-03-28 DIAGNOSIS — C50.912 BREAST CANCER METASTASIZED TO AXILLARY LYMPH NODE, LEFT (HCC): ICD-10-CM

## 2018-03-28 DIAGNOSIS — Z51.11 ENCOUNTER FOR CHEMOTHERAPY MANAGEMENT: ICD-10-CM

## 2018-03-28 LAB
ALBUMIN SERPL-MCNC: 3.7 G/DL (ref 3.5–5.1)
ALP BLD-CCNC: 65 U/L (ref 38–126)
ALT SERPL-CCNC: 17 U/L (ref 11–66)
AST SERPL-CCNC: 11 U/L (ref 5–40)
BASINOPHIL, AUTOMATED: 1 % (ref 0–3)
BILIRUB SERPL-MCNC: 0.4 MG/DL (ref 0.3–1.2)
BILIRUBIN DIRECT: < 0.2 MG/DL (ref 0–0.3)
BUN, WHOLE BLOOD: 10 MG/DL (ref 8–26)
CHLORIDE, WHOLE BLOOD: 105 MEQ/L (ref 98–109)
CREATININE, WHOLE BLOOD: 0.7 MG/DL (ref 0.5–1.2)
EOSINOPHILS RELATIVE PERCENT: 6 % (ref 0–4)
GFR, ESTIMATED: > 90 ML/MIN/1.73M2
GLUCOSE, WHOLE BLOOD: 96 MG/DL (ref 70–108)
HCT VFR BLD CALC: 31.7 % (ref 37–47)
HEMOGLOBIN: 10.6 GM/DL (ref 12–16)
IONIZED CALCIUM, WHOLE BLOOD: 1.21 MMOL/L (ref 1.12–1.32)
LYMPHOCYTES # BLD: 19 % (ref 15–47)
MCH RBC QN AUTO: 34.1 PG (ref 27–31)
MCHC RBC AUTO-ENTMCNC: 33.4 GM/DL (ref 33–37)
MCV RBC AUTO: 102 FL (ref 81–99)
MONOCYTES: 7 % (ref 0–12)
PDW BLD-RTO: 13.3 % (ref 11.5–14.5)
PLATELET # BLD: 243 THOU/MM3 (ref 130–400)
PMV BLD AUTO: 7.6 FL (ref 7.4–10.4)
POTASSIUM, WHOLE BLOOD: 3.7 MEQ/L (ref 3.5–4.9)
RBC # BLD: 3.11 MILL/MM3 (ref 4.2–5.4)
SEG NEUTROPHILS: 68 % (ref 43–75)
SODIUM, WHOLE BLOOD: 140 MEQ/L (ref 138–146)
TOTAL CO2, WHOLE BLOOD: 24 MEQ/L (ref 23–33)
TOTAL PROTEIN: 6.8 G/DL (ref 6.1–8)
WBC # BLD: 3.9 THOU/MM3 (ref 4.8–10.8)

## 2018-03-28 PROCEDURE — 96413 CHEMO IV INFUSION 1 HR: CPT

## 2018-03-28 PROCEDURE — 80076 HEPATIC FUNCTION PANEL: CPT

## 2018-03-28 PROCEDURE — 80047 BASIC METABLC PNL IONIZED CA: CPT

## 2018-03-28 PROCEDURE — 2580000003 HC RX 258: Performed by: INTERNAL MEDICINE

## 2018-03-28 PROCEDURE — S0028 INJECTION, FAMOTIDINE, 20 MG: HCPCS | Performed by: INTERNAL MEDICINE

## 2018-03-28 PROCEDURE — 6360000002 HC RX W HCPCS: Performed by: INTERNAL MEDICINE

## 2018-03-28 PROCEDURE — 96375 TX/PRO/DX INJ NEW DRUG ADDON: CPT

## 2018-03-28 PROCEDURE — 96367 TX/PROPH/DG ADDL SEQ IV INF: CPT

## 2018-03-28 PROCEDURE — 85025 COMPLETE CBC W/AUTO DIFF WBC: CPT

## 2018-03-28 PROCEDURE — G0463 HOSPITAL OUTPT CLINIC VISIT: HCPCS

## 2018-03-28 PROCEDURE — 99214 OFFICE O/P EST MOD 30 MIN: CPT | Performed by: INTERNAL MEDICINE

## 2018-03-28 PROCEDURE — 2500000003 HC RX 250 WO HCPCS: Performed by: INTERNAL MEDICINE

## 2018-03-28 PROCEDURE — 36591 DRAW BLOOD OFF VENOUS DEVICE: CPT

## 2018-03-28 RX ORDER — SODIUM CHLORIDE 0.9 % (FLUSH) 0.9 %
10 SYRINGE (ML) INJECTION PRN
Status: CANCELLED | OUTPATIENT
Start: 2018-03-28

## 2018-03-28 RX ORDER — 0.9 % SODIUM CHLORIDE 0.9 %
10 VIAL (ML) INJECTION ONCE
Status: COMPLETED | OUTPATIENT
Start: 2018-03-28 | End: 2018-03-28

## 2018-03-28 RX ORDER — SODIUM CHLORIDE 9 MG/ML
INJECTION, SOLUTION INTRAVENOUS ONCE
Status: COMPLETED | OUTPATIENT
Start: 2018-03-28 | End: 2018-03-28

## 2018-03-28 RX ORDER — SODIUM CHLORIDE 0.9 % (FLUSH) 0.9 %
10 SYRINGE (ML) INJECTION PRN
Status: DISCONTINUED | OUTPATIENT
Start: 2018-03-28 | End: 2018-03-29 | Stop reason: HOSPADM

## 2018-03-28 RX ORDER — AZITHROMYCIN 250 MG/1
TABLET, FILM COATED ORAL
Qty: 1 PACKET | Refills: 0 | Status: SHIPPED | OUTPATIENT
Start: 2018-03-28 | End: 2018-04-07

## 2018-03-28 RX ORDER — DIPHENHYDRAMINE HYDROCHLORIDE 50 MG/ML
50 INJECTION INTRAMUSCULAR; INTRAVENOUS ONCE
Status: COMPLETED | OUTPATIENT
Start: 2018-03-28 | End: 2018-03-28

## 2018-03-28 RX ORDER — HEPARIN SODIUM (PORCINE) LOCK FLUSH IV SOLN 100 UNIT/ML 100 UNIT/ML
500 SOLUTION INTRAVENOUS PRN
Status: DISCONTINUED | OUTPATIENT
Start: 2018-03-28 | End: 2018-03-29 | Stop reason: HOSPADM

## 2018-03-28 RX ORDER — HEPARIN SODIUM (PORCINE) LOCK FLUSH IV SOLN 100 UNIT/ML 100 UNIT/ML
500 SOLUTION INTRAVENOUS PRN
Status: CANCELLED | OUTPATIENT
Start: 2018-03-28

## 2018-03-28 RX ORDER — TRAMADOL HYDROCHLORIDE 50 MG/1
50 TABLET ORAL EVERY 6 HOURS PRN
Qty: 60 TABLET | Refills: 0 | Status: SHIPPED | OUTPATIENT
Start: 2018-03-28 | End: 2018-05-16 | Stop reason: SDUPTHER

## 2018-03-28 RX ORDER — DEXAMETHASONE 4 MG/1
8 TABLET ORAL
Qty: 12 TABLET | Refills: 1 | Status: SHIPPED | OUTPATIENT
Start: 2018-03-28 | End: 2018-04-25 | Stop reason: SDUPTHER

## 2018-03-28 RX ORDER — SODIUM CHLORIDE 0.9 % (FLUSH) 0.9 %
20 SYRINGE (ML) INJECTION PRN
Status: CANCELLED | OUTPATIENT
Start: 2018-03-28

## 2018-03-28 RX ADMIN — FAMOTIDINE 20 MG: 10 INJECTION, SOLUTION INTRAVENOUS at 09:09

## 2018-03-28 RX ADMIN — DIPHENHYDRAMINE HYDROCHLORIDE 50 MG: 50 INJECTION, SOLUTION INTRAMUSCULAR; INTRAVENOUS at 09:12

## 2018-03-28 RX ADMIN — Medication 500 UNITS: at 11:16

## 2018-03-28 RX ADMIN — PACLITAXEL 153.6 MG: 300 INJECTION, SOLUTION INTRAVENOUS at 09:47

## 2018-03-28 RX ADMIN — Medication 10 ML: at 09:06

## 2018-03-28 RX ADMIN — Medication 30 ML: at 08:10

## 2018-03-28 RX ADMIN — DEXAMETHASONE SODIUM PHOSPHATE: 4 INJECTION, SOLUTION INTRAMUSCULAR; INTRAVENOUS at 09:15

## 2018-03-28 RX ADMIN — Medication 10 ML: at 09:09

## 2018-03-28 RX ADMIN — SODIUM CHLORIDE: 9 INJECTION, SOLUTION INTRAVENOUS at 09:06

## 2018-03-28 RX ADMIN — Medication 10 ML: at 11:16

## 2018-03-28 ASSESSMENT — PAIN DESCRIPTION - FREQUENCY: FREQUENCY: CONTINUOUS

## 2018-03-28 ASSESSMENT — PAIN DESCRIPTION - DESCRIPTORS
DESCRIPTORS_2: PRESSURE
DESCRIPTORS: ACHING

## 2018-03-28 ASSESSMENT — PAIN DESCRIPTION - PROGRESSION
CLINICAL_PROGRESSION_2: NOT CHANGED
CLINICAL_PROGRESSION: NOT CHANGED

## 2018-03-28 ASSESSMENT — PAIN DESCRIPTION - ONSET
ONSET_2: ON-GOING
ONSET: ON-GOING

## 2018-03-28 ASSESSMENT — PAIN DESCRIPTION - LOCATION
LOCATION: LEG
LOCATION_2: CHEST

## 2018-03-28 ASSESSMENT — PAIN DESCRIPTION - INTENSITY
RATING_2: 1

## 2018-03-28 ASSESSMENT — PAIN DESCRIPTION - ORIENTATION: ORIENTATION: RIGHT;LEFT

## 2018-03-28 ASSESSMENT — PAIN SCALES - GENERAL
PAINLEVEL_OUTOF10: 1

## 2018-03-28 ASSESSMENT — PAIN DESCRIPTION - DURATION: DURATION_2: CONTINUOUS

## 2018-03-28 ASSESSMENT — PAIN DESCRIPTION - PAIN TYPE
TYPE_2: CHRONIC PAIN
TYPE: CHRONIC PAIN

## 2018-03-28 NOTE — PROGRESS NOTES
Lab specimen obtained from [facility] without any problems. Ambulated off unit to examination room for appointment with Dr. Maru Samuel escorted by [facility].

## 2018-03-28 NOTE — PLAN OF CARE
Problem: Infection - Central Venous Catheter-Associated Bloodstream Infection:  Intervention: Infection risk assessment  Instructed to monitor for signs/symptoms of infection at [address] [facility] [address] [facility] and call MD if problems develop. Goal: Will show no infection signs and symptoms  Will show no infection signs and symptoms   Outcome: Met This Shift  Mediport site with no redness or warmth. Skin over port site intact with no signs of breakdown noted. Patient verbalizes signs/symptoms of port infection and when to notify the physician. Problem: Discharge Planning  Intervention: Interaction with patient/family and care team  Discuss understanding of discharge instructions,follow-up appointments, and when to call the physician. Goal: Knowledge of discharge instructions  Knowledge of discharge instructions     Outcome: Met This Shift  Verbalized understanding of discharge instructions, follow-up appointments, and when to call the physician. Problem: Intellectual/Education/Knowledge Deficit  Intervention: Verbal/written education provided  Chemotherapy Teaching     What is Chemotherapy   Drug action [x]   Method of Administration [x]   Handouts given []     Side Effects  Nausea/vomiting [x]   Diarrhea [x]   Fatigue [x]   Signs / Symptoms of infection [x]   Neutropenia [x]   Thrombocytopenia [x]   Alopecia [x]   neuropathy [x]   [place] diet &  the importance of fluids [x]       Micellaneous  Importance of nutrition [x]   Importance of oral hygiene [x]   When to call the MD [x]   Monitoring labs [x]   Use of supportive services []     Explanation of Drug Regimen / Frequency  Taxol weekly- C4D1     Comments  Verbalized understanding to drug,action,side effects and when to call MD       Goal: Teaching initiated upon admission  Outcome: Met This Shift  Patient verbalizes understanding to verbal information given on Taxol,action and possible side effects. Aware to call MD if develop complications.      Problem: Musculor/Skeletal Functional Status  Intervention: Fall precautions  Verbalized understanding of fall prevention to ask for assistance with ambulation. Call light within reach. Goal: Absence of falls  Outcome: Met This Shift  No falls occurred during this visit    Comments: Care plan reviewed with patient and son. Patient and son verbalize understanding of the plan of care and contribute to goal setting.

## 2018-03-28 NOTE — ONCOLOGY
Chemotherapy Administration    Pre-assessment Data: Antineoplastic Agents  Other:   See toxicity flow sheet for assessment [x]     Physician Notification of Concerns Related to Chemotherapy Administration:   Physician Notified Keshav Phoenix / Time of Notification      Interventions:   Lab work assessed  [x]   Height / Weight verified for dose [x]   Current MAR reviewed [x]   Emergency drugs available as appropriate [x]   Anaphylaxis assessment completed [x]   Pre-medications administered as ordered [x]   Blood return noted upon initiation of chemotherapy [x]   Blood return noted each 1-2ml of a vesicant medication if given IV push []   Blood return noted each 2-3ml of a non-vesicant medication if given IV push []   Monitor for signs / symptoms of hypersensitivity reaction [x]   Chemotherapy orders (drug/dose/rate) verified by 2 Chemo certified RNs [x]   Monitor IV site and blood return throughout the infusion of the medication [x]   Document IV site checks on the IV assessment form [x]   Document chemotherapy teaching on the Patient Education tab [x]   Document patient verbalizes understanding of medications being administered [x]   If IV infiltration, see ONS Guidelines []   Other:      []

## 2018-03-28 NOTE — PROGRESS NOTES
[] Approachable   [] Grieving   []   Anxious   [] Angry   [] Loneliness   []   Hopeless   [] Coping   [x] Despair   []   Passive   [] Tearful   [] Fearful  []    Hopeful  [x]  Peaceful   [] Sleeping  []    Guilt   [] Other   []    Unable to Respond  []        Per report, patient is dealing with breast cancer which has metastasized to lymph nodes. Assessment:     Patient and her son, Manda Simpson, were approachable and receptive to the  and engaged in conversation during the encounter. Patient was feeling rough from the treatments and coughing during the infusion. She is sustained through support received from her children and family members, especially her grandchildren. She shared that they are staying with her this week, and it has been \"hard chasing after them. \"    Patient is striving to cope with her chemo treatments. She expressed feeling pain in her bones after the chemotherapy. Plan:        provided an empathic listening presence for the patient and son to share during the encounter. Patient expressed \"no needs\" regarding spiritual care at this time.  will remain available for further emotional and spiritual support as needed while rounding in the unit, or as referred for spiritual care.

## 2018-03-28 NOTE — PROGRESS NOTES
Patient assessed for the following post chemotherapy:    Dizziness   No  Lightheadedness  No      Acute nausea/vomiting No  Headache   No  Chest pain/pressure  No  Rash/itching   No  Shortness of breath  No    Patient kept for 20 minutes observation post infusion chemotherapy. Patient tolerated chemotherapy treatment Taxol without any complications. Last vital signs:   BP (!) 106/56   Pulse 88   Temp 98.6 °F (37 °C) (Oral)   Resp 18   Ht 5' 8\" (1.727 m)   Wt 172 lb 3.2 oz (78.1 kg)   SpO2 93%   BMI 26.18 kg/m²         Patient instructed if experience any of the above symptoms following today's infusion,he/she is to notify MD immediately or go to the emergency department. Discharge instructions given to patient. Verbalizes understanding. Ambulated off unit per self with belongings accompanied by son.

## 2018-03-28 NOTE — PROGRESS NOTES
°C) (Oral)   Resp 18   Ht 5' 8\" (1.727 m)   Wt 172 lb 3.2 oz (78.1 kg)   SpO2 95%   BMI 26.18 kg/m²      Imaging studies and labs:     Lab Results   Component Value Date    WBC 3.9 (L) 03/28/2018    HGB 10.6 (L) 03/28/2018    HCT 31.7 (L) 03/28/2018     (H) 03/28/2018     03/28/2018       Chemistry        Component Value Date/Time     03/28/2018 0810     (H) 11/28/2017 1250    K 3.7 03/28/2018 0810    K 4.4 11/28/2017 1250     11/28/2017 1250    CO2 26 11/28/2017 1250    BUN 12 11/28/2017 1250    CREATININE 0.7 03/28/2018 0810    CREATININE 0.7 11/28/2017 1250        Component Value Date/Time    CALCIUM 9.4 11/28/2017 1250    ALKPHOS 65 03/28/2018 0810    AST 11 03/28/2018 0810    ALT 17 03/28/2018 0810    BILITOT 0.4 03/28/2018 0810            Assessment/Plan:   1. Stage pT3, pN1, M0 left breast cancer. I had a lengthy discussion was the patient and her significant other about stage of her breast cancer and recommended treatment. Since she had  lymph nodes involved and her tumor was quite large, my recommendation is to proceed with adjuvant chemotherapy: 4 cycles of Adriamycin and Cytoxan followed by 12 weekly infusions of Taxol. After chemotherapy due to having 3 lymph nodes involved by cancer, she will benefit from postmastectomy radiation treatment. Her final step in breast cancer treatment will be adjuvant hormonal therapy. Since the patient has strong family history of breast cancer and she is diagnosed with breast cancer before age of 46 she had genetic testing that showed variant of unknown significance. The patient had echo in October 2017 that showed normal left ventricular ejection fraction. On 12/13/2017 she started adjuvant AC. On March 7, 2018 she started to weekly Taxol. 2. Adjuvant chemotherapy. The patient reports bone pain as well as numbness and tingling of the hands. Denies having nausea, vomiting, diarrhea, no mouth sores.   Denies having any fluid

## 2018-03-29 DIAGNOSIS — Z17.0 MALIGNANT NEOPLASM OF LEFT BREAST IN FEMALE, ESTROGEN RECEPTOR POSITIVE, UNSPECIFIED SITE OF BREAST (HCC): Primary | ICD-10-CM

## 2018-03-29 DIAGNOSIS — C50.912 MALIGNANT NEOPLASM OF LEFT BREAST IN FEMALE, ESTROGEN RECEPTOR POSITIVE, UNSPECIFIED SITE OF BREAST (HCC): Primary | ICD-10-CM

## 2018-04-03 ENCOUNTER — HOSPITAL ENCOUNTER (OUTPATIENT)
Dept: PHYSICAL THERAPY | Age: 48
Setting detail: THERAPIES SERIES
Discharge: HOME | End: 2018-04-03
Payer: COMMERCIAL

## 2018-04-04 ENCOUNTER — HOSPITAL ENCOUNTER (OUTPATIENT)
Dept: INFUSION THERAPY | Age: 48
End: 2018-04-04

## 2018-04-09 ENCOUNTER — HOSPITAL ENCOUNTER (OUTPATIENT)
Dept: PHYSICAL THERAPY | Age: 48
Setting detail: THERAPIES SERIES
Discharge: HOME | End: 2018-04-09
Payer: COMMERCIAL

## 2018-04-09 PROCEDURE — 97140 MANUAL THERAPY 1/> REGIONS: CPT

## 2018-04-09 PROCEDURE — 97110 THERAPEUTIC EXERCISES: CPT

## 2018-04-09 ASSESSMENT — PAIN SCALES - GENERAL: PAINLEVEL_OUTOF10: 1

## 2018-04-11 ENCOUNTER — HOSPITAL ENCOUNTER (OUTPATIENT)
Dept: INFUSION THERAPY | Age: 48
Discharge: HOME | End: 2018-04-11
Payer: COMMERCIAL

## 2018-04-11 VITALS
OXYGEN SATURATION: 98 % | TEMPERATURE: 98.4 F | RESPIRATION RATE: 18 BRPM | DIASTOLIC BLOOD PRESSURE: 62 MMHG | SYSTOLIC BLOOD PRESSURE: 103 MMHG | HEART RATE: 86 BPM | HEIGHT: 68 IN | WEIGHT: 176.8 LBS | BODY MASS INDEX: 26.8 KG/M2

## 2018-04-11 DIAGNOSIS — C50.912 MALIGNANT NEOPLASM OF LEFT BREAST IN FEMALE, ESTROGEN RECEPTOR POSITIVE, UNSPECIFIED SITE OF BREAST (HCC): ICD-10-CM

## 2018-04-11 DIAGNOSIS — C77.3 BREAST CANCER METASTASIZED TO AXILLARY LYMPH NODE, LEFT (HCC): ICD-10-CM

## 2018-04-11 DIAGNOSIS — Z17.0 MALIGNANT NEOPLASM OF LEFT BREAST IN FEMALE, ESTROGEN RECEPTOR POSITIVE, UNSPECIFIED SITE OF BREAST (HCC): ICD-10-CM

## 2018-04-11 DIAGNOSIS — C50.912 BREAST CANCER METASTASIZED TO AXILLARY LYMPH NODE, LEFT (HCC): ICD-10-CM

## 2018-04-11 LAB
ALBUMIN SERPL-MCNC: 3.8 G/DL (ref 3.5–5.1)
ALP BLD-CCNC: 55 U/L (ref 38–126)
ALT SERPL-CCNC: 11 U/L (ref 11–66)
AST SERPL-CCNC: 11 U/L (ref 5–40)
BASINOPHIL, AUTOMATED: 0 % (ref 0–3)
BILIRUB SERPL-MCNC: 0.2 MG/DL (ref 0.3–1.2)
BILIRUBIN DIRECT: < 0.2 MG/DL (ref 0–0.3)
BUN, WHOLE BLOOD: 6 MG/DL (ref 8–26)
CHLORIDE, WHOLE BLOOD: 106 MEQ/L (ref 98–109)
CREATININE, WHOLE BLOOD: 0.6 MG/DL (ref 0.5–1.2)
EOSINOPHILS RELATIVE PERCENT: 3 % (ref 0–4)
GFR, ESTIMATED: > 90 ML/MIN/1.73M2
GLUCOSE, WHOLE BLOOD: 100 MG/DL (ref 70–108)
HCT VFR BLD CALC: 34.5 % (ref 37–47)
HEMOGLOBIN: 11.6 GM/DL (ref 12–16)
IONIZED CALCIUM, WHOLE BLOOD: 1.2 MMOL/L (ref 1.12–1.32)
LYMPHOCYTES # BLD: 20 % (ref 15–47)
MCH RBC QN AUTO: 34.5 PG (ref 27–31)
MCHC RBC AUTO-ENTMCNC: 33.5 GM/DL (ref 33–37)
MCV RBC AUTO: 103 FL (ref 81–99)
MONOCYTES: 9 % (ref 0–12)
PDW BLD-RTO: 13.1 % (ref 11.5–14.5)
PLATELET # BLD: 219 THOU/MM3 (ref 130–400)
PMV BLD AUTO: 7.7 FL (ref 7.4–10.4)
POTASSIUM, WHOLE BLOOD: 4 MEQ/L (ref 3.5–4.9)
RBC # BLD: 3.35 MILL/MM3 (ref 4.2–5.4)
SEG NEUTROPHILS: 68 % (ref 43–75)
SODIUM, WHOLE BLOOD: 141 MEQ/L (ref 138–146)
TOTAL CO2, WHOLE BLOOD: 26 MEQ/L (ref 23–33)
TOTAL PROTEIN: 6.5 G/DL (ref 6.1–8)
WBC # BLD: 4.2 THOU/MM3 (ref 4.8–10.8)

## 2018-04-11 PROCEDURE — 85025 COMPLETE CBC W/AUTO DIFF WBC: CPT

## 2018-04-11 PROCEDURE — 36591 DRAW BLOOD OFF VENOUS DEVICE: CPT

## 2018-04-11 PROCEDURE — 96413 CHEMO IV INFUSION 1 HR: CPT

## 2018-04-11 PROCEDURE — 80047 BASIC METABLC PNL IONIZED CA: CPT

## 2018-04-11 PROCEDURE — 2500000003 HC RX 250 WO HCPCS: Performed by: INTERNAL MEDICINE

## 2018-04-11 PROCEDURE — S0028 INJECTION, FAMOTIDINE, 20 MG: HCPCS | Performed by: INTERNAL MEDICINE

## 2018-04-11 PROCEDURE — 80076 HEPATIC FUNCTION PANEL: CPT

## 2018-04-11 PROCEDURE — 2580000003 HC RX 258: Performed by: INTERNAL MEDICINE

## 2018-04-11 PROCEDURE — 96375 TX/PRO/DX INJ NEW DRUG ADDON: CPT

## 2018-04-11 PROCEDURE — 96367 TX/PROPH/DG ADDL SEQ IV INF: CPT

## 2018-04-11 PROCEDURE — 6360000002 HC RX W HCPCS: Performed by: INTERNAL MEDICINE

## 2018-04-11 RX ORDER — HEPARIN SODIUM (PORCINE) LOCK FLUSH IV SOLN 100 UNIT/ML 100 UNIT/ML
500 SOLUTION INTRAVENOUS PRN
Status: DISCONTINUED | OUTPATIENT
Start: 2018-04-11 | End: 2018-04-12 | Stop reason: HOSPADM

## 2018-04-11 RX ORDER — DIPHENHYDRAMINE HYDROCHLORIDE 50 MG/ML
50 INJECTION INTRAMUSCULAR; INTRAVENOUS ONCE
Status: CANCELLED | OUTPATIENT
Start: 2018-04-11 | End: 2018-04-11

## 2018-04-11 RX ORDER — SODIUM CHLORIDE 9 MG/ML
INJECTION, SOLUTION INTRAVENOUS CONTINUOUS
Status: CANCELLED | OUTPATIENT
Start: 2018-04-11

## 2018-04-11 RX ORDER — 0.9 % SODIUM CHLORIDE 0.9 %
10 VIAL (ML) INJECTION ONCE
Status: CANCELLED | OUTPATIENT
Start: 2018-04-11 | End: 2018-04-11

## 2018-04-11 RX ORDER — SODIUM CHLORIDE 0.9 % (FLUSH) 0.9 %
10 SYRINGE (ML) INJECTION PRN
Status: DISCONTINUED | OUTPATIENT
Start: 2018-04-11 | End: 2018-04-12 | Stop reason: HOSPADM

## 2018-04-11 RX ORDER — SODIUM CHLORIDE 0.9 % (FLUSH) 0.9 %
5 SYRINGE (ML) INJECTION PRN
Status: CANCELLED | OUTPATIENT
Start: 2018-04-11

## 2018-04-11 RX ORDER — SODIUM CHLORIDE 9 MG/ML
INJECTION, SOLUTION INTRAVENOUS ONCE
Status: COMPLETED | OUTPATIENT
Start: 2018-04-11 | End: 2018-04-11

## 2018-04-11 RX ORDER — DIPHENHYDRAMINE HYDROCHLORIDE 50 MG/ML
50 INJECTION INTRAMUSCULAR; INTRAVENOUS ONCE
Status: COMPLETED | OUTPATIENT
Start: 2018-04-11 | End: 2018-04-11

## 2018-04-11 RX ORDER — EPINEPHRINE 1 MG/ML
0.3 INJECTION, SOLUTION, CONCENTRATE INTRAVENOUS PRN
Status: CANCELLED | OUTPATIENT
Start: 2018-04-11

## 2018-04-11 RX ORDER — 0.9 % SODIUM CHLORIDE 0.9 %
10 VIAL (ML) INJECTION ONCE
Status: COMPLETED | OUTPATIENT
Start: 2018-04-11 | End: 2018-04-11

## 2018-04-11 RX ORDER — METHYLPREDNISOLONE SODIUM SUCCINATE 125 MG/2ML
125 INJECTION, POWDER, LYOPHILIZED, FOR SOLUTION INTRAMUSCULAR; INTRAVENOUS ONCE
Status: CANCELLED | OUTPATIENT
Start: 2018-04-11 | End: 2018-04-11

## 2018-04-11 RX ADMIN — PACLITAXEL 153.6 MG: 6 INJECTION, SOLUTION, CONCENTRATE INTRAVENOUS at 10:06

## 2018-04-11 RX ADMIN — Medication 10 ML: at 09:23

## 2018-04-11 RX ADMIN — SODIUM CHLORIDE: 9 INJECTION, SOLUTION INTRAVENOUS at 09:21

## 2018-04-11 RX ADMIN — Medication 10 ML: at 11:37

## 2018-04-11 RX ADMIN — Medication 500 UNITS: at 11:37

## 2018-04-11 RX ADMIN — Medication 30 ML: at 08:38

## 2018-04-11 RX ADMIN — DEXAMETHASONE SODIUM PHOSPHATE: 4 INJECTION, SOLUTION INTRAMUSCULAR; INTRAVENOUS at 09:28

## 2018-04-11 RX ADMIN — DIPHENHYDRAMINE HYDROCHLORIDE 50 MG: 50 INJECTION, SOLUTION INTRAMUSCULAR; INTRAVENOUS at 09:26

## 2018-04-11 RX ADMIN — Medication 10 ML: at 09:21

## 2018-04-11 RX ADMIN — FAMOTIDINE 20 MG: 10 INJECTION, SOLUTION INTRAVENOUS at 09:23

## 2018-04-11 NOTE — PROGRESS NOTES
Patient assessed for the following post chemotherapy:    Dizziness   No  Lightheadedness  No      Acute nausea/vomiting No  Headache   No  Chest pain/pressure  No  Rash/itching   No  Shortness of breath  No    Patient kept for 20 minutes observation post infusion chemotherapy. Patient tolerated chemotherapy treatment Taxol without any complications. Last vital signs:   /62   Pulse 86   Temp 98.4 °F (36.9 °C) (Oral)   Resp 18   Ht 5' 8\" (1.727 m)   Wt 176 lb 12.8 oz (80.2 kg)   SpO2 98%   BMI 26.88 kg/m²         Patient instructed if experience any of the above symptoms following today's infusion,he/she is to notify MD immediately or go to the emergency department. Discharge instructions given to patient. Verbalizes understanding. Ambulated off unit per self with belongings accompanied by friend.

## 2018-04-11 NOTE — ONCOLOGY
Chemotherapy Administration    Pre-assessment Data: Antineoplastic Agents  Other:   See toxicity flow sheet for assessment [x]     Physician Notification of Concerns Related to Chemotherapy Administration:   Physician Notified Rao Mistry / Time of Notification      Interventions:   Lab work assessed  [x]   Height / Weight verified for dose [x]   Current MAR reviewed [x]   Emergency drugs available as appropriate [x]   Anaphylaxis assessment completed [x]   Pre-medications administered as ordered [x]   Blood return noted upon initiation of chemotherapy [x]   Blood return noted each 1-2ml of a vesicant medication if given IV push []   Blood return noted each 2-3ml of a non-vesicant medication if given IV push []   Monitor for signs / symptoms of hypersensitivity reaction [x]   Chemotherapy orders (drug/dose/rate) verified by 2 Chemo certified RNs [x]   Monitor IV site and blood return throughout the infusion of the medication [x]   Document IV site checks on the IV assessment form [x]   Document chemotherapy teaching on the Patient Education tab [x]   Document patient verbalizes understanding of medications being administered [x]   If IV infiltration, see ONS Guidelines []   Other:      []

## 2018-04-11 NOTE — PLAN OF CARE
Problem: Infection - Central Venous Catheter-Associated Bloodstream Infection:  Intervention: Infection risk assessment  Instructed to monitor for signs/symptoms of infection at [address] [facility] [address] [facility] and call MD if problems develop. Goal: Will show no infection signs and symptoms  Will show no infection signs and symptoms   Outcome: Met This Shift  Mediport site with no redness or warmth. Skin over port site intact with no signs of breakdown noted. Patient verbalizes signs/symptoms of port infection and when to notify the physician. Problem: Discharge Planning  Intervention: Interaction with patient/family and care team  Discuss understanding of discharge instructions,follow-up appointments, and when to call the physician. Goal: Knowledge of discharge instructions  Knowledge of discharge instructions     Outcome: Met This Shift  Verbalized understanding of discharge instructions, follow-up appointments, and when to call the physician. Problem: Intellectual/Education/Knowledge Deficit  Intervention: Verbal/written education provided  Chemotherapy Teaching     What is Chemotherapy   Drug action [x]   Method of Administration [x]   Handouts given []     Side Effects  Nausea/vomiting [x]   Diarrhea [x]   Fatigue [x]   Signs / Symptoms of infection [x]   Neutropenia [x]   Thrombocytopenia [x]   Alopecia [x]   neuropathy [x]   [place] diet &  the importance of fluids [x]       Micellaneous  Importance of nutrition [x]   Importance of oral hygiene [x]   When to call the MD [x]   Monitoring labs [x]   Use of supportive services []     Explanation of Drug Regimen / Frequency  Taxol weekly- C5D1     Comments  Verbalized understanding to drug,action,side effects and when to call MD       Goal: Teaching initiated upon admission  Outcome: Met This Shift  Patient verbalizes understanding to verbal information given on Taxol,action and possible side effects. Aware to call MD if develop complications.      Problem: Musculor/Skeletal Functional Status  Intervention: Fall precautions  Verbalized understanding of fall prevention to ask for assistance with ambulation. Call light within reach. Goal: Absence of falls  Outcome: Met This Shift  No falls occurred during this visit    Comments: Care plan reviewed with patient and friend. Patient and friend verbalize understanding of the plan of care and contribute to goal setting.

## 2018-04-12 ENCOUNTER — HOSPITAL ENCOUNTER (OUTPATIENT)
Dept: PHYSICAL THERAPY | Age: 48
Setting detail: THERAPIES SERIES
Discharge: HOME | End: 2018-04-12
Payer: COMMERCIAL

## 2018-04-17 ENCOUNTER — HOSPITAL ENCOUNTER (OUTPATIENT)
Dept: PHYSICAL THERAPY | Age: 48
Setting detail: THERAPIES SERIES
Discharge: HOME | End: 2018-04-17
Payer: COMMERCIAL

## 2018-04-17 DIAGNOSIS — C50.912 MALIGNANT NEOPLASM OF LEFT BREAST IN FEMALE, ESTROGEN RECEPTOR POSITIVE, UNSPECIFIED SITE OF BREAST (HCC): Primary | ICD-10-CM

## 2018-04-17 DIAGNOSIS — Z17.0 MALIGNANT NEOPLASM OF LEFT BREAST IN FEMALE, ESTROGEN RECEPTOR POSITIVE, UNSPECIFIED SITE OF BREAST (HCC): Primary | ICD-10-CM

## 2018-04-18 ENCOUNTER — HOSPITAL ENCOUNTER (OUTPATIENT)
Dept: INFUSION THERAPY | Age: 48
Discharge: HOME | End: 2018-04-18
Payer: COMMERCIAL

## 2018-04-18 VITALS
RESPIRATION RATE: 16 BRPM | DIASTOLIC BLOOD PRESSURE: 58 MMHG | HEART RATE: 76 BPM | SYSTOLIC BLOOD PRESSURE: 106 MMHG | OXYGEN SATURATION: 95 % | TEMPERATURE: 99 F

## 2018-04-18 DIAGNOSIS — C77.3 BREAST CANCER METASTASIZED TO AXILLARY LYMPH NODE, LEFT (HCC): ICD-10-CM

## 2018-04-18 DIAGNOSIS — Z17.0 MALIGNANT NEOPLASM OF LEFT BREAST IN FEMALE, ESTROGEN RECEPTOR POSITIVE, UNSPECIFIED SITE OF BREAST (HCC): ICD-10-CM

## 2018-04-18 DIAGNOSIS — C50.912 BREAST CANCER METASTASIZED TO AXILLARY LYMPH NODE, LEFT (HCC): ICD-10-CM

## 2018-04-18 DIAGNOSIS — C50.912 MALIGNANT NEOPLASM OF LEFT BREAST IN FEMALE, ESTROGEN RECEPTOR POSITIVE, UNSPECIFIED SITE OF BREAST (HCC): ICD-10-CM

## 2018-04-18 LAB
ALBUMIN SERPL-MCNC: 3.8 G/DL (ref 3.5–5.1)
ALP BLD-CCNC: 49 U/L (ref 38–126)
ALT SERPL-CCNC: 14 U/L (ref 11–66)
AST SERPL-CCNC: 12 U/L (ref 5–40)
BASINOPHIL, AUTOMATED: 0 % (ref 0–3)
BILIRUB SERPL-MCNC: < 0.2 MG/DL (ref 0.3–1.2)
BILIRUBIN DIRECT: < 0.2 MG/DL (ref 0–0.3)
BUN, WHOLE BLOOD: 19 MG/DL (ref 8–26)
CHLORIDE, WHOLE BLOOD: 103 MEQ/L (ref 98–109)
CREATININE, WHOLE BLOOD: 0.7 MG/DL (ref 0.5–1.2)
EOSINOPHILS RELATIVE PERCENT: 5 % (ref 0–4)
GFR, ESTIMATED: > 90 ML/MIN/1.73M2
GLUCOSE, WHOLE BLOOD: 102 MG/DL (ref 70–108)
HCT VFR BLD CALC: 33.6 % (ref 37–47)
HEMOGLOBIN: 11.3 GM/DL (ref 12–16)
IONIZED CALCIUM, WHOLE BLOOD: 1.2 MMOL/L (ref 1.12–1.32)
LYMPHOCYTES # BLD: 21 % (ref 15–47)
MCH RBC QN AUTO: 34.7 PG (ref 27–31)
MCHC RBC AUTO-ENTMCNC: 33.8 GM/DL (ref 33–37)
MCV RBC AUTO: 103 FL (ref 81–99)
MONOCYTES: 5 % (ref 0–12)
PDW BLD-RTO: 13.3 % (ref 11.5–14.5)
PLATELET # BLD: 206 THOU/MM3 (ref 130–400)
PMV BLD AUTO: 7.7 FL (ref 7.4–10.4)
POTASSIUM, WHOLE BLOOD: 4.2 MEQ/L (ref 3.5–4.9)
RBC # BLD: 3.27 MILL/MM3 (ref 4.2–5.4)
SEG NEUTROPHILS: 69 % (ref 43–75)
SODIUM, WHOLE BLOOD: 139 MEQ/L (ref 138–146)
TOTAL CO2, WHOLE BLOOD: 27 MEQ/L (ref 23–33)
TOTAL PROTEIN: 6.5 G/DL (ref 6.1–8)
WBC # BLD: 4.6 THOU/MM3 (ref 4.8–10.8)

## 2018-04-18 PROCEDURE — 2500000003 HC RX 250 WO HCPCS: Performed by: INTERNAL MEDICINE

## 2018-04-18 PROCEDURE — 85025 COMPLETE CBC W/AUTO DIFF WBC: CPT

## 2018-04-18 PROCEDURE — S0028 INJECTION, FAMOTIDINE, 20 MG: HCPCS | Performed by: INTERNAL MEDICINE

## 2018-04-18 PROCEDURE — 96413 CHEMO IV INFUSION 1 HR: CPT

## 2018-04-18 PROCEDURE — 96367 TX/PROPH/DG ADDL SEQ IV INF: CPT

## 2018-04-18 PROCEDURE — 36591 DRAW BLOOD OFF VENOUS DEVICE: CPT

## 2018-04-18 PROCEDURE — 2580000003 HC RX 258: Performed by: INTERNAL MEDICINE

## 2018-04-18 PROCEDURE — 80076 HEPATIC FUNCTION PANEL: CPT

## 2018-04-18 PROCEDURE — 80047 BASIC METABLC PNL IONIZED CA: CPT

## 2018-04-18 PROCEDURE — 96375 TX/PRO/DX INJ NEW DRUG ADDON: CPT

## 2018-04-18 PROCEDURE — 6360000002 HC RX W HCPCS: Performed by: INTERNAL MEDICINE

## 2018-04-18 RX ORDER — HEPARIN SODIUM (PORCINE) LOCK FLUSH IV SOLN 100 UNIT/ML 100 UNIT/ML
500 SOLUTION INTRAVENOUS PRN
Status: DISCONTINUED | OUTPATIENT
Start: 2018-04-18 | End: 2018-04-19 | Stop reason: HOSPADM

## 2018-04-18 RX ORDER — SODIUM CHLORIDE 0.9 % (FLUSH) 0.9 %
10 SYRINGE (ML) INJECTION PRN
Status: CANCELLED | OUTPATIENT
Start: 2018-04-18

## 2018-04-18 RX ORDER — SODIUM CHLORIDE 9 MG/ML
INJECTION, SOLUTION INTRAVENOUS CONTINUOUS
Status: CANCELLED | OUTPATIENT
Start: 2018-04-18

## 2018-04-18 RX ORDER — SODIUM CHLORIDE 9 MG/ML
INJECTION, SOLUTION INTRAVENOUS ONCE
Status: COMPLETED | OUTPATIENT
Start: 2018-04-18 | End: 2018-04-18

## 2018-04-18 RX ORDER — DIPHENHYDRAMINE HYDROCHLORIDE 50 MG/ML
50 INJECTION INTRAMUSCULAR; INTRAVENOUS ONCE
Status: COMPLETED | OUTPATIENT
Start: 2018-04-18 | End: 2018-04-18

## 2018-04-18 RX ORDER — DIPHENHYDRAMINE HYDROCHLORIDE 50 MG/ML
50 INJECTION INTRAMUSCULAR; INTRAVENOUS ONCE
Status: CANCELLED | OUTPATIENT
Start: 2018-04-18 | End: 2018-04-18

## 2018-04-18 RX ORDER — 0.9 % SODIUM CHLORIDE 0.9 %
10 VIAL (ML) INJECTION ONCE
Status: CANCELLED | OUTPATIENT
Start: 2018-04-18 | End: 2018-04-18

## 2018-04-18 RX ORDER — EPINEPHRINE 1 MG/ML
0.3 INJECTION, SOLUTION, CONCENTRATE INTRAVENOUS PRN
Status: CANCELLED | OUTPATIENT
Start: 2018-04-18

## 2018-04-18 RX ORDER — HEPARIN SODIUM (PORCINE) LOCK FLUSH IV SOLN 100 UNIT/ML 100 UNIT/ML
500 SOLUTION INTRAVENOUS PRN
Status: CANCELLED | OUTPATIENT
Start: 2018-04-18

## 2018-04-18 RX ORDER — SODIUM CHLORIDE 0.9 % (FLUSH) 0.9 %
10 SYRINGE (ML) INJECTION PRN
Status: DISCONTINUED | OUTPATIENT
Start: 2018-04-18 | End: 2018-04-19 | Stop reason: HOSPADM

## 2018-04-18 RX ORDER — SODIUM CHLORIDE 0.9 % (FLUSH) 0.9 %
20 SYRINGE (ML) INJECTION PRN
Status: DISCONTINUED | OUTPATIENT
Start: 2018-04-18 | End: 2018-04-19 | Stop reason: HOSPADM

## 2018-04-18 RX ORDER — METHYLPREDNISOLONE SODIUM SUCCINATE 125 MG/2ML
125 INJECTION, POWDER, LYOPHILIZED, FOR SOLUTION INTRAMUSCULAR; INTRAVENOUS ONCE
Status: CANCELLED | OUTPATIENT
Start: 2018-04-18 | End: 2018-04-18

## 2018-04-18 RX ORDER — SODIUM CHLORIDE 0.9 % (FLUSH) 0.9 %
5 SYRINGE (ML) INJECTION PRN
Status: CANCELLED | OUTPATIENT
Start: 2018-04-18

## 2018-04-18 RX ORDER — SODIUM CHLORIDE 0.9 % (FLUSH) 0.9 %
20 SYRINGE (ML) INJECTION PRN
Status: CANCELLED | OUTPATIENT
Start: 2018-04-18

## 2018-04-18 RX ORDER — 0.9 % SODIUM CHLORIDE 0.9 %
10 VIAL (ML) INJECTION ONCE
Status: COMPLETED | OUTPATIENT
Start: 2018-04-18 | End: 2018-04-18

## 2018-04-18 RX ADMIN — PACLITAXEL 153.6 MG: 6 INJECTION, SOLUTION, CONCENTRATE INTRAVENOUS at 10:15

## 2018-04-18 RX ADMIN — FAMOTIDINE 20 MG: 10 INJECTION, SOLUTION INTRAVENOUS at 09:35

## 2018-04-18 RX ADMIN — Medication 500 UNITS: at 11:42

## 2018-04-18 RX ADMIN — Medication 10 ML: at 09:18

## 2018-04-18 RX ADMIN — SODIUM CHLORIDE: 9 INJECTION, SOLUTION INTRAVENOUS at 09:18

## 2018-04-18 RX ADMIN — Medication 10 ML: at 09:35

## 2018-04-18 RX ADMIN — DEXAMETHASONE SODIUM PHOSPHATE: 4 INJECTION, SOLUTION INTRAMUSCULAR; INTRAVENOUS at 09:40

## 2018-04-18 RX ADMIN — Medication 10 ML: at 11:42

## 2018-04-18 RX ADMIN — DIPHENHYDRAMINE HYDROCHLORIDE 50 MG: 50 INJECTION, SOLUTION INTRAMUSCULAR; INTRAVENOUS at 09:38

## 2018-04-18 RX ADMIN — Medication 20 ML: at 08:26

## 2018-04-18 RX ADMIN — Medication 10 ML: at 08:25

## 2018-04-18 ASSESSMENT — PAIN SCALES - GENERAL: PAINLEVEL_OUTOF10: 3

## 2018-04-18 ASSESSMENT — PAIN DESCRIPTION - PAIN TYPE: TYPE: CHRONIC PAIN

## 2018-04-18 ASSESSMENT — PAIN DESCRIPTION - ORIENTATION: ORIENTATION: ANTERIOR

## 2018-04-18 ASSESSMENT — PAIN DESCRIPTION - DESCRIPTORS: DESCRIPTORS: POUNDING

## 2018-04-18 ASSESSMENT — PAIN DESCRIPTION - FREQUENCY: FREQUENCY: INTERMITTENT

## 2018-04-18 ASSESSMENT — PAIN DESCRIPTION - PROGRESSION: CLINICAL_PROGRESSION: NOT CHANGED

## 2018-04-18 ASSESSMENT — PAIN DESCRIPTION - LOCATION: LOCATION: HEAD

## 2018-04-18 NOTE — ONCOLOGY
Chemotherapy Administration    Pre-assessment Data: Antineoplastic Agents  Other:   See toxicity flow sheet for assessment [x]     Physician Notification of Concerns Related to Chemotherapy Administration:   Physician Notified Francia Moreno / Time of Notification      Interventions:   Lab work assessed  [x]   Height / Weight verified for dose [x]   Current MAR reviewed [x]   Emergency drugs available as appropriate [x]   Anaphylaxis assessment completed [x]   Pre-medications administered as ordered [x]   Blood return noted upon initiation of chemotherapy [x]   Blood return noted each 1-2ml of a vesicant medication if given IV push []   Blood return noted each 2-3ml of a non-vesicant medication if given IV push []   Monitor for signs / symptoms of hypersensitivity reaction [x]   Chemotherapy orders (drug/dose/rate) verified by 2 Chemo certified RNs [x]   Monitor IV site and blood return throughout the infusion of the medication [x]   Document IV site checks on the IV assessment form [x]   Document chemotherapy teaching on the Patient Education tab [x]   Document patient verbalizes understanding of medications being administered [x]   If IV infiltration, see ONS Guidelines []   Other:     1 hour Taxol []

## 2018-04-19 ENCOUNTER — HOSPITAL ENCOUNTER (OUTPATIENT)
Dept: PHYSICAL THERAPY | Age: 48
Setting detail: THERAPIES SERIES
Discharge: HOME | End: 2018-04-19
Payer: COMMERCIAL

## 2018-04-19 PROCEDURE — 97140 MANUAL THERAPY 1/> REGIONS: CPT

## 2018-04-19 PROCEDURE — 97110 THERAPEUTIC EXERCISES: CPT

## 2018-04-19 ASSESSMENT — PAIN SCALES - GENERAL: PAINLEVEL_OUTOF10: 2

## 2018-04-24 DIAGNOSIS — C77.3 BREAST CANCER METASTASIZED TO AXILLARY LYMPH NODE, LEFT (HCC): Primary | ICD-10-CM

## 2018-04-24 DIAGNOSIS — C50.912 BREAST CANCER METASTASIZED TO AXILLARY LYMPH NODE, LEFT (HCC): Primary | ICD-10-CM

## 2018-04-24 RX ORDER — SODIUM CHLORIDE 9 MG/ML
INJECTION, SOLUTION INTRAVENOUS CONTINUOUS
Status: CANCELLED | OUTPATIENT
Start: 2018-04-25

## 2018-04-24 RX ORDER — EPINEPHRINE 1 MG/ML
0.3 INJECTION, SOLUTION, CONCENTRATE INTRAVENOUS PRN
Status: CANCELLED | OUTPATIENT
Start: 2018-04-25

## 2018-04-24 RX ORDER — 0.9 % SODIUM CHLORIDE 0.9 %
10 VIAL (ML) INJECTION ONCE
Status: CANCELLED | OUTPATIENT
Start: 2018-04-25 | End: 2018-04-25

## 2018-04-24 RX ORDER — METHYLPREDNISOLONE SODIUM SUCCINATE 125 MG/2ML
125 INJECTION, POWDER, LYOPHILIZED, FOR SOLUTION INTRAMUSCULAR; INTRAVENOUS ONCE
Status: CANCELLED | OUTPATIENT
Start: 2018-04-25 | End: 2018-04-25

## 2018-04-24 RX ORDER — SODIUM CHLORIDE 0.9 % (FLUSH) 0.9 %
5 SYRINGE (ML) INJECTION PRN
Status: CANCELLED | OUTPATIENT
Start: 2018-04-25

## 2018-04-24 RX ORDER — DIPHENHYDRAMINE HYDROCHLORIDE 50 MG/ML
50 INJECTION INTRAMUSCULAR; INTRAVENOUS ONCE
Status: CANCELLED | OUTPATIENT
Start: 2018-04-25 | End: 2018-04-25

## 2018-04-24 RX ORDER — HEPARIN SODIUM (PORCINE) LOCK FLUSH IV SOLN 100 UNIT/ML 100 UNIT/ML
500 SOLUTION INTRAVENOUS PRN
Status: CANCELLED | OUTPATIENT
Start: 2018-04-25

## 2018-04-24 RX ORDER — SODIUM CHLORIDE 0.9 % (FLUSH) 0.9 %
10 SYRINGE (ML) INJECTION PRN
Status: CANCELLED | OUTPATIENT
Start: 2018-04-25

## 2018-04-25 ENCOUNTER — OFFICE VISIT (OUTPATIENT)
Dept: ONCOLOGY | Age: 48
End: 2018-04-25
Payer: COMMERCIAL

## 2018-04-25 ENCOUNTER — HOSPITAL ENCOUNTER (OUTPATIENT)
Dept: INFUSION THERAPY | Age: 48
Discharge: HOME | End: 2018-04-25
Payer: COMMERCIAL

## 2018-04-25 ENCOUNTER — CLINICAL DOCUMENTATION (OUTPATIENT)
Dept: CASE MANAGEMENT | Age: 48
End: 2018-04-25

## 2018-04-25 VITALS
HEART RATE: 84 BPM | DIASTOLIC BLOOD PRESSURE: 72 MMHG | OXYGEN SATURATION: 97 % | TEMPERATURE: 98.4 F | RESPIRATION RATE: 16 BRPM | HEIGHT: 68 IN | WEIGHT: 179 LBS | BODY MASS INDEX: 27.13 KG/M2 | SYSTOLIC BLOOD PRESSURE: 118 MMHG

## 2018-04-25 VITALS
RESPIRATION RATE: 16 BRPM | DIASTOLIC BLOOD PRESSURE: 66 MMHG | SYSTOLIC BLOOD PRESSURE: 104 MMHG | OXYGEN SATURATION: 95 % | HEART RATE: 80 BPM | TEMPERATURE: 98.4 F

## 2018-04-25 DIAGNOSIS — Z51.11 ENCOUNTER FOR CHEMOTHERAPY MANAGEMENT: ICD-10-CM

## 2018-04-25 DIAGNOSIS — Z51.11 CHEMOTHERAPY MANAGEMENT, ENCOUNTER FOR: ICD-10-CM

## 2018-04-25 DIAGNOSIS — C77.3 BREAST CANCER METASTASIZED TO AXILLARY LYMPH NODE, LEFT (HCC): ICD-10-CM

## 2018-04-25 DIAGNOSIS — C50.912 BREAST CANCER METASTASIZED TO AXILLARY LYMPH NODE, LEFT (HCC): ICD-10-CM

## 2018-04-25 DIAGNOSIS — C50.912 MALIGNANT NEOPLASM OF LEFT BREAST IN FEMALE, ESTROGEN RECEPTOR POSITIVE, UNSPECIFIED SITE OF BREAST (HCC): Primary | ICD-10-CM

## 2018-04-25 DIAGNOSIS — G62.0 DRUG-INDUCED POLYNEUROPATHY (HCC): ICD-10-CM

## 2018-04-25 DIAGNOSIS — Z17.0 MALIGNANT NEOPLASM OF LEFT BREAST IN FEMALE, ESTROGEN RECEPTOR POSITIVE, UNSPECIFIED SITE OF BREAST (HCC): Primary | ICD-10-CM

## 2018-04-25 LAB
ALBUMIN SERPL-MCNC: 3.9 G/DL (ref 3.5–5.1)
ALP BLD-CCNC: 45 U/L (ref 38–126)
ALT SERPL-CCNC: 15 U/L (ref 11–66)
AST SERPL-CCNC: 12 U/L (ref 5–40)
BASINOPHIL, AUTOMATED: 0 % (ref 0–3)
BILIRUB SERPL-MCNC: 0.2 MG/DL (ref 0.3–1.2)
BILIRUBIN DIRECT: < 0.2 MG/DL (ref 0–0.3)
BUN, WHOLE BLOOD: 10 MG/DL (ref 8–26)
CHLORIDE, WHOLE BLOOD: 104 MEQ/L (ref 98–109)
CREATININE, WHOLE BLOOD: 0.6 MG/DL (ref 0.5–1.2)
EOSINOPHILS RELATIVE PERCENT: 4 % (ref 0–4)
GFR, ESTIMATED: > 90 ML/MIN/1.73M2
GLUCOSE, WHOLE BLOOD: 95 MG/DL (ref 70–108)
HCT VFR BLD CALC: 36 % (ref 37–47)
HEMOGLOBIN: 12 GM/DL (ref 12–16)
IONIZED CALCIUM, WHOLE BLOOD: 1.21 MMOL/L (ref 1.12–1.32)
LYMPHOCYTES # BLD: 24 % (ref 15–47)
MCH RBC QN AUTO: 34.2 PG (ref 27–31)
MCHC RBC AUTO-ENTMCNC: 33.2 GM/DL (ref 33–37)
MCV RBC AUTO: 103 FL (ref 81–99)
MONOCYTES: 5 % (ref 0–12)
PDW BLD-RTO: 13.1 % (ref 11.5–14.5)
PLATELET # BLD: 195 THOU/MM3 (ref 130–400)
PMV BLD AUTO: 7.7 FL (ref 7.4–10.4)
POTASSIUM, WHOLE BLOOD: 4.2 MEQ/L (ref 3.5–4.9)
RBC # BLD: 3.49 MILL/MM3 (ref 4.2–5.4)
SEG NEUTROPHILS: 67 % (ref 43–75)
SODIUM, WHOLE BLOOD: 140 MEQ/L (ref 138–146)
TOTAL CO2, WHOLE BLOOD: 26 MEQ/L (ref 23–33)
TOTAL PROTEIN: 6.4 G/DL (ref 6.1–8)
WBC # BLD: 3.9 THOU/MM3 (ref 4.8–10.8)

## 2018-04-25 PROCEDURE — 96367 TX/PROPH/DG ADDL SEQ IV INF: CPT

## 2018-04-25 PROCEDURE — 96375 TX/PRO/DX INJ NEW DRUG ADDON: CPT

## 2018-04-25 PROCEDURE — 96413 CHEMO IV INFUSION 1 HR: CPT

## 2018-04-25 PROCEDURE — 2500000003 HC RX 250 WO HCPCS: Performed by: INTERNAL MEDICINE

## 2018-04-25 PROCEDURE — 85025 COMPLETE CBC W/AUTO DIFF WBC: CPT

## 2018-04-25 PROCEDURE — 80076 HEPATIC FUNCTION PANEL: CPT

## 2018-04-25 PROCEDURE — 99214 OFFICE O/P EST MOD 30 MIN: CPT | Performed by: INTERNAL MEDICINE

## 2018-04-25 PROCEDURE — 36591 DRAW BLOOD OFF VENOUS DEVICE: CPT

## 2018-04-25 PROCEDURE — 2580000003 HC RX 258: Performed by: INTERNAL MEDICINE

## 2018-04-25 PROCEDURE — G0463 HOSPITAL OUTPT CLINIC VISIT: HCPCS

## 2018-04-25 PROCEDURE — 6360000002 HC RX W HCPCS: Performed by: INTERNAL MEDICINE

## 2018-04-25 PROCEDURE — 80047 BASIC METABLC PNL IONIZED CA: CPT

## 2018-04-25 PROCEDURE — S0028 INJECTION, FAMOTIDINE, 20 MG: HCPCS | Performed by: INTERNAL MEDICINE

## 2018-04-25 RX ORDER — SODIUM CHLORIDE 0.9 % (FLUSH) 0.9 %
10 SYRINGE (ML) INJECTION PRN
Status: DISCONTINUED | OUTPATIENT
Start: 2018-04-25 | End: 2018-04-26 | Stop reason: HOSPADM

## 2018-04-25 RX ORDER — SODIUM CHLORIDE 0.9 % (FLUSH) 0.9 %
20 SYRINGE (ML) INJECTION PRN
Status: DISCONTINUED | OUTPATIENT
Start: 2018-04-25 | End: 2018-04-26 | Stop reason: HOSPADM

## 2018-04-25 RX ORDER — 0.9 % SODIUM CHLORIDE 0.9 %
10 VIAL (ML) INJECTION ONCE
Status: COMPLETED | OUTPATIENT
Start: 2018-04-25 | End: 2018-04-25

## 2018-04-25 RX ORDER — DIPHENHYDRAMINE HYDROCHLORIDE 50 MG/ML
50 INJECTION INTRAMUSCULAR; INTRAVENOUS ONCE
Status: COMPLETED | OUTPATIENT
Start: 2018-04-25 | End: 2018-04-25

## 2018-04-25 RX ORDER — HEPARIN SODIUM (PORCINE) LOCK FLUSH IV SOLN 100 UNIT/ML 100 UNIT/ML
500 SOLUTION INTRAVENOUS PRN
Status: DISCONTINUED | OUTPATIENT
Start: 2018-04-25 | End: 2018-04-26 | Stop reason: HOSPADM

## 2018-04-25 RX ORDER — DEXAMETHASONE 4 MG/1
8 TABLET ORAL
Qty: 12 TABLET | Refills: 1 | Status: SHIPPED | OUTPATIENT
Start: 2018-04-25 | End: 2018-05-16 | Stop reason: SDUPTHER

## 2018-04-25 RX ORDER — HEPARIN SODIUM (PORCINE) LOCK FLUSH IV SOLN 100 UNIT/ML 100 UNIT/ML
500 SOLUTION INTRAVENOUS PRN
Status: CANCELLED | OUTPATIENT
Start: 2018-04-25

## 2018-04-25 RX ORDER — SODIUM CHLORIDE 0.9 % (FLUSH) 0.9 %
10 SYRINGE (ML) INJECTION PRN
Status: CANCELLED | OUTPATIENT
Start: 2018-04-25

## 2018-04-25 RX ORDER — SODIUM CHLORIDE 0.9 % (FLUSH) 0.9 %
20 SYRINGE (ML) INJECTION PRN
Status: CANCELLED | OUTPATIENT
Start: 2018-04-25

## 2018-04-25 RX ORDER — SODIUM CHLORIDE 9 MG/ML
INJECTION, SOLUTION INTRAVENOUS ONCE
Status: COMPLETED | OUTPATIENT
Start: 2018-04-25 | End: 2018-04-25

## 2018-04-25 RX ADMIN — Medication 10 ML: at 10:44

## 2018-04-25 RX ADMIN — FAMOTIDINE 20 MG: 10 INJECTION, SOLUTION INTRAVENOUS at 10:44

## 2018-04-25 RX ADMIN — PACLITAXEL 153.6 MG: 6 INJECTION, SOLUTION, CONCENTRATE INTRAVENOUS at 11:45

## 2018-04-25 RX ADMIN — Medication 20 ML: at 09:41

## 2018-04-25 RX ADMIN — DIPHENHYDRAMINE HYDROCHLORIDE 50 MG: 50 INJECTION, SOLUTION INTRAMUSCULAR; INTRAVENOUS at 10:47

## 2018-04-25 RX ADMIN — Medication 10 ML: at 13:15

## 2018-04-25 RX ADMIN — Medication 500 UNITS: at 13:15

## 2018-04-25 RX ADMIN — DEXAMETHASONE SODIUM PHOSPHATE: 4 INJECTION, SOLUTION INTRAMUSCULAR; INTRAVENOUS at 10:50

## 2018-04-25 RX ADMIN — Medication 10 ML: at 09:40

## 2018-04-25 RX ADMIN — SODIUM CHLORIDE: 9 INJECTION, SOLUTION INTRAVENOUS at 10:43

## 2018-04-25 ASSESSMENT — ENCOUNTER SYMPTOMS
TROUBLE SWALLOWING: 0
CHEST TIGHTNESS: 0
SHORTNESS OF BREATH: 1
VOICE CHANGE: 0
DIARRHEA: 0
EYE ITCHING: 0
BLOOD IN STOOL: 0
ABDOMINAL PAIN: 0
COLOR CHANGE: 0
SORE THROAT: 0
EYE REDNESS: 0
WHEEZING: 0
NAUSEA: 0
CONSTIPATION: 0
FACIAL SWELLING: 0
COUGH: 0
VOMITING: 0
ABDOMINAL DISTENTION: 0
BACK PAIN: 1

## 2018-04-25 NOTE — PROGRESS NOTES
Labs drawn via central venous catheter, then patient escorted to exam room accompanied by Sugey Figueroa

## 2018-04-25 NOTE — PROGRESS NOTES
Name: Nicanor Abrams  : 1970  MRN: [id number]    Oncology Navigation Follow-Up Note    Contact Type:  Medical Oncology    Subjective: Patient and friend here to see Dr. Kam Stevenson for continuation of chemotherapy. Objective: Patient voices fatigue. States some days are better than others. Felt well enough to help clean out the garage this weekend. Referrals: None at this time    Education: Patient had many questions about upcoming radiation treatments. Questions addressed    Notes: Will continue to follow through continuum of care.

## 2018-04-25 NOTE — PLAN OF CARE
Problem: Intellectual/Education/Knowledge Deficit  Intervention: Verbal/written education provided  Chemotherapy Teaching     What is Chemotherapy   Drug action [x]   Method of Administration [x]   Handouts given []     Side Effects  Nausea/vomiting [x]   Diarrhea [x]   Fatigue [x]   Signs / Symptoms of infection [x]   Neutropenia [x]   Thrombocytopenia [x]   Alopecia [x]   neuropathy [x]   [place] diet &  the importance of fluids [x]       Micellaneous  Importance of nutrition [x]   Importance of oral hygiene [x]   When to call the MD [x]   Monitoring labs [x]   Use of supportive services []     Explanation of Drug Regimen / Frequency  taxol     Comments  Verbalized understanding to drug,action,side effects and when to call MD       Goal: Teaching initiated upon admission  Outcome: Met This Shift  Patient verbalizes understanding to verbal information given on taxol,action and possible side effects. Aware to call MD if develop complications. Problem: Discharge Planning  Intervention: Interaction with patient/family and care team  Provide discharge instructions. Goal: Knowledge of discharge instructions  Knowledge of discharge instructions     Outcome: Met This Shift  Verbalized understanding of discharge instructions, follow-up appointments, and when to call the physician. Problem: Infection - Central Venous Catheter-Associated Bloodstream Infection:  Intervention: Infection risk assessment  Discussed mediport maintenance, infection prevention, and when to call the physician. Labs drawn per mediport. Goal: Will show no infection signs and symptoms  Will show no infection signs and symptoms   Outcome: Met This Shift  Mediport site with no redness or warmth. Skin over port intact with no signs of breakdown noted. Patient verbalizes signs/symptoms of port infection and when to notify the physician.      Problem: Musculor/Skeletal Functional Status  Intervention: Fall precautions  Discussed fall precautions, fall risks, and instructed patient to ask for assistance with ambulation. Call light within reach. Goal: Absence of falls  Outcome: Met This Shift  Free from falls during infusion. Comments: Care plan reviewed with patient and spouse. Patient and spouse verbalize understanding of the plan of care and contribute to goal setting.

## 2018-04-25 NOTE — PROGRESS NOTES
Patient assessed for the following post chemotherapy:    Dizziness   No  Lightheadedness  No      Acute nausea/vomiting No  Headache   No  Chest pain/pressure  No  Rash/itching   No  Shortness of breath  No    Patient kept for 20 minutes observation post infusion chemotherapy. Patient tolerated chemotherapy treatment taxol without any complications. Labs drawn per mediport. Last vital signs:   /66   Pulse 80   Temp 98.4 °F (36.9 °C) (Oral)   Resp 16   SpO2 95%     Patient instructed if experience any of the above symptoms following today's infusion,he/she is to notify MD immediately or go to the emergency department. Discharge instructions given to patient. Verbalizes understanding. Ambulated off unit per self with spouse with belongings.

## 2018-05-01 ENCOUNTER — HOSPITAL ENCOUNTER (OUTPATIENT)
Dept: PHYSICAL THERAPY | Age: 48
Setting detail: THERAPIES SERIES
Discharge: HOME | End: 2018-05-01
Payer: COMMERCIAL

## 2018-05-01 PROCEDURE — 97110 THERAPEUTIC EXERCISES: CPT

## 2018-05-01 PROCEDURE — 97140 MANUAL THERAPY 1/> REGIONS: CPT

## 2018-05-02 DIAGNOSIS — C77.3 BREAST CANCER METASTASIZED TO AXILLARY LYMPH NODE, LEFT (HCC): Primary | ICD-10-CM

## 2018-05-02 DIAGNOSIS — C50.912 BREAST CANCER METASTASIZED TO AXILLARY LYMPH NODE, LEFT (HCC): Primary | ICD-10-CM

## 2018-05-03 ENCOUNTER — HOSPITAL ENCOUNTER (OUTPATIENT)
Dept: INFUSION THERAPY | Age: 48
Discharge: HOME | End: 2018-05-03
Payer: COMMERCIAL

## 2018-05-03 VITALS
HEART RATE: 85 BPM | RESPIRATION RATE: 18 BRPM | TEMPERATURE: 98.6 F | DIASTOLIC BLOOD PRESSURE: 51 MMHG | BODY MASS INDEX: 27.19 KG/M2 | WEIGHT: 179.4 LBS | SYSTOLIC BLOOD PRESSURE: 94 MMHG | OXYGEN SATURATION: 95 % | HEIGHT: 68 IN

## 2018-05-03 DIAGNOSIS — C77.3 BREAST CANCER METASTASIZED TO AXILLARY LYMPH NODE, LEFT (HCC): ICD-10-CM

## 2018-05-03 DIAGNOSIS — C50.912 BREAST CANCER METASTASIZED TO AXILLARY LYMPH NODE, LEFT (HCC): ICD-10-CM

## 2018-05-03 LAB
ALBUMIN SERPL-MCNC: 3.7 G/DL (ref 3.5–5.1)
ALP BLD-CCNC: 44 U/L (ref 38–126)
ALT SERPL-CCNC: 14 U/L (ref 11–66)
AST SERPL-CCNC: 13 U/L (ref 5–40)
BASINOPHIL, AUTOMATED: 0 % (ref 0–3)
BILIRUB SERPL-MCNC: 0.2 MG/DL (ref 0.3–1.2)
BILIRUBIN DIRECT: < 0.2 MG/DL (ref 0–0.3)
BUN, WHOLE BLOOD: 8 MG/DL (ref 8–26)
CHLORIDE, WHOLE BLOOD: 106 MEQ/L (ref 98–109)
CREATININE, WHOLE BLOOD: 0.8 MG/DL (ref 0.5–1.2)
EOSINOPHILS RELATIVE PERCENT: 3 % (ref 0–4)
GFR, ESTIMATED: 81 ML/MIN/1.73M2
GLUCOSE, WHOLE BLOOD: 102 MG/DL (ref 70–108)
HCT VFR BLD CALC: 37.4 % (ref 37–47)
HEMOGLOBIN: 12 GM/DL (ref 12–16)
IONIZED CALCIUM, WHOLE BLOOD: 1.18 MMOL/L (ref 1.12–1.32)
LYMPHOCYTES # BLD: 22 % (ref 15–47)
MCH RBC QN AUTO: 33.6 PG (ref 27–31)
MCHC RBC AUTO-ENTMCNC: 32.1 GM/DL (ref 33–37)
MCV RBC AUTO: 105 FL (ref 81–99)
MONOCYTES: 8 % (ref 0–12)
PDW BLD-RTO: 12.1 % (ref 11.5–14.5)
PLATELET # BLD: 185 THOU/MM3 (ref 130–400)
PMV BLD AUTO: 7.7 FL (ref 7.4–10.4)
POTASSIUM, WHOLE BLOOD: 3.7 MEQ/L (ref 3.5–4.9)
RBC # BLD: 3.57 MILL/MM3 (ref 4.2–5.4)
SEG NEUTROPHILS: 67 % (ref 43–75)
SODIUM, WHOLE BLOOD: 143 MEQ/L (ref 138–146)
TOTAL CO2, WHOLE BLOOD: 27 MEQ/L (ref 23–33)
TOTAL PROTEIN: 6.2 G/DL (ref 6.1–8)
WBC # BLD: 4.8 THOU/MM3 (ref 4.8–10.8)

## 2018-05-03 PROCEDURE — 2500000003 HC RX 250 WO HCPCS: Performed by: INTERNAL MEDICINE

## 2018-05-03 PROCEDURE — 85025 COMPLETE CBC W/AUTO DIFF WBC: CPT

## 2018-05-03 PROCEDURE — 96367 TX/PROPH/DG ADDL SEQ IV INF: CPT

## 2018-05-03 PROCEDURE — S0028 INJECTION, FAMOTIDINE, 20 MG: HCPCS | Performed by: INTERNAL MEDICINE

## 2018-05-03 PROCEDURE — 2580000003 HC RX 258: Performed by: INTERNAL MEDICINE

## 2018-05-03 PROCEDURE — 80076 HEPATIC FUNCTION PANEL: CPT

## 2018-05-03 PROCEDURE — 96375 TX/PRO/DX INJ NEW DRUG ADDON: CPT

## 2018-05-03 PROCEDURE — 80047 BASIC METABLC PNL IONIZED CA: CPT

## 2018-05-03 PROCEDURE — 6360000002 HC RX W HCPCS: Performed by: INTERNAL MEDICINE

## 2018-05-03 PROCEDURE — 36591 DRAW BLOOD OFF VENOUS DEVICE: CPT

## 2018-05-03 PROCEDURE — 96413 CHEMO IV INFUSION 1 HR: CPT

## 2018-05-03 RX ORDER — 0.9 % SODIUM CHLORIDE 0.9 %
10 VIAL (ML) INJECTION ONCE
Status: COMPLETED | OUTPATIENT
Start: 2018-05-03 | End: 2018-05-03

## 2018-05-03 RX ORDER — DIPHENHYDRAMINE HYDROCHLORIDE 50 MG/ML
50 INJECTION INTRAMUSCULAR; INTRAVENOUS ONCE
Status: CANCELLED | OUTPATIENT
Start: 2018-05-03 | End: 2018-05-03

## 2018-05-03 RX ORDER — SODIUM CHLORIDE 0.9 % (FLUSH) 0.9 %
5 SYRINGE (ML) INJECTION PRN
Status: CANCELLED | OUTPATIENT
Start: 2018-05-03

## 2018-05-03 RX ORDER — HEPARIN SODIUM (PORCINE) LOCK FLUSH IV SOLN 100 UNIT/ML 100 UNIT/ML
500 SOLUTION INTRAVENOUS PRN
Status: DISCONTINUED | OUTPATIENT
Start: 2018-05-03 | End: 2018-05-04 | Stop reason: HOSPADM

## 2018-05-03 RX ORDER — DIPHENHYDRAMINE HYDROCHLORIDE 50 MG/ML
50 INJECTION INTRAMUSCULAR; INTRAVENOUS ONCE
Status: COMPLETED | OUTPATIENT
Start: 2018-05-03 | End: 2018-05-03

## 2018-05-03 RX ORDER — SODIUM CHLORIDE 9 MG/ML
INJECTION, SOLUTION INTRAVENOUS CONTINUOUS
Status: CANCELLED | OUTPATIENT
Start: 2018-05-03

## 2018-05-03 RX ORDER — SODIUM CHLORIDE 0.9 % (FLUSH) 0.9 %
10 SYRINGE (ML) INJECTION PRN
Status: DISCONTINUED | OUTPATIENT
Start: 2018-05-03 | End: 2018-05-04 | Stop reason: HOSPADM

## 2018-05-03 RX ORDER — EPINEPHRINE 1 MG/ML
0.3 INJECTION, SOLUTION, CONCENTRATE INTRAVENOUS PRN
Status: CANCELLED | OUTPATIENT
Start: 2018-05-03

## 2018-05-03 RX ORDER — SODIUM CHLORIDE 9 MG/ML
INJECTION, SOLUTION INTRAVENOUS ONCE
Status: COMPLETED | OUTPATIENT
Start: 2018-05-03 | End: 2018-05-03

## 2018-05-03 RX ORDER — 0.9 % SODIUM CHLORIDE 0.9 %
10 VIAL (ML) INJECTION ONCE
Status: CANCELLED | OUTPATIENT
Start: 2018-05-03 | End: 2018-05-03

## 2018-05-03 RX ORDER — METHYLPREDNISOLONE SODIUM SUCCINATE 125 MG/2ML
125 INJECTION, POWDER, LYOPHILIZED, FOR SOLUTION INTRAMUSCULAR; INTRAVENOUS ONCE
Status: CANCELLED | OUTPATIENT
Start: 2018-05-03 | End: 2018-05-03

## 2018-05-03 RX ADMIN — Medication 10 ML: at 13:13

## 2018-05-03 RX ADMIN — DIPHENHYDRAMINE HYDROCHLORIDE 50 MG: 50 INJECTION, SOLUTION INTRAMUSCULAR; INTRAVENOUS at 11:03

## 2018-05-03 RX ADMIN — SODIUM CHLORIDE: 9 INJECTION, SOLUTION INTRAVENOUS at 10:55

## 2018-05-03 RX ADMIN — Medication 20 ML: at 10:16

## 2018-05-03 RX ADMIN — DEXAMETHASONE SODIUM PHOSPHATE: 4 INJECTION, SOLUTION INTRAMUSCULAR; INTRAVENOUS at 11:06

## 2018-05-03 RX ADMIN — Medication 10 ML: at 10:15

## 2018-05-03 RX ADMIN — Medication 10 ML: at 11:00

## 2018-05-03 RX ADMIN — Medication 500 UNITS: at 13:13

## 2018-05-03 RX ADMIN — FAMOTIDINE 20 MG: 10 INJECTION, SOLUTION INTRAVENOUS at 11:00

## 2018-05-03 RX ADMIN — PACLITAXEL 153.6 MG: 6 INJECTION, SOLUTION, CONCENTRATE INTRAVENOUS at 11:41

## 2018-05-03 ASSESSMENT — PAIN DESCRIPTION - DESCRIPTORS: DESCRIPTORS: ACHING;DULL

## 2018-05-03 ASSESSMENT — PAIN DESCRIPTION - ONSET: ONSET: ON-GOING

## 2018-05-03 ASSESSMENT — PAIN DESCRIPTION - ORIENTATION: ORIENTATION: LEFT

## 2018-05-03 ASSESSMENT — PAIN DESCRIPTION - FREQUENCY: FREQUENCY: CONTINUOUS

## 2018-05-03 ASSESSMENT — PAIN SCALES - GENERAL
PAINLEVEL_OUTOF10: 0
PAINLEVEL_OUTOF10: 3

## 2018-05-03 ASSESSMENT — PAIN DESCRIPTION - PROGRESSION: CLINICAL_PROGRESSION: NOT CHANGED

## 2018-05-03 ASSESSMENT — PAIN DESCRIPTION - LOCATION: LOCATION: KNEE

## 2018-05-03 ASSESSMENT — PAIN DESCRIPTION - PAIN TYPE: TYPE: CHRONIC PAIN

## 2018-05-03 NOTE — PROGRESS NOTES
Patient assessed for the following post chemotherapy:    Dizziness   No  Lightheadedness  No      Acute nausea/vomiting No  Headache   No  Chest pain/pressure  No  Rash/itching   No  Shortness of breath  No    Patient kept for 20 minutes observation post infusion chemotherapy. Patient tolerated chemotherapy treatment Taxol without any complications. Last vital signs:   BP (!) 94/51   Pulse 85   Temp 98.6 °F (37 °C) (Oral)   Resp 18   Ht 5' 8\" (1.727 m)   Wt 179 lb 6.4 oz (81.4 kg)   SpO2 95%   BMI 27.28 kg/m²     . Patient instructed if experience any of the above symptoms following today's infusion,he/she is to notify MD immediately or go to the emergency department. Discharge instructions given to patient. Verbalizes understanding. Ambulated off unit per self with belongings accompanied with friend. Krista Crawford

## 2018-05-03 NOTE — ONCOLOGY
Chemotherapy Administration    Pre-assessment Data: Antineoplastic Agents  Other:   See toxicity flow sheet for assessment [x]     Physician Notification of Concerns Related to Chemotherapy Administration:   Physician Notified Justine Curet / Time of Notification      Interventions:   Lab work assessed  [x]   Height / Weight verified for dose [x]   Current MAR reviewed [x]   Emergency drugs available as appropriate [x]   Anaphylaxis assessment completed [x]   Pre-medications administered as ordered [x]   Blood return noted upon initiation of chemotherapy [x]   Blood return noted each 1-2ml of a vesicant medication if given IV push []   Blood return noted each 2-3ml of a non-vesicant medication if given IV push []   Monitor for signs / symptoms of hypersensitivity reaction [x]   Chemotherapy orders (drug/dose/rate) verified by 2 Chemo certified RNs [x]   Monitor IV site and blood return throughout the infusion of the medication [x]   Document IV site checks on the IV assessment form [x]   Document chemotherapy teaching on the Patient Education tab [x]   Document patient verbalizes understanding of medications being administered [x]   If IV infiltration, see ONS Guidelines []   Other:      []

## 2018-05-03 NOTE — PLAN OF CARE
Problem: Musculor/Skeletal Functional Status  Intervention: Fall precautions  Verbalized understanding of fall prevention to ask for assistance with ambulation. Call light within reach. Goal: Absence of falls  Outcome: Met This Shift  No falls occurred during this visit    Problem: Discharge Planning  Intervention: Interaction with patient/family and care team  Discuss understanding of discharge instructions,follow-up appointments, and when to call the physician. Goal: Knowledge of discharge instructions  Knowledge of discharge instructions     Outcome: Met This Shift  Verbalized understanding of discharge instructions, follow-up appointments, and when to call the physician. Problem: Intellectual/Education/Knowledge Deficit  Intervention: Verbal/written education provided  Chemotherapy Teaching     What is Chemotherapy   Drug action [x]   Method of Administration [x]   Handouts given []     Side Effects  Nausea/vomiting [x]   Diarrhea [x]   Fatigue [x]   Signs / Symptoms of infection [x]   Neutropenia [x]   Thrombocytopenia [x]   Alopecia [x]   neuropathy [x]   [place] diet &  the importance of fluids [x]       Micellaneous  Importance of nutrition [x]   Importance of oral hygiene [x]   When to call the MD [x]   Monitoring labs [x]   Use of supportive services []     Explanation of Drug Regimen / Frequency  Taxol weekly- C8D1     Comments  Verbalized understanding to drug,action,side effects and when to call MD       Goal: Teaching initiated upon admission  Outcome: Met This Shift  Patient verbalizes understanding to verbal information given on Taxol,action and possible side effects. Aware to call MD if develop complications. Problem: Infection - Central Venous Catheter-Associated Bloodstream Infection:  Intervention: Infection risk assessment  Instructed to monitor for signs/symptoms of infection at [address] [facility] [address] [facility] and call MD if problems develop.     Goal: Will show no infection signs and symptoms  Will show no infection signs and symptoms   Outcome: Met This Shift  Mediport site with no redness or warmth. Skin over port site intact with no signs of breakdown noted. Patient verbalizes signs/symptoms of port infection and when to notify the physician. Comments: Care plan reviewed with patient and friend. Patient and friend verbalize understanding of the plan of care and contribute to goal setting.

## 2018-05-08 ENCOUNTER — HOSPITAL ENCOUNTER (OUTPATIENT)
Dept: PHYSICAL THERAPY | Age: 48
Setting detail: THERAPIES SERIES
Discharge: HOME | End: 2018-05-08
Payer: COMMERCIAL

## 2018-05-08 DIAGNOSIS — C50.912 BREAST CANCER METASTASIZED TO AXILLARY LYMPH NODE, LEFT (HCC): Primary | ICD-10-CM

## 2018-05-08 DIAGNOSIS — C77.3 BREAST CANCER METASTASIZED TO AXILLARY LYMPH NODE, LEFT (HCC): Primary | ICD-10-CM

## 2018-05-08 PROCEDURE — 97110 THERAPEUTIC EXERCISES: CPT

## 2018-05-08 PROCEDURE — 97140 MANUAL THERAPY 1/> REGIONS: CPT

## 2018-05-08 ASSESSMENT — PAIN SCALES - GENERAL: PAINLEVEL_OUTOF10: 1

## 2018-05-09 ENCOUNTER — HOSPITAL ENCOUNTER (OUTPATIENT)
Dept: INFUSION THERAPY | Age: 48
Discharge: HOME | End: 2018-05-09
Payer: COMMERCIAL

## 2018-05-09 VITALS
OXYGEN SATURATION: 96 % | RESPIRATION RATE: 18 BRPM | HEART RATE: 73 BPM | BODY MASS INDEX: 27.46 KG/M2 | DIASTOLIC BLOOD PRESSURE: 60 MMHG | WEIGHT: 181.2 LBS | HEIGHT: 68 IN | TEMPERATURE: 98.3 F | SYSTOLIC BLOOD PRESSURE: 104 MMHG

## 2018-05-09 DIAGNOSIS — C50.912 BREAST CANCER METASTASIZED TO AXILLARY LYMPH NODE, LEFT (HCC): ICD-10-CM

## 2018-05-09 DIAGNOSIS — C77.3 BREAST CANCER METASTASIZED TO AXILLARY LYMPH NODE, LEFT (HCC): ICD-10-CM

## 2018-05-09 LAB
ALBUMIN SERPL-MCNC: 3.7 G/DL (ref 3.5–5.1)
ALP BLD-CCNC: 41 U/L (ref 38–126)
ALT SERPL-CCNC: 12 U/L (ref 11–66)
AST SERPL-CCNC: 10 U/L (ref 5–40)
BASINOPHIL, AUTOMATED: 0 % (ref 0–3)
BILIRUB SERPL-MCNC: 0.2 MG/DL (ref 0.3–1.2)
BILIRUBIN DIRECT: < 0.2 MG/DL (ref 0–0.3)
BUN, WHOLE BLOOD: 10 MG/DL (ref 8–26)
CHLORIDE, WHOLE BLOOD: 105 MEQ/L (ref 98–109)
CREATININE, WHOLE BLOOD: 0.7 MG/DL (ref 0.5–1.2)
EOSINOPHILS RELATIVE PERCENT: 4 % (ref 0–4)
GFR, ESTIMATED: > 90 ML/MIN/1.73M2
GLUCOSE, WHOLE BLOOD: 97 MG/DL (ref 70–108)
HCT VFR BLD CALC: 33.4 % (ref 37–47)
HEMOGLOBIN: 11.5 GM/DL (ref 12–16)
IONIZED CALCIUM, WHOLE BLOOD: 1.16 MMOL/L (ref 1.12–1.32)
LYMPHOCYTES # BLD: 24 % (ref 15–47)
MCH RBC QN AUTO: 35.2 PG (ref 27–31)
MCHC RBC AUTO-ENTMCNC: 34.3 GM/DL (ref 33–37)
MCV RBC AUTO: 103 FL (ref 81–99)
MONOCYTES: 7 % (ref 0–12)
PDW BLD-RTO: 12.7 % (ref 11.5–14.5)
PLATELET # BLD: 194 THOU/MM3 (ref 130–400)
PMV BLD AUTO: 7.3 FL (ref 7.4–10.4)
POTASSIUM, WHOLE BLOOD: 3.9 MEQ/L (ref 3.5–4.9)
RBC # BLD: 3.25 MILL/MM3 (ref 4.2–5.4)
SEG NEUTROPHILS: 65 % (ref 43–75)
SODIUM, WHOLE BLOOD: 141 MEQ/L (ref 138–146)
TOTAL CO2, WHOLE BLOOD: 26 MEQ/L (ref 23–33)
TOTAL PROTEIN: 6 G/DL (ref 6.1–8)
WBC # BLD: 3.8 THOU/MM3 (ref 4.8–10.8)

## 2018-05-09 PROCEDURE — 96413 CHEMO IV INFUSION 1 HR: CPT

## 2018-05-09 PROCEDURE — 80076 HEPATIC FUNCTION PANEL: CPT

## 2018-05-09 PROCEDURE — 2500000003 HC RX 250 WO HCPCS: Performed by: INTERNAL MEDICINE

## 2018-05-09 PROCEDURE — 85025 COMPLETE CBC W/AUTO DIFF WBC: CPT

## 2018-05-09 PROCEDURE — 80047 BASIC METABLC PNL IONIZED CA: CPT

## 2018-05-09 PROCEDURE — 96375 TX/PRO/DX INJ NEW DRUG ADDON: CPT

## 2018-05-09 PROCEDURE — S0028 INJECTION, FAMOTIDINE, 20 MG: HCPCS | Performed by: INTERNAL MEDICINE

## 2018-05-09 PROCEDURE — 36591 DRAW BLOOD OFF VENOUS DEVICE: CPT

## 2018-05-09 PROCEDURE — 6360000002 HC RX W HCPCS: Performed by: INTERNAL MEDICINE

## 2018-05-09 PROCEDURE — 2580000003 HC RX 258: Performed by: INTERNAL MEDICINE

## 2018-05-09 PROCEDURE — 96367 TX/PROPH/DG ADDL SEQ IV INF: CPT

## 2018-05-09 RX ORDER — SODIUM CHLORIDE 0.9 % (FLUSH) 0.9 %
20 SYRINGE (ML) INJECTION PRN
Status: CANCELLED | OUTPATIENT
Start: 2018-05-09

## 2018-05-09 RX ORDER — SODIUM CHLORIDE 0.9 % (FLUSH) 0.9 %
10 SYRINGE (ML) INJECTION PRN
Status: CANCELLED | OUTPATIENT
Start: 2018-05-09

## 2018-05-09 RX ORDER — DIPHENHYDRAMINE HYDROCHLORIDE 50 MG/ML
50 INJECTION INTRAMUSCULAR; INTRAVENOUS ONCE
Status: CANCELLED | OUTPATIENT
Start: 2018-05-09 | End: 2018-05-09

## 2018-05-09 RX ORDER — EPINEPHRINE 1 MG/ML
0.3 INJECTION, SOLUTION, CONCENTRATE INTRAVENOUS PRN
Status: CANCELLED | OUTPATIENT
Start: 2018-05-09

## 2018-05-09 RX ORDER — SODIUM CHLORIDE 0.9 % (FLUSH) 0.9 %
20 SYRINGE (ML) INJECTION PRN
Status: DISCONTINUED | OUTPATIENT
Start: 2018-05-09 | End: 2018-05-10 | Stop reason: HOSPADM

## 2018-05-09 RX ORDER — METHYLPREDNISOLONE SODIUM SUCCINATE 125 MG/2ML
125 INJECTION, POWDER, LYOPHILIZED, FOR SOLUTION INTRAMUSCULAR; INTRAVENOUS ONCE
Status: CANCELLED | OUTPATIENT
Start: 2018-05-09 | End: 2018-05-09

## 2018-05-09 RX ORDER — HEPARIN SODIUM (PORCINE) LOCK FLUSH IV SOLN 100 UNIT/ML 100 UNIT/ML
500 SOLUTION INTRAVENOUS PRN
Status: CANCELLED | OUTPATIENT
Start: 2018-05-09

## 2018-05-09 RX ORDER — DIPHENHYDRAMINE HYDROCHLORIDE 50 MG/ML
50 INJECTION INTRAMUSCULAR; INTRAVENOUS ONCE
Status: COMPLETED | OUTPATIENT
Start: 2018-05-09 | End: 2018-05-09

## 2018-05-09 RX ORDER — SODIUM CHLORIDE 0.9 % (FLUSH) 0.9 %
10 SYRINGE (ML) INJECTION PRN
Status: DISCONTINUED | OUTPATIENT
Start: 2018-05-09 | End: 2018-05-10 | Stop reason: HOSPADM

## 2018-05-09 RX ORDER — SODIUM CHLORIDE 9 MG/ML
INJECTION, SOLUTION INTRAVENOUS CONTINUOUS
Status: CANCELLED | OUTPATIENT
Start: 2018-05-09

## 2018-05-09 RX ORDER — HEPARIN SODIUM (PORCINE) LOCK FLUSH IV SOLN 100 UNIT/ML 100 UNIT/ML
500 SOLUTION INTRAVENOUS PRN
Status: DISCONTINUED | OUTPATIENT
Start: 2018-05-09 | End: 2018-05-10 | Stop reason: HOSPADM

## 2018-05-09 RX ORDER — 0.9 % SODIUM CHLORIDE 0.9 %
10 VIAL (ML) INJECTION ONCE
Status: CANCELLED | OUTPATIENT
Start: 2018-05-09 | End: 2018-05-09

## 2018-05-09 RX ORDER — 0.9 % SODIUM CHLORIDE 0.9 %
10 VIAL (ML) INJECTION ONCE
Status: COMPLETED | OUTPATIENT
Start: 2018-05-09 | End: 2018-05-09

## 2018-05-09 RX ORDER — VITAMIN B COMPLEX
1 CAPSULE ORAL DAILY
COMMUNITY

## 2018-05-09 RX ORDER — SODIUM CHLORIDE 0.9 % (FLUSH) 0.9 %
5 SYRINGE (ML) INJECTION PRN
Status: CANCELLED | OUTPATIENT
Start: 2018-05-09

## 2018-05-09 RX ORDER — SODIUM CHLORIDE 9 MG/ML
INJECTION, SOLUTION INTRAVENOUS ONCE
Status: COMPLETED | OUTPATIENT
Start: 2018-05-09 | End: 2018-05-09

## 2018-05-09 RX ADMIN — PACLITAXEL 153.6 MG: 6 INJECTION, SOLUTION, CONCENTRATE INTRAVENOUS at 10:05

## 2018-05-09 RX ADMIN — SODIUM CHLORIDE: 9 INJECTION, SOLUTION INTRAVENOUS at 09:16

## 2018-05-09 RX ADMIN — Medication 10 ML: at 08:55

## 2018-05-09 RX ADMIN — Medication 500 UNITS: at 11:35

## 2018-05-09 RX ADMIN — DIPHENHYDRAMINE HYDROCHLORIDE 50 MG: 50 INJECTION, SOLUTION INTRAMUSCULAR; INTRAVENOUS at 09:21

## 2018-05-09 RX ADMIN — DEXAMETHASONE SODIUM PHOSPHATE: 4 INJECTION, SOLUTION INTRAMUSCULAR; INTRAVENOUS at 09:25

## 2018-05-09 RX ADMIN — FAMOTIDINE 20 MG: 10 INJECTION, SOLUTION INTRAVENOUS at 09:18

## 2018-05-09 RX ADMIN — Medication 10 ML: at 11:35

## 2018-05-09 RX ADMIN — Medication 10 ML: at 09:18

## 2018-05-09 RX ADMIN — Medication 20 ML: at 08:56

## 2018-05-09 NOTE — PROGRESS NOTES
Patient assessed for the following post chemotherapy:    Dizziness   No  Lightheadedness  No      Acute nausea/vomiting No  Headache   No  Chest pain/pressure  No  Rash/itching   No  Shortness of breath  No    Patient kept for 20 minutes observation post infusion chemotherapy. Patient tolerated chemotherapy treatment taxol without any complications. Labs drawn per mediport. Last vital signs:   /60   Pulse 73   Temp 98.3 °F (36.8 °C) (Oral)   Resp 18   Ht 5' 8\" (1.727 m)   Wt 181 lb 3.2 oz (82.2 kg)   SpO2 96%   BMI 27.55 kg/m²     Patient instructed if experience any of the above symptoms following today's infusion,he/she is to notify MD immediately or go to the emergency department. Discharge instructions given to patient. Verbalizes understanding. Ambulated off unit per self with belongings.

## 2018-05-09 NOTE — PLAN OF CARE
with no redness or warmth. Skin over port intact with no signs of breakdown noted. Patient verbalizes signs/symptoms of port infection and when to notify the physician. Comments: Care plan reviewed with patient. Patient  verbalize understanding of the plan of care and contribute to goal setting.

## 2018-05-10 ENCOUNTER — HOSPITAL ENCOUNTER (OUTPATIENT)
Dept: INFUSION THERAPY | Age: 48
End: 2018-05-10

## 2018-05-15 ENCOUNTER — HOSPITAL ENCOUNTER (OUTPATIENT)
Dept: PHYSICAL THERAPY | Age: 48
Setting detail: THERAPIES SERIES
Discharge: HOME | End: 2018-05-15
Payer: COMMERCIAL

## 2018-05-16 ENCOUNTER — HOSPITAL ENCOUNTER (OUTPATIENT)
Dept: INFUSION THERAPY | Age: 48
Discharge: HOME | End: 2018-05-16
Payer: COMMERCIAL

## 2018-05-16 ENCOUNTER — OFFICE VISIT (OUTPATIENT)
Dept: ONCOLOGY | Age: 48
End: 2018-05-16
Payer: COMMERCIAL

## 2018-05-16 VITALS
TEMPERATURE: 98 F | DIASTOLIC BLOOD PRESSURE: 60 MMHG | RESPIRATION RATE: 18 BRPM | HEART RATE: 65 BPM | HEIGHT: 68 IN | SYSTOLIC BLOOD PRESSURE: 110 MMHG | OXYGEN SATURATION: 96 %

## 2018-05-16 VITALS
BODY MASS INDEX: 27.65 KG/M2 | TEMPERATURE: 98.2 F | OXYGEN SATURATION: 95 % | RESPIRATION RATE: 18 BRPM | HEIGHT: 68 IN | DIASTOLIC BLOOD PRESSURE: 71 MMHG | WEIGHT: 182.4 LBS | SYSTOLIC BLOOD PRESSURE: 116 MMHG | HEART RATE: 76 BPM

## 2018-05-16 DIAGNOSIS — C77.3 BREAST CANCER METASTASIZED TO AXILLARY LYMPH NODE, LEFT (HCC): ICD-10-CM

## 2018-05-16 DIAGNOSIS — Z51.11 CHEMOTHERAPY MANAGEMENT, ENCOUNTER FOR: ICD-10-CM

## 2018-05-16 DIAGNOSIS — Z51.11 ENCOUNTER FOR CHEMOTHERAPY MANAGEMENT: ICD-10-CM

## 2018-05-16 DIAGNOSIS — Z17.0 MALIGNANT NEOPLASM OF LEFT BREAST IN FEMALE, ESTROGEN RECEPTOR POSITIVE, UNSPECIFIED SITE OF BREAST (HCC): ICD-10-CM

## 2018-05-16 DIAGNOSIS — C50.912 BREAST CANCER METASTASIZED TO AXILLARY LYMPH NODE, LEFT (HCC): ICD-10-CM

## 2018-05-16 DIAGNOSIS — G62.0 DRUG-INDUCED POLYNEUROPATHY (HCC): Primary | ICD-10-CM

## 2018-05-16 DIAGNOSIS — Z90.12 STATUS POST MASTECTOMY, LEFT: ICD-10-CM

## 2018-05-16 DIAGNOSIS — C50.912 MALIGNANT NEOPLASM OF LEFT BREAST IN FEMALE, ESTROGEN RECEPTOR POSITIVE, UNSPECIFIED SITE OF BREAST (HCC): ICD-10-CM

## 2018-05-16 LAB
ALBUMIN SERPL-MCNC: 3.4 G/DL (ref 3.5–5.1)
ALP BLD-CCNC: 38 U/L (ref 38–126)
ALT SERPL-CCNC: 14 U/L (ref 11–66)
AST SERPL-CCNC: 13 U/L (ref 5–40)
BASINOPHIL, AUTOMATED: 0 % (ref 0–3)
BILIRUB SERPL-MCNC: 0.3 MG/DL (ref 0.3–1.2)
BILIRUBIN DIRECT: < 0.2 MG/DL (ref 0–0.3)
BUN, WHOLE BLOOD: 8 MG/DL (ref 8–26)
CHLORIDE, WHOLE BLOOD: 106 MEQ/L (ref 98–109)
CREATININE, WHOLE BLOOD: 0.8 MG/DL (ref 0.5–1.2)
EOSINOPHILS RELATIVE PERCENT: 2 % (ref 0–4)
GFR, ESTIMATED: 81 ML/MIN/1.73M2
GLUCOSE, WHOLE BLOOD: 94 MG/DL (ref 70–108)
HCT VFR BLD CALC: 31.5 % (ref 37–47)
HEMOGLOBIN: 10.9 GM/DL (ref 12–16)
IONIZED CALCIUM, WHOLE BLOOD: 1.16 MMOL/L (ref 1.12–1.32)
LYMPHOCYTES # BLD: 18 % (ref 15–47)
MCH RBC QN AUTO: 35.4 PG (ref 27–31)
MCHC RBC AUTO-ENTMCNC: 34.5 GM/DL (ref 33–37)
MCV RBC AUTO: 103 FL (ref 81–99)
MONOCYTES: 5 % (ref 0–12)
PDW BLD-RTO: 12.7 % (ref 11.5–14.5)
PLATELET # BLD: 209 THOU/MM3 (ref 130–400)
PMV BLD AUTO: 7.8 FL (ref 7.4–10.4)
POTASSIUM, WHOLE BLOOD: 3.5 MEQ/L (ref 3.5–4.9)
RBC # BLD: 3.07 MILL/MM3 (ref 4.2–5.4)
SEG NEUTROPHILS: 75 % (ref 43–75)
SODIUM, WHOLE BLOOD: 142 MEQ/L (ref 138–146)
TOTAL CO2, WHOLE BLOOD: 25 MEQ/L (ref 23–33)
TOTAL PROTEIN: 5.6 G/DL (ref 6.1–8)
WBC # BLD: 4.3 THOU/MM3 (ref 4.8–10.8)

## 2018-05-16 PROCEDURE — 6360000002 HC RX W HCPCS: Performed by: INTERNAL MEDICINE

## 2018-05-16 PROCEDURE — 80047 BASIC METABLC PNL IONIZED CA: CPT

## 2018-05-16 PROCEDURE — 2580000003 HC RX 258: Performed by: INTERNAL MEDICINE

## 2018-05-16 PROCEDURE — S0028 INJECTION, FAMOTIDINE, 20 MG: HCPCS | Performed by: INTERNAL MEDICINE

## 2018-05-16 PROCEDURE — 36591 DRAW BLOOD OFF VENOUS DEVICE: CPT

## 2018-05-16 PROCEDURE — 2500000003 HC RX 250 WO HCPCS: Performed by: INTERNAL MEDICINE

## 2018-05-16 PROCEDURE — 96413 CHEMO IV INFUSION 1 HR: CPT

## 2018-05-16 PROCEDURE — G0463 HOSPITAL OUTPT CLINIC VISIT: HCPCS

## 2018-05-16 PROCEDURE — 80076 HEPATIC FUNCTION PANEL: CPT

## 2018-05-16 PROCEDURE — 99214 OFFICE O/P EST MOD 30 MIN: CPT | Performed by: INTERNAL MEDICINE

## 2018-05-16 PROCEDURE — 85025 COMPLETE CBC W/AUTO DIFF WBC: CPT

## 2018-05-16 PROCEDURE — 96375 TX/PRO/DX INJ NEW DRUG ADDON: CPT

## 2018-05-16 PROCEDURE — 96367 TX/PROPH/DG ADDL SEQ IV INF: CPT

## 2018-05-16 RX ORDER — DIPHENHYDRAMINE HYDROCHLORIDE 50 MG/ML
50 INJECTION INTRAMUSCULAR; INTRAVENOUS ONCE
Status: CANCELLED | OUTPATIENT
Start: 2018-05-16 | End: 2018-05-16

## 2018-05-16 RX ORDER — SODIUM CHLORIDE 9 MG/ML
INJECTION, SOLUTION INTRAVENOUS CONTINUOUS
Status: CANCELLED | OUTPATIENT
Start: 2018-05-16

## 2018-05-16 RX ORDER — DIPHENHYDRAMINE HYDROCHLORIDE 50 MG/ML
50 INJECTION INTRAMUSCULAR; INTRAVENOUS ONCE
Status: COMPLETED | OUTPATIENT
Start: 2018-05-16 | End: 2018-05-16

## 2018-05-16 RX ORDER — HEPARIN SODIUM (PORCINE) LOCK FLUSH IV SOLN 100 UNIT/ML 100 UNIT/ML
500 SOLUTION INTRAVENOUS PRN
Status: DISCONTINUED | OUTPATIENT
Start: 2018-05-16 | End: 2018-05-17 | Stop reason: HOSPADM

## 2018-05-16 RX ORDER — SODIUM CHLORIDE 0.9 % (FLUSH) 0.9 %
5 SYRINGE (ML) INJECTION PRN
Status: CANCELLED | OUTPATIENT
Start: 2018-05-16

## 2018-05-16 RX ORDER — SODIUM CHLORIDE 0.9 % (FLUSH) 0.9 %
20 SYRINGE (ML) INJECTION PRN
Status: CANCELLED | OUTPATIENT
Start: 2018-05-16

## 2018-05-16 RX ORDER — HEPARIN SODIUM (PORCINE) LOCK FLUSH IV SOLN 100 UNIT/ML 100 UNIT/ML
500 SOLUTION INTRAVENOUS PRN
Status: CANCELLED | OUTPATIENT
Start: 2018-05-16

## 2018-05-16 RX ORDER — 0.9 % SODIUM CHLORIDE 0.9 %
10 VIAL (ML) INJECTION ONCE
Status: COMPLETED | OUTPATIENT
Start: 2018-05-16 | End: 2018-05-16

## 2018-05-16 RX ORDER — SODIUM CHLORIDE 0.9 % (FLUSH) 0.9 %
10 SYRINGE (ML) INJECTION PRN
Status: CANCELLED | OUTPATIENT
Start: 2018-05-16

## 2018-05-16 RX ORDER — SODIUM CHLORIDE 9 MG/ML
INJECTION, SOLUTION INTRAVENOUS ONCE
Status: CANCELLED | OUTPATIENT
Start: 2018-05-16 | End: 2018-05-16

## 2018-05-16 RX ORDER — 0.9 % SODIUM CHLORIDE 0.9 %
10 VIAL (ML) INJECTION ONCE
Status: CANCELLED | OUTPATIENT
Start: 2018-05-16 | End: 2018-05-16

## 2018-05-16 RX ORDER — SODIUM CHLORIDE 9 MG/ML
INJECTION, SOLUTION INTRAVENOUS ONCE
Status: COMPLETED | OUTPATIENT
Start: 2018-05-16 | End: 2018-05-16

## 2018-05-16 RX ORDER — TRAMADOL HYDROCHLORIDE 50 MG/1
50 TABLET ORAL EVERY 6 HOURS PRN
Qty: 60 TABLET | Refills: 0 | Status: SHIPPED | OUTPATIENT
Start: 2018-05-16 | End: 2018-07-15

## 2018-05-16 RX ORDER — SODIUM CHLORIDE 0.9 % (FLUSH) 0.9 %
20 SYRINGE (ML) INJECTION PRN
Status: DISCONTINUED | OUTPATIENT
Start: 2018-05-16 | End: 2018-05-17 | Stop reason: HOSPADM

## 2018-05-16 RX ORDER — METHYLPREDNISOLONE SODIUM SUCCINATE 125 MG/2ML
125 INJECTION, POWDER, LYOPHILIZED, FOR SOLUTION INTRAMUSCULAR; INTRAVENOUS ONCE
Status: CANCELLED | OUTPATIENT
Start: 2018-05-16 | End: 2018-05-16

## 2018-05-16 RX ORDER — SODIUM CHLORIDE 0.9 % (FLUSH) 0.9 %
10 SYRINGE (ML) INJECTION PRN
Status: DISCONTINUED | OUTPATIENT
Start: 2018-05-16 | End: 2018-05-17 | Stop reason: HOSPADM

## 2018-05-16 RX ORDER — DEXAMETHASONE 4 MG/1
8 TABLET ORAL
Qty: 12 TABLET | Refills: 1 | Status: SHIPPED | OUTPATIENT
Start: 2018-05-16 | End: 2018-05-18

## 2018-05-16 RX ADMIN — Medication 500 UNITS: at 13:40

## 2018-05-16 RX ADMIN — DEXAMETHASONE SODIUM PHOSPHATE: 4 INJECTION, SOLUTION INTRAMUSCULAR; INTRAVENOUS at 11:25

## 2018-05-16 RX ADMIN — Medication 20 ML: at 09:36

## 2018-05-16 RX ADMIN — Medication 10 ML: at 13:40

## 2018-05-16 RX ADMIN — FAMOTIDINE 20 MG: 10 INJECTION, SOLUTION INTRAVENOUS at 11:17

## 2018-05-16 RX ADMIN — SODIUM CHLORIDE: 9 INJECTION, SOLUTION INTRAVENOUS at 11:15

## 2018-05-16 RX ADMIN — Medication 10 ML: at 09:35

## 2018-05-16 RX ADMIN — Medication 10 ML: at 11:17

## 2018-05-16 RX ADMIN — DIPHENHYDRAMINE HYDROCHLORIDE 50 MG: 50 INJECTION, SOLUTION INTRAMUSCULAR; INTRAVENOUS at 11:19

## 2018-05-16 RX ADMIN — PACLITAXEL 115.2 MG: 6 INJECTION, SOLUTION, CONCENTRATE INTRAVENOUS at 12:15

## 2018-05-16 ASSESSMENT — ENCOUNTER SYMPTOMS
NAUSEA: 0
CHEST TIGHTNESS: 0
EYE ITCHING: 0
DIARRHEA: 0
ABDOMINAL PAIN: 0
WHEEZING: 0
COLOR CHANGE: 0
VOICE CHANGE: 0
TROUBLE SWALLOWING: 0
CONSTIPATION: 0
SHORTNESS OF BREATH: 1
ABDOMINAL DISTENTION: 0
FACIAL SWELLING: 0
COUGH: 0
VOMITING: 0
BACK PAIN: 1
SORE THROAT: 0
EYE REDNESS: 0
BLOOD IN STOOL: 0

## 2018-05-16 NOTE — PROGRESS NOTES
Patient assessed for the following post chemotherapy:    Dizziness   No  Lightheadedness  No      Acute nausea/vomiting No  Headache   No  Chest pain/pressure  No  Rash/itching   No  Shortness of breath  No    Patient kept for 20 minutes observation post infusion chemotherapy. Patient tolerated chemotherapy treatment taxol without any complications. Labs drawn per mediport. Last vital signs:   /60   Pulse 65   Temp 98 °F (36.7 °C) (Oral)   Resp 18   Ht 5' 8\" (1.727 m)   SpO2 96%       Patient instructed if experience any of the above symptoms following today's infusion,he/she is to notify MD immediately or go to the emergency department. Discharge instructions given to patient. Verbalizes understanding. Ambulated off unit per self with belongings.

## 2018-05-16 NOTE — PLAN OF CARE
Problem: Musculor/Skeletal Functional Status  Intervention: Fall precautions  Discussed fall precautions, fall risks, and instructed patient to ask for assistance with ambulation. Call light within reach. Goal: Absence of falls  Outcome: Met This Shift  Free from falls during infusion. Problem: Intellectual/Education/Knowledge Deficit  Intervention: Verbal/written education provided  Chemotherapy Teaching     What is Chemotherapy   Drug action [x]   Method of Administration [x]   Handouts given []     Side Effects  Nausea/vomiting [x]   Diarrhea [x]   Fatigue [x]   Signs / Symptoms of infection [x]   Neutropenia [x]   Thrombocytopenia [x]   Alopecia [x]   neuropathy [x]   [place] diet &  the importance of fluids [x]       Micellaneous  Importance of nutrition [x]   Importance of oral hygiene [x]   When to call the MD [x]   Monitoring labs [x]   Use of supportive services []     Explanation of Drug Regimen / Frequency  taxol     Comments  Verbalized understanding to drug,action,side effects and when to call MD       Goal: Teaching initiated upon admission  Outcome: Met This Shift  Patient verbalizes understanding to verbal information given on taxol,action and possible side effects. Aware to call MD if develop complications. Problem: Discharge Planning  Intervention: Interaction with patient/family and care team  Provide discharge instructions. Goal: Knowledge of discharge instructions  Knowledge of discharge instructions     Outcome: Met This Shift  Verbalized understanding of discharge instructions, follow-up appointments, and when to call the physician. Problem: Infection - Central Venous Catheter-Associated Bloodstream Infection:  Intervention: Infection risk assessment  Discussed mediport maintenance, infection prevention, and when to call the physician.  Labs drawn    Goal: Will show no infection signs and symptoms  Will show no infection signs and symptoms   Outcome: Met This Shift  Mediport site with no redness or warmth. Skin over port intact with no signs of breakdown noted. Patient verbalizes signs/symptoms of port infection and when to notify the physician. Comments: Care plan reviewed with patient. Patient verbalize understanding of the plan of care and contribute to goal setting.

## 2018-05-22 ENCOUNTER — HOSPITAL ENCOUNTER (OUTPATIENT)
Dept: PHYSICAL THERAPY | Age: 48
Setting detail: THERAPIES SERIES
Discharge: HOME | End: 2018-05-22
Payer: COMMERCIAL

## 2018-05-22 DIAGNOSIS — C77.3 BREAST CANCER METASTASIZED TO AXILLARY LYMPH NODE, LEFT (HCC): Primary | ICD-10-CM

## 2018-05-22 DIAGNOSIS — C50.912 BREAST CANCER METASTASIZED TO AXILLARY LYMPH NODE, LEFT (HCC): Primary | ICD-10-CM

## 2018-05-22 PROCEDURE — 97530 THERAPEUTIC ACTIVITIES: CPT

## 2018-05-22 PROCEDURE — 97110 THERAPEUTIC EXERCISES: CPT

## 2018-05-22 ASSESSMENT — PAIN SCALES - GENERAL: PAINLEVEL_OUTOF10: 1

## 2018-05-23 ENCOUNTER — HOSPITAL ENCOUNTER (OUTPATIENT)
Dept: INFUSION THERAPY | Age: 48
Discharge: HOME | End: 2018-05-23
Payer: COMMERCIAL

## 2018-05-23 ENCOUNTER — HOSPITAL ENCOUNTER (OUTPATIENT)
Dept: RADIATION ONCOLOGY | Age: 48
Discharge: HOME | End: 2018-05-23
Payer: COMMERCIAL

## 2018-05-23 VITALS
RESPIRATION RATE: 18 BRPM | BODY MASS INDEX: 27.65 KG/M2 | WEIGHT: 182.4 LBS | HEART RATE: 75 BPM | OXYGEN SATURATION: 98 % | HEIGHT: 68 IN | SYSTOLIC BLOOD PRESSURE: 106 MMHG | DIASTOLIC BLOOD PRESSURE: 55 MMHG | TEMPERATURE: 98 F

## 2018-05-23 DIAGNOSIS — C50.912 BREAST CANCER METASTASIZED TO AXILLARY LYMPH NODE, LEFT (HCC): ICD-10-CM

## 2018-05-23 DIAGNOSIS — C77.3 BREAST CANCER METASTASIZED TO AXILLARY LYMPH NODE, LEFT (HCC): ICD-10-CM

## 2018-05-23 LAB
ALBUMIN SERPL-MCNC: 3.6 G/DL (ref 3.5–5.1)
ALP BLD-CCNC: 38 U/L (ref 38–126)
ALT SERPL-CCNC: 13 U/L (ref 11–66)
AST SERPL-CCNC: 12 U/L (ref 5–40)
BASINOPHIL, AUTOMATED: 0 % (ref 0–3)
BILIRUB SERPL-MCNC: 0.2 MG/DL (ref 0.3–1.2)
BILIRUBIN DIRECT: < 0.2 MG/DL (ref 0–0.3)
BUN, WHOLE BLOOD: 5 MG/DL (ref 8–26)
CHLORIDE, WHOLE BLOOD: 107 MEQ/L (ref 98–109)
CREATININE, WHOLE BLOOD: 0.8 MG/DL (ref 0.5–1.2)
EOSINOPHILS RELATIVE PERCENT: 3 % (ref 0–4)
GFR, ESTIMATED: 81 ML/MIN/1.73M2
GLUCOSE, WHOLE BLOOD: 121 MG/DL (ref 70–108)
HCT VFR BLD CALC: 33.3 % (ref 37–47)
HEMOGLOBIN: 11.3 GM/DL (ref 12–16)
IONIZED CALCIUM, WHOLE BLOOD: 1.17 MMOL/L (ref 1.12–1.32)
LYMPHOCYTES # BLD: 23 % (ref 15–47)
MCH RBC QN AUTO: 35.1 PG (ref 27–31)
MCHC RBC AUTO-ENTMCNC: 34 GM/DL (ref 33–37)
MCV RBC AUTO: 103 FL (ref 81–99)
MONOCYTES: 7 % (ref 0–12)
PDW BLD-RTO: 12.5 % (ref 11.5–14.5)
PLATELET # BLD: 217 THOU/MM3 (ref 130–400)
PMV BLD AUTO: 7.3 FL (ref 7.4–10.4)
POTASSIUM, WHOLE BLOOD: 3.3 MEQ/L (ref 3.5–4.9)
RBC # BLD: 3.23 MILL/MM3 (ref 4.2–5.4)
SEG NEUTROPHILS: 68 % (ref 43–75)
SODIUM, WHOLE BLOOD: 143 MEQ/L (ref 138–146)
TOTAL CO2, WHOLE BLOOD: 25 MEQ/L (ref 23–33)
TOTAL PROTEIN: 5.8 G/DL (ref 6.1–8)
WBC # BLD: 4.2 THOU/MM3 (ref 4.8–10.8)

## 2018-05-23 PROCEDURE — 99202 OFFICE O/P NEW SF 15 MIN: CPT | Performed by: RADIOLOGY

## 2018-05-23 PROCEDURE — 80047 BASIC METABLC PNL IONIZED CA: CPT

## 2018-05-23 PROCEDURE — 2580000003 HC RX 258: Performed by: INTERNAL MEDICINE

## 2018-05-23 PROCEDURE — S0028 INJECTION, FAMOTIDINE, 20 MG: HCPCS | Performed by: INTERNAL MEDICINE

## 2018-05-23 PROCEDURE — 6360000002 HC RX W HCPCS: Performed by: INTERNAL MEDICINE

## 2018-05-23 PROCEDURE — 96413 CHEMO IV INFUSION 1 HR: CPT

## 2018-05-23 PROCEDURE — 96367 TX/PROPH/DG ADDL SEQ IV INF: CPT

## 2018-05-23 PROCEDURE — 96375 TX/PRO/DX INJ NEW DRUG ADDON: CPT

## 2018-05-23 PROCEDURE — 85025 COMPLETE CBC W/AUTO DIFF WBC: CPT

## 2018-05-23 PROCEDURE — 80076 HEPATIC FUNCTION PANEL: CPT

## 2018-05-23 PROCEDURE — 36591 DRAW BLOOD OFF VENOUS DEVICE: CPT

## 2018-05-23 PROCEDURE — 2500000003 HC RX 250 WO HCPCS: Performed by: INTERNAL MEDICINE

## 2018-05-23 RX ORDER — DIPHENHYDRAMINE HYDROCHLORIDE 50 MG/ML
50 INJECTION INTRAMUSCULAR; INTRAVENOUS ONCE
Status: CANCELLED | OUTPATIENT
Start: 2018-05-23 | End: 2018-05-23

## 2018-05-23 RX ORDER — SODIUM CHLORIDE 0.9 % (FLUSH) 0.9 %
5 SYRINGE (ML) INJECTION PRN
Status: CANCELLED | OUTPATIENT
Start: 2018-05-30

## 2018-05-23 RX ORDER — SODIUM CHLORIDE 9 MG/ML
INJECTION, SOLUTION INTRAVENOUS CONTINUOUS
Status: CANCELLED | OUTPATIENT
Start: 2018-05-30

## 2018-05-23 RX ORDER — SODIUM CHLORIDE 0.9 % (FLUSH) 0.9 %
10 SYRINGE (ML) INJECTION PRN
Status: DISCONTINUED | OUTPATIENT
Start: 2018-05-23 | End: 2018-05-24 | Stop reason: HOSPADM

## 2018-05-23 RX ORDER — HEPARIN SODIUM (PORCINE) LOCK FLUSH IV SOLN 100 UNIT/ML 100 UNIT/ML
500 SOLUTION INTRAVENOUS PRN
Status: DISCONTINUED | OUTPATIENT
Start: 2018-05-23 | End: 2018-05-24 | Stop reason: HOSPADM

## 2018-05-23 RX ORDER — EPINEPHRINE 1 MG/ML
0.3 INJECTION, SOLUTION, CONCENTRATE INTRAVENOUS PRN
Status: CANCELLED | OUTPATIENT
Start: 2018-05-23

## 2018-05-23 RX ORDER — DIPHENHYDRAMINE HYDROCHLORIDE 50 MG/ML
50 INJECTION INTRAMUSCULAR; INTRAVENOUS ONCE
Status: CANCELLED | OUTPATIENT
Start: 2018-05-30 | End: 2018-05-30

## 2018-05-23 RX ORDER — SODIUM CHLORIDE 0.9 % (FLUSH) 0.9 %
10 SYRINGE (ML) INJECTION PRN
Status: CANCELLED | OUTPATIENT
Start: 2018-05-30

## 2018-05-23 RX ORDER — 0.9 % SODIUM CHLORIDE 0.9 %
10 VIAL (ML) INJECTION ONCE
Status: CANCELLED | OUTPATIENT
Start: 2018-05-30 | End: 2018-05-30

## 2018-05-23 RX ORDER — METHYLPREDNISOLONE SODIUM SUCCINATE 125 MG/2ML
125 INJECTION, POWDER, LYOPHILIZED, FOR SOLUTION INTRAMUSCULAR; INTRAVENOUS ONCE
Status: CANCELLED | OUTPATIENT
Start: 2018-05-23 | End: 2018-05-23

## 2018-05-23 RX ORDER — SODIUM CHLORIDE 9 MG/ML
INJECTION, SOLUTION INTRAVENOUS ONCE
Status: CANCELLED | OUTPATIENT
Start: 2018-05-30 | End: 2018-05-30

## 2018-05-23 RX ORDER — 0.9 % SODIUM CHLORIDE 0.9 %
10 VIAL (ML) INJECTION ONCE
Status: CANCELLED | OUTPATIENT
Start: 2018-05-23 | End: 2018-05-23

## 2018-05-23 RX ORDER — DIPHENHYDRAMINE HYDROCHLORIDE 50 MG/ML
50 INJECTION INTRAMUSCULAR; INTRAVENOUS ONCE
Status: COMPLETED | OUTPATIENT
Start: 2018-05-23 | End: 2018-05-23

## 2018-05-23 RX ORDER — METHYLPREDNISOLONE SODIUM SUCCINATE 125 MG/2ML
125 INJECTION, POWDER, LYOPHILIZED, FOR SOLUTION INTRAMUSCULAR; INTRAVENOUS ONCE
Status: CANCELLED | OUTPATIENT
Start: 2018-05-30 | End: 2018-05-30

## 2018-05-23 RX ORDER — HEPARIN SODIUM (PORCINE) LOCK FLUSH IV SOLN 100 UNIT/ML 100 UNIT/ML
500 SOLUTION INTRAVENOUS PRN
Status: CANCELLED | OUTPATIENT
Start: 2018-05-30

## 2018-05-23 RX ORDER — 0.9 % SODIUM CHLORIDE 0.9 %
10 VIAL (ML) INJECTION ONCE
Status: COMPLETED | OUTPATIENT
Start: 2018-05-23 | End: 2018-05-23

## 2018-05-23 RX ORDER — EPINEPHRINE 1 MG/ML
0.3 INJECTION, SOLUTION, CONCENTRATE INTRAVENOUS PRN
Status: CANCELLED | OUTPATIENT
Start: 2018-05-30

## 2018-05-23 RX ORDER — SODIUM CHLORIDE 0.9 % (FLUSH) 0.9 %
5 SYRINGE (ML) INJECTION PRN
Status: CANCELLED | OUTPATIENT
Start: 2018-05-23

## 2018-05-23 RX ORDER — SODIUM CHLORIDE 9 MG/ML
INJECTION, SOLUTION INTRAVENOUS CONTINUOUS
Status: CANCELLED | OUTPATIENT
Start: 2018-05-23

## 2018-05-23 RX ORDER — SODIUM CHLORIDE 9 MG/ML
INJECTION, SOLUTION INTRAVENOUS ONCE
Status: COMPLETED | OUTPATIENT
Start: 2018-05-23 | End: 2018-05-23

## 2018-05-23 RX ADMIN — PACLITAXEL 115.2 MG: 6 INJECTION, SOLUTION, CONCENTRATE INTRAVENOUS at 10:41

## 2018-05-23 RX ADMIN — SODIUM CHLORIDE: 9 INJECTION, SOLUTION INTRAVENOUS at 09:50

## 2018-05-23 RX ADMIN — Medication 10 ML: at 12:18

## 2018-05-23 RX ADMIN — DIPHENHYDRAMINE HYDROCHLORIDE 50 MG: 50 INJECTION, SOLUTION INTRAMUSCULAR; INTRAVENOUS at 10:07

## 2018-05-23 RX ADMIN — Medication 20 ML: at 09:26

## 2018-05-23 RX ADMIN — SODIUM CHLORIDE, PRESERVATIVE FREE 500 UNITS: 5 INJECTION INTRAVENOUS at 12:18

## 2018-05-23 RX ADMIN — Medication 10 ML: at 10:05

## 2018-05-23 RX ADMIN — Medication 10 ML: at 09:25

## 2018-05-23 RX ADMIN — FAMOTIDINE 20 MG: 10 INJECTION INTRAVENOUS at 10:05

## 2018-05-23 RX ADMIN — DEXAMETHASONE SODIUM PHOSPHATE: 4 INJECTION, SOLUTION INTRAMUSCULAR; INTRAVENOUS at 10:10

## 2018-05-23 NOTE — PROGRESS NOTES
Patient assessed for the following post chemotherapy:    Dizziness   No  Lightheadedness  No      Acute nausea/vomiting No  Headache   No  Chest pain/pressure  No  Rash/itching   No  Shortness of breath  No    Patient kept for 20 minutes observation post infusion chemotherapy. Patient tolerated chemotherapy treatment Taxol without any complications. Last vital signs:   BP (!) 106/55   Pulse 75   Temp 98 °F (36.7 °C) (Oral)   Resp 18   Ht 5' 8\" (1.727 m)   Wt 182 lb 6.4 oz (82.7 kg)   SpO2 98%   BMI 27.73 kg/m²         Patient instructed if experience any of the above symptoms following today's infusion,he/she is to notify MD immediately or go to the emergency department. Discharge instructions given to patient. Verbalizes understanding. Ambulated off unit per self with belongings.

## 2018-05-23 NOTE — PLAN OF CARE
Problem: Infection - Central Venous Catheter-Associated Bloodstream Infection:  Intervention: Infection risk assessment  Mediport site with no redness or warmth. Skin over port site intact with no signs of breakdown noted. Patient verbalizes signs/symptoms of port infection and when to notify the physician. Goal: Will show no infection signs and symptoms  Will show no infection signs and symptoms   Outcome: Met This Shift  Mediport site with no redness or warmth. Skin over port site intact with no signs of breakdown noted. Patient verbalizes signs/symptoms of port infection and when to notify the physician. Problem: Discharge Planning  Intervention: Interaction with patient/family and care team  Discuss understanding of discharge instructions,follow-up appointments, and when to call the physician. Goal: Knowledge of discharge instructions  Knowledge of discharge instructions     Outcome: Met This Shift  Verbalized understanding of discharge instructions, follow-up appointments, and when to call the physician.     Problem: Intellectual/Education/Knowledge Deficit  Intervention: Verbal/written education provided  Chemotherapy Teaching     What is Chemotherapy   Drug action [x]   Method of Administration [x]   Handouts given []     Side Effects  Nausea/vomiting [x]   Diarrhea [x]   Fatigue [x]   Signs / Symptoms of infection [x]   Neutropenia [x]   Thrombocytopenia [x]   Alopecia [x]   neuropathy [x]   [place] diet &  the importance of fluids [x]       Micellaneous  Importance of nutrition [x]   Importance of oral hygiene [x]   When to call the MD [x]   Monitoring labs [x]   Use of supportive services []     Explanation of Drug Regimen / Frequency  Taxol weekly- C11D1     Comments  Verbalized understanding to drug,action,side effects and when to call MD       Goal: Teaching initiated upon admission  Outcome: Met This Shift  Patient verbalizes understanding to verbal information given on Taxol,action and possible side effects. Aware to call MD if develop complications. Problem: Musculor/Skeletal Functional Status  Intervention: Fall precautions  Verbalized understanding of fall prevention to ask for assistance with ambulation. Call light within reach. Goal: Absence of falls  Outcome: Met This Shift  No falls occurred during this visit    Comments: Care plan reviewed with patient . Patient  verbalize understanding of the plan of care and contribute to goal setting.

## 2018-05-29 ENCOUNTER — APPOINTMENT (OUTPATIENT)
Dept: PHYSICAL THERAPY | Age: 48
End: 2018-05-29
Payer: COMMERCIAL

## 2018-05-29 DIAGNOSIS — C50.912 BREAST CANCER METASTASIZED TO AXILLARY LYMPH NODE, LEFT (HCC): Primary | ICD-10-CM

## 2018-05-29 DIAGNOSIS — C77.3 BREAST CANCER METASTASIZED TO AXILLARY LYMPH NODE, LEFT (HCC): Primary | ICD-10-CM

## 2018-05-30 ENCOUNTER — HOSPITAL ENCOUNTER (OUTPATIENT)
Dept: INFUSION THERAPY | Age: 48
Discharge: HOME | End: 2018-05-30
Payer: COMMERCIAL

## 2018-05-30 VITALS
DIASTOLIC BLOOD PRESSURE: 56 MMHG | TEMPERATURE: 98.2 F | HEART RATE: 82 BPM | BODY MASS INDEX: 27.22 KG/M2 | HEIGHT: 68 IN | SYSTOLIC BLOOD PRESSURE: 97 MMHG | WEIGHT: 179.6 LBS | OXYGEN SATURATION: 97 % | RESPIRATION RATE: 18 BRPM

## 2018-05-30 DIAGNOSIS — C77.3 BREAST CANCER METASTASIZED TO AXILLARY LYMPH NODE, LEFT (HCC): ICD-10-CM

## 2018-05-30 DIAGNOSIS — C50.912 BREAST CANCER METASTASIZED TO AXILLARY LYMPH NODE, LEFT (HCC): ICD-10-CM

## 2018-05-30 LAB
ALBUMIN SERPL-MCNC: 3.8 G/DL (ref 3.5–5.1)
ALP BLD-CCNC: 45 U/L (ref 38–126)
ALT SERPL-CCNC: 16 U/L (ref 11–66)
AST SERPL-CCNC: 17 U/L (ref 5–40)
BASINOPHIL, AUTOMATED: 0 % (ref 0–3)
BILIRUB SERPL-MCNC: 0.4 MG/DL (ref 0.3–1.2)
BILIRUBIN DIRECT: < 0.2 MG/DL (ref 0–0.3)
BUN, WHOLE BLOOD: 12 MG/DL (ref 8–26)
CHLORIDE, WHOLE BLOOD: 105 MEQ/L (ref 98–109)
CREATININE, WHOLE BLOOD: 0.9 MG/DL (ref 0.5–1.2)
EOSINOPHILS RELATIVE PERCENT: 3 % (ref 0–4)
GFR, ESTIMATED: 70 ML/MIN/1.73M2
GLUCOSE, WHOLE BLOOD: 80 MG/DL (ref 70–108)
HCT VFR BLD CALC: 33.7 % (ref 37–47)
HEMOGLOBIN: 11.5 GM/DL (ref 12–16)
IONIZED CALCIUM, WHOLE BLOOD: 1.15 MMOL/L (ref 1.12–1.32)
LYMPHOCYTES # BLD: 20 % (ref 15–47)
MCH RBC QN AUTO: 35 PG (ref 27–31)
MCHC RBC AUTO-ENTMCNC: 34 GM/DL (ref 33–37)
MCV RBC AUTO: 103 FL (ref 81–99)
MONOCYTES: 7 % (ref 0–12)
PDW BLD-RTO: 12.3 % (ref 11.5–14.5)
PLATELET # BLD: 237 THOU/MM3 (ref 130–400)
PMV BLD AUTO: 7.6 FL (ref 7.4–10.4)
POTASSIUM, WHOLE BLOOD: 3.7 MEQ/L (ref 3.5–4.9)
RBC # BLD: 3.28 MILL/MM3 (ref 4.2–5.4)
SEG NEUTROPHILS: 69 % (ref 43–75)
SODIUM, WHOLE BLOOD: 141 MEQ/L (ref 138–146)
TOTAL CO2, WHOLE BLOOD: 27 MEQ/L (ref 23–33)
TOTAL PROTEIN: 6 G/DL (ref 6.1–8)
WBC # BLD: 4.3 THOU/MM3 (ref 4.8–10.8)

## 2018-05-30 PROCEDURE — 96367 TX/PROPH/DG ADDL SEQ IV INF: CPT

## 2018-05-30 PROCEDURE — 96375 TX/PRO/DX INJ NEW DRUG ADDON: CPT

## 2018-05-30 PROCEDURE — 36591 DRAW BLOOD OFF VENOUS DEVICE: CPT

## 2018-05-30 PROCEDURE — 85025 COMPLETE CBC W/AUTO DIFF WBC: CPT

## 2018-05-30 PROCEDURE — 6360000002 HC RX W HCPCS: Performed by: INTERNAL MEDICINE

## 2018-05-30 PROCEDURE — 2500000003 HC RX 250 WO HCPCS: Performed by: INTERNAL MEDICINE

## 2018-05-30 PROCEDURE — S0028 INJECTION, FAMOTIDINE, 20 MG: HCPCS | Performed by: INTERNAL MEDICINE

## 2018-05-30 PROCEDURE — 80076 HEPATIC FUNCTION PANEL: CPT

## 2018-05-30 PROCEDURE — 80047 BASIC METABLC PNL IONIZED CA: CPT

## 2018-05-30 PROCEDURE — 96413 CHEMO IV INFUSION 1 HR: CPT

## 2018-05-30 PROCEDURE — 2580000003 HC RX 258: Performed by: INTERNAL MEDICINE

## 2018-05-30 RX ORDER — 0.9 % SODIUM CHLORIDE 0.9 %
10 VIAL (ML) INJECTION ONCE
Status: COMPLETED | OUTPATIENT
Start: 2018-05-30 | End: 2018-05-30

## 2018-05-30 RX ORDER — HEPARIN SODIUM (PORCINE) LOCK FLUSH IV SOLN 100 UNIT/ML 100 UNIT/ML
500 SOLUTION INTRAVENOUS PRN
Status: DISCONTINUED | OUTPATIENT
Start: 2018-05-30 | End: 2018-05-31 | Stop reason: HOSPADM

## 2018-05-30 RX ORDER — SODIUM CHLORIDE 0.9 % (FLUSH) 0.9 %
10 SYRINGE (ML) INJECTION PRN
Status: DISCONTINUED | OUTPATIENT
Start: 2018-05-30 | End: 2018-05-31 | Stop reason: HOSPADM

## 2018-05-30 RX ORDER — DIPHENHYDRAMINE HYDROCHLORIDE 50 MG/ML
50 INJECTION INTRAMUSCULAR; INTRAVENOUS ONCE
Status: COMPLETED | OUTPATIENT
Start: 2018-05-30 | End: 2018-05-30

## 2018-05-30 RX ORDER — SODIUM CHLORIDE 9 MG/ML
INJECTION, SOLUTION INTRAVENOUS ONCE
Status: COMPLETED | OUTPATIENT
Start: 2018-05-30 | End: 2018-05-30

## 2018-05-30 RX ADMIN — PACLITAXEL 115.2 MG: 6 INJECTION, SOLUTION, CONCENTRATE INTRAVENOUS at 11:00

## 2018-05-30 RX ADMIN — FAMOTIDINE 20 MG: 10 INJECTION INTRAVENOUS at 10:09

## 2018-05-30 RX ADMIN — Medication 10 ML: at 09:26

## 2018-05-30 RX ADMIN — SODIUM CHLORIDE, PRESERVATIVE FREE 500 UNITS: 5 INJECTION INTRAVENOUS at 12:32

## 2018-05-30 RX ADMIN — Medication 10 ML: at 10:09

## 2018-05-30 RX ADMIN — SODIUM CHLORIDE: 9 INJECTION, SOLUTION INTRAVENOUS at 10:00

## 2018-05-30 RX ADMIN — DIPHENHYDRAMINE HYDROCHLORIDE 50 MG: 50 INJECTION, SOLUTION INTRAMUSCULAR; INTRAVENOUS at 10:11

## 2018-05-30 RX ADMIN — Medication 10 ML: at 12:32

## 2018-05-30 RX ADMIN — DEXAMETHASONE SODIUM PHOSPHATE: 4 INJECTION, SOLUTION INTRAMUSCULAR; INTRAVENOUS at 10:15

## 2018-05-30 RX ADMIN — Medication 10 ML: at 09:25

## 2018-05-30 ASSESSMENT — PAIN DESCRIPTION - PROGRESSION
CLINICAL_PROGRESSION: NOT CHANGED
CLINICAL_PROGRESSION: NOT CHANGED

## 2018-05-30 ASSESSMENT — PAIN DESCRIPTION - DESCRIPTORS
DESCRIPTORS: ACHING
DESCRIPTORS: ACHING;CONSTANT

## 2018-05-30 ASSESSMENT — PAIN DESCRIPTION - ONSET
ONSET: ON-GOING
ONSET: ON-GOING

## 2018-05-30 ASSESSMENT — PAIN DESCRIPTION - FREQUENCY
FREQUENCY: CONTINUOUS
FREQUENCY: CONTINUOUS

## 2018-05-30 ASSESSMENT — PAIN SCALES - GENERAL
PAINLEVEL_OUTOF10: 3
PAINLEVEL_OUTOF10: 3

## 2018-05-30 ASSESSMENT — PAIN DESCRIPTION - PAIN TYPE
TYPE: CHRONIC PAIN
TYPE: CHRONIC PAIN

## 2018-05-30 ASSESSMENT — PAIN DESCRIPTION - LOCATION
LOCATION: LEG
LOCATION: LEG

## 2018-05-30 ASSESSMENT — PAIN DESCRIPTION - ORIENTATION
ORIENTATION: RIGHT;LEFT
ORIENTATION: RIGHT;LEFT

## 2018-05-30 NOTE — PLAN OF CARE
Problem: Intellectual/Education/Knowledge Deficit  Intervention: Verbal/written education provided  Chemotherapy Teaching     What is Chemotherapy   Drug action [x]   Method of Administration [x]   Handouts given []     Side Effects  Nausea/vomiting [x]   Diarrhea [x]   Fatigue [x]   Signs / Symptoms of infection [x]   Neutropenia [x]   Thrombocytopenia [x]   Alopecia [x]   neuropathy [x]   [place] diet &  the importance of fluids [x]       Micellaneous  Importance of nutrition [x]   Importance of oral hygiene [x]   When to call the MD [x]   Monitoring labs [x]   Use of supportive services []     Explanation of Drug Regimen / Frequency  Taxol weekly Cycle #12     Comments  Verbalized understanding to drug,action,side effects and when to call MD       Goal: Teaching initiated upon admission  Outcome: Met This Shift      Problem: Discharge Planning  Intervention: Interaction with patient/family and care team  Patient verbalizes understanding of discharge instructions, follow up appointment, and when to call physician if needed    Goal: Knowledge of discharge instructions  Knowledge of discharge instructions    Outcome: Met This Shift      Problem: Musculor/Skeletal Functional Status  Intervention: Fall precautions  Fall precautions reviewed, patient verbalizes understanding. Call light light within reach. Goal: Absence of falls  Outcome: Met This Shift      Problem: Infection - Central Venous Catheter-Associated Bloodstream Infection:  Intervention: Infection risk assessment  Mediport site with no redness or warmth. Skin over port intact with no signs of breakdown noted. Patient verbalizes signs/symptoms of port infection and when to notify the physician. Goal: Will show no infection signs and symptoms  Will show no infection signs and symptoms  Outcome: Met This Shift      Comments: Care plan reviewed with patient. Patient verbalizes understanding of the plan of care and contributes to goal setting.

## 2018-05-30 NOTE — PROGRESS NOTES
Patient assessed for the following post chemotherapy:    Dizziness   No  Lightheadedness  No      Acute nausea/vomiting No  Headache   No  Chest pain/pressure  No  Rash/itching   No  Shortness of breath  No    Patient kept for 20 minutes observation post infusion chemotherapy. Patient tolerated chemotherapy treatment Taxol without any complications. Last vital signs:   BP (!) 97/56   Pulse 82   Temp 98.2 °F (36.8 °C) (Oral)   Resp 18   Ht 5' 8\" (1.727 m)   Wt 179 lb 9.6 oz (81.5 kg)   SpO2 97%   BMI 27.31 kg/m²     Patient instructed if she experiences any of the above symptoms following today's infusion, she is to notify MD immediately or go to the emergency department. Discharge instructions given to patient. Verbalizes understanding. Ambulated off unit per self with belongings.

## 2018-06-12 DIAGNOSIS — C50.912 BREAST CANCER METASTASIZED TO AXILLARY LYMPH NODE, LEFT (HCC): Primary | ICD-10-CM

## 2018-06-12 DIAGNOSIS — C77.3 BREAST CANCER METASTASIZED TO AXILLARY LYMPH NODE, LEFT (HCC): Primary | ICD-10-CM

## 2018-06-13 ENCOUNTER — OFFICE VISIT (OUTPATIENT)
Dept: ONCOLOGY | Age: 48
End: 2018-06-13
Payer: COMMERCIAL

## 2018-06-13 ENCOUNTER — HOSPITAL ENCOUNTER (OUTPATIENT)
Dept: INFUSION THERAPY | Age: 48
Discharge: HOME | End: 2018-06-13
Payer: COMMERCIAL

## 2018-06-13 VITALS
SYSTOLIC BLOOD PRESSURE: 120 MMHG | DIASTOLIC BLOOD PRESSURE: 65 MMHG | HEART RATE: 91 BPM | TEMPERATURE: 98.2 F | HEIGHT: 68 IN | WEIGHT: 181.6 LBS | OXYGEN SATURATION: 97 % | RESPIRATION RATE: 18 BRPM | BODY MASS INDEX: 27.52 KG/M2

## 2018-06-13 VITALS
OXYGEN SATURATION: 97 % | HEART RATE: 91 BPM | HEIGHT: 68 IN | WEIGHT: 181.6 LBS | DIASTOLIC BLOOD PRESSURE: 65 MMHG | TEMPERATURE: 98.2 F | BODY MASS INDEX: 27.52 KG/M2 | SYSTOLIC BLOOD PRESSURE: 120 MMHG | RESPIRATION RATE: 18 BRPM

## 2018-06-13 DIAGNOSIS — Z17.0 MALIGNANT NEOPLASM OF LEFT BREAST IN FEMALE, ESTROGEN RECEPTOR POSITIVE, UNSPECIFIED SITE OF BREAST (HCC): ICD-10-CM

## 2018-06-13 DIAGNOSIS — G62.0 DRUG-INDUCED POLYNEUROPATHY (HCC): ICD-10-CM

## 2018-06-13 DIAGNOSIS — C77.3 BREAST CANCER METASTASIZED TO AXILLARY LYMPH NODE, LEFT (HCC): Primary | ICD-10-CM

## 2018-06-13 DIAGNOSIS — C50.912 BREAST CANCER METASTASIZED TO AXILLARY LYMPH NODE, LEFT (HCC): ICD-10-CM

## 2018-06-13 DIAGNOSIS — C50.912 MALIGNANT NEOPLASM OF LEFT BREAST IN FEMALE, ESTROGEN RECEPTOR POSITIVE, UNSPECIFIED SITE OF BREAST (HCC): ICD-10-CM

## 2018-06-13 DIAGNOSIS — C50.912 BREAST CANCER METASTASIZED TO AXILLARY LYMPH NODE, LEFT (HCC): Primary | ICD-10-CM

## 2018-06-13 DIAGNOSIS — C77.3 BREAST CANCER METASTASIZED TO AXILLARY LYMPH NODE, LEFT (HCC): ICD-10-CM

## 2018-06-13 DIAGNOSIS — Z90.12 STATUS POST MASTECTOMY, LEFT: ICD-10-CM

## 2018-06-13 LAB
ALBUMIN SERPL-MCNC: 3.8 G/DL (ref 3.5–5.1)
ALP BLD-CCNC: 51 U/L (ref 38–126)
ALT SERPL-CCNC: 16 U/L (ref 11–66)
AST SERPL-CCNC: 14 U/L (ref 5–40)
BASINOPHIL, AUTOMATED: 0 % (ref 0–3)
BILIRUB SERPL-MCNC: 0.2 MG/DL (ref 0.3–1.2)
BILIRUBIN DIRECT: < 0.2 MG/DL (ref 0–0.3)
BUN, WHOLE BLOOD: 12 MG/DL (ref 8–26)
CHLORIDE, WHOLE BLOOD: 105 MEQ/L (ref 98–109)
CREATININE, WHOLE BLOOD: 0.6 MG/DL (ref 0.5–1.2)
EOSINOPHILS RELATIVE PERCENT: 2 % (ref 0–4)
GFR, ESTIMATED: > 90 ML/MIN/1.73M2
GLUCOSE, WHOLE BLOOD: 106 MG/DL (ref 70–108)
HCT VFR BLD CALC: 34.4 % (ref 37–47)
HEMOGLOBIN: 12.1 GM/DL (ref 12–16)
IONIZED CALCIUM, WHOLE BLOOD: 1.18 MMOL/L (ref 1.12–1.32)
LYMPHOCYTES # BLD: 15 % (ref 15–47)
MCH RBC QN AUTO: 34.3 PG (ref 27–31)
MCHC RBC AUTO-ENTMCNC: 35.2 GM/DL (ref 33–37)
MCV RBC AUTO: 98 FL (ref 81–99)
MONOCYTES: 9 % (ref 0–12)
PDW BLD-RTO: 12 % (ref 11.5–14.5)
PLATELET # BLD: 228 THOU/MM3 (ref 130–400)
PMV BLD AUTO: 7.3 FL (ref 7.4–10.4)
POTASSIUM, WHOLE BLOOD: 3.9 MEQ/L (ref 3.5–4.9)
RBC # BLD: 3.53 MILL/MM3 (ref 4.2–5.4)
SEG NEUTROPHILS: 74 % (ref 43–75)
SODIUM, WHOLE BLOOD: 141 MEQ/L (ref 138–146)
TOTAL CO2, WHOLE BLOOD: 24 MEQ/L (ref 23–33)
TOTAL PROTEIN: 6.4 G/DL (ref 6.1–8)
WBC # BLD: 6 THOU/MM3 (ref 4.8–10.8)

## 2018-06-13 PROCEDURE — 2580000003 HC RX 258: Performed by: INTERNAL MEDICINE

## 2018-06-13 PROCEDURE — 99211 OFF/OP EST MAY X REQ PHY/QHP: CPT

## 2018-06-13 PROCEDURE — 85025 COMPLETE CBC W/AUTO DIFF WBC: CPT

## 2018-06-13 PROCEDURE — 80076 HEPATIC FUNCTION PANEL: CPT

## 2018-06-13 PROCEDURE — 99214 OFFICE O/P EST MOD 30 MIN: CPT | Performed by: INTERNAL MEDICINE

## 2018-06-13 PROCEDURE — 6360000002 HC RX W HCPCS: Performed by: INTERNAL MEDICINE

## 2018-06-13 PROCEDURE — 80047 BASIC METABLC PNL IONIZED CA: CPT

## 2018-06-13 PROCEDURE — 36591 DRAW BLOOD OFF VENOUS DEVICE: CPT

## 2018-06-13 RX ORDER — HEPARIN SODIUM (PORCINE) LOCK FLUSH IV SOLN 100 UNIT/ML 100 UNIT/ML
500 SOLUTION INTRAVENOUS PRN
Status: DISCONTINUED | OUTPATIENT
Start: 2018-06-13 | End: 2018-06-14 | Stop reason: HOSPADM

## 2018-06-13 RX ORDER — HEPARIN SODIUM (PORCINE) LOCK FLUSH IV SOLN 100 UNIT/ML 100 UNIT/ML
500 SOLUTION INTRAVENOUS PRN
Status: CANCELLED | OUTPATIENT
Start: 2018-06-13

## 2018-06-13 RX ORDER — SODIUM CHLORIDE 0.9 % (FLUSH) 0.9 %
10 SYRINGE (ML) INJECTION PRN
Status: DISCONTINUED | OUTPATIENT
Start: 2018-06-13 | End: 2018-06-14 | Stop reason: HOSPADM

## 2018-06-13 RX ORDER — SODIUM CHLORIDE 0.9 % (FLUSH) 0.9 %
20 SYRINGE (ML) INJECTION PRN
Status: CANCELLED | OUTPATIENT
Start: 2018-06-13

## 2018-06-13 RX ORDER — SODIUM CHLORIDE 0.9 % (FLUSH) 0.9 %
20 SYRINGE (ML) INJECTION PRN
Status: DISCONTINUED | OUTPATIENT
Start: 2018-06-13 | End: 2018-06-14 | Stop reason: HOSPADM

## 2018-06-13 RX ORDER — SODIUM CHLORIDE 0.9 % (FLUSH) 0.9 %
10 SYRINGE (ML) INJECTION PRN
Status: CANCELLED | OUTPATIENT
Start: 2018-06-13

## 2018-06-13 RX ADMIN — Medication 10 ML: at 10:29

## 2018-06-13 RX ADMIN — Medication 20 ML: at 09:16

## 2018-06-13 RX ADMIN — Medication 10 ML: at 09:15

## 2018-06-13 RX ADMIN — SODIUM CHLORIDE, PRESERVATIVE FREE 500 UNITS: 5 INJECTION INTRAVENOUS at 10:30

## 2018-06-13 ASSESSMENT — ENCOUNTER SYMPTOMS
VOICE CHANGE: 0
ABDOMINAL PAIN: 0
TROUBLE SWALLOWING: 0
FACIAL SWELLING: 0
BLOOD IN STOOL: 0
CONSTIPATION: 0
COLOR CHANGE: 0
EYE ITCHING: 0
DIARRHEA: 0
WHEEZING: 0
COUGH: 0
CHEST TIGHTNESS: 0
EYE REDNESS: 0
SHORTNESS OF BREATH: 1
BACK PAIN: 1
SORE THROAT: 0
VOMITING: 0
ABDOMINAL DISTENTION: 0
NAUSEA: 0

## 2018-06-13 ASSESSMENT — PAIN DESCRIPTION - FREQUENCY: FREQUENCY: CONTINUOUS

## 2018-06-13 ASSESSMENT — PAIN DESCRIPTION - PROGRESSION: CLINICAL_PROGRESSION: NOT CHANGED

## 2018-06-13 ASSESSMENT — PAIN DESCRIPTION - DESCRIPTORS: DESCRIPTORS: ACHING;CONSTANT

## 2018-06-13 ASSESSMENT — PAIN DESCRIPTION - LOCATION: LOCATION: LEG

## 2018-06-13 ASSESSMENT — PAIN DESCRIPTION - PAIN TYPE: TYPE: CHRONIC PAIN

## 2018-06-13 ASSESSMENT — PAIN SCALES - GENERAL: PAINLEVEL_OUTOF10: 2

## 2018-06-13 ASSESSMENT — PAIN DESCRIPTION - ORIENTATION: ORIENTATION: RIGHT;LEFT

## 2018-06-13 NOTE — PROGRESS NOTES
Lab specimen obtained from [facility] without any problems. Ambulated off unit per self to examination room for appointment with Dr. Minna Alegre escorted by [facility].

## 2018-06-13 NOTE — PROGRESS NOTES
Patient tolerated  [facility] [address]  [facility] [address] [facility]  without any complications. Discharge instructions given to patient-verbalizes understanding. Ambulated off unit per self with belongings.

## 2018-06-13 NOTE — PLAN OF CARE
Problem: Pain:  Intervention: Promote participation in pain management plan  Patient encouraged to take prescribed pain medications and call Physician if pain is not controlled. Goal: Control of chronic pain  Control of chronic pain   Outcome: Met This Shift  patient pain in legs is tolerable with current pain medication. Comments: Care plan reviewed with patient . Patient  verbalize understanding of the plan of care and contribute to goal setting.

## 2018-06-13 NOTE — PLAN OF CARE
Problem: Infection - Central Venous Catheter-Associated Bloodstream Infection:  Intervention: Infection risk assessment  Instructed to monitor for signs/symptoms of infection at [address] [facility] [address] [facility] and call MD if problems develop. Goal: Will show no infection signs and symptoms  Will show no infection signs and symptoms   Outcome: Met This Shift  Mediport site with no redness or warmth. Skin over port site intact with no signs of breakdown noted. Patient verbalizes signs/symptoms of port infection and when to notify the physician. Problem: Discharge Planning  Intervention: Interaction with patient/family and care team  Discuss understanding of discharge instructions,follow-up appointments, and when to call the physician. Goal: Knowledge of discharge instructions  Knowledge of discharge instructions     Outcome: Met This Shift  Verbalized understanding of discharge instructions, follow-up appointments, and when to call the physician. Comments: Care plan reviewed with patient . Patient verbalize understanding of the plan of care and contribute to goal setting.

## 2018-06-20 ENCOUNTER — HOSPITAL ENCOUNTER (OUTPATIENT)
Dept: RADIATION ONCOLOGY | Age: 48
Discharge: HOME | End: 2018-06-20
Payer: COMMERCIAL

## 2018-06-20 PROCEDURE — 99212 OFFICE O/P EST SF 10 MIN: CPT

## 2018-06-26 ENCOUNTER — HOSPITAL ENCOUNTER (OUTPATIENT)
Dept: CT IMAGING | Age: 48
Discharge: HOME | End: 2018-06-26
Payer: COMMERCIAL

## 2018-06-26 ENCOUNTER — HOSPITAL ENCOUNTER (OUTPATIENT)
Dept: PHYSICAL THERAPY | Age: 48
Setting detail: THERAPIES SERIES
Discharge: HOME | End: 2018-06-26
Payer: COMMERCIAL

## 2018-06-26 DIAGNOSIS — Z17.0 MALIGNANT NEOPLASM OF OVERLAPPING SITES OF LEFT BREAST IN FEMALE, ESTROGEN RECEPTOR POSITIVE (HCC): ICD-10-CM

## 2018-06-26 DIAGNOSIS — C50.812 MALIGNANT NEOPLASM OF OVERLAPPING SITES OF LEFT BREAST IN FEMALE, ESTROGEN RECEPTOR POSITIVE (HCC): ICD-10-CM

## 2018-06-26 PROCEDURE — 77334 RADIATION TREATMENT AID(S): CPT | Performed by: RADIOLOGY

## 2018-06-26 PROCEDURE — 77290 THER RAD SIMULAJ FIELD CPLX: CPT | Performed by: RADIOLOGY

## 2018-06-26 PROCEDURE — 3209999900 CT GUIDE RADIATION THERAPY NO CHARGE

## 2018-06-26 PROCEDURE — 97530 THERAPEUTIC ACTIVITIES: CPT

## 2018-06-26 PROCEDURE — 97140 MANUAL THERAPY 1/> REGIONS: CPT

## 2018-06-26 ASSESSMENT — PAIN SCALES - GENERAL: PAINLEVEL_OUTOF10: 6

## 2018-06-29 PROCEDURE — 77295 3-D RADIOTHERAPY PLAN: CPT | Performed by: RADIOLOGY

## 2018-06-29 PROCEDURE — 77300 RADIATION THERAPY DOSE PLAN: CPT | Performed by: RADIOLOGY

## 2018-07-02 ENCOUNTER — HOSPITAL ENCOUNTER (OUTPATIENT)
Dept: RADIATION ONCOLOGY | Age: 48
Discharge: HOME | End: 2018-07-02
Payer: COMMERCIAL

## 2018-07-02 PROCEDURE — 77334 RADIATION TREATMENT AID(S): CPT | Performed by: RADIOLOGY

## 2018-07-03 ENCOUNTER — HOSPITAL ENCOUNTER (OUTPATIENT)
Age: 48
Discharge: HOME | End: 2018-07-03
Payer: COMMERCIAL

## 2018-07-03 LAB
BASOPHILS # BLD: 0.4 %
BASOPHILS ABSOLUTE: 0 THOU/MM3 (ref 0–0.1)
EOSINOPHIL # BLD: 5.3 %
EOSINOPHILS ABSOLUTE: 0.3 THOU/MM3 (ref 0–0.4)
ERYTHROCYTE [DISTWIDTH] IN BLOOD BY AUTOMATED COUNT: 13 % (ref 11.5–14.5)
ERYTHROCYTE [DISTWIDTH] IN BLOOD BY AUTOMATED COUNT: 48.7 FL (ref 35–45)
HCT VFR BLD CALC: 37 % (ref 37–47)
HEMOGLOBIN: 12.1 GM/DL (ref 12–16)
IMMATURE GRANS (ABS): 0.01 THOU/MM3 (ref 0–0.07)
IMMATURE GRANULOCYTES: 0.2 %
LYMPHOCYTES # BLD: 22 %
LYMPHOCYTES ABSOLUTE: 1.2 THOU/MM3 (ref 1–4.8)
MCH RBC QN AUTO: 33.9 PG (ref 26–33)
MCHC RBC AUTO-ENTMCNC: 32.7 GM/DL (ref 32.2–35.5)
MCV RBC AUTO: 103.6 FL (ref 81–99)
MONOCYTES # BLD: 8.3 %
MONOCYTES ABSOLUTE: 0.5 THOU/MM3 (ref 0.4–1.3)
NUCLEATED RED BLOOD CELLS: 0 /100 WBC
PLATELET # BLD: 195 THOU/MM3 (ref 130–400)
PMV BLD AUTO: 11.1 FL (ref 9.4–12.4)
RBC # BLD: 3.57 MILL/MM3 (ref 4.2–5.4)
SEG NEUTROPHILS: 63.8 %
SEGMENTED NEUTROPHILS ABSOLUTE COUNT: 3.6 THOU/MM3 (ref 1.8–7.7)
WBC # BLD: 5.6 THOU/MM3 (ref 4.8–10.8)

## 2018-07-03 PROCEDURE — 85025 COMPLETE CBC W/AUTO DIFF WBC: CPT

## 2018-07-03 PROCEDURE — 36415 COLL VENOUS BLD VENIPUNCTURE: CPT

## 2018-07-03 PROCEDURE — 77280 THER RAD SIMULAJ FIELD SMPL: CPT | Performed by: RADIOLOGY

## 2018-07-05 PROCEDURE — 77412 RADIATION TX DELIVERY LVL 3: CPT | Performed by: RADIOLOGY

## 2018-07-05 PROCEDURE — 77387 GUIDANCE FOR RADJ TX DLVR: CPT | Performed by: RADIOLOGY

## 2018-07-06 PROCEDURE — 77387 GUIDANCE FOR RADJ TX DLVR: CPT | Performed by: RADIOLOGY

## 2018-07-06 PROCEDURE — 77412 RADIATION TX DELIVERY LVL 3: CPT | Performed by: RADIOLOGY

## 2018-07-09 ENCOUNTER — HOSPITAL ENCOUNTER (OUTPATIENT)
Dept: PHYSICAL THERAPY | Age: 48
Setting detail: THERAPIES SERIES
Discharge: HOME | End: 2018-07-09
Payer: COMMERCIAL

## 2018-07-09 ENCOUNTER — OFFICE VISIT (OUTPATIENT)
Dept: SURGERY | Age: 48
End: 2018-07-09
Payer: COMMERCIAL

## 2018-07-09 VITALS
WEIGHT: 181.6 LBS | BODY MASS INDEX: 29.18 KG/M2 | RESPIRATION RATE: 16 BRPM | HEIGHT: 66 IN | TEMPERATURE: 97.4 F | DIASTOLIC BLOOD PRESSURE: 70 MMHG | HEART RATE: 93 BPM | SYSTOLIC BLOOD PRESSURE: 108 MMHG | OXYGEN SATURATION: 97 %

## 2018-07-09 DIAGNOSIS — C77.3 BREAST CANCER METASTASIZED TO AXILLARY LYMPH NODE, LEFT (HCC): Primary | ICD-10-CM

## 2018-07-09 DIAGNOSIS — C50.912 BREAST CANCER METASTASIZED TO AXILLARY LYMPH NODE, LEFT (HCC): Primary | ICD-10-CM

## 2018-07-09 PROCEDURE — 4004F PT TOBACCO SCREEN RCVD TLK: CPT | Performed by: SURGERY

## 2018-07-09 PROCEDURE — G8427 DOCREV CUR MEDS BY ELIG CLIN: HCPCS | Performed by: SURGERY

## 2018-07-09 PROCEDURE — 77412 RADIATION TX DELIVERY LVL 3: CPT | Performed by: RADIOLOGY

## 2018-07-09 PROCEDURE — G8419 CALC BMI OUT NRM PARAM NOF/U: HCPCS | Performed by: SURGERY

## 2018-07-09 PROCEDURE — 99213 OFFICE O/P EST LOW 20 MIN: CPT | Performed by: SURGERY

## 2018-07-09 PROCEDURE — 97140 MANUAL THERAPY 1/> REGIONS: CPT

## 2018-07-09 PROCEDURE — 3017F COLORECTAL CA SCREEN DOC REV: CPT | Performed by: SURGERY

## 2018-07-09 PROCEDURE — 77387 GUIDANCE FOR RADJ TX DLVR: CPT | Performed by: RADIOLOGY

## 2018-07-09 PROCEDURE — 97530 THERAPEUTIC ACTIVITIES: CPT

## 2018-07-09 ASSESSMENT — ENCOUNTER SYMPTOMS
EYE PAIN: 0
COLOR CHANGE: 0
SORE THROAT: 1
EYE REDNESS: 0
COUGH: 1
RHINORRHEA: 1

## 2018-07-09 ASSESSMENT — PAIN SCALES - GENERAL: PAINLEVEL_OUTOF10: 4

## 2018-07-09 NOTE — PROGRESS NOTES
Elbow Crease 26 26.1    10 cm proximal from Lateral Epicondyle 32.4 33.5    Axilla 35.4 37.8    Total 152.9 156.8       Last PN on 6/26/18:    153.7     154.6      Last PN on 4/19/18:    150.7       150.5                                              PN on 2/26/18:    151.9      151.8                                          Eval on 11/28/17:    152.5      153. 5     Patient Education/Home Exercise Program:  Continue with lotion to arm and chest wall, recommend to continue with therapy for stage 0/1 lymphedema management and strength and chest wall tightness as well as increasing complaints of fatigue and deconditioning with start of radiation. Response to Treatment:  Patient tolerated treatment well    Progression Toward Functional Goals: Pt's last appt on 6/26/18 was also a progress note and minimal change since then, since start of new treatment (radiation) has caused increased fatigue. Pt will continue to require skilled oncology rehab/PT to address her changing needs throughout her oncology care. Specific Interventions for Next Treatment:  Manual therapy to left arm and chest wall for lymphatic drainage and myfascial release (utilizing PORi protocol), gentle left shoulder stretches progressing to gentle strengthening as tolerated. Lymphedema education and precautions    Plan: Continue established plan of care of up to 1x per week x5 weeks    Goals:  Short term goals  Time Frame for Short term goals: 5 weeks  Short term goal 1: PREVIOUS GOAL MET, NO NEW GOAL. Short term goal 2: PREVIOUS GOAL MET, NO NEW GOAL. Short term goal 3: PREVIOUS GOAL MET. NO NEW GOAL. Short term goal 4: PREVIOUS GOAL MET. NO NEW GOAL. Long term goals  Time Frame for Long term goals : 5 weeks  Long term goal 1:  Pt to demo Brief Fatigue Inventory score improved from 7.5 to 5 for improved tolerance of participation in activities of choice. - GOAL MET. Scored 1.5 this date. UPDATE GOAL.  Pt to demo sustained Brief Fatigue Inventory of <2 with completion of radiation therapy to allow continued participation in daily activities. Long term goal 2: PREVIOUS GOAL MET, NO NEW GOAL. Long term goal 3: Pt to demo left shoulder strength improved from 2-/5 to 4+/5 for full return to work and household chores. - GOAL NOT MET,CONTINUE. Left shoulder 4/5. 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 - GOAL NOT MET, CONTINUE. 4+/5. Long term goal 5: Pt to demo sustained left arm girth measurments with return to previous level of activities with good understanding of lymphedema risk factors and precautions. - GOAL NOT MET, CONTINUE. Pt with bilateral increase in arm circumference and notes has not yet returned to previous level of activities.          Smooth [place]., JUANT, CORS [id number] 7/9/2018

## 2018-07-09 NOTE — PROGRESS NOTES
Maty Mccann MD   General Surgery  Follow up Patient Evaluation in Office  Pt Name: Kiki Goodman  Date of Birth 1970   Today's Date: 7/9/2018  Medical Record Number: [id number]  Referring Provider: No ref. provider found  Primary Care Provider: Darwin Kelly MD  Chief Complaint:  Chief Complaint   Patient presents with    Follow-up     4 month f/u--Infiltrating ductal carcinoma of left breast        ASSESSMENT      1. Breast cancer metastasized to axillary lymph node, left (HCC)         PLANS      1. Follow-up surgical clinic 6 months  2. Breast cancer survivorship plan discussed with patient after completion of treatment. Radiation therapy to follow completion of chemotherapy. 3.  Continue therapy for decreased range of motion left upper extremity        SUBJECTIVE   History of present illness:  Carmina De La Vega is a 46y.o. year old female who is presenting today in the office for follow-up of left breast carcinoma. She underwent a left modified radical mastectomy in November 2017. She completed  chemotherapy with Dr. Nam Benites. She had 4 cycles of Adriamycin and Cytoxan and has completed Taxotere therapy. She continues to work with therapy to work on range of motion of her left upper extremity. She has no visible lymphedema of her left upper extremity. Her mastectomy site is well healed. Her port site is well healed shows no signs of infection at this visit. She is going to start radiation shortly. She still has some numbness and tingling in her feet but it is improving. Her hair is growing back. She still is having some memory issues post chemotherapy. She originally presented with a palpable mass in her left breast.  She had a prior history of benign cysts. She had imaging and a biopsy performed at an outside institution which revealed an intermediate grade invasive ductal carcinoma left breast ER positive UT positive her to was indeterminate.   She sought opinion at [facility] at the urging of her family physician. However she desired to stay more local and sought treatment here. At the time of her diagnosis she had an abnormal appearing lymph node which was biopsied and positive for metastatic disease. Biopsy of the breast mass was positive as well. She underwent a mastectomy after staging studies for distant metastatic disease were negative. She had an invasive ductal carcinoma with lobular features involving the nipple and all 4 quadrants of the breast.  Maximal tumor dimension was 6.8 cm.  3 of 9 axillary lymph nodes were positive for metastatic carcinoma. Her 2 breezy was not amplified      Gynecologic history  with 3 living children. No history of exogenous estrogen. She had a prior hysterectomy her ovaries remain.      Past Medical History  Past Medical History:   Diagnosis Date    Acid reflux     Anxiety     Asthma     Breast cancer ([address]     COPD with asthma ([address]     Depression     Depression     Dizziness - light-headed     HX OF:    Emphysema     History of chest pain     History of tinnitus     Hx of blood clots     Joint pain     Numbness and tingling     Osteoarthritis     PONV (postoperative nausea and vomiting)     SOB (shortness of breath) on exertion        Past Surgical History  Past Surgical History:   Procedure Laterality Date    BLADDER SURGERY  2014    BRE[facility]    CARDIAC CATHETERIZATION  2011    NO EVIDENCE OF PULMONARY HTN,NO EVIDENCE OF DD,NORMAL LVF    CARDIOVASCULAR STRESS TEST  2011    EF 60% MILD INFERIOR WALL REVERSIBLE STRESS-INDUCED ISCHEMIA      SECTION  1992    CHOLECYSTECTOMY  2015    CYST REMOVAL Right 2015    rt breast [facility]-benign breast cyst     ESOPHAGUS SURGERY  2014    HYSTERECTOMY      INSERTION / REMOVAL / REPLACEMENT VENOUS ACCESS CATHETER Right 2017    RIGHT SIDE SINGLE LUMEN POWERPORT INSERTION performed by Indiana Scott MD at [address] Left 11/7/2017    LEFT MODIFIED RADICAL MASTECTOMY performed by Mandeep Gardner MD at [address] TUNNELED VENOUS PORT PLACEMENT Right 12/01/2017    Single Luman Smart Port Insertion- Right side. Medications  Current Outpatient Prescriptions   Medication Sig Dispense Refill    traMADol (ULTRAM) 50 MG tablet Take 1 tablet by mouth every 6 hours as needed for Pain for up to 60 days. . 60 tablet 0    b complex vitamins capsule Take 1 capsule by mouth daily      ondansetron (ZOFRAN-ODT) 4 MG disintegrating tablet Take 1 tablet by mouth every 8 hours as needed for Nausea or Vomiting 20 tablet 0    prochlorperazine (COMPAZINE) 10 MG tablet Take 1 tablet by mouth every 6 hours as needed (for nausea) 30 tablet 1    lidocaine-prilocaine (EMLA) 2.5-2.5 % cream Apply topically as needed. 30 g 1    albuterol sulfate  (90 Base) MCG/ACT inhaler Inhale 1 puff into the lungs every 6 hours as needed for Wheezing or Shortness of Breath       albuterol (PROVENTIL) (2.5 MG/3ML) 0.083% nebulizer solution Inhale 2.5 mg into the lungs every 4 hours as needed       butalbital-apap-caffeine -40 MG CAPS Take by mouth      ALPRAZolam (XANAX) 0.5 MG tablet Take 0.5 mg by mouth nightly as needed       cyclobenzaprine (FLEXERIL) 5 MG tablet Take 5 mg by mouth 3 times daily as needed       DULERA 200-5 MCG/ACT inhaler Inhale 1 puff into the lungs 2 times daily       venlafaxine (EFFEXOR XR) 75 MG extended release capsule Take 75 mg by mouth daily       meloxicam (MOBIC) 15 MG tablet Take 15 mg by mouth daily       No current facility-administered medications for this visit.       Allergies  No Known Allergies    Family History  Family History   Problem Relation Age of Onset    Heart Disease Mother     Hypertension Mother     Diabetes Mother     Heart Disease Father     No Known Problems Sister     No Known Problems Brother        Social History  Social History     Social History    Marital status:      Spouse name: N/A    Number of children: 3    Years of education: N/A     Occupational History          Social History Main Topics    Smoking status: Current Every Day Smoker     Packs/day: 0.50     Years: 18.00     Types: Cigarettes    Smokeless tobacco: Never Used      Comment: down to less than 0.25 aday    Alcohol use Yes      Comment: SOCAIL    Drug use: No    Sexual activity: Not on file     Other Topics Concern    Not on file     Social History Narrative    No narrative on file           Review of Systems  Review of Systems   Constitutional: Positive for activity change, diaphoresis and fatigue. Negative for chills, fever and unexpected weight change. HENT: Positive for congestion, rhinorrhea and sore throat. Eyes: Positive for visual disturbance. Negative for pain and redness. Respiratory: Positive for cough. Cardiovascular: Negative for chest pain and palpitations. Gastrointestinal: Negative for abdominal pain, constipation, diarrhea and nausea. Endocrine: Negative for polydipsia. Genitourinary: Negative for decreased urine volume, dysuria and vaginal bleeding. Musculoskeletal: Positive for arthralgias and myalgias. Negative for back pain. Skin: Negative for color change and wound. Allergic/Immunologic: Positive for immunocompromised state. Neurological: Positive for dizziness, weakness and headaches. Negative for syncope. Psychiatric/Behavioral: Positive for agitation, confusion and dysphoric mood. Negative for behavioral problems and suicidal ideas. The patient is nervous/anxious. OBJECTIVE     /70 (Site: Right Arm, Position: Sitting, Cuff Size: Medium Adult)   Pulse 93   Temp 97.4 °F (36.3 °C) (Tympanic)   Resp 16   Ht 5' 6\" (1.676 m)   Wt 181 lb 9.6 oz (82.4 kg)   SpO2 97%   Breastfeeding? No   BMI 29.31 kg/m²     Physical Exam   Constitutional: She is oriented to person, place, and time.  She appears

## 2018-07-10 PROCEDURE — 77412 RADIATION TX DELIVERY LVL 3: CPT | Performed by: RADIOLOGY

## 2018-07-10 PROCEDURE — 77387 GUIDANCE FOR RADJ TX DLVR: CPT | Performed by: RADIOLOGY

## 2018-07-11 ENCOUNTER — HOSPITAL ENCOUNTER (OUTPATIENT)
Dept: INFUSION THERAPY | Age: 48
End: 2018-07-11
Payer: COMMERCIAL

## 2018-07-11 PROCEDURE — 77336 RADIATION PHYSICS CONSULT: CPT | Performed by: RADIOLOGY

## 2018-07-11 PROCEDURE — 77387 GUIDANCE FOR RADJ TX DLVR: CPT | Performed by: RADIOLOGY

## 2018-07-11 PROCEDURE — 77412 RADIATION TX DELIVERY LVL 3: CPT | Performed by: RADIOLOGY

## 2018-07-11 ASSESSMENT — ENCOUNTER SYMPTOMS
DIARRHEA: 0
NAUSEA: 0
CONSTIPATION: 0
ABDOMINAL PAIN: 0
BACK PAIN: 0

## 2018-07-12 ENCOUNTER — HOSPITAL ENCOUNTER (OUTPATIENT)
Dept: INFUSION THERAPY | Age: 48
Discharge: HOME | End: 2018-07-12
Payer: COMMERCIAL

## 2018-07-12 VITALS
HEIGHT: 66 IN | WEIGHT: 182.8 LBS | BODY MASS INDEX: 29.38 KG/M2 | RESPIRATION RATE: 16 BRPM | DIASTOLIC BLOOD PRESSURE: 64 MMHG | TEMPERATURE: 97.7 F | SYSTOLIC BLOOD PRESSURE: 109 MMHG | HEART RATE: 81 BPM | OXYGEN SATURATION: 97 %

## 2018-07-12 DIAGNOSIS — C50.912 BREAST CANCER METASTASIZED TO AXILLARY LYMPH NODE, LEFT (HCC): ICD-10-CM

## 2018-07-12 DIAGNOSIS — C77.3 BREAST CANCER METASTASIZED TO AXILLARY LYMPH NODE, LEFT (HCC): ICD-10-CM

## 2018-07-12 PROCEDURE — 77412 RADIATION TX DELIVERY LVL 3: CPT | Performed by: RADIOLOGY

## 2018-07-12 PROCEDURE — 77387 GUIDANCE FOR RADJ TX DLVR: CPT | Performed by: RADIOLOGY

## 2018-07-12 PROCEDURE — 2580000003 HC RX 258: Performed by: INTERNAL MEDICINE

## 2018-07-12 PROCEDURE — 99212 OFFICE O/P EST SF 10 MIN: CPT

## 2018-07-12 PROCEDURE — 6360000002 HC RX W HCPCS: Performed by: INTERNAL MEDICINE

## 2018-07-12 RX ORDER — SODIUM CHLORIDE 0.9 % (FLUSH) 0.9 %
10 SYRINGE (ML) INJECTION PRN
Status: DISCONTINUED | OUTPATIENT
Start: 2018-07-12 | End: 2018-07-13 | Stop reason: HOSPADM

## 2018-07-12 RX ORDER — SODIUM CHLORIDE 0.9 % (FLUSH) 0.9 %
20 SYRINGE (ML) INJECTION PRN
Status: CANCELLED | OUTPATIENT
Start: 2018-07-12

## 2018-07-12 RX ORDER — HEPARIN SODIUM (PORCINE) LOCK FLUSH IV SOLN 100 UNIT/ML 100 UNIT/ML
500 SOLUTION INTRAVENOUS PRN
Status: CANCELLED | OUTPATIENT
Start: 2018-07-12

## 2018-07-12 RX ORDER — SODIUM CHLORIDE 0.9 % (FLUSH) 0.9 %
10 SYRINGE (ML) INJECTION PRN
Status: CANCELLED | OUTPATIENT
Start: 2018-07-12

## 2018-07-12 RX ORDER — HEPARIN SODIUM (PORCINE) LOCK FLUSH IV SOLN 100 UNIT/ML 100 UNIT/ML
500 SOLUTION INTRAVENOUS PRN
Status: DISCONTINUED | OUTPATIENT
Start: 2018-07-12 | End: 2018-07-13 | Stop reason: HOSPADM

## 2018-07-12 RX ADMIN — HEPARIN 500 UNITS: 100 SYRINGE at 10:21

## 2018-07-12 RX ADMIN — Medication 10 ML: at 10:20

## 2018-07-12 ASSESSMENT — PAIN DESCRIPTION - ORIENTATION: ORIENTATION: RIGHT;LEFT

## 2018-07-12 ASSESSMENT — PAIN DESCRIPTION - ONSET: ONSET: ON-GOING

## 2018-07-12 ASSESSMENT — PAIN DESCRIPTION - PROGRESSION: CLINICAL_PROGRESSION: NOT CHANGED

## 2018-07-12 ASSESSMENT — PAIN SCALES - GENERAL: PAINLEVEL_OUTOF10: 3

## 2018-07-12 ASSESSMENT — PAIN DESCRIPTION - DESCRIPTORS: DESCRIPTORS: ACHING;CONSTANT

## 2018-07-12 ASSESSMENT — PAIN DESCRIPTION - PAIN TYPE: TYPE: CHRONIC PAIN

## 2018-07-12 ASSESSMENT — PAIN DESCRIPTION - FREQUENCY: FREQUENCY: CONTINUOUS

## 2018-07-12 ASSESSMENT — PAIN DESCRIPTION - LOCATION: LOCATION: LEG

## 2018-07-12 NOTE — PROGRESS NOTES
Patient tolerated  mediport flush without any complications. Discharge instructions given to patient-verbalizes understanding. Ambulated off unit per self with belongings.

## 2018-07-12 NOTE — PLAN OF CARE
Problem: Pain:  Intervention: Promote participation in pain management plan  Patient encouraged to take prescribed pain medications and call Physician if pain is not controlled. Goal: Control of chronic pain  Control of chronic pain   Outcome: Met This Shift  Patient states chronic pain in legs is tolerable with current medication    Problem: Infection - Central Venous Catheter-Associated Bloodstream Infection:  Intervention: Infection risk assessment  Instructed to monitor for signs/symptoms of infection at mediport and call MD if problems develop. Goal: Will show no infection signs and symptoms  Will show no infection signs and symptoms   Outcome: Met This Shift  Mediport site with no redness or warmth. Skin over port site intact with no signs of breakdown noted. Patient verbalizes signs/symptoms of port infection and when to notify the physician. Problem: Discharge Planning  Intervention: Interaction with patient/family and care team  Discuss understanding of discharge instructions,follow-up appointments, and when to call the physician. Goal: Knowledge of discharge instructions  Knowledge of discharge instructions     Outcome: Met This Shift  Verbalized understanding of discharge instructions, follow-up appointments, and when to call the physician. Comments: Care plan reviewed with patient . Patient verbalize understanding of the plan of care and contribute to goal setting.

## 2018-07-13 PROCEDURE — 77412 RADIATION TX DELIVERY LVL 3: CPT | Performed by: RADIOLOGY

## 2018-07-13 PROCEDURE — 77387 GUIDANCE FOR RADJ TX DLVR: CPT | Performed by: RADIOLOGY

## 2018-07-16 PROCEDURE — 77412 RADIATION TX DELIVERY LVL 3: CPT

## 2018-07-16 PROCEDURE — 77387 GUIDANCE FOR RADJ TX DLVR: CPT

## 2018-07-16 PROCEDURE — 77336 RADIATION PHYSICS CONSULT: CPT

## 2018-07-17 PROCEDURE — 77387 GUIDANCE FOR RADJ TX DLVR: CPT | Performed by: RADIOLOGY

## 2018-07-17 PROCEDURE — 77412 RADIATION TX DELIVERY LVL 3: CPT | Performed by: RADIOLOGY

## 2018-07-18 PROCEDURE — 77412 RADIATION TX DELIVERY LVL 3: CPT | Performed by: RADIOLOGY

## 2018-07-18 PROCEDURE — 77387 GUIDANCE FOR RADJ TX DLVR: CPT | Performed by: RADIOLOGY

## 2018-07-19 PROCEDURE — 77387 GUIDANCE FOR RADJ TX DLVR: CPT

## 2018-07-19 PROCEDURE — 77412 RADIATION TX DELIVERY LVL 3: CPT

## 2018-07-20 ENCOUNTER — HOSPITAL ENCOUNTER (OUTPATIENT)
Age: 48
Discharge: HOME | End: 2018-07-20
Payer: COMMERCIAL

## 2018-07-20 LAB
BASINOPHIL, AUTOMATED: 0 % (ref 0–3)
EOSINOPHILS RELATIVE PERCENT: 6 % (ref 0–4)
HCT VFR BLD CALC: 40.9 % (ref 37–47)
HEMOGLOBIN: 14 GM/DL (ref 12–16)
LYMPHOCYTES # BLD: 20 % (ref 15–47)
MCH RBC QN AUTO: 33.4 PG (ref 27–31)
MCHC RBC AUTO-ENTMCNC: 34.2 GM/DL (ref 33–37)
MCV RBC AUTO: 97 FL (ref 81–99)
MONOCYTES: 8 % (ref 0–12)
PDW BLD-RTO: 11 % (ref 11.5–14.5)
PLATELET # BLD: 166 THOU/MM3 (ref 130–400)
PMV BLD AUTO: 7.9 FL (ref 7.4–10.4)
RBC # BLD: 4.19 MILL/MM3 (ref 4.2–5.4)
SEG NEUTROPHILS: 66 % (ref 43–75)
WBC # BLD: 4.4 THOU/MM3 (ref 4.8–10.8)

## 2018-07-20 PROCEDURE — 77387 GUIDANCE FOR RADJ TX DLVR: CPT | Performed by: RADIOLOGY

## 2018-07-20 PROCEDURE — 36415 COLL VENOUS BLD VENIPUNCTURE: CPT

## 2018-07-20 PROCEDURE — 77412 RADIATION TX DELIVERY LVL 3: CPT | Performed by: RADIOLOGY

## 2018-07-20 PROCEDURE — 85025 COMPLETE CBC W/AUTO DIFF WBC: CPT

## 2018-07-23 ENCOUNTER — HOSPITAL ENCOUNTER (OUTPATIENT)
Dept: PHYSICAL THERAPY | Age: 48
Setting detail: THERAPIES SERIES
Discharge: HOME | End: 2018-07-23
Payer: COMMERCIAL

## 2018-07-23 PROCEDURE — 77412 RADIATION TX DELIVERY LVL 3: CPT | Performed by: RADIOLOGY

## 2018-07-23 PROCEDURE — 97110 THERAPEUTIC EXERCISES: CPT

## 2018-07-23 PROCEDURE — 77387 GUIDANCE FOR RADJ TX DLVR: CPT | Performed by: RADIOLOGY

## 2018-07-23 PROCEDURE — 97140 MANUAL THERAPY 1/> REGIONS: CPT

## 2018-07-23 PROCEDURE — 77336 RADIATION PHYSICS CONSULT: CPT | Performed by: RADIOLOGY

## 2018-07-23 ASSESSMENT — PAIN SCALES - GENERAL: PAINLEVEL_OUTOF10: 4

## 2018-07-23 NOTE — PROGRESS NOTES
[facility]  OUTPATIENT REHABILITATION  PHYSICAL THERAPY   ONCOLOGY REHABILITATION   DAILY NOTE  Time In: 0900  Time Out: 7347  Total timed code minutes: 55  Total treatment minutes: 55    Date: 2018  Patient Name: Dell Hernandez        CSN: [id number]   : 1970  (46 y.o.)  Gender: female   Referring Practitioner: Dr. Jony Pereira  Diagnosis: U59.429 Breast cancer metastasized to axillary lymph node, left  Additional Pertinent Hx: COPD, OA, smoker, asthma, diagnosed with ER/SD+, HER2- invasive ductal carcinoma on 10/9/17 with mastectomy on 17 with SLNB 3/14 +, will be undergoing 4 cycles of Adriamycin and cytoxan with 12 weekly cycles of Taxol starting 17, then radiation and then hormonal therapy       Insurance:  [facility] Medicaid, previously [facility]   Total # Visits Approved: Total # Visits to Date: 16   Certification Date:  Progress Note Counter:1/10 for PN, last PN performed on 18 at visit #16   Date of Physician Follow-Up: Dr. Consuelo Pierson  Chart Reviewed: Yes     Subjective: Subjective: Notes redness and irritation to the skin of the radiation treatment field on the superior aspect of left chest wall. Pain: Pain Assessment  Patient Currently in Pain: Yes  Pain Level: 4 (left chest wall and bilateral arms)      Exercise/Equipment/Modalities Sets   Repetitions Comments   Pulleys 1 1.5 minutes Flexion and abduction   Shoulder extension 2 10  Yellow t-band   Shoulder retractions 2 10 Yellow t-band   Shoulder punches 2 10 Yellow t-band   Nustep L1 x5 minutes Seat 9, arms 10     Manual Treatments/Provider Interaction: PORi protocol to left arm and trunk x40 minutes for manual lymph drainage and myofascial release. Pt's cording has remained abolished since last session. Note small, 1 inch size lump at lateral edge of mastectomy incision, pt reports tender with palpation. Encouraged pt to report this to her radiation oncologist during her appt on 18.     Patient

## 2018-07-24 PROCEDURE — 77412 RADIATION TX DELIVERY LVL 3: CPT | Performed by: RADIOLOGY

## 2018-07-24 PROCEDURE — 77387 GUIDANCE FOR RADJ TX DLVR: CPT | Performed by: RADIOLOGY

## 2018-07-26 PROCEDURE — 77412 RADIATION TX DELIVERY LVL 3: CPT | Performed by: RADIOLOGY

## 2018-07-26 PROCEDURE — 77387 GUIDANCE FOR RADJ TX DLVR: CPT | Performed by: RADIOLOGY

## 2018-07-27 PROCEDURE — 77412 RADIATION TX DELIVERY LVL 3: CPT | Performed by: RADIOLOGY

## 2018-07-27 PROCEDURE — 77387 GUIDANCE FOR RADJ TX DLVR: CPT | Performed by: RADIOLOGY

## 2018-07-30 ENCOUNTER — HOSPITAL ENCOUNTER (OUTPATIENT)
Dept: PHYSICAL THERAPY | Age: 48
Setting detail: THERAPIES SERIES
Discharge: HOME | End: 2018-07-30
Payer: COMMERCIAL

## 2018-07-30 PROCEDURE — 77412 RADIATION TX DELIVERY LVL 3: CPT | Performed by: RADIOLOGY

## 2018-07-30 PROCEDURE — 77336 RADIATION PHYSICS CONSULT: CPT | Performed by: RADIOLOGY

## 2018-07-30 PROCEDURE — 77387 GUIDANCE FOR RADJ TX DLVR: CPT | Performed by: RADIOLOGY

## 2018-07-30 PROCEDURE — 97110 THERAPEUTIC EXERCISES: CPT

## 2018-07-30 PROCEDURE — 97140 MANUAL THERAPY 1/> REGIONS: CPT

## 2018-07-30 ASSESSMENT — PAIN SCALES - GENERAL: PAINLEVEL_OUTOF10: 3

## 2018-07-30 NOTE — PROGRESS NOTES
[facility]  OUTPATIENT REHABILITATION  PHYSICAL THERAPY   ONCOLOGY REHABILITATION   DAILY NOTE  Time In: 0845  Time Out: 0940  Total timed code minutes: 55  Total treatment minutes: 55    Date: 2018  Patient Name: Anneliese Lange        CSN: [id number]   : 1970  (46 y.o.)  Gender: female   Referring Practitioner: Dr. Ct Sutton  Diagnosis: R89.134 Breast cancer metastasized to axillary lymph node, left  Additional Pertinent Hx: COPD, OA, smoker, asthma, diagnosed with ER/NE+, HER2- invasive ductal carcinoma on 10/9/17 with mastectomy on 17 with SLNB 3/14 +, will be undergoing 4 cycles of Adriamycin and cytoxan with 12 weekly cycles of Taxol starting 17, then radiation and then hormonal therapy       Insurance:  [facility] Medicaid effective 18 - 30 visits per calendar year, used 14 visits under [facility] Cano therefore allowed 40 total    Total # Visits Approved: 40 Total # Visits to Date: 25   Certification Date:  Progress Note Counter:2/10 for PN, last PN performed on 18 at visit #16   Date of Physician Follow-Up: Dr. Niki Carroll  Chart Reviewed: Yes     Subjective: Subjective: Reports she is starting to hurts from the radiation. Didn't sleep well last night as she was up with the puppies. Pain: Pain Assessment  Patient Currently in Pain: Yes  Pain Level: 3 (left chest wall)    Exercise/Equipment/Modalities Sets    Repetitions Comments   Pulleys 1 1.5 minutes Flexion and abduction   Shoulder extension 2 15  Yellow t-band   Shoulder retractions 2 15 Yellow t-band   Shoulder punches 2 15 Yellow t-band   Nustep L1 x5 minutes Seat 9, arms 10   Minisquats 1 10 At bar   Marches 1 10 At bar   Heel/toe raises 1 10 At bar   Pec stretches 3 15 seconds Hands cheek height and elbows straight      Manual Treatments/Provider Interaction: PORi protocol for manual lymphatic drainage and myofascial relase to left arm and trunk x30 minutes. Pt's cording has remained abolished.  Note mild previous level of activities.        Smooth Cruz, DPT, CORS [id number] 7/30/2018

## 2018-07-31 PROCEDURE — 77387 GUIDANCE FOR RADJ TX DLVR: CPT | Performed by: RADIOLOGY

## 2018-07-31 PROCEDURE — 77412 RADIATION TX DELIVERY LVL 3: CPT | Performed by: RADIOLOGY

## 2018-08-01 ENCOUNTER — HOSPITAL ENCOUNTER (OUTPATIENT)
Dept: RADIATION ONCOLOGY | Age: 48
Discharge: HOME | End: 2018-08-01
Payer: COMMERCIAL

## 2018-08-02 PROCEDURE — 77412 RADIATION TX DELIVERY LVL 3: CPT | Performed by: RADIOLOGY

## 2018-08-02 PROCEDURE — 77387 GUIDANCE FOR RADJ TX DLVR: CPT | Performed by: RADIOLOGY

## 2018-08-03 PROCEDURE — 77412 RADIATION TX DELIVERY LVL 3: CPT | Performed by: RADIOLOGY

## 2018-08-03 PROCEDURE — 77387 GUIDANCE FOR RADJ TX DLVR: CPT | Performed by: RADIOLOGY

## 2018-08-06 PROCEDURE — 77412 RADIATION TX DELIVERY LVL 3: CPT | Performed by: RADIOLOGY

## 2018-08-06 PROCEDURE — 77387 GUIDANCE FOR RADJ TX DLVR: CPT | Performed by: RADIOLOGY

## 2018-08-06 PROCEDURE — 77336 RADIATION PHYSICS CONSULT: CPT | Performed by: RADIOLOGY

## 2018-08-07 DIAGNOSIS — C50.912 BREAST CANCER METASTASIZED TO AXILLARY LYMPH NODE, LEFT (HCC): Primary | ICD-10-CM

## 2018-08-07 DIAGNOSIS — C77.3 BREAST CANCER METASTASIZED TO AXILLARY LYMPH NODE, LEFT (HCC): Primary | ICD-10-CM

## 2018-08-07 PROCEDURE — 77290 THER RAD SIMULAJ FIELD CPLX: CPT | Performed by: RADIOLOGY

## 2018-08-07 PROCEDURE — 77387 GUIDANCE FOR RADJ TX DLVR: CPT | Performed by: RADIOLOGY

## 2018-08-07 PROCEDURE — 77334 RADIATION TREATMENT AID(S): CPT | Performed by: RADIOLOGY

## 2018-08-07 PROCEDURE — 77412 RADIATION TX DELIVERY LVL 3: CPT | Performed by: RADIOLOGY

## 2018-08-07 ASSESSMENT — ENCOUNTER SYMPTOMS
ABDOMINAL DISTENTION: 0
VOMITING: 0
WHEEZING: 0
DIARRHEA: 0
BACK PAIN: 1
SORE THROAT: 0
BLOOD IN STOOL: 0
EYE REDNESS: 0
VOICE CHANGE: 0
ABDOMINAL PAIN: 0
FACIAL SWELLING: 0
EYE ITCHING: 0
CHEST TIGHTNESS: 0
TROUBLE SWALLOWING: 0
CONSTIPATION: 0
NAUSEA: 0
SHORTNESS OF BREATH: 1
COLOR CHANGE: 0
COUGH: 0

## 2018-08-08 ENCOUNTER — HOSPITAL ENCOUNTER (OUTPATIENT)
Dept: INFUSION THERAPY | Age: 48
Discharge: HOME | End: 2018-08-08
Payer: COMMERCIAL

## 2018-08-08 ENCOUNTER — OFFICE VISIT (OUTPATIENT)
Dept: ONCOLOGY | Age: 48
End: 2018-08-08
Payer: COMMERCIAL

## 2018-08-08 VITALS
OXYGEN SATURATION: 99 % | SYSTOLIC BLOOD PRESSURE: 110 MMHG | HEIGHT: 66 IN | WEIGHT: 180.6 LBS | RESPIRATION RATE: 16 BRPM | HEART RATE: 74 BPM | BODY MASS INDEX: 29.02 KG/M2 | TEMPERATURE: 98.2 F | DIASTOLIC BLOOD PRESSURE: 63 MMHG

## 2018-08-08 VITALS
TEMPERATURE: 98.2 F | SYSTOLIC BLOOD PRESSURE: 110 MMHG | HEIGHT: 66 IN | DIASTOLIC BLOOD PRESSURE: 63 MMHG | RESPIRATION RATE: 16 BRPM | OXYGEN SATURATION: 99 % | BODY MASS INDEX: 29.02 KG/M2 | WEIGHT: 180.6 LBS | HEART RATE: 74 BPM

## 2018-08-08 DIAGNOSIS — Z90.12 STATUS POST MASTECTOMY, LEFT: ICD-10-CM

## 2018-08-08 DIAGNOSIS — G62.0 DRUG-INDUCED POLYNEUROPATHY (HCC): ICD-10-CM

## 2018-08-08 DIAGNOSIS — C77.3 BREAST CANCER METASTASIZED TO AXILLARY LYMPH NODE, LEFT (HCC): ICD-10-CM

## 2018-08-08 DIAGNOSIS — C50.912 BREAST CANCER METASTASIZED TO AXILLARY LYMPH NODE, LEFT (HCC): ICD-10-CM

## 2018-08-08 DIAGNOSIS — C77.3 BREAST CANCER METASTASIZED TO AXILLARY LYMPH NODE, LEFT (HCC): Primary | ICD-10-CM

## 2018-08-08 DIAGNOSIS — C50.912 BREAST CANCER METASTASIZED TO AXILLARY LYMPH NODE, LEFT (HCC): Primary | ICD-10-CM

## 2018-08-08 LAB
ALBUMIN SERPL-MCNC: 3.7 G/DL (ref 3.5–5.1)
ALP BLD-CCNC: 52 U/L (ref 38–126)
ALT SERPL-CCNC: 12 U/L (ref 11–66)
AST SERPL-CCNC: 14 U/L (ref 5–40)
BASINOPHIL, AUTOMATED: 0 % (ref 0–3)
BILIRUB SERPL-MCNC: 0.2 MG/DL (ref 0.3–1.2)
BILIRUBIN DIRECT: < 0.2 MG/DL (ref 0–0.3)
BUN, WHOLE BLOOD: 7 MG/DL (ref 8–26)
CHLORIDE, WHOLE BLOOD: 106 MEQ/L (ref 98–109)
CREATININE, WHOLE BLOOD: 0.7 MG/DL (ref 0.5–1.2)
EOSINOPHILS RELATIVE PERCENT: 4 % (ref 0–4)
ESTRADIOL LEVEL: < 5 PG/ML
FOLLICLE STIMULATING HORMONE: 115.9 MIU/ML (ref 16–160)
GFR, ESTIMATED: > 90 ML/MIN/1.73M2
GLUCOSE, WHOLE BLOOD: 91 MG/DL (ref 70–108)
HCT VFR BLD CALC: 36.5 % (ref 37–47)
HEMOGLOBIN: 12.7 GM/DL (ref 12–16)
IONIZED CALCIUM, WHOLE BLOOD: 1.13 MMOL/L (ref 1.12–1.32)
LUTEINIZING HORMONE: 48.7 MIU/ML (ref 3.3–70.6)
LYMPHOCYTES # BLD: 21 % (ref 15–47)
MCH RBC QN AUTO: 33.4 PG (ref 27–31)
MCHC RBC AUTO-ENTMCNC: 34.9 GM/DL (ref 33–37)
MCV RBC AUTO: 96 FL (ref 81–99)
MONOCYTES: 9 % (ref 0–12)
PDW BLD-RTO: 10.8 % (ref 11.5–14.5)
PLATELET # BLD: 162 THOU/MM3 (ref 130–400)
PMV BLD AUTO: 7.7 FL (ref 7.4–10.4)
POTASSIUM, WHOLE BLOOD: 3.6 MEQ/L (ref 3.5–4.9)
RBC # BLD: 3.81 MILL/MM3 (ref 4.2–5.4)
SEG NEUTROPHILS: 66 % (ref 43–75)
SODIUM, WHOLE BLOOD: 143 MEQ/L (ref 138–146)
TOTAL CO2, WHOLE BLOOD: 24 MEQ/L (ref 23–33)
TOTAL PROTEIN: 6.1 G/DL (ref 6.1–8)
WBC # BLD: 4 THOU/MM3 (ref 4.8–10.8)

## 2018-08-08 PROCEDURE — 85025 COMPLETE CBC W/AUTO DIFF WBC: CPT

## 2018-08-08 PROCEDURE — 82670 ASSAY OF TOTAL ESTRADIOL: CPT

## 2018-08-08 PROCEDURE — G8419 CALC BMI OUT NRM PARAM NOF/U: HCPCS | Performed by: INTERNAL MEDICINE

## 2018-08-08 PROCEDURE — 99213 OFFICE O/P EST LOW 20 MIN: CPT | Performed by: INTERNAL MEDICINE

## 2018-08-08 PROCEDURE — 2580000003 HC RX 258: Performed by: INTERNAL MEDICINE

## 2018-08-08 PROCEDURE — 36591 DRAW BLOOD OFF VENOUS DEVICE: CPT

## 2018-08-08 PROCEDURE — 3017F COLORECTAL CA SCREEN DOC REV: CPT | Performed by: INTERNAL MEDICINE

## 2018-08-08 PROCEDURE — 2709999900 HC NON-CHARGEABLE SUPPLY

## 2018-08-08 PROCEDURE — 83001 ASSAY OF GONADOTROPIN (FSH): CPT

## 2018-08-08 PROCEDURE — 77412 RADIATION TX DELIVERY LVL 3: CPT | Performed by: RADIOLOGY

## 2018-08-08 PROCEDURE — 6360000002 HC RX W HCPCS: Performed by: INTERNAL MEDICINE

## 2018-08-08 PROCEDURE — 77387 GUIDANCE FOR RADJ TX DLVR: CPT | Performed by: RADIOLOGY

## 2018-08-08 PROCEDURE — 99211 OFF/OP EST MAY X REQ PHY/QHP: CPT

## 2018-08-08 PROCEDURE — 77334 RADIATION TREATMENT AID(S): CPT | Performed by: RADIOLOGY

## 2018-08-08 PROCEDURE — 4004F PT TOBACCO SCREEN RCVD TLK: CPT | Performed by: INTERNAL MEDICINE

## 2018-08-08 PROCEDURE — 80076 HEPATIC FUNCTION PANEL: CPT

## 2018-08-08 PROCEDURE — G8427 DOCREV CUR MEDS BY ELIG CLIN: HCPCS | Performed by: INTERNAL MEDICINE

## 2018-08-08 PROCEDURE — 80047 BASIC METABLC PNL IONIZED CA: CPT

## 2018-08-08 PROCEDURE — 83002 ASSAY OF GONADOTROPIN (LH): CPT

## 2018-08-08 RX ORDER — SODIUM CHLORIDE 0.9 % (FLUSH) 0.9 %
20 SYRINGE (ML) INJECTION PRN
Status: DISCONTINUED | OUTPATIENT
Start: 2018-08-08 | End: 2018-08-09 | Stop reason: HOSPADM

## 2018-08-08 RX ORDER — SODIUM CHLORIDE 0.9 % (FLUSH) 0.9 %
10 SYRINGE (ML) INJECTION PRN
Status: DISCONTINUED | OUTPATIENT
Start: 2018-08-08 | End: 2018-08-09 | Stop reason: HOSPADM

## 2018-08-08 RX ORDER — HEPARIN SODIUM (PORCINE) LOCK FLUSH IV SOLN 100 UNIT/ML 100 UNIT/ML
500 SOLUTION INTRAVENOUS PRN
Status: DISCONTINUED | OUTPATIENT
Start: 2018-08-08 | End: 2018-08-09 | Stop reason: HOSPADM

## 2018-08-08 RX ORDER — SODIUM CHLORIDE 0.9 % (FLUSH) 0.9 %
20 SYRINGE (ML) INJECTION PRN
Status: CANCELLED | OUTPATIENT
Start: 2018-08-08

## 2018-08-08 RX ORDER — SODIUM CHLORIDE 0.9 % (FLUSH) 0.9 %
10 SYRINGE (ML) INJECTION PRN
Status: CANCELLED | OUTPATIENT
Start: 2018-08-08

## 2018-08-08 RX ORDER — HEPARIN SODIUM (PORCINE) LOCK FLUSH IV SOLN 100 UNIT/ML 100 UNIT/ML
500 SOLUTION INTRAVENOUS PRN
Status: CANCELLED | OUTPATIENT
Start: 2018-08-08

## 2018-08-08 RX ADMIN — Medication 10 ML: at 10:55

## 2018-08-08 RX ADMIN — Medication 10 ML: at 12:00

## 2018-08-08 RX ADMIN — SODIUM CHLORIDE, PRESERVATIVE FREE 500 UNITS: 5 INJECTION INTRAVENOUS at 12:00

## 2018-08-08 RX ADMIN — Medication 20 ML: at 10:56

## 2018-08-08 NOTE — PROGRESS NOTES
Patient tolerated  Lab draw from [address] [facility] without any complications. Discharge instructions given to patient-verbalizes understanding. Ambulated off unit per self with belongings.

## 2018-08-08 NOTE — PROGRESS NOTES
Lab specimen from [facility] without any problems. Ambulated off unit per self to examination room for appointment with Dr. Minna Alegre escorted by [facility].

## 2018-08-08 NOTE — PROGRESS NOTES
cm. pT3.  Margins negative.      3 of 9 axillary lymph nodes positive for metastatic carcinoma,      pN1a. B. Left level II axillary lymph nodes, dissection:   5 lymph nodes negative for metastatic carcinoma. BREAST BIOMARKERS   Inform HER-2 Dual CHARLENE Nonamplified -  Estrogen Receptor: positive (99%, strong intensity)  Progesterone Receptor: positive (99%, strong intensity)  When she recovered from her surgery, on 2017 she started adjuvant AC. On 2018 she started weekly Taxol and completed on May 30,2018. Interim history on 2018: The patient is toward the end of her radiation treatment. Overall she tolerates it reasonably well. She is working with PT for her peripheral neuropathy.   She presents to medical oncology clinic to discuss post radiation adjuvant hormonal therapy   HPI   Past Medical History:   Diagnosis Date    Acid reflux     Anxiety     Asthma     Breast cancer ([address]     COPD with asthma ([address]     Depression     Depression     Dizziness - light-headed     HX OF:    Emphysema     History of chest pain     History of tinnitus     Hx of blood clots     Joint pain     Numbness and tingling     Osteoarthritis     PONV (postoperative nausea and vomiting)     SOB (shortness of breath) on exertion       Past Surgical History:   Procedure Laterality Date    BLADDER SURGERY  2014    [facility]    NO EVIDENCE OF PULMONARY HTN,NO EVIDENCE OF DD,NORMAL LVF    CARDIOVASCULAR STRESS TEST  2011    EF 60% MILD INFERIOR WALL REVERSIBLE STRESS-INDUCED ISCHEMIA      SECTION  1992    CHOLECYSTECTOMY  2015    CYST REMOVAL Right 2015    [facility] breast [facility]-benign breast cyst     ESOPHAGUS SURGERY  2014    HYSTERECTOMY      INSERTION / REMOVAL / REPLACEMENT VENOUS ACCESS CATHETER Right 2017    RIGHT SIDE SINGLE LUMEN POWERPORT INSERTION performed by Lui Coker, MD at [address] Left 11/7/2017    LEFT MODIFIED RADICAL MASTECTOMY performed by Larene Holstein, MD at [address] TUNNELED VENOUS PORT PLACEMENT Right 12/01/2017    Single Luman Smart Port Insertion- Right side. Family History   Problem Relation Age of Onset    Heart Disease Mother     Hypertension Mother     Diabetes Mother     Heart Disease Father     No Known Problems Sister     No Known Problems Brother       Social History   Substance Use Topics    Smoking status: Current Every Day Smoker     Packs/day: 0.50     Years: 18.00     Types: Cigarettes    Smokeless tobacco: Never Used      Comment: down to less than 0.25 aday    Alcohol use Yes      Comment: SOCAIL      Current Outpatient Prescriptions   Medication Sig Dispense Refill    b complex vitamins capsule Take 1 capsule by mouth daily      ondansetron (ZOFRAN-ODT) 4 MG disintegrating tablet Take 1 tablet by mouth every 8 hours as needed for Nausea or Vomiting 20 tablet 0    prochlorperazine (COMPAZINE) 10 MG tablet Take 1 tablet by mouth every 6 hours as needed (for nausea) 30 tablet 1    lidocaine-prilocaine (EMLA) 2.5-2.5 % cream Apply topically as needed.  30 g 1    albuterol sulfate  (90 Base) MCG/ACT inhaler Inhale 1 puff into the lungs every 6 hours as needed for Wheezing or Shortness of Breath       albuterol (PROVENTIL) (2.5 MG/3ML) 0.083% nebulizer solution Inhale 2.5 mg into the lungs every 4 hours as needed       butalbital-apap-caffeine -40 MG CAPS Take by mouth      ALPRAZolam (XANAX) 0.5 MG tablet Take 0.5 mg by mouth nightly as needed       cyclobenzaprine (FLEXERIL) 5 MG tablet Take 5 mg by mouth 3 times daily as needed       DULERA 200-5 MCG/ACT inhaler Inhale 1 puff into the lungs 2 times daily       venlafaxine (EFFEXOR XR) 75 MG extended release capsule Take 75 mg by mouth daily       meloxicam (MOBIC) 15 MG tablet Take 15 mg by mouth daily       No current facility-administered medications for this visit. No Known Allergies   Health Maintenance   Topic Date Due    HIV screen  03/06/1983    DTaP/Tdap/Td vaccine (1 - Tdap) 03/06/1987    Pneumococcal highest risk (1 of 3 - PCV13) 03/06/1987    Cervical cancer screen  03/06/1989    Lipid screen  03/06/2008    Shingles Vaccine (1 of 2 - 2 Dose Series) 03/06/2018    Colon cancer screen colonoscopy  03/06/2018    Flu vaccine (1) 09/01/2018    Breast cancer screen  10/06/2018        Subjective:   Review of Systems   Constitutional: Negative for activity change, appetite change, chills, fatigue, fever and unexpected weight change. HENT: Negative for dental problem, facial swelling, hearing loss, mouth sores, nosebleeds, postnasal drip, sore throat, trouble swallowing and voice change. Eyes: Negative for redness, itching and visual disturbance. Respiratory: Positive for shortness of breath. Negative for cough, chest tightness and wheezing. Cardiovascular: Negative for chest pain, palpitations and leg swelling. Gastrointestinal: Negative for abdominal distention, abdominal pain, blood in stool, constipation, diarrhea, nausea and vomiting. Genitourinary: Negative for difficulty urinating, dysuria, flank pain, frequency and hematuria. Musculoskeletal: Positive for back pain. Negative for arthralgias, gait problem, joint swelling, myalgias and neck stiffness. Skin: Negative for color change and rash. Neurological: Negative for dizziness, seizures, syncope, speech difficulty, weakness, light-headedness, numbness and headaches. Hematological: Negative for adenopathy. Does not bruise/bleed easily. Psychiatric/Behavioral: Negative for confusion and sleep disturbance. The patient is not nervous/anxious. Objective:   Physical Exam   Constitutional: She is oriented to person, place, and time. She appears well-developed and well-nourished. No distress. HENT:   Head: Normocephalic.

## 2018-08-09 ENCOUNTER — HOSPITAL ENCOUNTER (OUTPATIENT)
Dept: PHYSICAL THERAPY | Age: 48
Setting detail: THERAPIES SERIES
Discharge: HOME | End: 2018-08-09
Payer: COMMERCIAL

## 2018-08-09 PROCEDURE — 97140 MANUAL THERAPY 1/> REGIONS: CPT

## 2018-08-09 PROCEDURE — 97110 THERAPEUTIC EXERCISES: CPT

## 2018-08-09 PROCEDURE — 77412 RADIATION TX DELIVERY LVL 3: CPT | Performed by: RADIOLOGY

## 2018-08-09 PROCEDURE — 77387 GUIDANCE FOR RADJ TX DLVR: CPT | Performed by: RADIOLOGY

## 2018-08-09 ASSESSMENT — PAIN SCALES - GENERAL: PAINLEVEL_OUTOF10: 2

## 2018-08-09 NOTE — PROGRESS NOTES
remained abolished. Note increasing redness at anterior treatment field extending laterally to underarm trunk. Discussed importance of continuing with lotion and non-irritating bras. Patient Education/Home Exercise Program:  Continue with stretches. Response to Treatment:  Patient tolerated treatment well    Progression Toward Functional Goals: Progressing toward goals    Specific Interventions for Next Treatment:  Manual therapy to left arm and chest wall for lymphatic drainage and myfascial release (utilizing PORi protocol), gentle left shoulder stretches progressing to gentle strengthening as tolerated. Lymphedema education and precautions    Plan: Continue established plan of care    Goals:  Short term goals  Time Frame for Short term goals: 5 weeks  Short term goal 1: PREVIOUS GOAL MET, NO NEW GOAL. Short term goal 2: PREVIOUS GOAL MET, NO NEW GOAL. Short term goal 3: PREVIOUS GOAL MET. NO NEW GOAL. Short term goal 4: PREVIOUS GOAL MET. NO NEW GOAL. Long term goals  Time Frame for Long term goals : 5 weeks  Long term goal 1: Pt to demo sustained Brief Fatigue Inventory of <2 with completion of radiation therapy to allow continued participation in daily activities. - GOAL NOT MET, CONTINUE. Score worsened from last progress note of 1.5 to 4.4 this date with initiation of radiation therapyl  Long term goal 2: PREVIOUS GOAL MET, NO NEW GOAL. Long term goal 3: Pt to demo left shoulder strength improved from 2-/5 to 4+/5 for full return to work and household chores. - GOAL NOT MET,CONTINUE. Left shoulder 4/5. 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 - GOAL NOT MET, CONTINUE. 4+/5. Long term goal 5: Pt to demo sustained left arm girth measurments with return to previous level of activities with good understanding of lymphedema risk factors and precautions. - GOAL NOT MET, CONTINUE.  Pt with bilateral increase in arm circumference and notes has not yet returned to previous level of activities.          Smooth Cruz, CHAR[place], [place] [id number] 8/9/2018

## 2018-08-13 PROCEDURE — 77387 GUIDANCE FOR RADJ TX DLVR: CPT | Performed by: RADIOLOGY

## 2018-08-13 PROCEDURE — 77336 RADIATION PHYSICS CONSULT: CPT | Performed by: RADIOLOGY

## 2018-08-13 PROCEDURE — 77412 RADIATION TX DELIVERY LVL 3: CPT | Performed by: RADIOLOGY

## 2018-08-14 PROCEDURE — 77321 SPECIAL TELETX PORT PLAN: CPT | Performed by: RADIOLOGY

## 2018-08-14 PROCEDURE — 77387 GUIDANCE FOR RADJ TX DLVR: CPT | Performed by: RADIOLOGY

## 2018-08-14 PROCEDURE — 77412 RADIATION TX DELIVERY LVL 3: CPT | Performed by: RADIOLOGY

## 2018-08-15 PROCEDURE — 77412 RADIATION TX DELIVERY LVL 3: CPT | Performed by: RADIOLOGY

## 2018-08-15 PROCEDURE — 77387 GUIDANCE FOR RADJ TX DLVR: CPT | Performed by: RADIOLOGY

## 2018-08-16 ENCOUNTER — HOSPITAL ENCOUNTER (OUTPATIENT)
Dept: PHYSICAL THERAPY | Age: 48
Setting detail: THERAPIES SERIES
Discharge: HOME | End: 2018-08-16
Payer: COMMERCIAL

## 2018-08-16 PROCEDURE — 77387 GUIDANCE FOR RADJ TX DLVR: CPT | Performed by: RADIOLOGY

## 2018-08-16 PROCEDURE — 97140 MANUAL THERAPY 1/> REGIONS: CPT

## 2018-08-16 PROCEDURE — 77412 RADIATION TX DELIVERY LVL 3: CPT | Performed by: RADIOLOGY

## 2018-08-16 PROCEDURE — 97110 THERAPEUTIC EXERCISES: CPT

## 2018-08-16 ASSESSMENT — PAIN SCALES - GENERAL: PAINLEVEL_OUTOF10: 3

## 2018-08-17 ENCOUNTER — TELEPHONE (OUTPATIENT)
Dept: ONCOLOGY | Age: 48
End: 2018-08-17

## 2018-08-17 PROCEDURE — 77332 RADIATION TREATMENT AID(S): CPT | Performed by: RADIOLOGY

## 2018-08-17 PROCEDURE — 77412 RADIATION TX DELIVERY LVL 3: CPT | Performed by: RADIOLOGY

## 2018-08-17 PROCEDURE — 77280 THER RAD SIMULAJ FIELD SMPL: CPT | Performed by: RADIOLOGY

## 2018-08-20 ENCOUNTER — HOSPITAL ENCOUNTER (OUTPATIENT)
Dept: PHYSICAL THERAPY | Age: 48
Setting detail: THERAPIES SERIES
Discharge: HOME | End: 2018-08-20
Payer: COMMERCIAL

## 2018-08-20 PROCEDURE — 77412 RADIATION TX DELIVERY LVL 3: CPT | Performed by: RADIOLOGY

## 2018-08-21 PROCEDURE — 77412 RADIATION TX DELIVERY LVL 3: CPT | Performed by: RADIOLOGY

## 2018-08-22 PROCEDURE — 77412 RADIATION TX DELIVERY LVL 3: CPT | Performed by: RADIOLOGY

## 2018-08-23 ENCOUNTER — APPOINTMENT (OUTPATIENT)
Dept: PHYSICAL THERAPY | Age: 48
End: 2018-08-23
Payer: COMMERCIAL

## 2018-08-23 PROCEDURE — 77412 RADIATION TX DELIVERY LVL 3: CPT | Performed by: RADIOLOGY

## 2018-08-27 PROCEDURE — 77336 RADIATION PHYSICS CONSULT: CPT | Performed by: RADIOLOGY

## 2018-08-28 DIAGNOSIS — C50.912 BREAST CANCER METASTASIZED TO AXILLARY LYMPH NODE, LEFT (HCC): Primary | ICD-10-CM

## 2018-08-28 DIAGNOSIS — C77.3 BREAST CANCER METASTASIZED TO AXILLARY LYMPH NODE, LEFT (HCC): Primary | ICD-10-CM

## 2018-08-29 ENCOUNTER — OFFICE VISIT (OUTPATIENT)
Dept: ONCOLOGY | Age: 48
End: 2018-08-29
Payer: COMMERCIAL

## 2018-08-29 ENCOUNTER — HOSPITAL ENCOUNTER (OUTPATIENT)
Dept: INFUSION THERAPY | Age: 48
Discharge: HOME | End: 2018-08-29
Payer: COMMERCIAL

## 2018-08-29 VITALS
HEART RATE: 85 BPM | RESPIRATION RATE: 16 BRPM | TEMPERATURE: 97.9 F | OXYGEN SATURATION: 98 % | DIASTOLIC BLOOD PRESSURE: 68 MMHG | SYSTOLIC BLOOD PRESSURE: 102 MMHG

## 2018-08-29 VITALS
DIASTOLIC BLOOD PRESSURE: 68 MMHG | HEART RATE: 85 BPM | OXYGEN SATURATION: 98 % | RESPIRATION RATE: 16 BRPM | WEIGHT: 175.8 LBS | SYSTOLIC BLOOD PRESSURE: 102 MMHG | TEMPERATURE: 97.9 F | HEIGHT: 66 IN | BODY MASS INDEX: 28.25 KG/M2

## 2018-08-29 DIAGNOSIS — C50.912 BREAST CANCER METASTASIZED TO AXILLARY LYMPH NODE, LEFT (HCC): Primary | ICD-10-CM

## 2018-08-29 DIAGNOSIS — C77.3 BREAST CANCER METASTASIZED TO AXILLARY LYMPH NODE, LEFT (HCC): Primary | ICD-10-CM

## 2018-08-29 DIAGNOSIS — G62.0 DRUG-INDUCED POLYNEUROPATHY (HCC): ICD-10-CM

## 2018-08-29 DIAGNOSIS — Z90.12 STATUS POST MASTECTOMY, LEFT: ICD-10-CM

## 2018-08-29 DIAGNOSIS — Z92.3 STATUS POST RADIATION THERAPY WITHIN LAST FOUR WEEKS: ICD-10-CM

## 2018-08-29 DIAGNOSIS — C77.3 BREAST CANCER METASTASIZED TO AXILLARY LYMPH NODE, LEFT (HCC): ICD-10-CM

## 2018-08-29 DIAGNOSIS — C50.912 BREAST CANCER METASTASIZED TO AXILLARY LYMPH NODE, LEFT (HCC): ICD-10-CM

## 2018-08-29 LAB
ALBUMIN SERPL-MCNC: 3.9 G/DL (ref 3.5–5.1)
ALP BLD-CCNC: 60 U/L (ref 38–126)
ALT SERPL-CCNC: 11 U/L (ref 11–66)
AST SERPL-CCNC: 13 U/L (ref 5–40)
BASINOPHIL, AUTOMATED: 0 % (ref 0–3)
BILIRUB SERPL-MCNC: 0.5 MG/DL (ref 0.3–1.2)
BILIRUBIN DIRECT: < 0.2 MG/DL (ref 0–0.3)
BUN, WHOLE BLOOD: 18 MG/DL (ref 8–26)
CHLORIDE, WHOLE BLOOD: 105 MEQ/L (ref 98–109)
CHOLESTEROL, TOTAL: 192 MG/DL (ref 100–199)
CREATININE, WHOLE BLOOD: 0.8 MG/DL (ref 0.5–1.2)
EOSINOPHILS RELATIVE PERCENT: 5 % (ref 0–4)
ESTRADIOL LEVEL: < 5 PG/ML
FOLLICLE STIMULATING HORMONE: 110.9 MIU/ML (ref 16–160)
GFR, ESTIMATED: 81 ML/MIN/1.73M2
GLUCOSE, WHOLE BLOOD: 105 MG/DL (ref 70–108)
HCT VFR BLD CALC: 37.3 % (ref 37–47)
HDLC SERPL-MCNC: 45 MG/DL
HEMOGLOBIN: 12.9 GM/DL (ref 12–16)
IONIZED CALCIUM, WHOLE BLOOD: 1.13 MMOL/L (ref 1.12–1.32)
LDL CHOLESTEROL CALCULATED: 135 MG/DL
LYMPHOCYTES # BLD: 22 % (ref 15–47)
MCH RBC QN AUTO: 32.9 PG (ref 27–31)
MCHC RBC AUTO-ENTMCNC: 34.7 GM/DL (ref 33–37)
MCV RBC AUTO: 95 FL (ref 81–99)
MONOCYTES: 9 % (ref 0–12)
PDW BLD-RTO: 11.5 % (ref 11.5–14.5)
PLATELET # BLD: 156 THOU/MM3 (ref 130–400)
PMV BLD AUTO: 7.8 FL (ref 7.4–10.4)
POTASSIUM, WHOLE BLOOD: 3.7 MEQ/L (ref 3.5–4.9)
RBC # BLD: 3.92 MILL/MM3 (ref 4.2–5.4)
SEG NEUTROPHILS: 63 % (ref 43–75)
SODIUM, WHOLE BLOOD: 142 MEQ/L (ref 138–146)
TOTAL CO2, WHOLE BLOOD: 24 MEQ/L (ref 23–33)
TOTAL PROTEIN: 6.4 G/DL (ref 6.1–8)
TRIGL SERPL-MCNC: 61 MG/DL (ref 0–199)
WBC # BLD: 3.6 THOU/MM3 (ref 4.8–10.8)

## 2018-08-29 PROCEDURE — G8428 CUR MEDS NOT DOCUMENT: HCPCS | Performed by: INTERNAL MEDICINE

## 2018-08-29 PROCEDURE — 36591 DRAW BLOOD OFF VENOUS DEVICE: CPT

## 2018-08-29 PROCEDURE — 80047 BASIC METABLC PNL IONIZED CA: CPT

## 2018-08-29 PROCEDURE — 80061 LIPID PANEL: CPT

## 2018-08-29 PROCEDURE — 82670 ASSAY OF TOTAL ESTRADIOL: CPT

## 2018-08-29 PROCEDURE — 4004F PT TOBACCO SCREEN RCVD TLK: CPT | Performed by: INTERNAL MEDICINE

## 2018-08-29 PROCEDURE — 3017F COLORECTAL CA SCREEN DOC REV: CPT | Performed by: INTERNAL MEDICINE

## 2018-08-29 PROCEDURE — G8419 CALC BMI OUT NRM PARAM NOF/U: HCPCS | Performed by: INTERNAL MEDICINE

## 2018-08-29 PROCEDURE — 83001 ASSAY OF GONADOTROPIN (FSH): CPT

## 2018-08-29 PROCEDURE — 6360000002 HC RX W HCPCS: Performed by: INTERNAL MEDICINE

## 2018-08-29 PROCEDURE — 80076 HEPATIC FUNCTION PANEL: CPT

## 2018-08-29 PROCEDURE — 2709999900 HC NON-CHARGEABLE SUPPLY

## 2018-08-29 PROCEDURE — 99211 OFF/OP EST MAY X REQ PHY/QHP: CPT

## 2018-08-29 PROCEDURE — 2580000003 HC RX 258: Performed by: INTERNAL MEDICINE

## 2018-08-29 PROCEDURE — 85025 COMPLETE CBC W/AUTO DIFF WBC: CPT

## 2018-08-29 PROCEDURE — 99213 OFFICE O/P EST LOW 20 MIN: CPT | Performed by: INTERNAL MEDICINE

## 2018-08-29 RX ORDER — HEPARIN SODIUM (PORCINE) LOCK FLUSH IV SOLN 100 UNIT/ML 100 UNIT/ML
500 SOLUTION INTRAVENOUS PRN
Status: CANCELLED | OUTPATIENT
Start: 2018-08-29

## 2018-08-29 RX ORDER — SODIUM CHLORIDE 0.9 % (FLUSH) 0.9 %
10 SYRINGE (ML) INJECTION PRN
Status: CANCELLED | OUTPATIENT
Start: 2018-08-29

## 2018-08-29 RX ORDER — SODIUM CHLORIDE 0.9 % (FLUSH) 0.9 %
10 SYRINGE (ML) INJECTION PRN
Status: DISCONTINUED | OUTPATIENT
Start: 2018-08-29 | End: 2018-08-30 | Stop reason: HOSPADM

## 2018-08-29 RX ORDER — SODIUM CHLORIDE 0.9 % (FLUSH) 0.9 %
20 SYRINGE (ML) INJECTION PRN
Status: DISCONTINUED | OUTPATIENT
Start: 2018-08-29 | End: 2018-08-30 | Stop reason: HOSPADM

## 2018-08-29 RX ORDER — SODIUM CHLORIDE 0.9 % (FLUSH) 0.9 %
20 SYRINGE (ML) INJECTION PRN
Status: CANCELLED | OUTPATIENT
Start: 2018-08-29

## 2018-08-29 RX ORDER — HEPARIN SODIUM (PORCINE) LOCK FLUSH IV SOLN 100 UNIT/ML 100 UNIT/ML
500 SOLUTION INTRAVENOUS PRN
Status: DISCONTINUED | OUTPATIENT
Start: 2018-08-29 | End: 2018-08-30 | Stop reason: HOSPADM

## 2018-08-29 RX ADMIN — Medication 10 ML: at 09:00

## 2018-08-29 RX ADMIN — Medication 500 UNITS: at 10:25

## 2018-08-29 RX ADMIN — Medication 10 ML: at 10:25

## 2018-08-29 RX ADMIN — Medication 20 ML: at 09:01

## 2018-08-29 ASSESSMENT — ENCOUNTER SYMPTOMS
BACK PAIN: 1
ABDOMINAL DISTENTION: 0
WHEEZING: 0
BLOOD IN STOOL: 0
COLOR CHANGE: 0
FACIAL SWELLING: 0
EYE REDNESS: 0
SORE THROAT: 0
NAUSEA: 0
CHEST TIGHTNESS: 0
SHORTNESS OF BREATH: 0
EYE ITCHING: 0
COUGH: 1
TROUBLE SWALLOWING: 0
ABDOMINAL PAIN: 0
CONSTIPATION: 0
DIARRHEA: 0
VOMITING: 0
VOICE CHANGE: 0

## 2018-08-29 ASSESSMENT — PAIN DESCRIPTION - FREQUENCY: FREQUENCY: CONTINUOUS

## 2018-08-29 ASSESSMENT — PAIN SCALES - GENERAL: PAINLEVEL_OUTOF10: 2

## 2018-08-29 ASSESSMENT — PAIN DESCRIPTION - DESCRIPTORS: DESCRIPTORS: ACHING;CONSTANT

## 2018-08-29 ASSESSMENT — PAIN DESCRIPTION - PAIN TYPE: TYPE: CHRONIC PAIN

## 2018-08-29 ASSESSMENT — PAIN DESCRIPTION - LOCATION: LOCATION: GENERALIZED

## 2018-08-29 NOTE — PLAN OF CARE
Problem: Infection - Central Venous Catheter-Associated Bloodstream Infection:  Intervention: Infection risk assessment  Instructed to monitor for signs/symptoms of infection at [address] [facility] [address] [facility] and call MD if problems develop. Goal: Will show no infection signs and symptoms  Will show no infection signs and symptoms   Outcome: Met This Shift  Mediport site with no redness or warmth. Skin over port site intact with no signs of breakdown noted. Patient verbalizes signs/symptoms of port infection and when to notify the physician. Problem: Discharge Planning  Intervention: Interaction with patient/family and care team  Discuss understanding of discharge instructions,follow-up appointments, and when to call the physician. Goal: Knowledge of discharge instructions  Knowledge of discharge instructions     Outcome: Met This Shift  Verbalized understanding of discharge instructions, follow-up appointments, and when to call the physician. Comments: Care plan reviewed with patient . Patient  verbalize understanding of the plan of care and contribute to goal setting.

## 2018-08-29 NOTE — PLAN OF CARE
Problem: Pain:  Intervention: Promote participation in pain management plan  Patient encouraged to take prescribed pain medications and call Physician if pain is not controlled. Goal: Control of chronic pain  Control of chronic pain   Outcome: Met This Shift  Patient states her chronic pain is tolerable with current medication. Comments: Care plan reviewed with patient. Patient  verbalize understanding of the plan of care and contribute to goal setting.

## 2018-08-29 NOTE — PROGRESS NOTES
[facility] PROFESSIONAL SERVS  ONCOLOGY SPECIALISTS OF [facility]  [address]  [place] [address]  [address] [address]  Dept: [phone]  Dept Fax: [phone]: [phone]     Visit Date: 8/29/2018     Leidy Ron is a 46 y.o. female who presents today for:   Chief Complaint   Patient presents with    Follow-up     METS BREAST CA       HPI:   Gerard Delaney is a 57-year-old female with invasive ductal carcinoma of her left breast. The patient noted a mass in the upper aspect of her left breast, at first she thought it was a cyst which she has had before. When it persisted, she elected to have further evaluation. She did undergo a diagnostic mammogram at [facility] AT THE [facility] in [place], [place]. It showed a 3.8 x 2.4 x 2.7 cm malignant-appearing spiculated mass within the left breast at 11:00 position. Evaluation of the left axilla with US showed suspicious appearing lymph node with increased cortical thickening and replacement of fatty hilum. On October 9, 2017, she underwent a left breast mass and left lymph node ultrasound-guided core biopsy. The final path report showed an invasive ductal carcinoma, ER positive at 95%, MD positive at 95%, and a HER-2/breezy by IHC was 2+. The FISH test was still equivocal.  HER-2/CEP17 ratio was 1.7, however the average HER-2 copy number was 4. She met with the surgical oncologists and medical oncologists at the [facility], however she decided to receive care closer to her home. The patient had metastatic workup: CT of the chest, abdomen and bone scan were negative for evidence of metastatic disease. On November 7, 2017 she underwent right breast mastectomy with axillary lymph node dissection. Final pathology report showed: FINAL DIAGNOSIS:  A.  Left breast with portion of axillary contents, mastectomy:      Invasive ductal carcinoma with lobular features involving nipple      and all 4 quadrants of breast.  Maximum tumor dimension estimated      6.8 cm. pT3.  Margins negative.      3 of 9 axillary lymph nodes positive for metastatic carcinoma,      pN1a. B. Left level II axillary lymph nodes, dissection:   5 lymph nodes negative for metastatic carcinoma. BREAST BIOMARKERS   Inform HER-2 Dual CHARLENE Nonamplified -  Estrogen Receptor: positive (99%, strong intensity)  Progesterone Receptor: positive (99%, strong intensity)  When she recovered from her surgery, on 2017 she started adjuvant AC. On 2018 she started weekly Taxol and completed on May 30,2018. She completed with mastectomy radiation treatment on 2018. Interim history on 2018: The patient has completed radiation treatment 6 days ago. Overall she tolerated it well, however she reports grade 2 fatigue. She has been working with PT for her peripheral neuropathy. She reports significant improvement practically she denies any numbness tingling in her fingers and toes.   She presents to medical oncology clinic to discuss post radiation adjuvant hormonal therapy   HPI   Past Medical History:   Diagnosis Date    Acid reflux     Anxiety     Asthma     Breast cancer ([address]     COPD with asthma ([address]     Depression     Depression     Dizziness - light-headed     HX OF:    Emphysema     History of chest pain     History of tinnitus     Hx of blood clots     Joint pain     Numbness and tingling     Osteoarthritis     PONV (postoperative nausea and vomiting)     SOB (shortness of breath) on exertion       Past Surgical History:   Procedure Laterality Date    BLADDER SURGERY      BREAST BIOPSY  10/06/2017    [facility] CATHETERIZATION  2011    NO EVIDENCE OF PULMONARY HTN,NO EVIDENCE OF DD,NORMAL LVF    CARDIOVASCULAR STRESS TEST  2011    EF 60% MILD INFERIOR WALL REVERSIBLE STRESS-INDUCED ISCHEMIA      SECTION  1992    CHOLECYSTECTOMY  2015    CYST REMOVAL Right 2015    rt breast [facility]-benign breast

## 2018-08-29 NOTE — PROGRESS NOTES
Patient tolerated  [address] [facility] without any complications. Discharge instructions given to patient-verbalizes understanding. Ambulated off unit per self with belongings.

## 2018-08-29 NOTE — PROGRESS NOTES
Lab specimen obtained from [facility] without any problems.  Ambulated off unit to examination room for appointment with Corina Rodriguez escorted by [facility]

## 2018-09-06 ENCOUNTER — HOSPITAL ENCOUNTER (OUTPATIENT)
Dept: PHYSICAL THERAPY | Age: 48
Setting detail: THERAPIES SERIES
Discharge: HOME | End: 2018-09-06
Payer: COMMERCIAL

## 2018-09-06 PROCEDURE — 97530 THERAPEUTIC ACTIVITIES: CPT

## 2018-09-06 PROCEDURE — 97140 MANUAL THERAPY 1/> REGIONS: CPT

## 2018-09-06 PROCEDURE — 97110 THERAPEUTIC EXERCISES: CPT

## 2018-09-06 ASSESSMENT — PAIN SCALES - GENERAL: PAINLEVEL_OUTOF10: 1

## 2018-09-06 NOTE — PROGRESS NOTES
[facility]  OUTPATIENT REHABILITATION  PHYSICAL THERAPY   ONCOLOGY REHABILITATION   PROGRESS NOTE  Time In: 1000  Time Out: 1100  Total timed code minutes: 60  Total treatment minutes: 60    Date: 2018  Patient Name: Ronnell Jones        CSN: [id number]   : 1970  (46 y.o.)  Gender: female   Referring Practitioner: Dr. Radha Orta  Diagnosis: I57.177 Breast cancer metastasized to axillary lymph node, left  Additional Pertinent Hx: COPD, OA, smoker, asthma, diagnosed with ER/OR+, HER2- invasive ductal carcinoma on 10/9/17 with mastectomy on 17 with SLNB 3/14 +, will be undergoing 4 cycles of Adriamycin and cytoxan with 12 weekly cycles of Taxol starting 17, then radiation and then hormonal therapy       Insurance:  [employer] Medicaid effective 18 - 30 visits per calendar year, used 14 visits under [address] therefore allowed 40 total    Total # Visits Approved: 40 Total # Visits to Date: 21   Certification Date:  Progress Note Counter:0/10 for PN, last PN performed on 18 at visit #21   Date of Physician Follow-Up: Dr. Fish Aguiar  Chart Reviewed: Yes     Subjective: Subjective: States healing well from radiation. Mild tightness of left shoulder. Has been compliant with HEP however not as frequent as prescribed. Pain: Pain Assessment  Patient Currently in Pain: Yes  Pain Level: 1 (left lateral flank and posterior medial upper arm)    Manual Treatments/Provider Interaction: Utilized PORi protocol to left upper quarter for gentle manual lymphatic drainage and myofascial release x30 minutes. Note tightness at left lower rib cage with gentle shoulder flexion stretches completed with manual techniques to loosen fibrotic changes from radiation. 6-minute walk test: 860 feet with patient fatigued by end of duration    Brief Fatigue Inventory   Throughout our lives, most of us have times when we feel very tired or fatigued.  Have you felt unusually tired or fatigued in the Encouraged pt to return to pec minor and major stretches due to complaints of tightness through lower left rib cage. Discussed progress to date and pt complaints of increased fatigue and reduced stamina since completing radiation. Recommendation to continue with Oncology Rehab to address these issues with patient in agreement. Response to Treatment:  Patient tolerated treatment well    Progression Toward Functional Goals: Progressing toward goals    Specific Interventions for Next Treatment:  Manual therapy to left arm and chest wall for lymphatic drainage and myfascial release (utilizing PORi protocol), gentle left shoulder stretches progressing to gentle strengthening as tolerated. Lymphedema education and precautions    Plan: Continue plan of care at 2x per week for up to 8 weeks. Goals:  Short term goals  Time Frame for Short term goals: 8 weeks  Short term goal 1: PREVIOUS GOAL MET, NO NEW GOAL. Short term goal 2: PREVIOUS GOAL MET, NO NEW GOAL. Short term goal 3: PREVIOUS GOAL MET. NO NEW GOAL. Short term goal 4: PREVIOUS GOAL MET. NO NEW GOAL. Long term goals  Time Frame for Long term goals : 8 weeks  Long term goal 1: Pt to demo sustained Brief Fatigue Inventory of <2 with completion of radiation therapy to allow continued participation in daily activities. - GOAL NOT MET, CONTINUE. Scored 4.9 this date. Long term goal 2: PREVIOUS GOAL MET, NO NEW GOAL. Long term goal 3: Pt to demo left shoulder strength improved from 2-/5 to 4+/5 for full return to work and household chores. - GOAL MET. 4+/5. 6-minute walk test completed and covered 860 feet. Normal for women in their 60's is 5089-6466 feet. NEW GOAL: Pt to improve 6-minute walk test from 860 feet t0 1200 feet for improved ease with participation in grandchildren's activities. 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 - GOAL MET. 5/5 NO NEW GOAL.   Long term

## 2018-09-10 ENCOUNTER — CLINICAL DOCUMENTATION (OUTPATIENT)
Dept: RADIATION ONCOLOGY | Age: 48
End: 2018-09-10

## 2018-09-10 NOTE — PROGRESS NOTES
[facility]Y [address] [facility], [address]  Ph. [phone]  Fax [phone]      PATIENT: Adrienne Delgadillo  : 1970  MRN: [id number]  DATE: 09/10/18      DIAGNOSIS: C50.812 -- malignant neoplasm of overlapping sites of the left female breast, invasive ductal carcinoma with lobular features, pT3 pN1a M0, Stage IIIA, ER+, ND+, Her2-. Treatment Course Number: 1    Treatment Site (s) Modality Dose (cGy From To Fractions/  Elapsed Days   Left Chest Wall 6MV photons 5040 cGy 2018   LSC + Axillary Nodes 15MV photons 5040 cGy 2018   Left BreastBed/ Mastectomy Scar Boost 6Me-V electrons 1000 cGy 2018     Cumulative Dose to Left Breast Bed/Mastectomy Scar: 6040 cGy  Cumulative Dose to Regional Lymphatics: 5040 cGy  Treatment Modifiers: Chest Wall and Regional Lymphatics: 3 Dimensional Conformal Therapy; custom MLC blocking; field in field technique; dual-photon energy; custom Vac fix immobilization. Mastectomy Scar/Breast Bed Boost: Appositional electron particle Beam therapy; custom Cerrobend blocking; custom Vac fix immobilization.       HISTORY OF PRESENT ILLNESS:  Isela Serra is a 38-year-old woman with invasive ductal carcinoma of the left breast. She noted a mass in the upper aspect of her left breast, and at first she thought it was a cyst which she has had before. When it persisted, she elected to have further evaluation. She did undergo a diagnostic mammogram at [facility] in [place], [place]. It showed a 3.8 x 2.4 x 2.7 cm malignant-appearing spiculated mass within the left breast at 11:00 position. Ultrasound evaluation of the left axilla showed a suspicious appearing lymph node with cortical thickening and replacement of the fatty hilum.  On 2017, she underwent ultrasound-guided core needle biopsies of the left breast mass and suspicious lymph node. The final pathology report showed an invasive ductal carcinoma, ER positive (95%), MA positive (95%), and  HER-2/breezy 2+ by IHC; FISH was equivocal.  The HER-2/CEP17 ratio was 1.7,with an average Her2 copy number of 4. She met with the surgical oncologists and medical oncologists at [facility], but wished to receive her care closer to home. Staging studies were negative for evidence of metastatic disease. In 2017, she underwent  mastectomy with axillary lymph node dissection, followed by adjuvant chemotherapy with AC x 4 and weekly Taxol. Odette Feliciano was  seen for evaluation and discussion regarding the role of radiation therapy in the management of her breast cancer. She was agreeable to the recommended post-mastectomy radiation therapy. Pain Ratin-3/10 (local tenderness)  ECOG Performance Status: 0      Treatment Tolerance/Response:   Ricardo Charlton tolerated her radiation therapy as expected. She developed a localized soft tissue puffiness and associated tenderness, and also developed moderate erythema. Her local side effects responded appropriately to topical management. She did not have any unexpected side effects related to therapy, but did develop a small area of brisk erythema within a skin fold that was present adjacent to the axilla. Systemic Therapy: AC x 4 + Weekly Taxol x 12      Follow Up Plan:   Ricardo Charlton received post-treatment instructions regarding skin care, activity, etc.  She is scheduled to return for a checkup in a month. As usual, she was instructed to call and arrange for an earlier appointment if she has any problems, questions or concerns. She will continue care in hematology/oncology, and with her other physicians.       Electronically signed by Cayla Wu MD  Radiation Oncology      CC: Darian Wang; Nathan Lewis; Elidia Ramirez   ACC: [facility] [id number] [address] Tumor Registry; [address] [facility] Tumor Registry      This document was created using a voice-recognition program.  Computer generated transcription errors may be present.

## 2018-09-12 ENCOUNTER — HOSPITAL ENCOUNTER (OUTPATIENT)
Dept: PHYSICAL THERAPY | Age: 48
Setting detail: THERAPIES SERIES
Discharge: HOME | End: 2018-09-12
Payer: COMMERCIAL

## 2018-09-12 NOTE — PROGRESS NOTES
[facility]  PHYSICAL THERAPY MISSED TREATMENT NOTE  OUTPATIENT REHABILITATION    Date: 2018  Patient Name: Baltazar Sheffield        MRN: [id number]   : 1970  (46 y.o.)  Gender: female   Referring Practitioner: Dr. Jc Keenan  Diagnosis: C66.734 Breast cancer metastasized to axillary lymph node, left    PT Visit Information  Onset Date: 17  PT Insurance Information: [employer] Medicaid effective 18 - 30 visits per calendar year, used 14 visits under [place] therefore allowed 40 total   Total # of Visits Approved: 40  Plan of Care/Certification Expiration Date: 18  No Show: 2    REASON FOR MISSED TREATMENT:  No Show/No Call. Patient was called with next scheduled appointment. Ousmane Palumbo PTA [id number]    Addendum: Voicemail left at 615am on 18 stating pt was unable to attend session secondary to family emergency.   Smooth Cruz, CHAR[place], [place] [id number] 2018

## 2018-09-13 DIAGNOSIS — C77.3 BREAST CANCER METASTASIZED TO AXILLARY LYMPH NODE, LEFT (HCC): Primary | ICD-10-CM

## 2018-09-13 DIAGNOSIS — C50.912 BREAST CANCER METASTASIZED TO AXILLARY LYMPH NODE, LEFT (HCC): Primary | ICD-10-CM

## 2018-09-14 ENCOUNTER — HOSPITAL ENCOUNTER (OUTPATIENT)
Dept: INFUSION THERAPY | Age: 48
Discharge: HOME | End: 2018-09-14
Payer: COMMERCIAL

## 2018-09-14 ENCOUNTER — OFFICE VISIT (OUTPATIENT)
Dept: ONCOLOGY | Age: 48
End: 2018-09-14
Payer: COMMERCIAL

## 2018-09-14 VITALS
HEIGHT: 66 IN | WEIGHT: 174.8 LBS | TEMPERATURE: 98.3 F | SYSTOLIC BLOOD PRESSURE: 122 MMHG | DIASTOLIC BLOOD PRESSURE: 64 MMHG | BODY MASS INDEX: 28.09 KG/M2 | OXYGEN SATURATION: 95 % | RESPIRATION RATE: 18 BRPM | HEART RATE: 87 BPM

## 2018-09-14 VITALS
TEMPERATURE: 98.3 F | HEART RATE: 87 BPM | RESPIRATION RATE: 18 BRPM | SYSTOLIC BLOOD PRESSURE: 122 MMHG | OXYGEN SATURATION: 95 % | DIASTOLIC BLOOD PRESSURE: 64 MMHG

## 2018-09-14 DIAGNOSIS — Z78.0 POSTMENOPAUSAL: Primary | ICD-10-CM

## 2018-09-14 DIAGNOSIS — C77.3 BREAST CANCER METASTASIZED TO AXILLARY LYMPH NODE, LEFT (HCC): ICD-10-CM

## 2018-09-14 DIAGNOSIS — Z79.811 PROPHYLACTIC USE OF LETROZOLE (FEMARA): ICD-10-CM

## 2018-09-14 DIAGNOSIS — Z92.3 STATUS POST RADIATION THERAPY WITHIN LAST FOUR WEEKS: ICD-10-CM

## 2018-09-14 DIAGNOSIS — Z17.0 MALIGNANT NEOPLASM OF LEFT BREAST IN FEMALE, ESTROGEN RECEPTOR POSITIVE, UNSPECIFIED SITE OF BREAST (HCC): ICD-10-CM

## 2018-09-14 DIAGNOSIS — C50.912 MALIGNANT NEOPLASM OF LEFT BREAST IN FEMALE, ESTROGEN RECEPTOR POSITIVE, UNSPECIFIED SITE OF BREAST (HCC): ICD-10-CM

## 2018-09-14 DIAGNOSIS — C50.912 BREAST CANCER METASTASIZED TO AXILLARY LYMPH NODE, LEFT (HCC): ICD-10-CM

## 2018-09-14 LAB
ALBUMIN SERPL-MCNC: 3.9 G/DL (ref 3.5–5.1)
ALP BLD-CCNC: 61 U/L (ref 38–126)
ALT SERPL-CCNC: 12 U/L (ref 11–66)
AST SERPL-CCNC: 12 U/L (ref 5–40)
BASINOPHIL, AUTOMATED: 0 % (ref 0–3)
BILIRUB SERPL-MCNC: 0.2 MG/DL (ref 0.3–1.2)
BILIRUBIN DIRECT: < 0.2 MG/DL (ref 0–0.3)
BUN, WHOLE BLOOD: 11 MG/DL (ref 8–26)
CHLORIDE, WHOLE BLOOD: 106 MEQ/L (ref 98–109)
CHOLESTEROL, TOTAL: 184 MG/DL (ref 100–199)
CREATININE, WHOLE BLOOD: 0.7 MG/DL (ref 0.5–1.2)
EOSINOPHILS RELATIVE PERCENT: 4 % (ref 0–4)
ESTRADIOL LEVEL: < 5 PG/ML
FOLLICLE STIMULATING HORMONE: 114.6 MIU/ML (ref 16–160)
GFR, ESTIMATED: > 90 ML/MIN/1.73M2
GLUCOSE, WHOLE BLOOD: 98 MG/DL (ref 70–108)
HCT VFR BLD CALC: 38.2 % (ref 37–47)
HDLC SERPL-MCNC: 44 MG/DL
HEMOGLOBIN: 13.1 GM/DL (ref 12–16)
IONIZED CALCIUM, WHOLE BLOOD: 1.19 MMOL/L (ref 1.12–1.32)
LDL CHOLESTEROL CALCULATED: 119 MG/DL
LYMPHOCYTES # BLD: 20 % (ref 15–47)
MCH RBC QN AUTO: 32.6 PG (ref 27–31)
MCHC RBC AUTO-ENTMCNC: 34.2 GM/DL (ref 33–37)
MCV RBC AUTO: 95 FL (ref 81–99)
MONOCYTES: 7 % (ref 0–12)
PDW BLD-RTO: 11.8 % (ref 11.5–14.5)
PLATELET # BLD: 165 THOU/MM3 (ref 130–400)
PMV BLD AUTO: 7.4 FL (ref 7.4–10.4)
POTASSIUM, WHOLE BLOOD: 3.3 MEQ/L (ref 3.5–4.9)
RBC # BLD: 4.01 MILL/MM3 (ref 4.2–5.4)
SEG NEUTROPHILS: 69 % (ref 43–75)
SODIUM, WHOLE BLOOD: 144 MEQ/L (ref 138–146)
TOTAL CO2, WHOLE BLOOD: 25 MEQ/L (ref 23–33)
TOTAL PROTEIN: 6.3 G/DL (ref 6.1–8)
TRIGL SERPL-MCNC: 105 MG/DL (ref 0–199)
WBC # BLD: 4.1 THOU/MM3 (ref 4.8–10.8)

## 2018-09-14 PROCEDURE — 6360000002 HC RX W HCPCS: Performed by: INTERNAL MEDICINE

## 2018-09-14 PROCEDURE — 2580000003 HC RX 258: Performed by: INTERNAL MEDICINE

## 2018-09-14 PROCEDURE — 99213 OFFICE O/P EST LOW 20 MIN: CPT | Performed by: INTERNAL MEDICINE

## 2018-09-14 PROCEDURE — 2709999900 HC NON-CHARGEABLE SUPPLY

## 2018-09-14 PROCEDURE — G8427 DOCREV CUR MEDS BY ELIG CLIN: HCPCS | Performed by: INTERNAL MEDICINE

## 2018-09-14 PROCEDURE — 99211 OFF/OP EST MAY X REQ PHY/QHP: CPT

## 2018-09-14 PROCEDURE — 80047 BASIC METABLC PNL IONIZED CA: CPT

## 2018-09-14 PROCEDURE — G8419 CALC BMI OUT NRM PARAM NOF/U: HCPCS | Performed by: INTERNAL MEDICINE

## 2018-09-14 PROCEDURE — 3017F COLORECTAL CA SCREEN DOC REV: CPT | Performed by: INTERNAL MEDICINE

## 2018-09-14 PROCEDURE — 4004F PT TOBACCO SCREEN RCVD TLK: CPT | Performed by: INTERNAL MEDICINE

## 2018-09-14 PROCEDURE — 36591 DRAW BLOOD OFF VENOUS DEVICE: CPT

## 2018-09-14 PROCEDURE — 80076 HEPATIC FUNCTION PANEL: CPT

## 2018-09-14 PROCEDURE — 80061 LIPID PANEL: CPT

## 2018-09-14 PROCEDURE — 83001 ASSAY OF GONADOTROPIN (FSH): CPT

## 2018-09-14 PROCEDURE — 82670 ASSAY OF TOTAL ESTRADIOL: CPT

## 2018-09-14 PROCEDURE — 85025 COMPLETE CBC W/AUTO DIFF WBC: CPT

## 2018-09-14 RX ORDER — HEPARIN SODIUM (PORCINE) LOCK FLUSH IV SOLN 100 UNIT/ML 100 UNIT/ML
500 SOLUTION INTRAVENOUS PRN
Status: DISCONTINUED | OUTPATIENT
Start: 2018-09-14 | End: 2018-09-15 | Stop reason: HOSPADM

## 2018-09-14 RX ORDER — SODIUM CHLORIDE 0.9 % (FLUSH) 0.9 %
10 SYRINGE (ML) INJECTION PRN
Status: CANCELLED | OUTPATIENT
Start: 2018-09-14

## 2018-09-14 RX ORDER — SODIUM CHLORIDE 0.9 % (FLUSH) 0.9 %
20 SYRINGE (ML) INJECTION PRN
Status: CANCELLED | OUTPATIENT
Start: 2018-09-14

## 2018-09-14 RX ORDER — SODIUM CHLORIDE 0.9 % (FLUSH) 0.9 %
20 SYRINGE (ML) INJECTION PRN
Status: DISCONTINUED | OUTPATIENT
Start: 2018-09-14 | End: 2018-09-15 | Stop reason: HOSPADM

## 2018-09-14 RX ORDER — HEPARIN SODIUM (PORCINE) LOCK FLUSH IV SOLN 100 UNIT/ML 100 UNIT/ML
500 SOLUTION INTRAVENOUS PRN
Status: CANCELLED | OUTPATIENT
Start: 2018-09-14

## 2018-09-14 RX ORDER — SODIUM CHLORIDE 0.9 % (FLUSH) 0.9 %
10 SYRINGE (ML) INJECTION PRN
Status: DISCONTINUED | OUTPATIENT
Start: 2018-09-14 | End: 2018-09-15 | Stop reason: HOSPADM

## 2018-09-14 RX ORDER — LETROZOLE 2.5 MG/1
2.5 TABLET, FILM COATED ORAL DAILY
Qty: 30 TABLET | Refills: 3 | Status: SHIPPED | OUTPATIENT
Start: 2018-09-14 | End: 2019-02-19 | Stop reason: SDUPTHER

## 2018-09-14 RX ADMIN — Medication 20 ML: at 09:05

## 2018-09-14 RX ADMIN — Medication 500 UNITS: at 10:19

## 2018-09-14 ASSESSMENT — PAIN DESCRIPTION - FREQUENCY: FREQUENCY: CONTINUOUS

## 2018-09-14 ASSESSMENT — ENCOUNTER SYMPTOMS
SORE THROAT: 0
CONSTIPATION: 0
BACK PAIN: 1
EYE REDNESS: 0
CHEST TIGHTNESS: 0
TROUBLE SWALLOWING: 0
VOMITING: 0
SHORTNESS OF BREATH: 0
VOICE CHANGE: 0
COLOR CHANGE: 0
FACIAL SWELLING: 0
EYE ITCHING: 0
BLOOD IN STOOL: 0
DIARRHEA: 0
ABDOMINAL DISTENTION: 0
ABDOMINAL PAIN: 0
WHEEZING: 0
NAUSEA: 0
COUGH: 1

## 2018-09-14 ASSESSMENT — PAIN DESCRIPTION - PROGRESSION: CLINICAL_PROGRESSION: NOT CHANGED

## 2018-09-14 ASSESSMENT — PAIN DESCRIPTION - ONSET: ONSET: ON-GOING

## 2018-09-14 ASSESSMENT — PAIN DESCRIPTION - PAIN TYPE: TYPE: CHRONIC PAIN

## 2018-09-14 ASSESSMENT — PAIN SCALES - GENERAL: PAINLEVEL_OUTOF10: 2

## 2018-09-14 NOTE — PROGRESS NOTES
[facility] PROFESSIONAL SERVS  ONCOLOGY SPECIALISTS OF [facility]  [address]  [address]  [place] [address]  Dept: [phone]  Dept Fax: [phone]: [phone]     Visit Date: 9/14/2018     Santos Morley is a 46 y.o. female who presents today for:   Chief Complaint   Patient presents with    Follow-up     Breast Cancer       HPI:   Germania Flores is a 57-year-old female with invasive ductal carcinoma of her left breast. The patient noted a mass in the upper aspect of her left breast, at first she thought it was a cyst which she has had before. When it persisted, she elected to have further evaluation. She did undergo a diagnostic mammogram at [facility] in [place], [place]. It showed a 3.8 x 2.4 x 2.7 cm malignant-appearing spiculated mass within the left breast at 11:00 position. Evaluation of the left axilla with US showed suspicious appearing lymph node with increased cortical thickening and replacement of fatty hilum. On October 9, 2017, she underwent a left breast mass and left lymph node ultrasound-guided core biopsy. The final path report showed an invasive ductal carcinoma, ER positive at 95%, MA positive at 95%, and a HER-2/breezy by IHC was 2+. The FISH test was still equivocal.  HER-2/CEP17 ratio was 1.7, however the average HER-2 copy number was 4. She met with the surgical oncologists and medical oncologists at the [facility], however she decided to receive care closer to her home. The patient had metastatic workup: CT of the chest, abdomen and bone scan were negative for evidence of metastatic disease. On November 7, 2017 she underwent right breast mastectomy with axillary lymph node dissection. Final pathology report showed: FINAL DIAGNOSIS:  A.  Left breast with portion of axillary contents, mastectomy:      Invasive ductal carcinoma with lobular features involving nipple      and all 4 quadrants of breast.  Maximum tumor dimension estimated      6.8 cm. pT3.  Margins negative.      3 of 9 axillary lymph nodes positive for metastatic carcinoma,      pN1a. B. Left level II axillary lymph nodes, dissection:   5 lymph nodes negative for metastatic carcinoma. BREAST BIOMARKERS   Inform HER-2 Dual CHARLENE Nonamplified -  Estrogen Receptor: positive (99%, strong intensity)  Progesterone Receptor: positive (99%, strong intensity)  When she recovered from her surgery, on 2017 she started adjuvant AC. On 2018 she started weekly Taxol and completed on May 30,2018. She completed with mastectomy radiation treatment on 2018. Interim history on 2018: The patient presents to medical oncology clinic to start adjuvant hormonal treatment. Radiation treatment was completed 3 weeks ago. She still reports intermittent fatigue. She has been working with PT for her peripheral neuropathy. She reports significant improvement practically she denies any numbness tingling in her fingers and toes. She denies having any shortness of breath, she has chronic cough related to her smoking.   She denies having any pain   HPI   Past Medical History:   Diagnosis Date    Acid reflux     Anxiety     Asthma     Breast cancer ([address]     COPD with asthma ([address]     Depression     Depression     Dizziness - light-headed     HX OF:    Emphysema     History of chest pain     History of tinnitus     Hx of blood clots     Joint pain     Numbness and tingling     Osteoarthritis     PONV (postoperative nausea and vomiting)     SOB (shortness of breath) on exertion       Past Surgical History:   Procedure Laterality Date    BLADDER SURGERY      BREAST BIOPSY  10/06/2017    Elia Courser    CARDIAC CATHETERIZATION  2011    NO EVIDENCE OF PULMONARY HTN,NO EVIDENCE OF DD,NORMAL LVF    CARDIOVASCULAR STRESS TEST  2011    EF 60% MILD INFERIOR WALL REVERSIBLE STRESS-INDUCED ISCHEMIA      SECTION      CHOLECYSTECTOMY    CYST REMOVAL Right 2015    rt breast [facility]-benign breast cyst     ESOPHAGUS SURGERY  2014    HYSTERECTOMY  1999    INSERTION / REMOVAL / REPLACEMENT VENOUS ACCESS CATHETER Right 12/1/2017    RIGHT SIDE SINGLE LUMEN POWERPORT INSERTION performed by Denae Alegria MD at [address] Left 11/7/2017    LEFT MODIFIED RADICAL MASTECTOMY performed by Denae Alegria MD at [address] TUNNELED VENOUS PORT PLACEMENT Right 12/01/2017    Single [place] Smart Port Insertion- Right side. Family History   Problem Relation Age of Onset    Heart Disease Mother     Hypertension Mother     Diabetes Mother     Heart Disease Father     No Known Problems Sister     No Known Problems Brother       Social History   Substance Use Topics    Smoking status: Current Every Day Smoker     Packs/day: 0.50     Years: 18.00     Types: Cigarettes    Smokeless tobacco: Never Used      Comment: down to less than 0.25 aday    Alcohol use Yes      Comment: SOCAIL      Current Outpatient Prescriptions   Medication Sig Dispense Refill    letrozole (FEMARA) 2.5 MG tablet Take 1 tablet by mouth daily 30 tablet 3    b complex vitamins capsule Take 1 capsule by mouth daily      ondansetron (ZOFRAN-ODT) 4 MG disintegrating tablet Take 1 tablet by mouth every 8 hours as needed for Nausea or Vomiting 20 tablet 0    prochlorperazine (COMPAZINE) 10 MG tablet Take 1 tablet by mouth every 6 hours as needed (for nausea) 30 tablet 1    lidocaine-prilocaine (EMLA) 2.5-2.5 % cream Apply topically as needed.  30 g 1    albuterol sulfate  (90 Base) MCG/ACT inhaler Inhale 1 puff into the lungs every 6 hours as needed for Wheezing or Shortness of Breath       albuterol (PROVENTIL) (2.5 MG/3ML) 0.083% nebulizer solution Inhale 2.5 mg into the lungs every 4 hours as needed       butalbital-apap-caffeine -40 MG CAPS Take by mouth      ALPRAZolam (XANAX) 0.5 MG tablet Take 0.5 mg by mouth nightly reflexes. No cranial nerve deficit. She exhibits normal muscle tone. Skin: Skin is warm. No rash noted. No erythema. Psychiatric: She has a normal mood and affect. Her behavior is normal. Judgment and thought content normal.   Vitals reviewed. /64 (Site: Right Upper Arm, Position: Sitting, Cuff Size: Large Adult)   Pulse 87   Temp 98.3 °F (36.8 °C) (Oral)   Resp 18   Ht 5' 6\" (1.676 m)   Wt 174 lb 12.8 oz (79.3 kg)   SpO2 95%   BMI 28.21 kg/m²      Imaging studies and labs:     Lab Results   Component Value Date    WBC 4.1 (L) 09/14/2018    HGB 13.1 09/14/2018    HCT 38.2 09/14/2018    MCV 95 09/14/2018     09/14/2018       Chemistry        Component Value Date/Time     09/14/2018 0905     (H) 11/28/2017 1250    K 3.3 (L) 09/14/2018 0905    K 4.4 11/28/2017 1250     11/28/2017 1250    CO2 26 11/28/2017 1250    BUN 12 11/28/2017 1250    CREATININE 0.7 09/14/2018 0905    CREATININE 0.7 11/28/2017 1250        Component Value Date/Time    CALCIUM 9.4 11/28/2017 1250    ALKPHOS 61 09/14/2018 0905    AST 12 09/14/2018 0905    ALT 12 09/14/2018 0905    BILITOT 0.2 (L) 09/14/2018 0905            Assessment/Plan:   1. Stage pT3, pN1, M0 left breast cancer. I had a lengthy discussion was the patient and her significant other about stage of her breast cancer and recommended treatment. Since she had  lymph nodes involved and her tumor was quite large, my recommendation is to proceed with adjuvant chemotherapy: 4 cycles of Adriamycin and Cytoxan followed by 12 weekly infusions of Taxol. After chemotherapy due to having 3 lymph nodes involved by cancer, she will benefit from postmastectomy radiation treatment. Her final step in breast cancer treatment will be adjuvant hormonal therapy. Since the patient has strong family history of breast cancer and she is diagnosed with breast cancer before age of 46 she had genetic testing that showed variant of unknown significance.   On 12/13/2017 she started adjuvant AC. On March 7, 2018 she started weekly Taxol and completed on May 30,2018. She has completed postmastectomy radiation treatment on August 23, 2018. She presents to the medical oncology clinic to start adjuvant hormonal treatment. The patient had a hysterectomy without oophorectomy. She reports having hot flashes probably 3-4 years before her breast cancer diagnosis. We did check her estradiol and FSH level on 3 occasions. The [address] [place] [address] level is in the postmenopausal range, estradiol is below 5.0. The patient is enrolled in the clinical study combining aromatase inhibitor with everolimus versus placebo. Since her fatigue is improving, I will start adjuvant hormonal treatment with Femara on September 17. Side effects of Femara explained: including but not limited to hot flashes, joints and bones aches, osteoporosis, edema, vasodilation, GI upset, mood changes, dyslipidemia, - pt understands and agrees to proceed.   She is also referred to have bone density study

## 2018-09-14 NOTE — PLAN OF CARE
Problem: Intellectual/Education/Knowledge Deficit  Intervention: Verbal/written education provided  Discuss lab draw    Goal: Teaching initiated upon admission  Outcome: Met This Shift  Patient verbalizes understanding of lab draw    Problem: Discharge Planning  Intervention: Discharge to appropriate level of care  Discuss discharge instructions, follow ups and when to call doctor. Goal: Knowledge of discharge instructions  Knowledge of discharge instructions     Outcome: Met This Shift  Verbalized understanding of discharge instructions, follow ups and when to call doctor    Problem: Infection - Central Venous Catheter-Associated Bloodstream Infection:  Intervention: Infection risk assessment  Discuss port maintenance, infection prevention, signs and when to call Dr    Goal: Will show no infection signs and symptoms  Will show no infection signs and symptoms  Outcome: Met This Shift  Mediport site with no redness or warmth. Skin over port intact with no signs of breakdown noted. Patient verbalizes signs/symptoms of port infection and when to notify the physician. Comments: Care plan reviewed with patient. Patient  verbalized understanding of the plan of care and contribute to goal setting.

## 2018-09-14 NOTE — PATIENT INSTRUCTIONS
1.  DEXA study on the day of return to clinic  2. Prescription sent for Femara  3. RTC in approximately 2 weeks    Patient Education          letrozole  Pronunciation:  LET erlin zol  Brand:  Femara  What is the most important information I should know about letrozole? You should not use letrozole if you have not gone completely through menopause, or if you are pregnant. What is letrozole? Letrozole lowers estrogen levels in postmenopausal women, which may slow the growth of certain types of breast tumors that need estrogen to grow in the body. Letrozole is used to treat breast cancer in postmenopausal women. It is often given to women who have been taking tamoxifen (Nolvadex, Soltamox) for 5 years. Letrozole may also be used for purposes not listed in this medication guide. What should I discuss with my healthcare provider before taking letrozole? You should not use letrozole if you are allergic to it, or if:  · you have not gone completely through menopause; or  · you are pregnant. Although it is not likely that a postmenopausal woman would be pregnant, letrozole can harm an unborn baby. Do not use if you are pregnant or may become pregnant. Use effective birth control if you are not past menopause. Keep using birth control for at least 3 weeks after your last dose of letrozole. Tell your doctor if you think you may be pregnant. To make sure letrozole is safe for you, tell your doctor if you have:  · liver disease (especially cirrhosis);  · osteoporosis, osteopenia (low bone mineral density);  · high cholesterol; or  · if you also take tamoxifen. It is not known whether letrozole passes into breast milk or if it could harm a nursing baby. You should not breast-feed while you are using letrozole and for at least 3 weeks after your last dose. How should I take letrozole? Letrozole is usually taken once per day, or once every other day. Follow all directions on your prescription label.  Do not take this medicine in larger or smaller amounts or for longer than recommended. You may take letrozole with or without food. While using letrozole, you may need frequent blood tests. Your bone mineral density may also need to be checked. Store at room temperature away from moisture and heat. What happens if I miss a dose? Take the missed dose as soon as you remember. Skip the missed dose if it is almost time for your next scheduled dose. Do not take extra medicine to make up the missed dose. What happens if I overdose? Seek emergency medical attention or call the Poison Help line at 1-312.402.9698. What should I avoid while taking letrozole? This medication may impair your thinking or reactions. Be careful if you drive or do anything that requires you to be alert. This medicine can pass into body fluids (urine, feces, vomit). Caregivers should wear rubber gloves while cleaning up a patient's body fluids, handling contaminated trash or laundry or changing diapers. Wash hands before and after removing gloves. Wash soiled clothing and linens separately from other laundry. What are the possible side effects of letrozole? Get emergency medical help if you have signs of an allergic reaction: hives; difficult breathing; swelling of your face, lips, tongue, or throat. Common side effects may include:  · dizziness, drowsiness, weakness, tired feeling;  · hot flashes, warmth in your face or chest;  · bone pain, muscle or joint pain;  · flushing (warmth, redness, or tingly feeling);  · headache;  · increased sweating; or  · swelling, weight gain. This is not a complete list of side effects and others may occur. Call your doctor for medical advice about side effects. You may report side effects to FDA at 1-940-DFH-0087. What other drugs will affect letrozole? Other drugs may interact with letrozole, including prescription and over-the-counter medicines, vitamins, and herbal products.  Tell your doctor about all your

## 2018-09-14 NOTE — PROGRESS NOTES
Returned from seeing dr Emily Mirza. [facility] de accessed per protocol. Tolerated well. Discharged in satisfactory condition. Ambulated off unit per self.

## 2018-09-18 ENCOUNTER — HOSPITAL ENCOUNTER (OUTPATIENT)
Dept: PHYSICAL THERAPY | Age: 48
Setting detail: THERAPIES SERIES
Discharge: HOME | End: 2018-09-18
Payer: COMMERCIAL

## 2018-09-20 ENCOUNTER — APPOINTMENT (OUTPATIENT)
Dept: PHYSICAL THERAPY | Age: 48
End: 2018-09-20
Payer: COMMERCIAL

## 2018-09-25 ENCOUNTER — HOSPITAL ENCOUNTER (OUTPATIENT)
Dept: PHYSICAL THERAPY | Age: 48
Setting detail: THERAPIES SERIES
Discharge: HOME | End: 2018-09-25
Payer: COMMERCIAL

## 2018-09-25 DIAGNOSIS — Z78.0 POSTMENOPAUSAL: Primary | ICD-10-CM

## 2018-09-25 PROCEDURE — 97112 NEUROMUSCULAR REEDUCATION: CPT

## 2018-09-25 PROCEDURE — 97110 THERAPEUTIC EXERCISES: CPT

## 2018-09-25 ASSESSMENT — PAIN SCALES - GENERAL: PAINLEVEL_OUTOF10: 2

## 2018-09-26 ENCOUNTER — HOSPITAL ENCOUNTER (OUTPATIENT)
Dept: WOMENS IMAGING | Age: 48
Discharge: HOME | End: 2018-09-26
Payer: COMMERCIAL

## 2018-09-26 DIAGNOSIS — Z78.0 POSTMENOPAUSAL: ICD-10-CM

## 2018-09-26 PROCEDURE — 77080 DXA BONE DENSITY AXIAL: CPT

## 2018-09-27 ENCOUNTER — HOSPITAL ENCOUNTER (OUTPATIENT)
Dept: PHYSICAL THERAPY | Age: 48
Setting detail: THERAPIES SERIES
Discharge: HOME | End: 2018-09-27
Payer: COMMERCIAL

## 2018-09-27 DIAGNOSIS — C50.912 BREAST CANCER METASTASIZED TO AXILLARY LYMPH NODE, LEFT (HCC): Primary | ICD-10-CM

## 2018-09-27 DIAGNOSIS — C77.3 BREAST CANCER METASTASIZED TO AXILLARY LYMPH NODE, LEFT (HCC): Primary | ICD-10-CM

## 2018-09-28 ENCOUNTER — OFFICE VISIT (OUTPATIENT)
Dept: ONCOLOGY | Age: 48
End: 2018-09-28
Payer: COMMERCIAL

## 2018-09-28 ENCOUNTER — HOSPITAL ENCOUNTER (OUTPATIENT)
Dept: INFUSION THERAPY | Age: 48
Discharge: HOME | End: 2018-09-28
Payer: COMMERCIAL

## 2018-09-28 VITALS
RESPIRATION RATE: 18 BRPM | OXYGEN SATURATION: 95 % | TEMPERATURE: 97.7 F | HEART RATE: 88 BPM | SYSTOLIC BLOOD PRESSURE: 113 MMHG | DIASTOLIC BLOOD PRESSURE: 63 MMHG

## 2018-09-28 VITALS
DIASTOLIC BLOOD PRESSURE: 61 MMHG | RESPIRATION RATE: 18 BRPM | HEIGHT: 66 IN | OXYGEN SATURATION: 95 % | WEIGHT: 174.8 LBS | BODY MASS INDEX: 28.09 KG/M2 | SYSTOLIC BLOOD PRESSURE: 113 MMHG | TEMPERATURE: 97.5 F | HEART RATE: 88 BPM

## 2018-09-28 DIAGNOSIS — Z17.0 MALIGNANT NEOPLASM OF LEFT BREAST IN FEMALE, ESTROGEN RECEPTOR POSITIVE, UNSPECIFIED SITE OF BREAST (HCC): Primary | ICD-10-CM

## 2018-09-28 DIAGNOSIS — Z79.811 PROPHYLACTIC USE OF LETROZOLE (FEMARA): ICD-10-CM

## 2018-09-28 DIAGNOSIS — Z92.3 STATUS POST RADIATION THERAPY WITHIN LAST FOUR WEEKS: ICD-10-CM

## 2018-09-28 DIAGNOSIS — C77.3 BREAST CANCER METASTASIZED TO AXILLARY LYMPH NODE, LEFT (HCC): ICD-10-CM

## 2018-09-28 DIAGNOSIS — C50.912 BREAST CANCER METASTASIZED TO AXILLARY LYMPH NODE, LEFT (HCC): ICD-10-CM

## 2018-09-28 DIAGNOSIS — Z90.12 STATUS POST MASTECTOMY, LEFT: ICD-10-CM

## 2018-09-28 DIAGNOSIS — C50.912 MALIGNANT NEOPLASM OF LEFT BREAST IN FEMALE, ESTROGEN RECEPTOR POSITIVE, UNSPECIFIED SITE OF BREAST (HCC): Primary | ICD-10-CM

## 2018-09-28 LAB
ALBUMIN SERPL-MCNC: 3.9 G/DL (ref 3.5–5.1)
ALP BLD-CCNC: 74 U/L (ref 38–126)
ALT SERPL-CCNC: 15 U/L (ref 11–66)
AST SERPL-CCNC: 15 U/L (ref 5–40)
BASINOPHIL, AUTOMATED: 0 % (ref 0–3)
BILIRUB SERPL-MCNC: 0.3 MG/DL (ref 0.3–1.2)
BILIRUBIN DIRECT: < 0.2 MG/DL (ref 0–0.3)
BUN, WHOLE BLOOD: 8 MG/DL (ref 8–26)
CHLORIDE, WHOLE BLOOD: 105 MEQ/L (ref 98–109)
CHOLESTEROL, TOTAL: 199 MG/DL (ref 100–199)
CREATININE, WHOLE BLOOD: 0.7 MG/DL (ref 0.5–1.2)
EOSINOPHILS RELATIVE PERCENT: 3 % (ref 0–4)
ESTRADIOL LEVEL: < 5 PG/ML
FOLLICLE STIMULATING HORMONE: 118 MIU/ML (ref 16–160)
GFR, ESTIMATED: > 90 ML/MIN/1.73M2
GLUCOSE, WHOLE BLOOD: 82 MG/DL (ref 70–108)
HCT VFR BLD CALC: 39.1 % (ref 37–47)
HDLC SERPL-MCNC: 46 MG/DL
HEMOGLOBIN: 13.5 GM/DL (ref 12–16)
IONIZED CALCIUM, WHOLE BLOOD: 1.14 MMOL/L (ref 1.12–1.32)
LDL CHOLESTEROL CALCULATED: 132 MG/DL
LYMPHOCYTES # BLD: 25 % (ref 15–47)
MCH RBC QN AUTO: 32.6 PG (ref 27–31)
MCHC RBC AUTO-ENTMCNC: 34.5 GM/DL (ref 33–37)
MCV RBC AUTO: 95 FL (ref 81–99)
MONOCYTES: 10 % (ref 0–12)
PDW BLD-RTO: 12 % (ref 11.5–14.5)
PLATELET # BLD: 185 THOU/MM3 (ref 130–400)
PMV BLD AUTO: 8.1 FL (ref 7.4–10.4)
POTASSIUM, WHOLE BLOOD: 3.8 MEQ/L (ref 3.5–4.9)
RBC # BLD: 4.13 MILL/MM3 (ref 4.2–5.4)
SEG NEUTROPHILS: 62 % (ref 43–75)
SODIUM, WHOLE BLOOD: 141 MEQ/L (ref 138–146)
TOTAL CO2, WHOLE BLOOD: 24 MEQ/L (ref 23–33)
TOTAL PROTEIN: 6.6 G/DL (ref 6.1–8)
TRIGL SERPL-MCNC: 104 MG/DL (ref 0–199)
WBC # BLD: 4.7 THOU/MM3 (ref 4.8–10.8)

## 2018-09-28 PROCEDURE — 4004F PT TOBACCO SCREEN RCVD TLK: CPT | Performed by: INTERNAL MEDICINE

## 2018-09-28 PROCEDURE — 2580000003 HC RX 258: Performed by: INTERNAL MEDICINE

## 2018-09-28 PROCEDURE — 2709999900 HC NON-CHARGEABLE SUPPLY

## 2018-09-28 PROCEDURE — 80076 HEPATIC FUNCTION PANEL: CPT

## 2018-09-28 PROCEDURE — 80061 LIPID PANEL: CPT

## 2018-09-28 PROCEDURE — 99211 OFF/OP EST MAY X REQ PHY/QHP: CPT

## 2018-09-28 PROCEDURE — G8427 DOCREV CUR MEDS BY ELIG CLIN: HCPCS | Performed by: INTERNAL MEDICINE

## 2018-09-28 PROCEDURE — 82670 ASSAY OF TOTAL ESTRADIOL: CPT

## 2018-09-28 PROCEDURE — 6360000002 HC RX W HCPCS: Performed by: INTERNAL MEDICINE

## 2018-09-28 PROCEDURE — G8419 CALC BMI OUT NRM PARAM NOF/U: HCPCS | Performed by: INTERNAL MEDICINE

## 2018-09-28 PROCEDURE — 83001 ASSAY OF GONADOTROPIN (FSH): CPT

## 2018-09-28 PROCEDURE — 3017F COLORECTAL CA SCREEN DOC REV: CPT | Performed by: INTERNAL MEDICINE

## 2018-09-28 PROCEDURE — 80047 BASIC METABLC PNL IONIZED CA: CPT

## 2018-09-28 PROCEDURE — 36591 DRAW BLOOD OFF VENOUS DEVICE: CPT

## 2018-09-28 PROCEDURE — 85025 COMPLETE CBC W/AUTO DIFF WBC: CPT

## 2018-09-28 PROCEDURE — 99214 OFFICE O/P EST MOD 30 MIN: CPT | Performed by: INTERNAL MEDICINE

## 2018-09-28 RX ORDER — HEPARIN SODIUM (PORCINE) LOCK FLUSH IV SOLN 100 UNIT/ML 100 UNIT/ML
500 SOLUTION INTRAVENOUS PRN
Status: CANCELLED | OUTPATIENT
Start: 2018-09-28

## 2018-09-28 RX ORDER — SODIUM CHLORIDE 0.9 % (FLUSH) 0.9 %
20 SYRINGE (ML) INJECTION PRN
Status: DISCONTINUED | OUTPATIENT
Start: 2018-09-28 | End: 2018-09-29 | Stop reason: HOSPADM

## 2018-09-28 RX ORDER — SODIUM CHLORIDE 0.9 % (FLUSH) 0.9 %
20 SYRINGE (ML) INJECTION PRN
Status: CANCELLED | OUTPATIENT
Start: 2018-09-28

## 2018-09-28 RX ORDER — SODIUM CHLORIDE 0.9 % (FLUSH) 0.9 %
10 SYRINGE (ML) INJECTION PRN
Status: CANCELLED | OUTPATIENT
Start: 2018-09-28

## 2018-09-28 RX ORDER — SODIUM CHLORIDE 0.9 % (FLUSH) 0.9 %
10 SYRINGE (ML) INJECTION PRN
Status: DISCONTINUED | OUTPATIENT
Start: 2018-09-28 | End: 2018-09-29 | Stop reason: HOSPADM

## 2018-09-28 RX ORDER — DEXAMETHASONE 0.5 MG/5ML
0.5 ELIXIR ORAL 2 TIMES DAILY
Qty: 237 ML | Refills: 1 | Status: SHIPPED | OUTPATIENT
Start: 2018-09-28 | End: 2018-10-28

## 2018-09-28 RX ORDER — HEPARIN SODIUM (PORCINE) LOCK FLUSH IV SOLN 100 UNIT/ML 100 UNIT/ML
500 SOLUTION INTRAVENOUS PRN
Status: DISCONTINUED | OUTPATIENT
Start: 2018-09-28 | End: 2018-09-29 | Stop reason: HOSPADM

## 2018-09-28 RX ADMIN — Medication 20 ML: at 09:50

## 2018-09-28 RX ADMIN — Medication 500 UNITS: at 09:55

## 2018-09-28 RX ADMIN — Medication 20 ML: at 09:55

## 2018-09-28 ASSESSMENT — ENCOUNTER SYMPTOMS
ABDOMINAL PAIN: 0
COUGH: 1
CONSTIPATION: 0
DIARRHEA: 0
BACK PAIN: 1
SHORTNESS OF BREATH: 0
ABDOMINAL DISTENTION: 0
COLOR CHANGE: 0
SORE THROAT: 0
BLOOD IN STOOL: 0
EYE REDNESS: 0
VOMITING: 0
CHEST TIGHTNESS: 0
EYE ITCHING: 0
TROUBLE SWALLOWING: 0
VOICE CHANGE: 0
NAUSEA: 0
WHEEZING: 0
FACIAL SWELLING: 0

## 2018-09-28 NOTE — PROGRESS NOTES
Labs drawn from port per protocol. Tolerated well. Discharged in satisfactory condition.  Ambulated off unit with [facility] for appointment with dr Se Harris

## 2018-09-28 NOTE — PROGRESS NOTES
MG tablet Take 0.5 mg by mouth nightly as needed       cyclobenzaprine (FLEXERIL) 5 MG tablet Take 5 mg by mouth 3 times daily as needed       DULERA 200-5 MCG/ACT inhaler Inhale 1 puff into the lungs 2 times daily       venlafaxine (EFFEXOR XR) 75 MG extended release capsule Take 75 mg by mouth daily       meloxicam (MOBIC) 15 MG tablet Take 15 mg by mouth daily       No current facility-administered medications for this visit. Facility-Administered Medications Ordered in Other Visits   Medication Dose Route Frequency Provider Last Rate Last Dose    sodium chloride flush 0.9 % injection 10 mL  10 mL Intercatheter PRN Stanislaw Rausch MD        sodium chloride flush 0.9 % injection 20 mL  20 mL Intercatheter PRN Stanislaw Rausch MD        heparin flush 100 UNIT/ML injection 500 Units  500 Units Intercatheter PRN Stanislaw Rausch MD          No Known Allergies   Health Maintenance   Topic Date Due    HIV screen  03/06/1983    DTaP/Tdap/Td vaccine (1 - Tdap) 03/06/1987    Pneumococcal highest risk (1 of 3 - PCV13) 03/06/1987    Cervical cancer screen  03/06/1989    Shingles Vaccine (1 of 2 - 2 Dose Series) 03/06/2018    Colon cancer screen colonoscopy  03/06/2018    Flu vaccine (1) 09/01/2018    Breast cancer screen  10/06/2018    Lipid screen  09/14/2023        Subjective:   Review of Systems   Constitutional: Positive for fatigue. Negative for activity change, appetite change, chills, fever and unexpected weight change. HENT: Negative for dental problem, facial swelling, hearing loss, mouth sores, nosebleeds, postnasal drip, sore throat, trouble swallowing and voice change. Eyes: Negative for redness, itching and visual disturbance. Respiratory: Positive for cough. Negative for chest tightness, shortness of breath and wheezing. Cardiovascular: Negative for chest pain, palpitations and leg swelling.    Gastrointestinal: Negative for abdominal distention, abdominal pain, blood in stool, constipation, nerve deficit. She exhibits normal muscle tone. Skin: Skin is warm. No rash noted. No erythema. Psychiatric: She has a normal mood and affect. Her behavior is normal. Judgment and thought content normal.   Vitals reviewed. /61 (Site: Right Upper Arm, Position: Sitting, Cuff Size: Medium Adult)   Pulse 88   Temp 97.5 °F (36.4 °C) (Oral)   Resp 18   Ht 5' 6\" (1.676 m)   Wt 174 lb 12.8 oz (79.3 kg)   SpO2 95%   BMI 28.21 kg/m²      Imaging studies and labs:     Lab Results   Component Value Date    WBC 4.7 (L) 09/28/2018    HGB 13.5 09/28/2018    HCT 39.1 09/28/2018    MCV 95 09/28/2018     09/28/2018       Chemistry        Component Value Date/Time     09/28/2018 0955     (H) 11/28/2017 1250    K 3.8 09/28/2018 0955    K 4.4 11/28/2017 1250     11/28/2017 1250    CO2 26 11/28/2017 1250    BUN 12 11/28/2017 1250    CREATININE 0.7 09/28/2018 0955    CREATININE 0.7 11/28/2017 1250        Component Value Date/Time    CALCIUM 9.4 11/28/2017 1250    ALKPHOS 74 09/28/2018 0955    AST 15 09/28/2018 0955    ALT 15 09/28/2018 0955    BILITOT 0.3 09/28/2018 0955        DEXA study on the September 26, 2018 showed: Normal bone density    Assessment/Plan:   1. Stage pT3, pN1, M0 left breast cancer. Since she had  lymph nodes involved and her tumor was quite large, my recommendation was to proceed with adjuvant chemotherapy: 4 cycles of Adriamycin and Cytoxan followed by 12 weekly infusions of Taxol. After chemotherapy due to having 3 lymph nodes involved by cancer, she would benefit from postmastectomy radiation treatment. Her final step in breast cancer treatment is adjuvant hormonal therapy. Since the patient has strong family history of breast cancer and she is diagnosed with breast cancer before age of 46 she had genetic testing that showed variant of unknown significance. On 12/13/2017 she started adjuvant AC.  On March 7, 2018 she started weekly Taxol and completed on May 30, 2018.  She has completed postmastectomy radiation treatment on August 23, 2018. On September 15, 2018 she started adjuvant hormonal therapy with Femara. So far no side effects   She had DEXA study that showed normal bone density The patient had a hysterectomy without oophorectomy. We did check her estradiol and FSH level on 3 occasions. The [facility] level is in the postmenopausal range, estradiol is below 5.0. The patient is enrolled in the clinical study combining aromatase inhibitor with everolimus versus placebo. Today she will start her study drug. Possible side effects from study drug were reviewed by clinical trial coordinator Jos Almaraz. They include:  peripheral edema, hypertension, fatigue, skin rash, abnormal lipid levels, stomatitis, diarrhea, anorexia, weight loss, anemia, abnormal liver function tests,infection, arthralgia, cough, upper respiratory tract infection, pneumonitis.  (?19%; may include interstitial pulmonary disease, pulmonary alveolar hemorrhage, pulmonar

## 2018-10-02 ENCOUNTER — HOSPITAL ENCOUNTER (OUTPATIENT)
Dept: PHYSICAL THERAPY | Age: 48
Setting detail: THERAPIES SERIES
Discharge: HOME | End: 2018-10-02
Payer: COMMERCIAL

## 2018-10-02 PROCEDURE — 97110 THERAPEUTIC EXERCISES: CPT

## 2018-10-02 ASSESSMENT — PAIN SCALES - GENERAL: PAINLEVEL_OUTOF10: 4

## 2018-10-04 ENCOUNTER — HOSPITAL ENCOUNTER (OUTPATIENT)
Dept: PHYSICAL THERAPY | Age: 48
Setting detail: THERAPIES SERIES
Discharge: HOME | End: 2018-10-04
Payer: COMMERCIAL

## 2018-10-09 ENCOUNTER — HOSPITAL ENCOUNTER (OUTPATIENT)
Dept: PHYSICAL THERAPY | Age: 48
Setting detail: THERAPIES SERIES
Discharge: HOME | End: 2018-10-09
Payer: COMMERCIAL

## 2018-10-09 PROCEDURE — 97140 MANUAL THERAPY 1/> REGIONS: CPT

## 2018-10-09 PROCEDURE — 97110 THERAPEUTIC EXERCISES: CPT

## 2018-10-09 PROCEDURE — 97530 THERAPEUTIC ACTIVITIES: CPT

## 2018-10-09 ASSESSMENT — PAIN SCALES - GENERAL: PAINLEVEL_OUTOF10: 2

## 2018-10-09 NOTE — DISCHARGE SUMMARY
questions answered. Brief Fatigue Inventory   Throughout our lives, most of us have times when we feel very tired or fatigued. Have you felt unusually tired or fatigued in the last week? No   Scale: 0 (No Fatigue)  <<<<>>>>  10 (As Bad as You Can Imagine)   1. How would you rate your fatigue (weariness, tiredness) right NOW? 3   2. How would you rate your USUAL level of fatigue during the past 24 hours? 3   3. How would you rate your WORST level of fatigue during the past 24 hours? 6   Scale: 0 (Does Not Interfere) <<<<>>>> 10 (Completely Interferes)   4. How would you rate how your fatigue has interfered with your GENERAL ACTIVITY in the past 24 hours? 3   5. How would you rate how your fatigue has interfered with your MOOD in the past 24 hours? 6   6. How would you rate how your fatigue has interfered with your WALKING ABILITY in the past 24 hours? 3   7. How would you rate how your fatigue has interfered with your NORMAL WORK (both outside and inside the home) in the past 24 hours? 3   8. How would you rate how your fatigue has interfered with your RELATIONS WITH OTHER PEOPLE in the past 24 hours? 2   9. How would you rate how your fatigue has interfered with your ENJOYMENT OF LIFE in the past 24 hours?  2   Total Score (sum of points for all 9 items/9): 3.4   Levels of Fatigue:  0 = None  1 -3 = Mild  4 - 6 = Moderate  7 - 10 = Severe Mild   *Adapted from [employer] [address] [place] [address] Brief Fatigue Inventory 1997*       Upper Extremity Circumferential Measurements    Right (cm) Left (cm) Comments   Met Heads 19 19.4    Ulnar Styloid Process 15.8 16    10 cm distal to Ulnar Styloid Process 24.2 22.5    Elbow Crease 25.7 25.8    10 cm proximal from Lateral Epicondyle 31.1 31.6    Axilla 35.4 34.9    Total 151.4 150.2       Last PN on 9/6/18: 153.8      153.7     PN on 7/9/18:  152.9    156.8     PN on 6/26/18: 153.7    154.6     PN on 4/19/18: 150.7    150.5     PN on 2/26/18: 151.9    151.8     Eval on

## 2018-10-17 ENCOUNTER — HOSPITAL ENCOUNTER (OUTPATIENT)
Dept: RADIATION ONCOLOGY | Age: 48
Discharge: HOME | End: 2018-10-17
Payer: COMMERCIAL

## 2018-10-17 PROCEDURE — 99211 OFF/OP EST MAY X REQ PHY/QHP: CPT

## 2018-10-26 ENCOUNTER — HOSPITAL ENCOUNTER (OUTPATIENT)
Dept: INFUSION THERAPY | Age: 48
End: 2018-10-26
Payer: COMMERCIAL

## 2018-11-05 DIAGNOSIS — C50.912 BREAST CANCER METASTASIZED TO AXILLARY LYMPH NODE, LEFT (HCC): Primary | ICD-10-CM

## 2018-11-05 DIAGNOSIS — C77.3 BREAST CANCER METASTASIZED TO AXILLARY LYMPH NODE, LEFT (HCC): Primary | ICD-10-CM

## 2018-11-08 ENCOUNTER — HOSPITAL ENCOUNTER (OUTPATIENT)
Dept: INFUSION THERAPY | Age: 48
Discharge: HOME | End: 2018-11-08
Payer: COMMERCIAL

## 2018-11-08 ENCOUNTER — OFFICE VISIT (OUTPATIENT)
Dept: ONCOLOGY | Age: 48
End: 2018-11-08
Payer: COMMERCIAL

## 2018-11-08 VITALS
BODY MASS INDEX: 27.51 KG/M2 | SYSTOLIC BLOOD PRESSURE: 119 MMHG | HEART RATE: 101 BPM | DIASTOLIC BLOOD PRESSURE: 72 MMHG | WEIGHT: 171.2 LBS | TEMPERATURE: 98.1 F | OXYGEN SATURATION: 96 % | RESPIRATION RATE: 16 BRPM | HEIGHT: 66 IN

## 2018-11-08 VITALS
SYSTOLIC BLOOD PRESSURE: 119 MMHG | TEMPERATURE: 98.1 F | HEART RATE: 101 BPM | RESPIRATION RATE: 16 BRPM | DIASTOLIC BLOOD PRESSURE: 72 MMHG | OXYGEN SATURATION: 96 %

## 2018-11-08 DIAGNOSIS — Z90.12 STATUS POST MASTECTOMY, LEFT: ICD-10-CM

## 2018-11-08 DIAGNOSIS — C50.912 BREAST CANCER METASTASIZED TO AXILLARY LYMPH NODE, LEFT (HCC): Primary | ICD-10-CM

## 2018-11-08 DIAGNOSIS — Z17.0 MALIGNANT NEOPLASM OF LEFT BREAST IN FEMALE, ESTROGEN RECEPTOR POSITIVE, UNSPECIFIED SITE OF BREAST (HCC): ICD-10-CM

## 2018-11-08 DIAGNOSIS — Z79.811 PROPHYLACTIC USE OF LETROZOLE (FEMARA): ICD-10-CM

## 2018-11-08 DIAGNOSIS — C77.3 BREAST CANCER METASTASIZED TO AXILLARY LYMPH NODE, LEFT (HCC): Primary | ICD-10-CM

## 2018-11-08 DIAGNOSIS — C77.3 BREAST CANCER METASTASIZED TO AXILLARY LYMPH NODE, LEFT (HCC): ICD-10-CM

## 2018-11-08 DIAGNOSIS — C50.912 BREAST CANCER METASTASIZED TO AXILLARY LYMPH NODE, LEFT (HCC): ICD-10-CM

## 2018-11-08 DIAGNOSIS — C50.912 MALIGNANT NEOPLASM OF LEFT BREAST IN FEMALE, ESTROGEN RECEPTOR POSITIVE, UNSPECIFIED SITE OF BREAST (HCC): ICD-10-CM

## 2018-11-08 LAB
ALBUMIN SERPL-MCNC: 3.5 G/DL (ref 3.5–5.1)
ALP BLD-CCNC: 96 U/L (ref 38–126)
ALT SERPL-CCNC: 17 U/L (ref 11–66)
AST SERPL-CCNC: 15 U/L (ref 5–40)
BASOPHILS # BLD: 0.6 %
BASOPHILS ABSOLUTE: 0 THOU/MM3 (ref 0–0.1)
BILIRUB SERPL-MCNC: < 0.2 MG/DL (ref 0.3–1.2)
BILIRUBIN DIRECT: < 0.2 MG/DL (ref 0–0.3)
BUN, WHOLE BLOOD: 13 MG/DL (ref 8–26)
CHLORIDE, WHOLE BLOOD: 106 MEQ/L (ref 98–109)
CHOLESTEROL, FASTING: 190 MG/DL (ref 100–199)
CREATININE, WHOLE BLOOD: 0.7 MG/DL (ref 0.5–1.2)
EOSINOPHIL # BLD: 3 %
EOSINOPHILS ABSOLUTE: 0.1 THOU/MM3 (ref 0–0.4)
GFR, ESTIMATED: > 90 ML/MIN/1.73M2
GLUCOSE, WHOLE BLOOD: 95 MG/DL (ref 70–108)
HCT VFR BLD CALC: 33 % (ref 37–47)
HDLC SERPL-MCNC: 40 MG/DL
HEMOGLOBIN: 11.4 GM/DL (ref 12–16)
IMMATURE GRANS (ABS): 0.02 THOU/MM3 (ref 0–0.07)
IMMATURE GRANULOCYTES: 0.6 %
IONIZED CALCIUM, WHOLE BLOOD: 1.15 MMOL/L (ref 1.12–1.32)
LDL CHOLESTEROL CALCULATED: 125 MG/DL
LYMPHOCYTES # BLD: 24.3 %
LYMPHOCYTES ABSOLUTE: 0.7 THOU/MM3 (ref 1–4.8)
MCH RBC QN AUTO: 31 PG (ref 27–31)
MCHC RBC AUTO-ENTMCNC: 34.5 GM/DL (ref 33–37)
MCV RBC AUTO: 90 FL (ref 81–99)
MONOCYTES # BLD: 8.1 %
MONOCYTES ABSOLUTE: 0.2 THOU/MM3 (ref 0.4–1.3)
NUCLEATED RED BLOOD CELLS: 0 /100 WBC
PDW BLD-RTO: 10.8 % (ref 11.5–14.5)
PLATELET # BLD: 207 THOU/MM3 (ref 130–400)
PMV BLD AUTO: 7.2 FL (ref 7.4–10.4)
POTASSIUM, WHOLE BLOOD: 3.4 MEQ/L (ref 3.5–4.9)
RBC # BLD: 3.67 MILL/MM3 (ref 4.2–5.4)
SEG NEUTROPHILS: 63.4 %
SEGMENTED NEUTROPHILS ABSOLUTE COUNT: 1.9 THOU/MM3 (ref 1.8–7.7)
SODIUM, WHOLE BLOOD: 144 MEQ/L (ref 138–146)
TOTAL CO2, WHOLE BLOOD: 26 MEQ/L (ref 23–33)
TOTAL PROTEIN: 6.4 G/DL (ref 6.1–8)
TRIGLYCERIDE, FASTING: 127 MG/DL (ref 0–199)
WBC # BLD: 3 THOU/MM3 (ref 4.8–10.8)

## 2018-11-08 PROCEDURE — 6360000002 HC RX W HCPCS: Performed by: INTERNAL MEDICINE

## 2018-11-08 PROCEDURE — G8419 CALC BMI OUT NRM PARAM NOF/U: HCPCS | Performed by: INTERNAL MEDICINE

## 2018-11-08 PROCEDURE — 2709999900 HC NON-CHARGEABLE SUPPLY

## 2018-11-08 PROCEDURE — 99214 OFFICE O/P EST MOD 30 MIN: CPT | Performed by: INTERNAL MEDICINE

## 2018-11-08 PROCEDURE — 36591 DRAW BLOOD OFF VENOUS DEVICE: CPT

## 2018-11-08 PROCEDURE — 99211 OFF/OP EST MAY X REQ PHY/QHP: CPT

## 2018-11-08 PROCEDURE — 80076 HEPATIC FUNCTION PANEL: CPT

## 2018-11-08 PROCEDURE — G8484 FLU IMMUNIZE NO ADMIN: HCPCS | Performed by: INTERNAL MEDICINE

## 2018-11-08 PROCEDURE — 3017F COLORECTAL CA SCREEN DOC REV: CPT | Performed by: INTERNAL MEDICINE

## 2018-11-08 PROCEDURE — 80061 LIPID PANEL: CPT

## 2018-11-08 PROCEDURE — 80047 BASIC METABLC PNL IONIZED CA: CPT

## 2018-11-08 PROCEDURE — 4004F PT TOBACCO SCREEN RCVD TLK: CPT | Performed by: INTERNAL MEDICINE

## 2018-11-08 PROCEDURE — 2580000003 HC RX 258: Performed by: INTERNAL MEDICINE

## 2018-11-08 PROCEDURE — G8427 DOCREV CUR MEDS BY ELIG CLIN: HCPCS | Performed by: INTERNAL MEDICINE

## 2018-11-08 PROCEDURE — 85025 COMPLETE CBC W/AUTO DIFF WBC: CPT

## 2018-11-08 RX ORDER — HEPARIN SODIUM (PORCINE) LOCK FLUSH IV SOLN 100 UNIT/ML 100 UNIT/ML
500 SOLUTION INTRAVENOUS PRN
Status: DISCONTINUED | OUTPATIENT
Start: 2018-11-08 | End: 2018-11-09 | Stop reason: HOSPADM

## 2018-11-08 RX ORDER — SODIUM CHLORIDE 0.9 % (FLUSH) 0.9 %
10 SYRINGE (ML) INJECTION PRN
Status: CANCELLED | OUTPATIENT
Start: 2018-11-08

## 2018-11-08 RX ORDER — HEPARIN SODIUM (PORCINE) LOCK FLUSH IV SOLN 100 UNIT/ML 100 UNIT/ML
500 SOLUTION INTRAVENOUS PRN
Status: CANCELLED | OUTPATIENT
Start: 2018-11-08

## 2018-11-08 RX ORDER — SODIUM CHLORIDE 0.9 % (FLUSH) 0.9 %
20 SYRINGE (ML) INJECTION PRN
Status: CANCELLED | OUTPATIENT
Start: 2018-11-08

## 2018-11-08 RX ORDER — SODIUM CHLORIDE 0.9 % (FLUSH) 0.9 %
10 SYRINGE (ML) INJECTION PRN
Status: DISCONTINUED | OUTPATIENT
Start: 2018-11-08 | End: 2018-11-09 | Stop reason: HOSPADM

## 2018-11-08 RX ORDER — SODIUM CHLORIDE 0.9 % (FLUSH) 0.9 %
20 SYRINGE (ML) INJECTION PRN
Status: DISCONTINUED | OUTPATIENT
Start: 2018-11-08 | End: 2018-11-09 | Stop reason: HOSPADM

## 2018-11-08 RX ADMIN — Medication 10 ML: at 08:18

## 2018-11-08 RX ADMIN — Medication 500 UNITS: at 08:20

## 2018-11-08 RX ADMIN — Medication 20 ML: at 08:20

## 2018-11-08 ASSESSMENT — ENCOUNTER SYMPTOMS
NAUSEA: 0
VOMITING: 0
CONSTIPATION: 0
COUGH: 1
ABDOMINAL PAIN: 0
SHORTNESS OF BREATH: 0
COLOR CHANGE: 0
SINUS PRESSURE: 1
EYE REDNESS: 0
EYE ITCHING: 0
TROUBLE SWALLOWING: 0
CHEST TIGHTNESS: 0
BLOOD IN STOOL: 0
SORE THROAT: 0
VOICE CHANGE: 0
DIARRHEA: 0
ABDOMINAL DISTENTION: 0
BACK PAIN: 1
WHEEZING: 0

## 2018-11-08 NOTE — PROGRESS NOTES
variant of unknown significance. On 12/13/2017 she started adjuvant AC. On March 7, 2018 she started weekly Taxol and completed on May 30, 2018. She has completed postmastectomy radiation treatment on August 23, 2018. On September 15, 2018 she started adjuvant hormonal therapy with Femara. So far no side effects   She had DEXA study that showed normal bone density The patient had a hysterectomy without oophorectomy. We did check her estradiol and FSH level on 3 occasions. The [facility] level is in the postmenopausal range, estradiol is below 5.0. The patient is enrolled in the clinical study combining aromatase inhibitor with everolimus versus placebo. Overall she tolerates treatment well. No peripheral edema, no skin rash, no stomatitis, no diarrhea. She has chronic sinus congestion and postnasal drip, she is afebrile. Her potassium is 3.4 today. The patient was instructed to eat potassium-rich diet. There is no evidence of disease recurrence on today's physical examination. The patient was instructed to continue current treatment.   She is scheduled to have her annual mammogram next week

## 2018-11-08 NOTE — PROGRESS NOTES
Patient tolerated lab draw via medi-port without any complications. Discharge instructions given to patient-verbalizes understanding. Ambulated off unit per self  In stable condition with belongings.

## 2018-11-12 ENCOUNTER — HOSPITAL ENCOUNTER (OUTPATIENT)
Dept: WOMENS IMAGING | Age: 48
Discharge: HOME | End: 2018-11-12
Payer: COMMERCIAL

## 2018-11-12 DIAGNOSIS — Z00.6 ENCOUNTER FOR EXAMINATION FOR NORMAL COMPARISON OR CONTROL IN CLINICAL RESEARCH PROGRAM: ICD-10-CM

## 2018-11-12 PROCEDURE — 3209999900 MAM COMPARISON OF OUTSIDE IMAGES

## 2018-11-13 ENCOUNTER — HOSPITAL ENCOUNTER (OUTPATIENT)
Dept: WOMENS IMAGING | Age: 48
Discharge: HOME | End: 2018-11-13
Payer: COMMERCIAL

## 2018-11-13 DIAGNOSIS — C50.912 BREAST CANCER METASTASIZED TO AXILLARY LYMPH NODE, LEFT (HCC): ICD-10-CM

## 2018-11-13 DIAGNOSIS — C77.3 BREAST CANCER METASTASIZED TO AXILLARY LYMPH NODE, LEFT (HCC): ICD-10-CM

## 2018-11-13 PROCEDURE — 77065 DX MAMMO INCL CAD UNI: CPT

## 2018-12-12 DIAGNOSIS — C77.3 BREAST CANCER METASTASIZED TO AXILLARY LYMPH NODE, LEFT (HCC): Primary | ICD-10-CM

## 2018-12-12 DIAGNOSIS — C50.912 BREAST CANCER METASTASIZED TO AXILLARY LYMPH NODE, LEFT (HCC): Primary | ICD-10-CM

## 2018-12-13 ENCOUNTER — HOSPITAL ENCOUNTER (OUTPATIENT)
Dept: INFUSION THERAPY | Age: 48
Discharge: HOME | End: 2018-12-13
Payer: COMMERCIAL

## 2018-12-13 ENCOUNTER — HOSPITAL ENCOUNTER (OUTPATIENT)
Dept: GENERAL RADIOLOGY | Age: 48
Discharge: HOME | End: 2018-12-13
Payer: COMMERCIAL

## 2018-12-13 ENCOUNTER — OFFICE VISIT (OUTPATIENT)
Dept: ONCOLOGY | Age: 48
End: 2018-12-13
Payer: COMMERCIAL

## 2018-12-13 ENCOUNTER — HOSPITAL ENCOUNTER (OUTPATIENT)
Age: 48
Discharge: HOME | End: 2018-12-13
Payer: COMMERCIAL

## 2018-12-13 VITALS
WEIGHT: 167.4 LBS | SYSTOLIC BLOOD PRESSURE: 125 MMHG | TEMPERATURE: 98 F | DIASTOLIC BLOOD PRESSURE: 62 MMHG | RESPIRATION RATE: 18 BRPM | OXYGEN SATURATION: 99 % | BODY MASS INDEX: 25.37 KG/M2 | HEART RATE: 94 BPM | HEIGHT: 68 IN

## 2018-12-13 VITALS
BODY MASS INDEX: 25.37 KG/M2 | DIASTOLIC BLOOD PRESSURE: 62 MMHG | HEIGHT: 68 IN | RESPIRATION RATE: 18 BRPM | SYSTOLIC BLOOD PRESSURE: 125 MMHG | TEMPERATURE: 98 F | WEIGHT: 167.4 LBS | HEART RATE: 94 BPM | OXYGEN SATURATION: 99 %

## 2018-12-13 DIAGNOSIS — Z51.11 ENCOUNTER FOR CHEMOTHERAPY MANAGEMENT: ICD-10-CM

## 2018-12-13 DIAGNOSIS — C77.3 BREAST CANCER METASTASIZED TO AXILLARY LYMPH NODE, LEFT (HCC): ICD-10-CM

## 2018-12-13 DIAGNOSIS — Z17.0 MALIGNANT NEOPLASM OF LEFT BREAST IN FEMALE, ESTROGEN RECEPTOR POSITIVE, UNSPECIFIED SITE OF BREAST (HCC): Primary | ICD-10-CM

## 2018-12-13 DIAGNOSIS — J01.00 SUBACUTE MAXILLARY SINUSITIS: ICD-10-CM

## 2018-12-13 DIAGNOSIS — G62.0 DRUG-INDUCED POLYNEUROPATHY (HCC): ICD-10-CM

## 2018-12-13 DIAGNOSIS — Z90.12 STATUS POST MASTECTOMY, LEFT: ICD-10-CM

## 2018-12-13 DIAGNOSIS — C50.912 BREAST CANCER METASTASIZED TO AXILLARY LYMPH NODE, LEFT (HCC): ICD-10-CM

## 2018-12-13 DIAGNOSIS — Z79.811 PROPHYLACTIC USE OF LETROZOLE (FEMARA): ICD-10-CM

## 2018-12-13 DIAGNOSIS — C50.912 MALIGNANT NEOPLASM OF LEFT BREAST IN FEMALE, ESTROGEN RECEPTOR POSITIVE, UNSPECIFIED SITE OF BREAST (HCC): Primary | ICD-10-CM

## 2018-12-13 LAB
BASINOPHIL, AUTOMATED: 0 % (ref 0–3)
EOSINOPHILS RELATIVE PERCENT: 4 % (ref 0–4)
HCT VFR BLD CALC: 31.2 % (ref 37–47)
HEMOGLOBIN: 11 GM/DL (ref 12–16)
LYMPHOCYTES # BLD: 20 % (ref 15–47)
MCH RBC QN AUTO: 31.2 PG (ref 27–31)
MCHC RBC AUTO-ENTMCNC: 35.2 GM/DL (ref 33–37)
MCV RBC AUTO: 88 FL (ref 81–99)
MONOCYTES: 6 % (ref 0–12)
PDW BLD-RTO: 11.1 % (ref 11.5–14.5)
PLATELET # BLD: 169 THOU/MM3 (ref 130–400)
PMV BLD AUTO: 7.4 FL (ref 7.4–10.4)
RBC # BLD: 3.53 MILL/MM3 (ref 4.2–5.4)
SEG NEUTROPHILS: 70 % (ref 43–75)
WBC # BLD: 4.1 THOU/MM3 (ref 4.8–10.8)

## 2018-12-13 PROCEDURE — 99211 OFF/OP EST MAY X REQ PHY/QHP: CPT

## 2018-12-13 PROCEDURE — G8427 DOCREV CUR MEDS BY ELIG CLIN: HCPCS | Performed by: INTERNAL MEDICINE

## 2018-12-13 PROCEDURE — 36415 COLL VENOUS BLD VENIPUNCTURE: CPT

## 2018-12-13 PROCEDURE — 2709999900 HC NON-CHARGEABLE SUPPLY

## 2018-12-13 PROCEDURE — 85025 COMPLETE CBC W/AUTO DIFF WBC: CPT

## 2018-12-13 PROCEDURE — 6360000002 HC RX W HCPCS: Performed by: INTERNAL MEDICINE

## 2018-12-13 PROCEDURE — 36591 DRAW BLOOD OFF VENOUS DEVICE: CPT

## 2018-12-13 PROCEDURE — G8419 CALC BMI OUT NRM PARAM NOF/U: HCPCS | Performed by: INTERNAL MEDICINE

## 2018-12-13 PROCEDURE — 3017F COLORECTAL CA SCREEN DOC REV: CPT | Performed by: INTERNAL MEDICINE

## 2018-12-13 PROCEDURE — 2580000003 HC RX 258: Performed by: INTERNAL MEDICINE

## 2018-12-13 PROCEDURE — G8484 FLU IMMUNIZE NO ADMIN: HCPCS | Performed by: INTERNAL MEDICINE

## 2018-12-13 PROCEDURE — 71046 X-RAY EXAM CHEST 2 VIEWS: CPT

## 2018-12-13 PROCEDURE — 4004F PT TOBACCO SCREEN RCVD TLK: CPT | Performed by: INTERNAL MEDICINE

## 2018-12-13 PROCEDURE — 99214 OFFICE O/P EST MOD 30 MIN: CPT | Performed by: INTERNAL MEDICINE

## 2018-12-13 RX ORDER — HEPARIN SODIUM (PORCINE) LOCK FLUSH IV SOLN 100 UNIT/ML 100 UNIT/ML
500 SOLUTION INTRAVENOUS PRN
Status: CANCELLED | OUTPATIENT
Start: 2018-12-13

## 2018-12-13 RX ORDER — PROCHLORPERAZINE MALEATE 10 MG
10 TABLET ORAL EVERY 6 HOURS PRN
Qty: 30 TABLET | Refills: 1 | Status: ON HOLD | OUTPATIENT
Start: 2018-12-13 | End: 2019-03-04 | Stop reason: HOSPADM

## 2018-12-13 RX ORDER — SODIUM CHLORIDE 0.9 % (FLUSH) 0.9 %
10 SYRINGE (ML) INJECTION PRN
Status: DISCONTINUED | OUTPATIENT
Start: 2018-12-13 | End: 2018-12-14 | Stop reason: HOSPADM

## 2018-12-13 RX ORDER — ONDANSETRON 4 MG/1
4 TABLET, ORALLY DISINTEGRATING ORAL EVERY 8 HOURS PRN
Qty: 20 TABLET | Refills: 0 | Status: SHIPPED | OUTPATIENT
Start: 2018-12-13 | End: 2019-09-10 | Stop reason: ALTCHOICE

## 2018-12-13 RX ORDER — SODIUM CHLORIDE 0.9 % (FLUSH) 0.9 %
20 SYRINGE (ML) INJECTION PRN
Status: CANCELLED | OUTPATIENT
Start: 2018-12-13

## 2018-12-13 RX ORDER — SODIUM CHLORIDE 0.9 % (FLUSH) 0.9 %
20 SYRINGE (ML) INJECTION PRN
Status: DISCONTINUED | OUTPATIENT
Start: 2018-12-13 | End: 2018-12-14 | Stop reason: HOSPADM

## 2018-12-13 RX ORDER — SODIUM CHLORIDE 0.9 % (FLUSH) 0.9 %
10 SYRINGE (ML) INJECTION PRN
Status: CANCELLED | OUTPATIENT
Start: 2018-12-13

## 2018-12-13 RX ORDER — HEPARIN SODIUM (PORCINE) LOCK FLUSH IV SOLN 100 UNIT/ML 100 UNIT/ML
500 SOLUTION INTRAVENOUS PRN
Status: DISCONTINUED | OUTPATIENT
Start: 2018-12-13 | End: 2018-12-14 | Stop reason: HOSPADM

## 2018-12-13 RX ADMIN — Medication 10 ML: at 11:36

## 2018-12-13 RX ADMIN — Medication 20 ML: at 10:24

## 2018-12-13 RX ADMIN — Medication 10 ML: at 10:23

## 2018-12-13 RX ADMIN — Medication 500 UNITS: at 11:36

## 2018-12-13 ASSESSMENT — PAIN DESCRIPTION - LOCATION
LOCATION: LEG
LOCATION: LEG

## 2018-12-13 ASSESSMENT — PAIN DESCRIPTION - FREQUENCY
FREQUENCY: CONTINUOUS
FREQUENCY: CONTINUOUS

## 2018-12-13 ASSESSMENT — PAIN SCALES - GENERAL
PAINLEVEL_OUTOF10: 8
PAINLEVEL_OUTOF10: 7

## 2018-12-13 ASSESSMENT — PAIN DESCRIPTION - ORIENTATION
ORIENTATION: RIGHT;LEFT;LOWER
ORIENTATION: RIGHT;LEFT;LOWER

## 2018-12-13 ASSESSMENT — PAIN DESCRIPTION - DESCRIPTORS
DESCRIPTORS: ACHING;SHARP
DESCRIPTORS: ACHING;BURNING;CONSTANT;THROBBING

## 2018-12-13 ASSESSMENT — PAIN DESCRIPTION - PAIN TYPE
TYPE: ACUTE PAIN
TYPE: CHRONIC PAIN

## 2018-12-13 ASSESSMENT — PAIN DESCRIPTION - PROGRESSION: CLINICAL_PROGRESSION: NOT CHANGED

## 2018-12-13 ASSESSMENT — PAIN DESCRIPTION - ONSET: ONSET: ON-GOING

## 2018-12-13 NOTE — PROGRESS NOTES
Pt tolerated lab draw via mediport without issues. Pt ambulated off unit to exam room for appointment with Dr. Benedict Burr accompanied by Geraldo Rose and family.

## 2018-12-13 NOTE — PLAN OF CARE
Problem: Pain:  Intervention: Promote participation in pain management plan  Encourage pt to use PRN pain medications as needed. Encourage pt to report uncontrolled pain to MD.     Goal: Control of acute pain  Control of acute pain   Outcome: Met This Shift  Pt presents today with new acute BLE pain. Pt rates pain 7-8/10. Pt to see MD regarding pain today. Problem: Discharge Planning  Intervention: Discharge to appropriate level of care  Discharge instructions given to pt and reviewed. Follow up appointments discussed. Goal: Knowledge of discharge instructions  Knowledge of discharge instructions     Outcome: Met This Shift  Patient verbalizes understanding of discharge instructions, follow up appointment, and when to call physician if needed    Problem: Infection - Central Venous Catheter-Associated Bloodstream Infection:  Intervention: Infection risk assessment  Discuss port maintenance, infection prevention, signs of infection, and when to call the doctor. Goal: Will show no infection signs and symptoms  Will show no infection signs and symptoms   Outcome: Met This Shift  Mediport site with no redness or warmth. Skin over port site intact with no signs of breakdown noted. Patient verbalizes signs/symptoms of port infection and when to notify the physician. Comments: Care plan reviewed with patient. Patient verbalizes understanding of the plan of care and contributes to goal setting.

## 2018-12-13 NOTE — PROGRESS NOTES
Pt tolerated lab draw via mediport without any complications. Discharge instructions given to patient. Patient verbalizes understanding. Pt ambulated off unit per self accompanied by family with belongings.

## 2018-12-21 DIAGNOSIS — C77.3 BREAST CANCER METASTASIZED TO AXILLARY LYMPH NODE, LEFT (HCC): Primary | ICD-10-CM

## 2018-12-21 DIAGNOSIS — C50.912 BREAST CANCER METASTASIZED TO AXILLARY LYMPH NODE, LEFT (HCC): Primary | ICD-10-CM

## 2018-12-26 ENCOUNTER — HOSPITAL ENCOUNTER (OUTPATIENT)
Dept: INFUSION THERAPY | Age: 48
End: 2018-12-26

## 2018-12-27 DIAGNOSIS — C50.912 BREAST CANCER METASTASIZED TO AXILLARY LYMPH NODE, LEFT (HCC): Primary | ICD-10-CM

## 2018-12-27 DIAGNOSIS — C77.3 BREAST CANCER METASTASIZED TO AXILLARY LYMPH NODE, LEFT (HCC): Primary | ICD-10-CM

## 2018-12-28 ENCOUNTER — HOSPITAL ENCOUNTER (OUTPATIENT)
Dept: INFUSION THERAPY | Age: 48
End: 2018-12-28
Payer: COMMERCIAL

## 2018-12-28 ENCOUNTER — TELEPHONE (OUTPATIENT)
Dept: ONCOLOGY | Age: 48
End: 2018-12-28

## 2019-01-02 RX ORDER — SODIUM CHLORIDE 0.9 % (FLUSH) 0.9 %
10 SYRINGE (ML) INJECTION PRN
Status: CANCELLED | OUTPATIENT
Start: 2019-01-02

## 2019-01-03 ENCOUNTER — HOSPITAL ENCOUNTER (OUTPATIENT)
Dept: INFUSION THERAPY | Age: 49
Discharge: HOME | End: 2019-01-03
Payer: COMMERCIAL

## 2019-01-03 ENCOUNTER — OFFICE VISIT (OUTPATIENT)
Dept: ONCOLOGY | Age: 49
End: 2019-01-03
Payer: COMMERCIAL

## 2019-01-03 VITALS
OXYGEN SATURATION: 94 % | SYSTOLIC BLOOD PRESSURE: 105 MMHG | BODY MASS INDEX: 24.67 KG/M2 | HEART RATE: 94 BPM | HEIGHT: 68 IN | RESPIRATION RATE: 18 BRPM | WEIGHT: 162.8 LBS | TEMPERATURE: 98 F | DIASTOLIC BLOOD PRESSURE: 65 MMHG

## 2019-01-03 DIAGNOSIS — Z79.811 PROPHYLACTIC USE OF LETROZOLE (FEMARA): ICD-10-CM

## 2019-01-03 DIAGNOSIS — Z90.12 STATUS POST MASTECTOMY, LEFT: ICD-10-CM

## 2019-01-03 DIAGNOSIS — C77.3 BREAST CANCER METASTASIZED TO AXILLARY LYMPH NODE, LEFT (HCC): ICD-10-CM

## 2019-01-03 DIAGNOSIS — Z17.0 MALIGNANT NEOPLASM OF LEFT BREAST IN FEMALE, ESTROGEN RECEPTOR POSITIVE, UNSPECIFIED SITE OF BREAST (HCC): ICD-10-CM

## 2019-01-03 DIAGNOSIS — C50.912 MALIGNANT NEOPLASM OF LEFT BREAST IN FEMALE, ESTROGEN RECEPTOR POSITIVE, UNSPECIFIED SITE OF BREAST (HCC): ICD-10-CM

## 2019-01-03 DIAGNOSIS — J44.9 COPD WITH ASTHMA (HCC): ICD-10-CM

## 2019-01-03 DIAGNOSIS — C50.912 BREAST CANCER METASTASIZED TO AXILLARY LYMPH NODE, LEFT (HCC): Primary | ICD-10-CM

## 2019-01-03 DIAGNOSIS — G62.0 DRUG-INDUCED POLYNEUROPATHY (HCC): ICD-10-CM

## 2019-01-03 DIAGNOSIS — J01.00 SUBACUTE MAXILLARY SINUSITIS: ICD-10-CM

## 2019-01-03 DIAGNOSIS — C77.3 BREAST CANCER METASTASIZED TO AXILLARY LYMPH NODE, LEFT (HCC): Primary | ICD-10-CM

## 2019-01-03 DIAGNOSIS — C50.912 BREAST CANCER METASTASIZED TO AXILLARY LYMPH NODE, LEFT (HCC): ICD-10-CM

## 2019-01-03 LAB
ALBUMIN SERPL-MCNC: 3.6 G/DL (ref 3.5–5.1)
ALP BLD-CCNC: 83 U/L (ref 38–126)
ALT SERPL-CCNC: 10 U/L (ref 11–66)
AST SERPL-CCNC: 11 U/L (ref 5–40)
BASINOPHIL, AUTOMATED: 0 % (ref 0–3)
BILIRUB SERPL-MCNC: 0.2 MG/DL (ref 0.3–1.2)
BILIRUBIN DIRECT: < 0.2 MG/DL (ref 0–0.3)
BUN, WHOLE BLOOD: 16 MG/DL (ref 8–26)
CHLORIDE, WHOLE BLOOD: 105 MEQ/L (ref 98–109)
CREATININE, WHOLE BLOOD: 0.9 MG/DL (ref 0.5–1.2)
EOSINOPHILS RELATIVE PERCENT: 8 % (ref 0–4)
GFR, ESTIMATED: 70 ML/MIN/1.73M2
GLUCOSE, WHOLE BLOOD: 88 MG/DL (ref 70–108)
HCT VFR BLD CALC: 35.4 % (ref 37–47)
HEMOGLOBIN: 12.3 GM/DL (ref 12–16)
IONIZED CALCIUM, WHOLE BLOOD: 1.18 MMOL/L (ref 1.12–1.32)
LYMPHOCYTES # BLD: 23 % (ref 15–47)
MCH RBC QN AUTO: 31.6 PG (ref 27–31)
MCHC RBC AUTO-ENTMCNC: 34.7 GM/DL (ref 33–37)
MCV RBC AUTO: 91 FL (ref 81–99)
MONOCYTES: 7 % (ref 0–12)
PDW BLD-RTO: 13.6 % (ref 11.5–14.5)
PLATELET # BLD: 179 THOU/MM3 (ref 130–400)
PMV BLD AUTO: 7.7 FL (ref 7.4–10.4)
POTASSIUM, WHOLE BLOOD: 3.8 MEQ/L (ref 3.5–4.9)
RBC # BLD: 3.89 MILL/MM3 (ref 4.2–5.4)
SEG NEUTROPHILS: 62 % (ref 43–75)
SODIUM, WHOLE BLOOD: 143 MEQ/L (ref 138–146)
TOTAL CO2, WHOLE BLOOD: 26 MEQ/L (ref 23–33)
TOTAL PROTEIN: 6.4 G/DL (ref 6.1–8)
WBC # BLD: 4 THOU/MM3 (ref 4.8–10.8)

## 2019-01-03 PROCEDURE — 36415 COLL VENOUS BLD VENIPUNCTURE: CPT

## 2019-01-03 PROCEDURE — 3017F COLORECTAL CA SCREEN DOC REV: CPT | Performed by: INTERNAL MEDICINE

## 2019-01-03 PROCEDURE — 6360000002 HC RX W HCPCS: Performed by: INTERNAL MEDICINE

## 2019-01-03 PROCEDURE — 80047 BASIC METABLC PNL IONIZED CA: CPT

## 2019-01-03 PROCEDURE — 2709999900 HC NON-CHARGEABLE SUPPLY

## 2019-01-03 PROCEDURE — 4004F PT TOBACCO SCREEN RCVD TLK: CPT | Performed by: INTERNAL MEDICINE

## 2019-01-03 PROCEDURE — G0463 HOSPITAL OUTPT CLINIC VISIT: HCPCS

## 2019-01-03 PROCEDURE — 36593 DECLOT VASCULAR DEVICE: CPT

## 2019-01-03 PROCEDURE — G8484 FLU IMMUNIZE NO ADMIN: HCPCS | Performed by: INTERNAL MEDICINE

## 2019-01-03 PROCEDURE — G8420 CALC BMI NORM PARAMETERS: HCPCS | Performed by: INTERNAL MEDICINE

## 2019-01-03 PROCEDURE — 99214 OFFICE O/P EST MOD 30 MIN: CPT | Performed by: INTERNAL MEDICINE

## 2019-01-03 PROCEDURE — 85025 COMPLETE CBC W/AUTO DIFF WBC: CPT

## 2019-01-03 PROCEDURE — 2580000003 HC RX 258: Performed by: INTERNAL MEDICINE

## 2019-01-03 PROCEDURE — 80076 HEPATIC FUNCTION PANEL: CPT

## 2019-01-03 PROCEDURE — G8427 DOCREV CUR MEDS BY ELIG CLIN: HCPCS | Performed by: INTERNAL MEDICINE

## 2019-01-03 RX ORDER — SODIUM CHLORIDE 0.9 % (FLUSH) 0.9 %
20 SYRINGE (ML) INJECTION PRN
Status: CANCELLED | OUTPATIENT
Start: 2019-01-03

## 2019-01-03 RX ORDER — SODIUM CHLORIDE 0.9 % (FLUSH) 0.9 %
20 SYRINGE (ML) INJECTION PRN
Status: DISCONTINUED | OUTPATIENT
Start: 2019-01-03 | End: 2019-01-04 | Stop reason: HOSPADM

## 2019-01-03 RX ORDER — HEPARIN SODIUM (PORCINE) LOCK FLUSH IV SOLN 100 UNIT/ML 100 UNIT/ML
500 SOLUTION INTRAVENOUS PRN
Status: DISCONTINUED | OUTPATIENT
Start: 2019-01-03 | End: 2019-01-04 | Stop reason: HOSPADM

## 2019-01-03 RX ORDER — SODIUM CHLORIDE 0.9 % (FLUSH) 0.9 %
10 SYRINGE (ML) INJECTION PRN
Status: CANCELLED | OUTPATIENT
Start: 2019-01-03

## 2019-01-03 RX ORDER — HEPARIN SODIUM (PORCINE) LOCK FLUSH IV SOLN 100 UNIT/ML 100 UNIT/ML
500 SOLUTION INTRAVENOUS PRN
Status: CANCELLED | OUTPATIENT
Start: 2019-01-03

## 2019-01-03 RX ORDER — METHYLPREDNISOLONE 4 MG/1
TABLET ORAL
Qty: 1 KIT | Refills: 1 | Status: SHIPPED | OUTPATIENT
Start: 2019-01-03 | End: 2019-01-09

## 2019-01-03 RX ADMIN — Medication 10 ML: at 08:53

## 2019-01-03 RX ADMIN — WATER 2.2 ML: 1 INJECTION, SOLUTION INTRAVENOUS at 08:53

## 2019-01-03 RX ADMIN — Medication 500 UNITS: at 10:24

## 2019-01-03 RX ADMIN — ALTEPLASE 2 MG: 2.2 INJECTION, POWDER, LYOPHILIZED, FOR SOLUTION INTRAVENOUS at 08:53

## 2019-01-03 RX ADMIN — Medication 10 ML: at 10:24

## 2019-01-03 NOTE — PROGRESS NOTES
Pt discharged in stable condition with verbalization of discharge instructions all questions answered and all  belongings sent with patient. Patient tolerated Mediport cath dagoberto instillation and flush  without any complications or signs of a reaction. Patient accompanied off unit by family.

## 2019-01-03 NOTE — PROGRESS NOTES
Patient agreeable to having blood drawn peripherally since unable to get enough blood out of port.  Patient escorted to exam room to see doctor

## 2019-01-03 NOTE — PLAN OF CARE
Problem: Musculor/Skeletal Functional Status  Intervention: Fall precautions  Patient aware of fall precautions for here and at home -call light in reach while here     Goal: Absence of falls  Outcome: Met This Shift  No falls this admission    Problem: Intellectual/Education/Knowledge Deficit  Intervention: Verbal/written education provided  Discharge home    Goal: Teaching initiated upon admission  Outcome: Met This Shift  Reviewed cath dagoberto instillation and Mediport flush with patient, patient offered no questions or concerns. Patient verbalized understanding of drug being administered. Goal: Written Disposition Instruction form completed  Outcome: Met This Shift  Discharge instructions given and reviewed with patient. All questions answered. Patient verbalized understanding    Problem: Discharge Planning  Intervention: Interaction with patient/family and care team  Patient currently denies any needs or concerns   Intervention: Discharge to appropriate level of care  Discharge home    Goal: Knowledge of discharge instructions  Knowledge of discharge instructions    Outcome: Met This Shift  Patient  able to teach back follow up appointments and when to call the doctor. Patient offers no questions at this time    Problem: Infection - Central Venous Catheter-Associated Bloodstream Infection:  Intervention: Central line needs assessment  Patient prefers to leave in   Intervention: Infection risk assessment  Patient aware that is of increased risk for infection due to receiving chemotherapy and having a central venous catheter. Patient aware of signs and symptoms of infection and when to call the doctor    Goal: Will show no infection signs and symptoms  Will show no infection signs and symptoms  Outcome: Met This Shift  No signs of infection noted at mediport site     Comments: Care plan reviewed with patient and she verbalized understanding of the plan of care and contributed to goal setting.

## 2019-01-07 ENCOUNTER — OFFICE VISIT (OUTPATIENT)
Dept: SURGERY | Age: 49
End: 2019-01-07
Payer: COMMERCIAL

## 2019-01-07 VITALS
HEIGHT: 68 IN | BODY MASS INDEX: 25.31 KG/M2 | RESPIRATION RATE: 16 BRPM | SYSTOLIC BLOOD PRESSURE: 124 MMHG | DIASTOLIC BLOOD PRESSURE: 74 MMHG | HEART RATE: 76 BPM | WEIGHT: 167 LBS | TEMPERATURE: 97.1 F | OXYGEN SATURATION: 98 %

## 2019-01-07 DIAGNOSIS — C77.3 BREAST CANCER METASTASIZED TO AXILLARY LYMPH NODE, LEFT (HCC): Primary | ICD-10-CM

## 2019-01-07 DIAGNOSIS — C50.912 BREAST CANCER METASTASIZED TO AXILLARY LYMPH NODE, LEFT (HCC): Primary | ICD-10-CM

## 2019-01-07 DIAGNOSIS — J44.9 COPD WITH ASTHMA (HCC): ICD-10-CM

## 2019-01-07 PROCEDURE — G8484 FLU IMMUNIZE NO ADMIN: HCPCS | Performed by: SURGERY

## 2019-01-07 PROCEDURE — 3017F COLORECTAL CA SCREEN DOC REV: CPT | Performed by: SURGERY

## 2019-01-07 PROCEDURE — G8427 DOCREV CUR MEDS BY ELIG CLIN: HCPCS | Performed by: SURGERY

## 2019-01-07 PROCEDURE — G8419 CALC BMI OUT NRM PARAM NOF/U: HCPCS | Performed by: SURGERY

## 2019-01-07 PROCEDURE — 4004F PT TOBACCO SCREEN RCVD TLK: CPT | Performed by: SURGERY

## 2019-01-07 PROCEDURE — 3023F SPIROM DOC REV: CPT | Performed by: SURGERY

## 2019-01-07 PROCEDURE — 99214 OFFICE O/P EST MOD 30 MIN: CPT | Performed by: SURGERY

## 2019-01-07 PROCEDURE — G8926 SPIRO NO PERF OR DOC: HCPCS | Performed by: SURGERY

## 2019-01-07 ASSESSMENT — ENCOUNTER SYMPTOMS
COLOR CHANGE: 0
COUGH: 1
ABDOMINAL PAIN: 0
SINUS PRESSURE: 1
BACK PAIN: 0
DIARRHEA: 0
SHORTNESS OF BREATH: 0
EYE ITCHING: 0
CONSTIPATION: 0
TROUBLE SWALLOWING: 0
DIARRHEA: 0
VOICE CHANGE: 0
EYE REDNESS: 0
COLOR CHANGE: 0
EYE PAIN: 0
CHEST TIGHTNESS: 0
BACK PAIN: 1
NAUSEA: 0
ABDOMINAL DISTENTION: 0
BLOOD IN STOOL: 0
CONSTIPATION: 0
WHEEZING: 0
EYE REDNESS: 0
VOMITING: 0
ABDOMINAL PAIN: 0
SORE THROAT: 0
NAUSEA: 0

## 2019-01-08 ASSESSMENT — ENCOUNTER SYMPTOMS
VOICE CHANGE: 0
COUGH: 0
SORE THROAT: 0
WHEEZING: 0
VOMITING: 0
RHINORRHEA: 0
SHORTNESS OF BREATH: 1
BLOOD IN STOOL: 0
TROUBLE SWALLOWING: 0

## 2019-01-09 ASSESSMENT — ENCOUNTER SYMPTOMS
WHEEZING: 0
TROUBLE SWALLOWING: 0
ABDOMINAL PAIN: 0
VOICE CHANGE: 0
EYE ITCHING: 0
ABDOMINAL DISTENTION: 0
EYE REDNESS: 0
CHEST TIGHTNESS: 0
COUGH: 1
COLOR CHANGE: 0
DIARRHEA: 0
BLOOD IN STOOL: 0
SHORTNESS OF BREATH: 0
CONSTIPATION: 0
VOMITING: 0
BACK PAIN: 1
NAUSEA: 0
SORE THROAT: 0
SINUS PRESSURE: 1

## 2019-02-07 ENCOUNTER — HOSPITAL ENCOUNTER (OUTPATIENT)
Dept: INFUSION THERAPY | Age: 49
Discharge: HOME | End: 2019-02-07
Payer: COMMERCIAL

## 2019-02-07 VITALS
OXYGEN SATURATION: 96 % | TEMPERATURE: 97.7 F | WEIGHT: 160 LBS | RESPIRATION RATE: 18 BRPM | HEART RATE: 87 BPM | SYSTOLIC BLOOD PRESSURE: 125 MMHG | DIASTOLIC BLOOD PRESSURE: 67 MMHG | HEIGHT: 68 IN | BODY MASS INDEX: 24.25 KG/M2

## 2019-02-07 DIAGNOSIS — C50.912 BREAST CANCER METASTASIZED TO AXILLARY LYMPH NODE, LEFT (HCC): ICD-10-CM

## 2019-02-07 DIAGNOSIS — C77.3 BREAST CANCER METASTASIZED TO AXILLARY LYMPH NODE, LEFT (HCC): ICD-10-CM

## 2019-02-07 DIAGNOSIS — J44.9 COPD WITH ASTHMA (HCC): ICD-10-CM

## 2019-02-07 DIAGNOSIS — Z17.0 MALIGNANT NEOPLASM OF LEFT BREAST IN FEMALE, ESTROGEN RECEPTOR POSITIVE, UNSPECIFIED SITE OF BREAST (HCC): ICD-10-CM

## 2019-02-07 DIAGNOSIS — C50.912 MALIGNANT NEOPLASM OF LEFT BREAST IN FEMALE, ESTROGEN RECEPTOR POSITIVE, UNSPECIFIED SITE OF BREAST (HCC): ICD-10-CM

## 2019-02-07 PROCEDURE — 6360000002 HC RX W HCPCS: Performed by: INTERNAL MEDICINE

## 2019-02-07 PROCEDURE — 36593 DECLOT VASCULAR DEVICE: CPT

## 2019-02-07 PROCEDURE — 2709999900 HC NON-CHARGEABLE SUPPLY

## 2019-02-07 PROCEDURE — 2580000003 HC RX 258: Performed by: INTERNAL MEDICINE

## 2019-02-07 RX ORDER — SODIUM CHLORIDE 0.9 % (FLUSH) 0.9 %
20 SYRINGE (ML) INJECTION PRN
Status: DISCONTINUED | OUTPATIENT
Start: 2019-02-07 | End: 2019-02-08 | Stop reason: HOSPADM

## 2019-02-07 RX ORDER — SODIUM CHLORIDE 0.9 % (FLUSH) 0.9 %
20 SYRINGE (ML) INJECTION PRN
Status: CANCELLED | OUTPATIENT
Start: 2019-02-07

## 2019-02-07 RX ORDER — SODIUM CHLORIDE 0.9 % (FLUSH) 0.9 %
10 SYRINGE (ML) INJECTION PRN
Status: DISCONTINUED | OUTPATIENT
Start: 2019-02-07 | End: 2019-02-08 | Stop reason: HOSPADM

## 2019-02-07 RX ORDER — HEPARIN SODIUM (PORCINE) LOCK FLUSH IV SOLN 100 UNIT/ML 100 UNIT/ML
500 SOLUTION INTRAVENOUS PRN
Status: DISCONTINUED | OUTPATIENT
Start: 2019-02-07 | End: 2019-02-08 | Stop reason: HOSPADM

## 2019-02-07 RX ORDER — HEPARIN SODIUM (PORCINE) LOCK FLUSH IV SOLN 100 UNIT/ML 100 UNIT/ML
500 SOLUTION INTRAVENOUS PRN
Status: CANCELLED | OUTPATIENT
Start: 2019-02-07

## 2019-02-07 RX ORDER — SODIUM CHLORIDE 0.9 % (FLUSH) 0.9 %
10 SYRINGE (ML) INJECTION PRN
Status: CANCELLED | OUTPATIENT
Start: 2019-02-07

## 2019-02-07 RX ADMIN — Medication 10 ML: at 09:15

## 2019-02-07 RX ADMIN — ALTEPLASE 2 MG: 2.2 INJECTION, POWDER, LYOPHILIZED, FOR SOLUTION INTRAVENOUS at 09:25

## 2019-02-07 RX ADMIN — WATER 2.2 ML: 1 INJECTION INTRAMUSCULAR; INTRAVENOUS; SUBCUTANEOUS at 09:25

## 2019-02-07 RX ADMIN — Medication 500 UNITS: at 10:05

## 2019-02-07 RX ADMIN — Medication 20 ML: at 10:05

## 2019-02-07 ASSESSMENT — PAIN SCALES - GENERAL: PAINLEVEL_OUTOF10: 0

## 2019-02-07 NOTE — PLAN OF CARE
Problem: Discharge Planning  Intervention: Interaction with patient/family and care team  Discharge instructions given to pt and reviewed. Follow up appointments discussed. Goal: Knowledge of discharge instructions  Knowledge of discharge instructions     Outcome: Met This Shift  Patient verbalizes understanding of discharge instructions, follow up appointment, and when to call physician if needed    Problem: Intellectual/Education/Knowledge Deficit  Intervention: Verbal/written education provided  Verbal education provided on cathflo prior to instilling into mediport. Goal: Teaching initiated upon admission  Outcome: Met This Shift  Patient verbalizes understanding to verbal information given on cathflo, including action and possible side effects. Aware to call MD if develop complications. Problem: Infection - Central Venous Catheter-Associated Bloodstream Infection:  Intervention: Central line needs assessment  Discuss port maintenance, infection prevention, signs of infection, and when to call the doctor. Goal: Will show no infection signs and symptoms  Will show no infection signs and symptoms   Outcome: Met This Shift  Mediport site with no redness or warmth. Skin over port site intact with no signs of breakdown noted. Patient verbalizes signs/symptoms of port infection and when to notify the physician. Comments: Care plan reviewed with patient. Patient verbalizes understanding of the plan of care and contributes to goal setting.

## 2019-02-07 NOTE — PROGRESS NOTES
Pt tolerated cathflo and mediport flush without any complications. Discharge instructions given to patient. Patient verbalizes understanding. Pt ambulated off unit per self with belongings.

## 2019-02-19 RX ORDER — LETROZOLE 2.5 MG/1
2.5 TABLET, FILM COATED ORAL DAILY
Qty: 30 TABLET | Refills: 3 | Status: SHIPPED | OUTPATIENT
Start: 2019-02-19 | End: 2019-04-12 | Stop reason: SDUPTHER

## 2019-02-27 ENCOUNTER — HOSPITAL ENCOUNTER (OUTPATIENT)
Dept: RADIATION ONCOLOGY | Age: 49
Discharge: HOME | DRG: 720 | End: 2019-02-27
Payer: COMMERCIAL

## 2019-02-27 PROCEDURE — 99211 OFF/OP EST MAY X REQ PHY/QHP: CPT

## 2019-03-02 ENCOUNTER — HOSPITAL ENCOUNTER (INPATIENT)
Age: 49
LOS: 2 days | Discharge: HOME | DRG: 720 | End: 2019-03-04
Attending: OPHTHALMOLOGY | Admitting: OPHTHALMOLOGY
Payer: COMMERCIAL

## 2019-03-02 ENCOUNTER — APPOINTMENT (OUTPATIENT)
Dept: GENERAL RADIOLOGY | Age: 49
DRG: 720 | End: 2019-03-02
Payer: COMMERCIAL

## 2019-03-02 ENCOUNTER — APPOINTMENT (OUTPATIENT)
Dept: CT IMAGING | Age: 49
DRG: 720 | End: 2019-03-02
Payer: COMMERCIAL

## 2019-03-02 DIAGNOSIS — C50.912 BREAST CANCER METASTASIZED TO AXILLARY LYMPH NODE, LEFT (HCC): ICD-10-CM

## 2019-03-02 DIAGNOSIS — J18.9 SEPSIS DUE TO PNEUMONIA (HCC): Primary | ICD-10-CM

## 2019-03-02 DIAGNOSIS — J44.1 CHRONIC OBSTRUCTIVE PULMONARY DISEASE WITH ACUTE EXACERBATION (HCC): ICD-10-CM

## 2019-03-02 DIAGNOSIS — R59.0 MEDIASTINAL LYMPHADENOPATHY: ICD-10-CM

## 2019-03-02 DIAGNOSIS — J44.1 COPD EXACERBATION (HCC): ICD-10-CM

## 2019-03-02 DIAGNOSIS — A41.9 SEPSIS DUE TO PNEUMONIA (HCC): Primary | ICD-10-CM

## 2019-03-02 DIAGNOSIS — J18.9 PNEUMONIA OF BOTH LOWER LOBES DUE TO INFECTIOUS ORGANISM: ICD-10-CM

## 2019-03-02 DIAGNOSIS — C77.3 BREAST CANCER METASTASIZED TO AXILLARY LYMPH NODE, LEFT (HCC): ICD-10-CM

## 2019-03-02 PROBLEM — R11.0 NAUSEA: Status: ACTIVE | Noted: 2019-03-02

## 2019-03-02 PROBLEM — R79.89 ELEVATED LACTIC ACID LEVEL: Status: ACTIVE | Noted: 2019-03-02

## 2019-03-02 PROBLEM — R19.7 DIARRHEA OF PRESUMED INFECTIOUS ORIGIN: Status: ACTIVE | Noted: 2019-03-02

## 2019-03-02 LAB
ANION GAP SERPL CALCULATED.3IONS-SCNC: 18 MEQ/L (ref 8–16)
BASOPHILS # BLD: 0.3 %
BASOPHILS ABSOLUTE: 0 THOU/MM3 (ref 0–0.1)
BUN BLDV-MCNC: 10 MG/DL (ref 7–22)
CALCIUM SERPL-MCNC: 9.3 MG/DL (ref 8.5–10.5)
CHLORIDE BLD-SCNC: 99 MEQ/L (ref 98–111)
CO2: 21 MEQ/L (ref 23–33)
CREAT SERPL-MCNC: 0.9 MG/DL (ref 0.4–1.2)
D-DIMER QUANTITATIVE: 1541 NG/ML FEU (ref 0–500)
EOSINOPHIL # BLD: 0.5 %
EOSINOPHILS ABSOLUTE: 0.1 THOU/MM3 (ref 0–0.4)
ERYTHROCYTE [DISTWIDTH] IN BLOOD BY AUTOMATED COUNT: 13 % (ref 11.5–14.5)
ERYTHROCYTE [DISTWIDTH] IN BLOOD BY AUTOMATED COUNT: 44.3 FL (ref 35–45)
FLU A ANTIGEN: NEGATIVE
FLU B ANTIGEN: NEGATIVE
GFR SERPL CREATININE-BSD FRML MDRD: 66 ML/MIN/1.73M2
GLUCOSE BLD-MCNC: 103 MG/DL (ref 70–108)
HCT VFR BLD CALC: 35.2 % (ref 37–47)
HEMOGLOBIN: 11.8 GM/DL (ref 12–16)
IMMATURE GRANS (ABS): 0.04 THOU/MM3 (ref 0–0.07)
IMMATURE GRANULOCYTES: 0.4 %
LACTIC ACID: 2.1 MMOL/L (ref 0.5–2.2)
LACTIC ACID: 3 MMOL/L (ref 0.5–2.2)
LYMPHOCYTES # BLD: 7.3 %
LYMPHOCYTES ABSOLUTE: 0.8 THOU/MM3 (ref 1–4.8)
MAGNESIUM: 1.8 MG/DL (ref 1.6–2.4)
MCH RBC QN AUTO: 31.2 PG (ref 26–33)
MCHC RBC AUTO-ENTMCNC: 33.5 GM/DL (ref 32.2–35.5)
MCV RBC AUTO: 93.1 FL (ref 81–99)
MONOCYTES # BLD: 6.7 %
MONOCYTES ABSOLUTE: 0.7 THOU/MM3 (ref 0.4–1.3)
NUCLEATED RED BLOOD CELLS: 0 /100 WBC
OSMOLALITY CALCULATION: 275 MOSMOL/KG (ref 275–300)
PLATELET # BLD: 207 THOU/MM3 (ref 130–400)
PMV BLD AUTO: 10.2 FL (ref 9.4–12.4)
POTASSIUM SERPL-SCNC: 4.3 MEQ/L (ref 3.5–5.2)
PREGNANCY, SERUM: NEGATIVE
PROCALCITONIN: 0.13 NG/ML (ref 0.01–0.09)
RBC # BLD: 3.78 MILL/MM3 (ref 4.2–5.4)
SEG NEUTROPHILS: 84.8 %
SEGMENTED NEUTROPHILS ABSOLUTE COUNT: 9.4 THOU/MM3 (ref 1.8–7.7)
SODIUM BLD-SCNC: 138 MEQ/L (ref 135–145)
TROPONIN T: < 0.01 NG/ML
WBC # BLD: 11.1 THOU/MM3 (ref 4.8–10.8)

## 2019-03-02 PROCEDURE — 80307 DRUG TEST PRSMV CHEM ANLYZR: CPT

## 2019-03-02 PROCEDURE — 84145 PROCALCITONIN (PCT): CPT

## 2019-03-02 PROCEDURE — 96365 THER/PROPH/DIAG IV INF INIT: CPT

## 2019-03-02 PROCEDURE — 71275 CT ANGIOGRAPHY CHEST: CPT

## 2019-03-02 PROCEDURE — 94640 AIRWAY INHALATION TREATMENT: CPT

## 2019-03-02 PROCEDURE — 83605 ASSAY OF LACTIC ACID: CPT

## 2019-03-02 PROCEDURE — 85379 FIBRIN DEGRADATION QUANT: CPT

## 2019-03-02 PROCEDURE — 2580000003 HC RX 258: Performed by: OPHTHALMOLOGY

## 2019-03-02 PROCEDURE — 99285 EMERGENCY DEPT VISIT HI MDM: CPT

## 2019-03-02 PROCEDURE — 6360000004 HC RX CONTRAST MEDICATION: Performed by: PHYSICIAN ASSISTANT

## 2019-03-02 PROCEDURE — 36415 COLL VENOUS BLD VENIPUNCTURE: CPT

## 2019-03-02 PROCEDURE — 85025 COMPLETE CBC W/AUTO DIFF WBC: CPT

## 2019-03-02 PROCEDURE — 6370000000 HC RX 637 (ALT 250 FOR IP): Performed by: OPHTHALMOLOGY

## 2019-03-02 PROCEDURE — 6360000002 HC RX W HCPCS: Performed by: PHYSICIAN ASSISTANT

## 2019-03-02 PROCEDURE — 93005 ELECTROCARDIOGRAM TRACING: CPT | Performed by: OPHTHALMOLOGY

## 2019-03-02 PROCEDURE — 87040 BLOOD CULTURE FOR BACTERIA: CPT

## 2019-03-02 PROCEDURE — 80048 BASIC METABOLIC PNL TOTAL CA: CPT

## 2019-03-02 PROCEDURE — 87899 AGENT NOS ASSAY W/OPTIC: CPT

## 2019-03-02 PROCEDURE — 99223 1ST HOSP IP/OBS HIGH 75: CPT | Performed by: OPHTHALMOLOGY

## 2019-03-02 PROCEDURE — 83735 ASSAY OF MAGNESIUM: CPT

## 2019-03-02 PROCEDURE — 84703 CHORIONIC GONADOTROPIN ASSAY: CPT

## 2019-03-02 PROCEDURE — 6370000000 HC RX 637 (ALT 250 FOR IP): Performed by: PHYSICIAN ASSISTANT

## 2019-03-02 PROCEDURE — 87804 INFLUENZA ASSAY W/OPTIC: CPT

## 2019-03-02 PROCEDURE — 96375 TX/PRO/DX INJ NEW DRUG ADDON: CPT

## 2019-03-02 PROCEDURE — 2580000003 HC RX 258: Performed by: PHYSICIAN ASSISTANT

## 2019-03-02 PROCEDURE — 1200000003 HC TELEMETRY R&B

## 2019-03-02 PROCEDURE — 71045 X-RAY EXAM CHEST 1 VIEW: CPT

## 2019-03-02 PROCEDURE — 84484 ASSAY OF TROPONIN QUANT: CPT

## 2019-03-02 RX ORDER — ONDANSETRON 4 MG/1
4 TABLET, ORALLY DISINTEGRATING ORAL EVERY 8 HOURS PRN
Status: DISCONTINUED | OUTPATIENT
Start: 2019-03-02 | End: 2019-03-04 | Stop reason: HOSPADM

## 2019-03-02 RX ORDER — IPRATROPIUM BROMIDE AND ALBUTEROL SULFATE 2.5; .5 MG/3ML; MG/3ML
1 SOLUTION RESPIRATORY (INHALATION) ONCE
Status: COMPLETED | OUTPATIENT
Start: 2019-03-02 | End: 2019-03-02

## 2019-03-02 RX ORDER — 0.9 % SODIUM CHLORIDE 0.9 %
1000 INTRAVENOUS SOLUTION INTRAVENOUS ONCE
Status: COMPLETED | OUTPATIENT
Start: 2019-03-02 | End: 2019-03-02

## 2019-03-02 RX ORDER — ALPRAZOLAM 0.5 MG/1
0.5 TABLET ORAL NIGHTLY PRN
Status: DISCONTINUED | OUTPATIENT
Start: 2019-03-02 | End: 2019-03-04 | Stop reason: HOSPADM

## 2019-03-02 RX ORDER — LETROZOLE 2.5 MG/1
2.5 TABLET, FILM COATED ORAL DAILY
Status: DISCONTINUED | OUTPATIENT
Start: 2019-03-02 | End: 2019-03-04 | Stop reason: HOSPADM

## 2019-03-02 RX ORDER — SODIUM CHLORIDE 9 MG/ML
INJECTION, SOLUTION INTRAVENOUS CONTINUOUS
Status: DISCONTINUED | OUTPATIENT
Start: 2019-03-02 | End: 2019-03-03

## 2019-03-02 RX ORDER — VENLAFAXINE HYDROCHLORIDE 75 MG/1
75 CAPSULE, EXTENDED RELEASE ORAL DAILY
Status: DISCONTINUED | OUTPATIENT
Start: 2019-03-02 | End: 2019-03-04 | Stop reason: HOSPADM

## 2019-03-02 RX ORDER — 0.9 % SODIUM CHLORIDE 0.9 %
1000 INTRAVENOUS SOLUTION INTRAVENOUS ONCE
Status: DISCONTINUED | OUTPATIENT
Start: 2019-03-02 | End: 2019-03-04 | Stop reason: HOSPADM

## 2019-03-02 RX ORDER — IPRATROPIUM BROMIDE AND ALBUTEROL SULFATE 2.5; .5 MG/3ML; MG/3ML
1 SOLUTION RESPIRATORY (INHALATION)
Status: DISCONTINUED | OUTPATIENT
Start: 2019-03-02 | End: 2019-03-04 | Stop reason: HOSPADM

## 2019-03-02 RX ORDER — METHYLPREDNISOLONE SODIUM SUCCINATE 125 MG/2ML
80 INJECTION, POWDER, LYOPHILIZED, FOR SOLUTION INTRAMUSCULAR; INTRAVENOUS ONCE
Status: COMPLETED | OUTPATIENT
Start: 2019-03-02 | End: 2019-03-02

## 2019-03-02 RX ORDER — LEVOFLOXACIN 5 MG/ML
500 INJECTION, SOLUTION INTRAVENOUS EVERY 24 HOURS
Status: DISCONTINUED | OUTPATIENT
Start: 2019-03-03 | End: 2019-03-03

## 2019-03-02 RX ORDER — ONDANSETRON 2 MG/ML
4 INJECTION INTRAMUSCULAR; INTRAVENOUS EVERY 6 HOURS PRN
Status: DISCONTINUED | OUTPATIENT
Start: 2019-03-02 | End: 2019-03-04 | Stop reason: HOSPADM

## 2019-03-02 RX ORDER — PROCHLORPERAZINE MALEATE 10 MG
10 TABLET ORAL EVERY 6 HOURS PRN
Status: DISCONTINUED | OUTPATIENT
Start: 2019-03-02 | End: 2019-03-04 | Stop reason: HOSPADM

## 2019-03-02 RX ORDER — METHYLPREDNISOLONE SODIUM SUCCINATE 40 MG/ML
40 INJECTION, POWDER, LYOPHILIZED, FOR SOLUTION INTRAMUSCULAR; INTRAVENOUS DAILY
Status: DISCONTINUED | OUTPATIENT
Start: 2019-03-03 | End: 2019-03-03

## 2019-03-02 RX ORDER — LEVOFLOXACIN 5 MG/ML
750 INJECTION, SOLUTION INTRAVENOUS ONCE
Status: COMPLETED | OUTPATIENT
Start: 2019-03-02 | End: 2019-03-02

## 2019-03-02 RX ADMIN — VENLAFAXINE HYDROCHLORIDE 75 MG: 75 CAPSULE, EXTENDED RELEASE ORAL at 20:05

## 2019-03-02 RX ADMIN — IPRATROPIUM BROMIDE AND ALBUTEROL SULFATE 1 AMPULE: .5; 3 SOLUTION RESPIRATORY (INHALATION) at 21:10

## 2019-03-02 RX ADMIN — LEVOFLOXACIN 750 MG: 5 INJECTION, SOLUTION INTRAVENOUS at 15:56

## 2019-03-02 RX ADMIN — SODIUM CHLORIDE: 9 INJECTION, SOLUTION INTRAVENOUS at 16:30

## 2019-03-02 RX ADMIN — SODIUM CHLORIDE 1000 ML: 9 INJECTION, SOLUTION INTRAVENOUS at 13:28

## 2019-03-02 RX ADMIN — Medication 1 PUFF: at 21:10

## 2019-03-02 RX ADMIN — LETROZOLE 2.5 MG: 2.5 TABLET ORAL at 20:05

## 2019-03-02 RX ADMIN — IPRATROPIUM BROMIDE AND ALBUTEROL SULFATE 1 AMPULE: .5; 3 SOLUTION RESPIRATORY (INHALATION) at 12:39

## 2019-03-02 RX ADMIN — IOPAMIDOL 80 ML: 755 INJECTION, SOLUTION INTRAVENOUS at 14:36

## 2019-03-02 RX ADMIN — METHYLPREDNISOLONE SODIUM SUCCINATE 80 MG: 125 INJECTION, POWDER, FOR SOLUTION INTRAMUSCULAR; INTRAVENOUS at 13:29

## 2019-03-02 RX ADMIN — SODIUM CHLORIDE 1000 ML: 9 INJECTION, SOLUTION INTRAVENOUS at 15:17

## 2019-03-02 RX ADMIN — CEFTRIAXONE SODIUM 1 G: 1 INJECTION, POWDER, FOR SOLUTION INTRAMUSCULAR; INTRAVENOUS at 15:14

## 2019-03-02 ASSESSMENT — PAIN DESCRIPTION - LOCATION: LOCATION: OTHER (COMMENT)

## 2019-03-02 ASSESSMENT — PAIN DESCRIPTION - PAIN TYPE
TYPE: ACUTE PAIN
TYPE: ACUTE PAIN

## 2019-03-02 ASSESSMENT — PAIN DESCRIPTION - DESCRIPTORS
DESCRIPTORS: ACHING
DESCRIPTORS: ACHING

## 2019-03-02 ASSESSMENT — ENCOUNTER SYMPTOMS
WHEEZING: 0
SHORTNESS OF BREATH: 0
ABDOMINAL PAIN: 0
NAUSEA: 1
VOMITING: 0
SORE THROAT: 0
EYE PAIN: 0
EYE DISCHARGE: 0
RHINORRHEA: 1
COUGH: 1
DIARRHEA: 0
BACK PAIN: 0

## 2019-03-02 ASSESSMENT — PAIN DESCRIPTION - FREQUENCY
FREQUENCY: CONTINUOUS
FREQUENCY: CONTINUOUS

## 2019-03-02 ASSESSMENT — PAIN SCALES - GENERAL
PAINLEVEL_OUTOF10: 7
PAINLEVEL_OUTOF10: 6

## 2019-03-03 ENCOUNTER — APPOINTMENT (OUTPATIENT)
Dept: GENERAL RADIOLOGY | Age: 49
DRG: 720 | End: 2019-03-03
Payer: COMMERCIAL

## 2019-03-03 LAB
ALBUMIN SERPL-MCNC: 2.8 G/DL (ref 3.5–5.1)
ALP BLD-CCNC: 114 U/L (ref 38–126)
ALT SERPL-CCNC: 29 U/L (ref 11–66)
AMPHETAMINE+METHAMPHETAMINE URINE SCREEN: NEGATIVE
ANION GAP SERPL CALCULATED.3IONS-SCNC: 12 MEQ/L (ref 8–16)
AST SERPL-CCNC: 19 U/L (ref 5–40)
BARBITURATE QUANTITATIVE URINE: POSITIVE
BENZODIAZEPINE QUANTITATIVE URINE: NEGATIVE
BILIRUB SERPL-MCNC: < 0.2 MG/DL (ref 0.3–1.2)
BUN BLDV-MCNC: 8 MG/DL (ref 7–22)
CALCIUM SERPL-MCNC: 8.7 MG/DL (ref 8.5–10.5)
CANNABINOID QUANTITATIVE URINE: NEGATIVE
CHLORIDE BLD-SCNC: 108 MEQ/L (ref 98–111)
CO2: 19 MEQ/L (ref 23–33)
COCAINE METABOLITE QUANTITATIVE URINE: NEGATIVE
CREAT SERPL-MCNC: 0.6 MG/DL (ref 0.4–1.2)
EKG ATRIAL RATE: 105 BPM
EKG P AXIS: 73 DEGREES
EKG P-R INTERVAL: 126 MS
EKG Q-T INTERVAL: 322 MS
EKG QRS DURATION: 78 MS
EKG QTC CALCULATION (BAZETT): 425 MS
EKG R AXIS: 66 DEGREES
EKG T AXIS: 70 DEGREES
EKG VENTRICULAR RATE: 105 BPM
ERYTHROCYTE [DISTWIDTH] IN BLOOD BY AUTOMATED COUNT: 13 % (ref 11.5–14.5)
ERYTHROCYTE [DISTWIDTH] IN BLOOD BY AUTOMATED COUNT: 44.8 FL (ref 35–45)
GFR SERPL CREATININE-BSD FRML MDRD: > 90 ML/MIN/1.73M2
GLUCOSE BLD-MCNC: 123 MG/DL (ref 70–108)
HCT VFR BLD CALC: 29.1 % (ref 37–47)
HEMOGLOBIN: 9.4 GM/DL (ref 12–16)
LACTIC ACID: 1.3 MMOL/L (ref 0.5–2.2)
MCH RBC QN AUTO: 30.7 PG (ref 26–33)
MCHC RBC AUTO-ENTMCNC: 32.3 GM/DL (ref 32.2–35.5)
MCV RBC AUTO: 95.1 FL (ref 81–99)
OPIATES, URINE: NEGATIVE
OXYCODONE: NEGATIVE
PHENCYCLIDINE QUANTITATIVE URINE: NEGATIVE
PLATELET # BLD: 193 THOU/MM3 (ref 130–400)
PMV BLD AUTO: 10.3 FL (ref 9.4–12.4)
POTASSIUM SERPL-SCNC: 3.3 MEQ/L (ref 3.5–5.2)
RBC # BLD: 3.06 MILL/MM3 (ref 4.2–5.4)
SODIUM BLD-SCNC: 139 MEQ/L (ref 135–145)
TOTAL PROTEIN: 5.9 G/DL (ref 6.1–8)
WBC # BLD: 5.9 THOU/MM3 (ref 4.8–10.8)

## 2019-03-03 PROCEDURE — 6370000000 HC RX 637 (ALT 250 FOR IP): Performed by: OPHTHALMOLOGY

## 2019-03-03 PROCEDURE — 90670 PCV13 VACCINE IM: CPT | Performed by: HOSPITALIST

## 2019-03-03 PROCEDURE — 71046 X-RAY EXAM CHEST 2 VIEWS: CPT

## 2019-03-03 PROCEDURE — 36415 COLL VENOUS BLD VENIPUNCTURE: CPT

## 2019-03-03 PROCEDURE — 6370000000 HC RX 637 (ALT 250 FOR IP): Performed by: PHYSICIAN ASSISTANT

## 2019-03-03 PROCEDURE — 99254 IP/OBS CNSLTJ NEW/EST MOD 60: CPT | Performed by: INTERNAL MEDICINE

## 2019-03-03 PROCEDURE — 83605 ASSAY OF LACTIC ACID: CPT

## 2019-03-03 PROCEDURE — 93010 ELECTROCARDIOGRAM REPORT: CPT | Performed by: NUCLEAR MEDICINE

## 2019-03-03 PROCEDURE — 1200000003 HC TELEMETRY R&B

## 2019-03-03 PROCEDURE — 6360000002 HC RX W HCPCS: Performed by: HOSPITALIST

## 2019-03-03 PROCEDURE — 99233 SBSQ HOSP IP/OBS HIGH 50: CPT | Performed by: HOSPITALIST

## 2019-03-03 PROCEDURE — 85027 COMPLETE CBC AUTOMATED: CPT

## 2019-03-03 PROCEDURE — G0009 ADMIN PNEUMOCOCCAL VACCINE: HCPCS | Performed by: HOSPITALIST

## 2019-03-03 PROCEDURE — 6360000002 HC RX W HCPCS: Performed by: INTERNAL MEDICINE

## 2019-03-03 PROCEDURE — 94640 AIRWAY INHALATION TREATMENT: CPT

## 2019-03-03 PROCEDURE — 6370000000 HC RX 637 (ALT 250 FOR IP): Performed by: HOSPITALIST

## 2019-03-03 PROCEDURE — 80053 COMPREHEN METABOLIC PANEL: CPT

## 2019-03-03 RX ORDER — BUTALBITAL, ACETAMINOPHEN AND CAFFEINE 50; 325; 40 MG/1; MG/1; MG/1
1 TABLET ORAL EVERY 6 HOURS PRN
Status: DISCONTINUED | OUTPATIENT
Start: 2019-03-03 | End: 2019-03-04 | Stop reason: HOSPADM

## 2019-03-03 RX ORDER — LEVOFLOXACIN 500 MG/1
500 TABLET, FILM COATED ORAL DAILY
Status: DISCONTINUED | OUTPATIENT
Start: 2019-03-03 | End: 2019-03-04 | Stop reason: HOSPADM

## 2019-03-03 RX ORDER — PREDNISONE 20 MG/1
40 TABLET ORAL DAILY
Status: DISCONTINUED | OUTPATIENT
Start: 2019-03-03 | End: 2019-03-04 | Stop reason: HOSPADM

## 2019-03-03 RX ORDER — POTASSIUM CHLORIDE 20 MEQ/1
40 TABLET, EXTENDED RELEASE ORAL ONCE
Status: COMPLETED | OUTPATIENT
Start: 2019-03-03 | End: 2019-03-03

## 2019-03-03 RX ADMIN — IPRATROPIUM BROMIDE AND ALBUTEROL SULFATE 1 AMPULE: .5; 3 SOLUTION RESPIRATORY (INHALATION) at 01:54

## 2019-03-03 RX ADMIN — IPRATROPIUM BROMIDE AND ALBUTEROL SULFATE 1 AMPULE: .5; 3 SOLUTION RESPIRATORY (INHALATION) at 18:13

## 2019-03-03 RX ADMIN — LETROZOLE 2.5 MG: 2.5 TABLET ORAL at 21:45

## 2019-03-03 RX ADMIN — POTASSIUM CHLORIDE 40 MEQ: 1500 TABLET, EXTENDED RELEASE ORAL at 09:28

## 2019-03-03 RX ADMIN — Medication 1 PUFF: at 21:57

## 2019-03-03 RX ADMIN — PREDNISONE 40 MG: 20 TABLET ORAL at 10:17

## 2019-03-03 RX ADMIN — Medication 1 PUFF: at 08:46

## 2019-03-03 RX ADMIN — BUTALBITAL, ACETAMINOPHEN, AND CAFFEINE 1 TABLET: 50; 325; 40 TABLET ORAL at 21:45

## 2019-03-03 RX ADMIN — ENOXAPARIN SODIUM 40 MG: 40 INJECTION SUBCUTANEOUS at 14:38

## 2019-03-03 RX ADMIN — LEVOFLOXACIN 500 MG: 500 TABLET, FILM COATED ORAL at 10:17

## 2019-03-03 RX ADMIN — IPRATROPIUM BROMIDE AND ALBUTEROL SULFATE 1 AMPULE: .5; 3 SOLUTION RESPIRATORY (INHALATION) at 21:56

## 2019-03-03 RX ADMIN — IPRATROPIUM BROMIDE AND ALBUTEROL SULFATE 1 AMPULE: .5; 3 SOLUTION RESPIRATORY (INHALATION) at 12:47

## 2019-03-03 RX ADMIN — VENLAFAXINE HYDROCHLORIDE 75 MG: 75 CAPSULE, EXTENDED RELEASE ORAL at 21:45

## 2019-03-03 RX ADMIN — PNEUMOCOCCAL 13-VALENT CONJUGATE VACCINE 0.5 ML: 2.2; 2.2; 2.2; 2.2; 2.2; 4.4; 2.2; 2.2; 2.2; 2.2; 2.2; 2.2; 2.2 INJECTION, SUSPENSION INTRAMUSCULAR at 14:38

## 2019-03-03 RX ADMIN — IPRATROPIUM BROMIDE AND ALBUTEROL SULFATE 1 AMPULE: .5; 3 SOLUTION RESPIRATORY (INHALATION) at 08:46

## 2019-03-03 ASSESSMENT — PAIN SCALES - GENERAL: PAINLEVEL_OUTOF10: 3

## 2019-03-04 VITALS
SYSTOLIC BLOOD PRESSURE: 116 MMHG | TEMPERATURE: 98 F | HEIGHT: 68 IN | HEART RATE: 96 BPM | OXYGEN SATURATION: 93 % | DIASTOLIC BLOOD PRESSURE: 64 MMHG | WEIGHT: 162.1 LBS | RESPIRATION RATE: 18 BRPM | BODY MASS INDEX: 24.57 KG/M2

## 2019-03-04 LAB — POTASSIUM SERPL-SCNC: 4 MEQ/L (ref 3.5–5.2)

## 2019-03-04 PROCEDURE — 99232 SBSQ HOSP IP/OBS MODERATE 35: CPT | Performed by: HOSPITALIST

## 2019-03-04 PROCEDURE — 6370000000 HC RX 637 (ALT 250 FOR IP): Performed by: OPHTHALMOLOGY

## 2019-03-04 PROCEDURE — 6370000000 HC RX 637 (ALT 250 FOR IP): Performed by: HOSPITALIST

## 2019-03-04 PROCEDURE — 94640 AIRWAY INHALATION TREATMENT: CPT

## 2019-03-04 PROCEDURE — 6360000002 HC RX W HCPCS: Performed by: OPHTHALMOLOGY

## 2019-03-04 PROCEDURE — 84132 ASSAY OF SERUM POTASSIUM: CPT

## 2019-03-04 PROCEDURE — APPSS30 APP SPLIT SHARED TIME 16-30 MINUTES: Performed by: NURSE PRACTITIONER

## 2019-03-04 PROCEDURE — 99232 SBSQ HOSP IP/OBS MODERATE 35: CPT | Performed by: INTERNAL MEDICINE

## 2019-03-04 PROCEDURE — G0008 ADMIN INFLUENZA VIRUS VAC: HCPCS | Performed by: OPHTHALMOLOGY

## 2019-03-04 PROCEDURE — 6370000000 HC RX 637 (ALT 250 FOR IP): Performed by: PHYSICIAN ASSISTANT

## 2019-03-04 PROCEDURE — 90686 IIV4 VACC NO PRSV 0.5 ML IM: CPT | Performed by: OPHTHALMOLOGY

## 2019-03-04 PROCEDURE — 6360000002 HC RX W HCPCS: Performed by: INTERNAL MEDICINE

## 2019-03-04 PROCEDURE — 36415 COLL VENOUS BLD VENIPUNCTURE: CPT

## 2019-03-04 RX ORDER — PREDNISONE 20 MG/1
40 TABLET ORAL DAILY
Qty: 6 TABLET | Refills: 0 | Status: SHIPPED | OUTPATIENT
Start: 2019-03-05 | End: 2019-03-08 | Stop reason: ALTCHOICE

## 2019-03-04 RX ORDER — LEVOFLOXACIN 500 MG/1
500 TABLET, FILM COATED ORAL DAILY
Qty: 5 TABLET | Refills: 0 | Status: SHIPPED | OUTPATIENT
Start: 2019-03-05 | End: 2019-03-08 | Stop reason: ALTCHOICE

## 2019-03-04 RX ADMIN — BUTALBITAL, ACETAMINOPHEN, AND CAFFEINE 1 TABLET: 50; 325; 40 TABLET ORAL at 13:05

## 2019-03-04 RX ADMIN — PREDNISONE 40 MG: 20 TABLET ORAL at 09:06

## 2019-03-04 RX ADMIN — IPRATROPIUM BROMIDE AND ALBUTEROL SULFATE 1 AMPULE: .5; 3 SOLUTION RESPIRATORY (INHALATION) at 03:37

## 2019-03-04 RX ADMIN — Medication 1 PUFF: at 09:32

## 2019-03-04 RX ADMIN — IPRATROPIUM BROMIDE AND ALBUTEROL SULFATE 1 AMPULE: .5; 3 SOLUTION RESPIRATORY (INHALATION) at 13:04

## 2019-03-04 RX ADMIN — IPRATROPIUM BROMIDE AND ALBUTEROL SULFATE 1 AMPULE: .5; 3 SOLUTION RESPIRATORY (INHALATION) at 09:29

## 2019-03-04 RX ADMIN — LEVOFLOXACIN 500 MG: 500 TABLET, FILM COATED ORAL at 09:06

## 2019-03-04 RX ADMIN — ENOXAPARIN SODIUM 40 MG: 40 INJECTION SUBCUTANEOUS at 09:06

## 2019-03-04 RX ADMIN — INFLUENZA A VIRUS A/MICHIGAN/45/2015 X-275 (H1N1) ANTIGEN (FORMALDEHYDE INACTIVATED), INFLUENZA A VIRUS A/SINGAPORE/INFIMH-16-0019/2016 IVR-186 (H3N2) ANTIGEN (FORMALDEHYDE INACTIVATED), INFLUENZA B VIRUS B/PHUKET/3073/2013 ANTIGEN (FORMALDEHYDE INACTIVATED), AND INFLUENZA B VIRUS B/MARYLAND/15/2016 BX-69A ANTIGEN (FORMALDEHYDE INACTIVATED) 0.5 ML: 15; 15; 15; 15 INJECTION, SUSPENSION INTRAMUSCULAR at 09:06

## 2019-03-04 ASSESSMENT — PAIN DESCRIPTION - PAIN TYPE
TYPE: ACUTE PAIN

## 2019-03-04 ASSESSMENT — PAIN DESCRIPTION - LOCATION
LOCATION: CHEST

## 2019-03-04 ASSESSMENT — PAIN DESCRIPTION - DESCRIPTORS
DESCRIPTORS: DISCOMFORT

## 2019-03-04 ASSESSMENT — PAIN SCALES - GENERAL
PAINLEVEL_OUTOF10: 2
PAINLEVEL_OUTOF10: 0
PAINLEVEL_OUTOF10: 2
PAINLEVEL_OUTOF10: 2

## 2019-03-04 NOTE — PROGRESS NOTES
[facility]   MCHC 34.5  33.0 - 37.0 gm/dl Final 09/28/2018  9:55 AM  - [facility]   RDW 12.0  11.5 - 14.5 % Final 09/28/2018  9:55 AM  - [facility]   Platelets 631  996 - 400 thou/mm3 Final 09/28/2018  9:55 AM  - [facility]   MPV 8.1  7.4 - 10.4 fL Final 09/28/2018  9:55 AM  - [facility]   Seg Neutrophils 62  43 - 75 % Final 09/28/2018  9:55 AM  - [facility]   Lymphocytes 25  15 - 47 % Final 09/28/2018  9:55 AM  - [facility]   Monocytes 10  0 - 12 % Final 09/28/2018  9:55 AM  - [facility]   Eosinophils % 3  0 - 4 % Final 09/28/2018  9:55 AM  - [facility]   BASINOPHIL, AUTOMATED 0  0 - 3 % Final 09/28/2018  9:55 AM  - [facility]     9/28/18: BMP:  Sodium, Whole Blood 141  138 - 146 meq/l Final 09/28/2018  9:55 AM  - [facility]   Potassium, Whole Blood 3.8  3.5 - 4.9 meq/l Final 09/28/2018  9:55 AM  - [facility]   Chloride, Whole Blood 105  98 - 109 meq/l Final 09/28/2018  9:55 AM  - [facility]   TOTAL CO2, WHOLE BLOOD 24  23 - 33 meq/l Final 09/28/2018  9:55 AM  - [facility]   Glucose, Whole Blood 82  70 - 108 mg/dl Final 09/28/2018  9:55 AM  - [facility]   BUN, WHOLE BLOOD 8  8 - 26 mg/dl Final 09/28/2018  9:55 AM  - [facility]   CREATININE, WHOLE BLOOD 0.7  0.5 - 1.2 mg/dl Final 09/28/2018  9:55 AM  - [facility]   Ionized Calcium, WB 1.14  1.12 - 1.32 mmol/L Final 09/28/2018  9:55 AM [facility]     9/28/18: Estradiol:   Estradiol < 5.0  pg/mL Final 09/28/2018  9:55 AM [facility] [address] [facility]     9/28/18: [address]:   .0  16.0 - 160.0 mIU/ml Final 09/28/2018  9:55 AM [facility] [address] [facility]       9/28/18: Lipid panel:   Cholesterol, Total 199  100 - 199 mg/dL Final 09/28/2018  9:55 AM [facility]        <200          Desirable        200 - 239     Borderline High        >239          High    Triglycerides 104  0 - 199 mg/dL Final 09/28/2018  9:55 AM [facility] - [facility] ([facility]) no impairment

## 2019-03-06 ENCOUNTER — TELEPHONE (OUTPATIENT)
Dept: PULMONOLOGY | Age: 49
End: 2019-03-06

## 2019-03-06 LAB — STREP PNEUMO AG, UR: NEGATIVE

## 2019-03-07 DIAGNOSIS — C77.3 BREAST CANCER METASTASIZED TO AXILLARY LYMPH NODE, LEFT (HCC): Primary | ICD-10-CM

## 2019-03-07 DIAGNOSIS — C50.912 BREAST CANCER METASTASIZED TO AXILLARY LYMPH NODE, LEFT (HCC): Primary | ICD-10-CM

## 2019-03-08 ENCOUNTER — HOSPITAL ENCOUNTER (OUTPATIENT)
Dept: INFUSION THERAPY | Age: 49
Discharge: HOME | End: 2019-03-08
Payer: COMMERCIAL

## 2019-03-08 ENCOUNTER — OFFICE VISIT (OUTPATIENT)
Dept: ONCOLOGY | Age: 49
End: 2019-03-08
Payer: COMMERCIAL

## 2019-03-08 VITALS
DIASTOLIC BLOOD PRESSURE: 67 MMHG | HEIGHT: 68 IN | BODY MASS INDEX: 25.28 KG/M2 | RESPIRATION RATE: 18 BRPM | WEIGHT: 166.8 LBS | OXYGEN SATURATION: 95 % | HEART RATE: 70 BPM | TEMPERATURE: 97.9 F | SYSTOLIC BLOOD PRESSURE: 117 MMHG

## 2019-03-08 VITALS
SYSTOLIC BLOOD PRESSURE: 117 MMHG | DIASTOLIC BLOOD PRESSURE: 67 MMHG | HEART RATE: 70 BPM | RESPIRATION RATE: 18 BRPM | WEIGHT: 166.8 LBS | OXYGEN SATURATION: 95 % | HEIGHT: 68 IN | TEMPERATURE: 97.9 F | BODY MASS INDEX: 25.28 KG/M2

## 2019-03-08 DIAGNOSIS — C50.912 MALIGNANT NEOPLASM OF LEFT BREAST IN FEMALE, ESTROGEN RECEPTOR POSITIVE, UNSPECIFIED SITE OF BREAST (HCC): Primary | ICD-10-CM

## 2019-03-08 DIAGNOSIS — J18.9 PNEUMONITIS: ICD-10-CM

## 2019-03-08 DIAGNOSIS — C50.912 BREAST CANCER METASTASIZED TO AXILLARY LYMPH NODE, LEFT (HCC): ICD-10-CM

## 2019-03-08 DIAGNOSIS — Z17.0 MALIGNANT NEOPLASM OF LEFT BREAST IN FEMALE, ESTROGEN RECEPTOR POSITIVE, UNSPECIFIED SITE OF BREAST (HCC): Primary | ICD-10-CM

## 2019-03-08 DIAGNOSIS — G62.0 DRUG-INDUCED POLYNEUROPATHY (HCC): ICD-10-CM

## 2019-03-08 DIAGNOSIS — C77.3 BREAST CANCER METASTASIZED TO AXILLARY LYMPH NODE, LEFT (HCC): Primary | ICD-10-CM

## 2019-03-08 DIAGNOSIS — Z90.12 STATUS POST MASTECTOMY, LEFT: ICD-10-CM

## 2019-03-08 DIAGNOSIS — C50.912 BREAST CANCER METASTASIZED TO AXILLARY LYMPH NODE, LEFT (HCC): Primary | ICD-10-CM

## 2019-03-08 DIAGNOSIS — J44.9 COPD WITH ASTHMA (HCC): ICD-10-CM

## 2019-03-08 DIAGNOSIS — C77.3 BREAST CANCER METASTASIZED TO AXILLARY LYMPH NODE, LEFT (HCC): ICD-10-CM

## 2019-03-08 DIAGNOSIS — Z79.811 PROPHYLACTIC USE OF LETROZOLE (FEMARA): ICD-10-CM

## 2019-03-08 DIAGNOSIS — J18.9 PNEUMONIA OF BOTH LUNGS DUE TO INFECTIOUS ORGANISM, UNSPECIFIED PART OF LUNG: ICD-10-CM

## 2019-03-08 LAB
ALBUMIN SERPL-MCNC: 3.6 G/DL (ref 3.5–5.1)
ALP BLD-CCNC: 82 U/L (ref 38–126)
ALT SERPL-CCNC: 26 U/L (ref 11–66)
AST SERPL-CCNC: 13 U/L (ref 5–40)
BILIRUB SERPL-MCNC: < 0.2 MG/DL (ref 0.3–1.2)
BILIRUBIN DIRECT: < 0.2 MG/DL (ref 0–0.3)
BLOOD CULTURE, ROUTINE: NORMAL
BLOOD CULTURE, ROUTINE: NORMAL
BUN, WHOLE BLOOD: 13 MG/DL (ref 8–26)
CHLORIDE, WHOLE BLOOD: 103 MEQ/L (ref 98–109)
CREATININE, WHOLE BLOOD: 0.7 MG/DL (ref 0.5–1.2)
GFR, ESTIMATED: > 90 ML/MIN/1.73M2
GLUCOSE, WHOLE BLOOD: 84 MG/DL (ref 70–108)
HCT VFR BLD CALC: 30.7 % (ref 37–47)
HEMOGLOBIN: 10.5 GM/DL (ref 12–16)
IONIZED CALCIUM, WHOLE BLOOD: 1.16 MMOL/L (ref 1.12–1.32)
MCH RBC QN AUTO: 30.6 PG (ref 27–31)
MCHC RBC AUTO-ENTMCNC: 34.1 GM/DL (ref 33–37)
MCV RBC AUTO: 90 FL (ref 81–99)
PDW BLD-RTO: 11.7 % (ref 11.5–14.5)
PLATELET # BLD: 281 THOU/MM3 (ref 130–400)
PMV BLD AUTO: 6.9 FL (ref 7.4–10.4)
POTASSIUM, WHOLE BLOOD: 3.4 MEQ/L (ref 3.5–4.9)
RBC # BLD: 3.42 MILL/MM3 (ref 4.2–5.4)
SEG NEUTROPHILS: 65 % (ref 43–75)
SEGMENTED NEUTROPHILS ABSOLUTE COUNT: 3.4 THOU/MM3 (ref 1.8–7.7)
SODIUM, WHOLE BLOOD: 144 MEQ/L (ref 138–146)
TOTAL CO2, WHOLE BLOOD: 28 MEQ/L (ref 23–33)
TOTAL PROTEIN: 6 G/DL (ref 6.1–8)
WBC # BLD: 5.2 THOU/MM3 (ref 4.8–10.8)

## 2019-03-08 PROCEDURE — 2580000003 HC RX 258: Performed by: INTERNAL MEDICINE

## 2019-03-08 PROCEDURE — G8427 DOCREV CUR MEDS BY ELIG CLIN: HCPCS | Performed by: INTERNAL MEDICINE

## 2019-03-08 PROCEDURE — 6360000002 HC RX W HCPCS: Performed by: INTERNAL MEDICINE

## 2019-03-08 PROCEDURE — 4004F PT TOBACCO SCREEN RCVD TLK: CPT | Performed by: INTERNAL MEDICINE

## 2019-03-08 PROCEDURE — G8482 FLU IMMUNIZE ORDER/ADMIN: HCPCS | Performed by: INTERNAL MEDICINE

## 2019-03-08 PROCEDURE — 3017F COLORECTAL CA SCREEN DOC REV: CPT | Performed by: INTERNAL MEDICINE

## 2019-03-08 PROCEDURE — 1111F DSCHRG MED/CURRENT MED MERGE: CPT | Performed by: INTERNAL MEDICINE

## 2019-03-08 PROCEDURE — 36591 DRAW BLOOD OFF VENOUS DEVICE: CPT

## 2019-03-08 PROCEDURE — 2709999900 HC NON-CHARGEABLE SUPPLY

## 2019-03-08 PROCEDURE — 99214 OFFICE O/P EST MOD 30 MIN: CPT | Performed by: INTERNAL MEDICINE

## 2019-03-08 PROCEDURE — 80076 HEPATIC FUNCTION PANEL: CPT

## 2019-03-08 PROCEDURE — 85027 COMPLETE CBC AUTOMATED: CPT

## 2019-03-08 PROCEDURE — 80047 BASIC METABLC PNL IONIZED CA: CPT

## 2019-03-08 PROCEDURE — G0463 HOSPITAL OUTPT CLINIC VISIT: HCPCS

## 2019-03-08 PROCEDURE — G8419 CALC BMI OUT NRM PARAM NOF/U: HCPCS | Performed by: INTERNAL MEDICINE

## 2019-03-08 RX ORDER — LEVOFLOXACIN 500 MG/1
500 TABLET, FILM COATED ORAL DAILY
Qty: 7 TABLET | Refills: 0 | Status: SHIPPED | OUTPATIENT
Start: 2019-03-08 | End: 2019-03-15

## 2019-03-08 RX ORDER — SODIUM CHLORIDE 0.9 % (FLUSH) 0.9 %
10 SYRINGE (ML) INJECTION PRN
Status: CANCELLED | OUTPATIENT
Start: 2019-03-08

## 2019-03-08 RX ORDER — HEPARIN SODIUM (PORCINE) LOCK FLUSH IV SOLN 100 UNIT/ML 100 UNIT/ML
500 SOLUTION INTRAVENOUS PRN
Status: DISCONTINUED | OUTPATIENT
Start: 2019-03-08 | End: 2019-03-09 | Stop reason: HOSPADM

## 2019-03-08 RX ORDER — HEPARIN SODIUM (PORCINE) LOCK FLUSH IV SOLN 100 UNIT/ML 100 UNIT/ML
500 SOLUTION INTRAVENOUS PRN
Status: CANCELLED | OUTPATIENT
Start: 2019-03-08

## 2019-03-08 RX ORDER — SODIUM CHLORIDE 0.9 % (FLUSH) 0.9 %
20 SYRINGE (ML) INJECTION PRN
Status: DISCONTINUED | OUTPATIENT
Start: 2019-03-08 | End: 2019-03-09 | Stop reason: HOSPADM

## 2019-03-08 RX ORDER — SODIUM CHLORIDE 0.9 % (FLUSH) 0.9 %
20 SYRINGE (ML) INJECTION PRN
Status: CANCELLED | OUTPATIENT
Start: 2019-03-08

## 2019-03-08 RX ORDER — SODIUM CHLORIDE 0.9 % (FLUSH) 0.9 %
10 SYRINGE (ML) INJECTION PRN
Status: DISCONTINUED | OUTPATIENT
Start: 2019-03-08 | End: 2019-03-09 | Stop reason: HOSPADM

## 2019-03-08 RX ORDER — PREDNISONE 10 MG/1
TABLET ORAL
Qty: 20 TABLET | Refills: 0 | Status: SHIPPED | OUTPATIENT
Start: 2019-03-08 | End: 2019-06-11 | Stop reason: ALTCHOICE

## 2019-03-08 RX ADMIN — Medication 10 ML: at 09:30

## 2019-03-08 RX ADMIN — Medication 500 UNITS: at 10:20

## 2019-03-08 RX ADMIN — Medication 20 ML: at 09:31

## 2019-03-08 ASSESSMENT — ENCOUNTER SYMPTOMS
COUGH: 1
CHEST TIGHTNESS: 0
ABDOMINAL DISTENTION: 0
BACK PAIN: 1
EYE REDNESS: 0
EYE ITCHING: 0
CONSTIPATION: 0
COLOR CHANGE: 0
ABDOMINAL PAIN: 0
NAUSEA: 0
BLOOD IN STOOL: 0
DIARRHEA: 0
TROUBLE SWALLOWING: 0
VOICE CHANGE: 0
SORE THROAT: 0
VOMITING: 0
WHEEZING: 0
SINUS PRESSURE: 1

## 2019-03-08 NOTE — PROGRESS NOTES
Mediport accessed and lab draw completed on arrival to unit. Off the unit at this time to office appointment, accompanied by office staff, per self.

## 2019-03-08 NOTE — PLAN OF CARE
Problem: Discharge Planning  Goal: Knowledge of discharge instructions  Verbalized understanding of discharge instructions, follow-up appointments, and when to call the physician. Description  Knowledge of discharge instructions     Outcome: Met This Shift  Intervention: Discharge to appropriate level of care  Note:   Discuss understanding of discharge instructions,follow-up appointments, and when to call the physician. Problem: Infection - Central Venous Catheter-Associated Bloodstream Infection:  Goal: Will show no infection signs and symptoms  Mediport site with no redness or warmth. Skin over port site intact with no signs of breakdown noted. Patient verbalizes signs/symptoms of port infection and when to notify the physician. Description  Will show no infection signs and symptoms   Outcome: Met This Shift  Intervention: Infection risk assessment  Note:   Discuss port maintenance, infection prevention, signs and when to call Dr Mary Muse reviewed with patient. Patient verbalize understanding of the plan of care and contribute to goal setting.

## 2019-03-08 NOTE — PROGRESS NOTES
Returned to the clinic from office appointment today, per self. Reported no further treatment today.

## 2019-03-08 NOTE — PROGRESS NOTES
Patient assessed for the following post mediport access /lab draw:    Dizziness   No  Lightheadedness  No      Acute nausea/vomiting No  Headache   No  Chest pain/pressure  No  Rash/itching   No  Shortness of breath  No    Patient tolerated mediport access and lab draw without any complications. Last vital signs:   /67   Pulse 70   Temp 97.9 °F (36.6 °C) (Oral)   Resp 18   Ht 5' 7.99\" (1.727 m)   Wt 166 lb 12.8 oz (75.7 kg)   SpO2 95%   BMI 25.37 kg/m²     Patient instructed if experience any of the above symptoms following today's infusion, she is to notify MD immediately or go to the emergency department. Discharge instructions given to patient. Verbalizes understanding. Ambulated off unit per self with belongings.

## 2019-03-10 ASSESSMENT — ENCOUNTER SYMPTOMS: SHORTNESS OF BREATH: 1

## 2019-03-18 DIAGNOSIS — C77.3 BREAST CANCER METASTASIZED TO AXILLARY LYMPH NODE, LEFT (HCC): Primary | ICD-10-CM

## 2019-03-18 DIAGNOSIS — C50.912 BREAST CANCER METASTASIZED TO AXILLARY LYMPH NODE, LEFT (HCC): Primary | ICD-10-CM

## 2019-03-19 ENCOUNTER — TELEPHONE (OUTPATIENT)
Dept: ONCOLOGY | Age: 49
End: 2019-03-19

## 2019-03-19 ENCOUNTER — HOSPITAL ENCOUNTER (OUTPATIENT)
Dept: INFUSION THERAPY | Age: 49
End: 2019-03-19
Payer: COMMERCIAL

## 2019-04-11 DIAGNOSIS — C77.3 BREAST CANCER METASTASIZED TO AXILLARY LYMPH NODE, LEFT (HCC): Primary | ICD-10-CM

## 2019-04-11 DIAGNOSIS — C50.912 BREAST CANCER METASTASIZED TO AXILLARY LYMPH NODE, LEFT (HCC): Primary | ICD-10-CM

## 2019-04-12 ENCOUNTER — HOSPITAL ENCOUNTER (OUTPATIENT)
Dept: INFUSION THERAPY | Age: 49
Discharge: HOME | End: 2019-04-12
Payer: COMMERCIAL

## 2019-04-12 ENCOUNTER — OFFICE VISIT (OUTPATIENT)
Dept: ONCOLOGY | Age: 49
End: 2019-04-12
Payer: COMMERCIAL

## 2019-04-12 VITALS
WEIGHT: 166.4 LBS | BODY MASS INDEX: 25.22 KG/M2 | DIASTOLIC BLOOD PRESSURE: 66 MMHG | HEIGHT: 68 IN | HEART RATE: 84 BPM | OXYGEN SATURATION: 98 % | RESPIRATION RATE: 16 BRPM | TEMPERATURE: 97.9 F | SYSTOLIC BLOOD PRESSURE: 112 MMHG

## 2019-04-12 VITALS
SYSTOLIC BLOOD PRESSURE: 112 MMHG | OXYGEN SATURATION: 98 % | BODY MASS INDEX: 25.22 KG/M2 | RESPIRATION RATE: 16 BRPM | TEMPERATURE: 97.9 F | HEIGHT: 68 IN | WEIGHT: 166.4 LBS | HEART RATE: 84 BPM | DIASTOLIC BLOOD PRESSURE: 66 MMHG

## 2019-04-12 DIAGNOSIS — C77.3 BREAST CANCER METASTASIZED TO AXILLARY LYMPH NODE, LEFT (HCC): Primary | ICD-10-CM

## 2019-04-12 DIAGNOSIS — Z17.0 MALIGNANT NEOPLASM OF LEFT BREAST IN FEMALE, ESTROGEN RECEPTOR POSITIVE, UNSPECIFIED SITE OF BREAST (HCC): Primary | ICD-10-CM

## 2019-04-12 DIAGNOSIS — Z79.811 PROPHYLACTIC USE OF LETROZOLE (FEMARA): ICD-10-CM

## 2019-04-12 DIAGNOSIS — C77.3 BREAST CANCER METASTASIZED TO AXILLARY LYMPH NODE, LEFT (HCC): ICD-10-CM

## 2019-04-12 DIAGNOSIS — C50.912 BREAST CANCER METASTASIZED TO AXILLARY LYMPH NODE, LEFT (HCC): Primary | ICD-10-CM

## 2019-04-12 DIAGNOSIS — G62.0 DRUG-INDUCED POLYNEUROPATHY (HCC): ICD-10-CM

## 2019-04-12 DIAGNOSIS — C50.912 BREAST CANCER METASTASIZED TO AXILLARY LYMPH NODE, LEFT (HCC): ICD-10-CM

## 2019-04-12 DIAGNOSIS — Z90.12 STATUS POST MASTECTOMY, LEFT: ICD-10-CM

## 2019-04-12 DIAGNOSIS — J44.9 COPD WITH ASTHMA (HCC): ICD-10-CM

## 2019-04-12 DIAGNOSIS — C50.912 MALIGNANT NEOPLASM OF LEFT BREAST IN FEMALE, ESTROGEN RECEPTOR POSITIVE, UNSPECIFIED SITE OF BREAST (HCC): Primary | ICD-10-CM

## 2019-04-12 LAB
ALBUMIN SERPL-MCNC: 3.9 G/DL (ref 3.5–5.1)
ALP BLD-CCNC: 57 U/L (ref 38–126)
ALT SERPL-CCNC: 12 U/L (ref 11–66)
AST SERPL-CCNC: 14 U/L (ref 5–40)
BILIRUB SERPL-MCNC: 0.2 MG/DL (ref 0.3–1.2)
BILIRUBIN DIRECT: < 0.2 MG/DL (ref 0–0.3)
BUN, WHOLE BLOOD: 9 MG/DL (ref 8–26)
CHLORIDE, WHOLE BLOOD: 105 MEQ/L (ref 98–109)
CREATININE, WHOLE BLOOD: 0.7 MG/DL (ref 0.5–1.2)
GFR, ESTIMATED: > 90 ML/MIN/1.73M2
GLUCOSE, WHOLE BLOOD: 119 MG/DL (ref 70–108)
HCT VFR BLD CALC: 36.3 % (ref 37–47)
HEMOGLOBIN: 12.6 GM/DL (ref 12–16)
IONIZED CALCIUM, WHOLE BLOOD: 1.18 MMOL/L (ref 1.12–1.32)
MCH RBC QN AUTO: 32.9 PG (ref 27–31)
MCHC RBC AUTO-ENTMCNC: 34.8 GM/DL (ref 33–37)
MCV RBC AUTO: 94 FL (ref 81–99)
PDW BLD-RTO: 15 % (ref 11.5–14.5)
PLATELET # BLD: 166 THOU/MM3 (ref 130–400)
PMV BLD AUTO: 7.7 FL (ref 7.4–10.4)
POTASSIUM, WHOLE BLOOD: 3.5 MEQ/L (ref 3.5–4.9)
RBC # BLD: 3.85 MILL/MM3 (ref 4.2–5.4)
SEG NEUTROPHILS: 71 % (ref 43–75)
SEGMENTED NEUTROPHILS ABSOLUTE COUNT: 2.8 THOU/MM3 (ref 1.8–7.7)
SODIUM, WHOLE BLOOD: 142 MEQ/L (ref 138–146)
TOTAL CO2, WHOLE BLOOD: 24 MEQ/L (ref 23–33)
TOTAL PROTEIN: 6.4 G/DL (ref 6.1–8)
WBC # BLD: 4 THOU/MM3 (ref 4.8–10.8)

## 2019-04-12 PROCEDURE — 4004F PT TOBACCO SCREEN RCVD TLK: CPT | Performed by: INTERNAL MEDICINE

## 2019-04-12 PROCEDURE — 2709999900 HC NON-CHARGEABLE SUPPLY

## 2019-04-12 PROCEDURE — 99211 OFF/OP EST MAY X REQ PHY/QHP: CPT

## 2019-04-12 PROCEDURE — G8419 CALC BMI OUT NRM PARAM NOF/U: HCPCS | Performed by: INTERNAL MEDICINE

## 2019-04-12 PROCEDURE — 2580000003 HC RX 258: Performed by: INTERNAL MEDICINE

## 2019-04-12 PROCEDURE — G8428 CUR MEDS NOT DOCUMENT: HCPCS | Performed by: INTERNAL MEDICINE

## 2019-04-12 PROCEDURE — 3017F COLORECTAL CA SCREEN DOC REV: CPT | Performed by: INTERNAL MEDICINE

## 2019-04-12 PROCEDURE — 85027 COMPLETE CBC AUTOMATED: CPT

## 2019-04-12 PROCEDURE — 80047 BASIC METABLC PNL IONIZED CA: CPT

## 2019-04-12 PROCEDURE — 6360000002 HC RX W HCPCS: Performed by: INTERNAL MEDICINE

## 2019-04-12 PROCEDURE — 36591 DRAW BLOOD OFF VENOUS DEVICE: CPT

## 2019-04-12 PROCEDURE — 99214 OFFICE O/P EST MOD 30 MIN: CPT | Performed by: INTERNAL MEDICINE

## 2019-04-12 PROCEDURE — 80076 HEPATIC FUNCTION PANEL: CPT

## 2019-04-12 RX ORDER — SODIUM CHLORIDE 0.9 % (FLUSH) 0.9 %
20 SYRINGE (ML) INJECTION PRN
Status: DISCONTINUED | OUTPATIENT
Start: 2019-04-12 | End: 2019-04-13 | Stop reason: HOSPADM

## 2019-04-12 RX ORDER — LETROZOLE 2.5 MG/1
2.5 TABLET, FILM COATED ORAL DAILY
Qty: 30 TABLET | Refills: 3 | Status: SHIPPED | OUTPATIENT
Start: 2019-04-12 | End: 2019-06-17 | Stop reason: SDUPTHER

## 2019-04-12 RX ORDER — SODIUM CHLORIDE 0.9 % (FLUSH) 0.9 %
10 SYRINGE (ML) INJECTION PRN
Status: CANCELLED | OUTPATIENT
Start: 2019-04-12

## 2019-04-12 RX ORDER — HEPARIN SODIUM (PORCINE) LOCK FLUSH IV SOLN 100 UNIT/ML 100 UNIT/ML
500 SOLUTION INTRAVENOUS PRN
Status: DISCONTINUED | OUTPATIENT
Start: 2019-04-12 | End: 2019-04-13 | Stop reason: HOSPADM

## 2019-04-12 RX ORDER — SODIUM CHLORIDE 0.9 % (FLUSH) 0.9 %
20 SYRINGE (ML) INJECTION PRN
Status: CANCELLED | OUTPATIENT
Start: 2019-04-12

## 2019-04-12 RX ORDER — HEPARIN SODIUM (PORCINE) LOCK FLUSH IV SOLN 100 UNIT/ML 100 UNIT/ML
500 SOLUTION INTRAVENOUS PRN
Status: CANCELLED | OUTPATIENT
Start: 2019-04-12

## 2019-04-12 RX ORDER — SODIUM CHLORIDE 0.9 % (FLUSH) 0.9 %
10 SYRINGE (ML) INJECTION PRN
Status: DISCONTINUED | OUTPATIENT
Start: 2019-04-12 | End: 2019-04-13 | Stop reason: HOSPADM

## 2019-04-12 RX ADMIN — Medication 20 ML: at 11:36

## 2019-04-12 RX ADMIN — Medication 500 UNITS: at 13:21

## 2019-04-12 RX ADMIN — Medication 10 ML: at 11:35

## 2019-04-12 ASSESSMENT — ENCOUNTER SYMPTOMS
VOICE CHANGE: 0
COLOR CHANGE: 0
BLOOD IN STOOL: 0
SHORTNESS OF BREATH: 1
NAUSEA: 0
VOMITING: 0
BACK PAIN: 1
CHEST TIGHTNESS: 0
SINUS PRESSURE: 1
EYE ITCHING: 0
COUGH: 1
SORE THROAT: 0
WHEEZING: 0
ABDOMINAL DISTENTION: 0
EYE REDNESS: 0
CONSTIPATION: 0
ABDOMINAL PAIN: 0
DIARRHEA: 0
TROUBLE SWALLOWING: 0

## 2019-04-12 NOTE — PROGRESS NOTES
Patient assessed for the following post mediport access and lab draw:    Dizziness   No  Lightheadedness  No      Acute nausea/vomiting No  Headache   No  Chest pain/pressure  No  Rash/itching   No  Shortness of breath  No    Patient tolerated mediport access and lab draw without any complications. Last vital signs:   /66   Pulse 84   Temp 97.9 °F (36.6 °C) (Oral)   Resp 16   Ht 5' 8\" (1.727 m)   Wt 166 lb 6.4 oz (75.5 kg)   SpO2 98%   BMI 25.30 kg/m²       Patient instructed if experience any of the above symptoms following today's infusion, she is to notify MD immediately or go to the emergency department. Discharge instructions given to patient. Verbalizes understanding. Ambulated off unit per self, with belongings.

## 2019-04-12 NOTE — PROGRESS NOTES
Mediport accessed and lab draw completed on arrival to unit. Off the unit at this time to office appointment, accompanied by office staff , per self.

## 2019-04-12 NOTE — PLAN OF CARE
Problem: Discharge Planning  Goal: Knowledge of discharge instructions  Description  Knowledge of discharge instructions     Outcome: Met This Shift  Note:   Verbalized understanding of discharge instructions, follow-up appointments, and when to call the physician. Intervention: Interaction with patient/family and care team  Note:   Discuss understanding of discharge instructions,follow-up appointments, and when to call the physician. Problem: Infection - Central Venous Catheter-Associated Bloodstream Infection:  Goal: Will show no infection signs and symptoms  Description  Will show no infection signs and symptoms   Outcome: Met This Shift  Note:   Mediport site with no redness or warmth. Skin over port site intact with no signs of breakdown noted. Patient verbalizes signs/symptoms of port infection and when to notify the physician. Intervention: Central line needs assessment  Note:   Discuss port maintenance, infection prevention, signs and when to call Dr Lukas Anderson reviewed with patient. Patient verbalize understanding of the plan of care and contribute to goal setting.

## 2019-04-12 NOTE — PROGRESS NOTES
[facility] PROFESSIONAL SERVS  [facility]  [address] [facility] [address]  [address] [facility] [address]  [address] [address]  Dept: [phone]  Dept Fax: [phone]: [phone]     Visit Date: 4/12/2019     Rikki Cox is a 44 y.o. female who presents today for:   Chief Complaint   Patient presents with    Follow-up     Breast Cancer       HPI:   Miranda Ji is a 66-year-old female with h/o invasive ductal carcinoma of the left breast. The patient noted a mass in the upper aspect of her left breast, at first she thought it was a cyst which she had before. When it persisted, she elected to have further evaluation. She did undergo a diagnostic mammogram at [facility] in [place], [place]. It showed a 3.8 x 2.4 x 2.7 cm malignant-appearing spiculated mass within the left breast at 11:00 position. Evaluation of the left axilla with US showed suspicious appearing lymph node with increased cortical thickening and replacement of fatty hilum. On October 9, 2017, she underwent a left breast mass and left lymph node ultrasound-guided core biopsy. The final path report showed an invasive ductal carcinoma, ER positive at 95%, AR positive at 95%, and a HER-2/breezy by IHC was 2+. The FISH test was still equivocal.  HER-2/CEP17 ratio was 1.7, however the average HER-2 copy number was 4. She met with the surgical oncologists and medical oncologists at the [facility], however she decided to receive care closer to her home. The patient had metastatic workup: CT of the chest, abdomen and bone scan were negative for evidence of metastatic disease. On November 7, 2017 she underwent right breast mastectomy with axillary lymph node dissection. Final pathology report showed: FINAL DIAGNOSIS:  A.  Left breast with portion of axillary contents, mastectomy:      Invasive ductal carcinoma with lobular features involving nipple      and all 4 quadrants of breast.  Maximum tumor dimension estimated      6.8 CATHETERIZATION  2011    NO EVIDENCE OF PULMONARY HTN,NO EVIDENCE OF DD,NORMAL LVF    CARDIOVASCULAR STRESS TEST  2011    EF 60% MILD INFERIOR WALL REVERSIBLE STRESS-INDUCED ISCHEMIA      SECTION  1992    CHOLECYSTECTOMY  2015    CYST REMOVAL Right 2015    rt breast [facility]-benign breast cyst     ESOPHAGUS SURGERY  2014    HYSTERECTOMY  1999    INSERTION / REMOVAL / REPLACEMENT VENOUS ACCESS CATHETER Right 2017    RIGHT SIDE SINGLE LUMEN POWERPORT INSERTION performed by Kezia King MD at [address] Left 2017    LEFT MODIFIED RADICAL MASTECTOMY performed by Kezia King MD at [address] [facility] [address] TUNNELED VENOUS PORT PLACEMENT Right 2017    Single Luman Smart Port Insertion- Right side.        Family History   Problem Relation Age of Onset    Heart Disease Mother     Hypertension Mother     Diabetes Mother     Heart Disease Father     No Known Problems Sister     No Known Problems Brother       Social History     Tobacco Use    Smoking status: Current Every Day Smoker     Packs/day: 0.50     Years: 18.00     Pack years: 9.00     Types: Cigarettes    Smokeless tobacco: Never Used    Tobacco comment: down to less than 0.25 aday   Substance Use Topics    Alcohol use: Yes     Comment: SOCAIL      Current Outpatient Medications   Medication Sig Dispense Refill    predniSONE (DELTASONE) 10 MG tablet Take 30 mg daily for 3 days, then at 20 mg daily for 3 days, then 10 mg daily for 3 days 20 tablet 0    tiotropium (SPIRIVA HANDIHALER) 18 MCG inhalation capsule Inhale 1 capsule into the lungs daily 90 capsule 1    letrozole (FEMARA) 2.5 MG tablet Take 1 tablet by mouth daily 30 tablet 3    NONFORMULARY 2 tablets daily Tumeric      ondansetron (ZOFRAN-ODT) 4 MG disintegrating tablet Take 1 tablet by mouth every 8 hours as needed for Nausea or Vomiting 20 tablet 0    b complex vitamins capsule Take 1 capsule by mouth daily      lidocaine-prilocaine (EMLA) 2.5-2.5 % cream Apply topically as needed. 30 g 1    albuterol sulfate  (90 Base) MCG/ACT inhaler Inhale 1 puff into the lungs every 6 hours as needed for Wheezing or Shortness of Breath       albuterol (PROVENTIL) (2.5 MG/3ML) 0.083% nebulizer solution Inhale 2.5 mg into the lungs every 4 hours as needed       butalbital-apap-caffeine -40 MG CAPS Take by mouth      ALPRAZolam (XANAX) 0.5 MG tablet Take 0.5 mg by mouth nightly as needed       cyclobenzaprine (FLEXERIL) 5 MG tablet Take 5 mg by mouth 3 times daily as needed       DULERA 200-5 MCG/ACT inhaler Inhale 1 puff into the lungs 2 times daily       venlafaxine (EFFEXOR XR) 75 MG extended release capsule Take 75 mg by mouth daily       meloxicam (MOBIC) 15 MG tablet Take 15 mg by mouth daily       No current facility-administered medications for this visit. Facility-Administered Medications Ordered in Other Visits   Medication Dose Route Frequency Provider Last Rate Last Dose    sodium chloride flush 0.9 % injection 10 mL  10 mL Intercatheter PRN Antonietta Lai MD   10 mL at 04/12/19 1135    sodium chloride flush 0.9 % injection 20 mL  20 mL Intercatheter PRN Antonietta Lai MD   20 mL at 04/12/19 1136    heparin flush 100 UNIT/ML injection 500 Units  500 Units Intercatheter PRN Antonietta Lai MD          No Known Allergies   Health Maintenance   Topic Date Due    HIV screen  03/06/1983    DTaP/Tdap/Td vaccine (1 - Tdap) 03/06/1987    Cervical cancer screen  03/06/1989    Diabetes screen  03/06/2008    Shingles Vaccine (1 of 2) 03/06/2018    Colon cancer screen colonoscopy  03/06/2018    Pneumococcal 0-64 years Vaccine (2 of 3 - PPSV23) 04/28/2019    Breast cancer screen  11/13/2019    Lipid screen  11/08/2023    Flu vaccine  Completed        Subjective:   Review of Systems   Constitutional: Negative for activity change, appetite change, chills, fatigue, fever and unexpected weight change.    HENT: Positive for postnasal drip and sinus pressure. Negative for dental problem, hearing loss, mouth sores, nosebleeds, sore throat, trouble swallowing and voice change. Facial swelling: her potassium today is 3 4. she was instructed to eat potassium-rich diet. Eyes: Negative for redness, itching and visual disturbance. Respiratory: Positive for cough and shortness of breath. Negative for chest tightness and wheezing. Cardiovascular: Negative for chest pain, palpitations and leg swelling. Gastrointestinal: Negative for abdominal distention, abdominal pain, blood in stool, constipation, diarrhea, nausea and vomiting. Genitourinary: Negative for difficulty urinating, dysuria, flank pain, frequency and hematuria. Musculoskeletal: Positive for back pain. Negative for arthralgias, gait problem, joint swelling, myalgias and neck stiffness. Skin: Negative for color change and rash. Neurological: Negative for dizziness, seizures, syncope, speech difficulty, weakness, light-headedness, numbness and headaches. Hematological: Negative for adenopathy. Does not bruise/bleed easily. Psychiatric/Behavioral: Negative for confusion and sleep disturbance. The patient is not nervous/anxious. Objective:   ECOG performance status is 1 today  Physical Exam   Constitutional: She is oriented to person, place, and time. She appears well-developed and well-nourished. No distress. HENT:   Head: Normocephalic. Mouth/Throat: Oropharynx is clear and moist. No oropharyngeal exudate. Eyes: Pupils are equal, round, and reactive to light. EOM are normal. Right eye exhibits no discharge. Left eye exhibits no discharge. No scleral icterus. Neck: Normal range of motion. Neck supple. No JVD present. No tracheal deviation present. No thyromegaly present. Cardiovascular: Normal rate and normal heart sounds. Exam reveals no gallop and no friction rub. No murmur heard. Pulmonary/Chest: Effort normal. No stridor.  No respiratory distress. She has decreased breath sounds in the right lower field and the left lower field. She has no wheezes. She has no rales. She exhibits no tenderness. Left breast exhibits mass (status post left mastectomy. Incision nicely healed). Abdominal: Soft. Bowel sounds are normal. She exhibits no distension and no mass. There is no tenderness. There is no rebound. Musculoskeletal: Normal range of motion. She exhibits no edema. Good range of motion in all four extremities. Lymphadenopathy:     She has no cervical adenopathy. Neurological: She is alert and oriented to person, place, and time. She has normal reflexes. No cranial nerve deficit. She exhibits normal muscle tone. Skin: Skin is warm. No rash noted. No erythema. Psychiatric: She has a normal mood and affect. Her behavior is normal. Judgment and thought content normal.   Vitals reviewed. /66 (Site: Left Upper Arm, Position: Sitting, Cuff Size: Large Adult)   Pulse 84   Temp 97.9 °F (36.6 °C) (Oral)   Resp 16   Ht 5' 8\" (1.727 m)   Wt 166 lb 6.4 oz (75.5 kg)   SpO2 98%   BMI 25.30 kg/m²      Imaging studies and labs:     Lab Results   Component Value Date    WBC 4.0 (L) 04/12/2019    HGB 12.6 04/12/2019    HCT 36.3 (L) 04/12/2019    MCV 94 04/12/2019     04/12/2019       Chemistry        Component Value Date/Time     04/12/2019 1135     03/03/2019 0527    K 3.5 04/12/2019 1135    K 4.0 03/04/2019 0623     03/03/2019 0527    CO2 19 (L) 03/03/2019 0527    BUN 8 03/03/2019 0527    CREATININE 0.7 04/12/2019 1135    CREATININE 0.6 03/03/2019 0527        Component Value Date/Time    CALCIUM 8.7 03/03/2019 0527    ALKPHOS 82 03/08/2019 0930    AST 13 03/08/2019 0930    ALT 26 03/08/2019 0930    BILITOT <0.2 (L) 03/08/2019 0930        DEXA study on the September 26, 2018 showed: Normal bone density. Mammogram on November 13, 2018 showed:  Benign findings, BI-RADS Category 2.     CTA on 03/02/2019 showed:    1. There is no pulmonary embolus. 2. Left mastectomy. The mild skin thickening, subcutaneous stranding densities and asymmetric prominence of the pectoralis muscle most likely is postsurgical. There is no well-defined mass within this region. 3. There is bronchiectasis within the bilateral lower lobes. There are nodular interstitial and alveolar infiltrates within the bilateral lower lobes. A follow-up CT examination of the chest with intravenous contrast in one month is recommended to    confirm complete resolution in order to exclude a more significant mass lesion/metastatic disease. 4. There are enlarged mediastinal and bilateral hilar lymph nodes with the differential including reactive lymphadenopathy and metastatic disease. Assessment/Plan:   1. Stage pT3, pN1, M0, ER positive left breast cancer. Since she had  lymph nodes involved and her tumor was quite large, my recommendation was to proceed with adjuvant chemotherapy: 4 cycles of Adriamycin and Cytoxan followed by 12 weekly infusions of Taxol. After chemotherapy due to having 3 lymph nodes involved by cancer, she would benefit from postmastectomy radiation treatment. Her final step in breast cancer treatment is adjuvant hormonal therapy. Since the patient has strong family history of breast cancer and she is diagnosed with breast cancer before age of 46 she had genetic testing that showed variant of unknown significance. On 12/13/2017 she started adjuvant AC. On March 7, 2018 she started weekly Taxol and completed on May 30, 2018. She has completed postmastectomy radiation treatment on August 23, 2018. The patient had a hysterectomy without oophorectomy. We did check her estradiol and FSH level on 3   occasions. The [facility] level was in the postmenopausal range, estradiol is below 5.0. On September 15, 2018 she started adjuvant hormonal therapy with Femara.    The patient is enrolled in the clinical study combining aromatase inhibitor with everolimus versus placebo. In December 2018 her chronic sinus congestion and postnasal drip got significantly worse, her primary care physician placed her on 2 courses of antibiotics. She continued to be congested. At that time, the patient was instructed to stop taking her study drug. In addition due to worsening joint pain the patient was instructed to stop taking Femara. Despite being off Femara her joint pains have not improved. And despite being off study drug her sinus congestion did not improve. The patient went back on Femara and study drug, however study drug dose was reduced by 50%. Due to recent hospitalization due to pneumonia possibly pneumonitis, the patient was taken off the study, since there is no any further dose reduction. She completed 10 day course of antibiotic. In addition, since pneumonitis can be side effect of m-tor inhibitor, the patient was instructed to continue prednisone. She will take 30 mg for 3 days, followed by 20 mg for 3 days, followed by 10 mg for 3 days. She is off steroids now and her respiratory status is stable. She will continue Femara since she tolerates it well. The physical examination today is without evidence of disease recurrence.   Her annual mammogram in November 2018 showed benign findings

## 2019-05-07 ENCOUNTER — HOSPITAL ENCOUNTER (OUTPATIENT)
Dept: PULMONOLOGY | Age: 49
Discharge: HOME | End: 2019-05-07
Payer: COMMERCIAL

## 2019-05-07 DIAGNOSIS — J44.1 COPD EXACERBATION (HCC): ICD-10-CM

## 2019-05-07 DIAGNOSIS — J44.1 CHRONIC OBSTRUCTIVE PULMONARY DISEASE WITH ACUTE EXACERBATION (HCC): ICD-10-CM

## 2019-05-07 PROCEDURE — 2709999900 HC NON-CHARGEABLE SUPPLY

## 2019-05-07 PROCEDURE — 94729 DIFFUSING CAPACITY: CPT

## 2019-05-07 PROCEDURE — 94060 EVALUATION OF WHEEZING: CPT

## 2019-05-07 PROCEDURE — 94726 PLETHYSMOGRAPHY LUNG VOLUMES: CPT

## 2019-05-29 ENCOUNTER — HOSPITAL ENCOUNTER (OUTPATIENT)
Dept: INFUSION THERAPY | Age: 49
End: 2019-05-29
Payer: COMMERCIAL

## 2019-06-03 ENCOUNTER — HOSPITAL ENCOUNTER (OUTPATIENT)
Dept: INFUSION THERAPY | Age: 49
Discharge: HOME | End: 2019-06-03
Payer: COMMERCIAL

## 2019-06-03 VITALS
SYSTOLIC BLOOD PRESSURE: 117 MMHG | OXYGEN SATURATION: 97 % | TEMPERATURE: 98 F | HEART RATE: 92 BPM | DIASTOLIC BLOOD PRESSURE: 64 MMHG | RESPIRATION RATE: 16 BRPM

## 2019-06-03 DIAGNOSIS — C50.912 BREAST CANCER METASTASIZED TO AXILLARY LYMPH NODE, LEFT (HCC): ICD-10-CM

## 2019-06-03 DIAGNOSIS — J44.9 COPD WITH ASTHMA (HCC): ICD-10-CM

## 2019-06-03 DIAGNOSIS — C50.912 MALIGNANT NEOPLASM OF LEFT BREAST IN FEMALE, ESTROGEN RECEPTOR POSITIVE, UNSPECIFIED SITE OF BREAST (HCC): Primary | ICD-10-CM

## 2019-06-03 DIAGNOSIS — C77.3 BREAST CANCER METASTASIZED TO AXILLARY LYMPH NODE, LEFT (HCC): ICD-10-CM

## 2019-06-03 DIAGNOSIS — Z17.0 MALIGNANT NEOPLASM OF LEFT BREAST IN FEMALE, ESTROGEN RECEPTOR POSITIVE, UNSPECIFIED SITE OF BREAST (HCC): Primary | ICD-10-CM

## 2019-06-03 PROCEDURE — 2709999900 HC NON-CHARGEABLE SUPPLY

## 2019-06-03 PROCEDURE — 6360000002 HC RX W HCPCS: Performed by: INTERNAL MEDICINE

## 2019-06-03 PROCEDURE — 2580000003 HC RX 258: Performed by: INTERNAL MEDICINE

## 2019-06-03 PROCEDURE — 99212 OFFICE O/P EST SF 10 MIN: CPT

## 2019-06-03 RX ORDER — HEPARIN SODIUM (PORCINE) LOCK FLUSH IV SOLN 100 UNIT/ML 100 UNIT/ML
500 SOLUTION INTRAVENOUS PRN
Status: DISCONTINUED | OUTPATIENT
Start: 2019-06-03 | End: 2019-06-04 | Stop reason: HOSPADM

## 2019-06-03 RX ORDER — SODIUM CHLORIDE 0.9 % (FLUSH) 0.9 %
20 SYRINGE (ML) INJECTION PRN
Status: CANCELLED | OUTPATIENT
Start: 2019-06-03

## 2019-06-03 RX ORDER — SODIUM CHLORIDE 0.9 % (FLUSH) 0.9 %
10 SYRINGE (ML) INJECTION PRN
Status: CANCELLED | OUTPATIENT
Start: 2019-06-03

## 2019-06-03 RX ORDER — HEPARIN SODIUM (PORCINE) LOCK FLUSH IV SOLN 100 UNIT/ML 100 UNIT/ML
500 SOLUTION INTRAVENOUS PRN
Status: CANCELLED | OUTPATIENT
Start: 2019-06-03

## 2019-06-03 RX ORDER — SODIUM CHLORIDE 0.9 % (FLUSH) 0.9 %
10 SYRINGE (ML) INJECTION PRN
Status: DISCONTINUED | OUTPATIENT
Start: 2019-06-03 | End: 2019-06-04 | Stop reason: HOSPADM

## 2019-06-03 RX ADMIN — Medication 500 UNITS: at 15:26

## 2019-06-03 RX ADMIN — Medication 10 ML: at 15:25

## 2019-06-03 RX ADMIN — Medication 10 ML: at 15:26

## 2019-06-03 ASSESSMENT — PAIN DESCRIPTION - FREQUENCY: FREQUENCY: CONTINUOUS

## 2019-06-03 ASSESSMENT — PAIN DESCRIPTION - ONSET: ONSET: ON-GOING

## 2019-06-03 ASSESSMENT — PAIN DESCRIPTION - PAIN TYPE: TYPE: CHRONIC PAIN

## 2019-06-03 ASSESSMENT — PAIN DESCRIPTION - ORIENTATION: ORIENTATION: MID

## 2019-06-03 ASSESSMENT — PAIN SCALES - GENERAL: PAINLEVEL_OUTOF10: 3

## 2019-06-03 ASSESSMENT — PAIN DESCRIPTION - DESCRIPTORS: DESCRIPTORS: ACHING

## 2019-06-03 ASSESSMENT — PAIN DESCRIPTION - LOCATION: LOCATION: BACK

## 2019-06-03 ASSESSMENT — PAIN DESCRIPTION - PROGRESSION: CLINICAL_PROGRESSION: NOT CHANGED

## 2019-06-03 NOTE — PLAN OF CARE
Problem: Pain:  Goal: Control of chronic pain  Description  Control of chronic pain  Outcome: Met This Shift  Note:   Patient rating pain a 3 out of 10 using BING scale, Pain located in mid back. Intervention: Opioid analgesia side-effects  Note:   Patient encouraged to take prescribed pain medications and call Physician if pain is not controlled. Problem: Discharge Planning  Goal: Knowledge of discharge instructions  Description  Knowledge of discharge instructions     Outcome: Met This Shift  Note:   Verbalize understanding of discharge instructions, follow up appointments, and when to call Physician. Intervention: Interaction with patient/family and care team  Note:   Provide discharge instructions. Problem: Infection - Central Venous Catheter-Associated Bloodstream Infection:  Goal: Will show no infection signs and symptoms  Description  Will show no infection signs and symptoms   Outcome: Met This Shift  Note:   Mediport site with no redness or warmth. Skin over port intact with no signs of breakdown noted. Patient verbalizes signs/symptoms of port infection and when to notify the physician. Intervention: Central line needs assessment  Note:   Discussed mediport maintenance, infection prevention, and when to call the physician. Good blood return noted. Care plan reviewed with patient. Patient verbalize understanding of the plan of care and contribute to goal setting.

## 2019-06-03 NOTE — PROGRESS NOTES
Patient assessed for the following post mediport flush:    Dizziness   No  Lightheadedness             No     Acute nausea/vomiting No  Headache   No  Chest pain/pressure  No  Rash/itching   No  Shortness of breath  No    Patient tolerated mediport flush without any complications. Last vital signs:   /64   Pulse 92   Temp 98 °F (36.7 °C) (Oral)   Resp 16   SpO2 97%     Patient instructed if experience any of the above symptoms following today's mediport flush, he/she is to notify MD immediately or go to the emergency department. Discharge instructions given to patient. Verbalizes understanding. Ambulated off unit per self with belongings.

## 2019-06-07 ENCOUNTER — HOSPITAL ENCOUNTER (OUTPATIENT)
Dept: CT IMAGING | Age: 49
Discharge: HOME | End: 2019-06-07
Payer: COMMERCIAL

## 2019-06-07 DIAGNOSIS — J44.1 COPD EXACERBATION (HCC): ICD-10-CM

## 2019-06-07 DIAGNOSIS — R59.0 MEDIASTINAL LYMPHADENOPATHY: ICD-10-CM

## 2019-06-07 DIAGNOSIS — C50.912 BREAST CANCER METASTASIZED TO AXILLARY LYMPH NODE, LEFT (HCC): ICD-10-CM

## 2019-06-07 DIAGNOSIS — J18.9 PNEUMONIA OF BOTH LOWER LOBES DUE TO INFECTIOUS ORGANISM: ICD-10-CM

## 2019-06-07 DIAGNOSIS — C77.3 BREAST CANCER METASTASIZED TO AXILLARY LYMPH NODE, LEFT (HCC): ICD-10-CM

## 2019-06-07 PROCEDURE — 6360000004 HC RX CONTRAST MEDICATION: Performed by: INTERNAL MEDICINE

## 2019-06-07 PROCEDURE — 71260 CT THORAX DX C+: CPT

## 2019-06-07 RX ADMIN — IOPAMIDOL 85 ML: 755 INJECTION, SOLUTION INTRAVENOUS at 15:12

## 2019-06-11 ENCOUNTER — OFFICE VISIT (OUTPATIENT)
Dept: PULMONOLOGY | Age: 49
End: 2019-06-11
Payer: COMMERCIAL

## 2019-06-11 VITALS
RESPIRATION RATE: 17 BRPM | OXYGEN SATURATION: 96 % | WEIGHT: 157 LBS | SYSTOLIC BLOOD PRESSURE: 102 MMHG | HEART RATE: 93 BPM | TEMPERATURE: 98 F | DIASTOLIC BLOOD PRESSURE: 60 MMHG | BODY MASS INDEX: 23.79 KG/M2 | HEIGHT: 68 IN

## 2019-06-11 DIAGNOSIS — Z85.3 HISTORY OF RIGHT BREAST CANCER: ICD-10-CM

## 2019-06-11 DIAGNOSIS — J44.9 COPD, MILD (HCC): Primary | ICD-10-CM

## 2019-06-11 DIAGNOSIS — J45.909 ASTHMA, UNSPECIFIED ASTHMA SEVERITY, UNSPECIFIED WHETHER COMPLICATED, UNSPECIFIED WHETHER PERSISTENT: ICD-10-CM

## 2019-06-11 DIAGNOSIS — R59.0 MEDIASTINAL LYMPHADENOPATHY: ICD-10-CM

## 2019-06-11 PROCEDURE — 99213 OFFICE O/P EST LOW 20 MIN: CPT | Performed by: NURSE PRACTITIONER

## 2019-06-11 PROCEDURE — 3017F COLORECTAL CA SCREEN DOC REV: CPT | Performed by: NURSE PRACTITIONER

## 2019-06-11 PROCEDURE — 3023F SPIROM DOC REV: CPT | Performed by: NURSE PRACTITIONER

## 2019-06-11 PROCEDURE — G8926 SPIRO NO PERF OR DOC: HCPCS | Performed by: NURSE PRACTITIONER

## 2019-06-11 PROCEDURE — G8427 DOCREV CUR MEDS BY ELIG CLIN: HCPCS | Performed by: NURSE PRACTITIONER

## 2019-06-11 PROCEDURE — 4004F PT TOBACCO SCREEN RCVD TLK: CPT | Performed by: NURSE PRACTITIONER

## 2019-06-11 PROCEDURE — G8420 CALC BMI NORM PARAMETERS: HCPCS | Performed by: NURSE PRACTITIONER

## 2019-06-11 RX ORDER — ALBUTEROL SULFATE 90 UG/1
1 AEROSOL, METERED RESPIRATORY (INHALATION) EVERY 6 HOURS PRN
Qty: 1 INHALER | Refills: 5 | Status: SHIPPED | OUTPATIENT
Start: 2019-06-11

## 2019-06-11 RX ORDER — MOMETASONE FUROATE AND FORMOTEROL FUMARATE DIHYDRATE 200; 5 UG/1; UG/1
1 AEROSOL RESPIRATORY (INHALATION) 2 TIMES DAILY
Qty: 1 INHALER | Refills: 5 | Status: SHIPPED | OUTPATIENT
Start: 2019-06-11 | End: 2020-05-22

## 2019-06-11 ASSESSMENT — ENCOUNTER SYMPTOMS
SHORTNESS OF BREATH: 1
EYE ITCHING: 1
SINUS PAIN: 1
SINUS PRESSURE: 1
EYE DISCHARGE: 1
GASTROINTESTINAL NEGATIVE: 1

## 2019-06-11 NOTE — PROGRESS NOTES
letrozole (FEMARA) 2.5 MG tablet Take 1 tablet by mouth daily 30 tablet 3    NONFORMULARY 2 tablets daily Tumeric      ondansetron (ZOFRAN-ODT) 4 MG disintegrating tablet Take 1 tablet by mouth every 8 hours as needed for Nausea or Vomiting 20 tablet 0    b complex vitamins capsule Take 1 capsule by mouth daily      lidocaine-prilocaine (EMLA) 2.5-2.5 % cream Apply topically as needed. 30 g 1    albuterol (PROVENTIL) (2.5 MG/3ML) 0.083% nebulizer solution Inhale 2.5 mg into the lungs every 4 hours as needed       butalbital-apap-caffeine -40 MG CAPS Take by mouth      ALPRAZolam (XANAX) 0.5 MG tablet Take 0.5 mg by mouth nightly as needed       cyclobenzaprine (FLEXERIL) 5 MG tablet Take 5 mg by mouth 3 times daily as needed       venlafaxine (EFFEXOR XR) 75 MG extended release capsule Take 75 mg by mouth daily       meloxicam (MOBIC) 15 MG tablet Take 15 mg by mouth daily       No current facility-administered medications for this visit. ROS   Review of Systems   Constitutional: Negative. HENT: Positive for congestion, sinus pressure, sinus pain and sneezing. Eyes: Positive for discharge (clear) and itching. Respiratory: Positive for shortness of breath (minimal). Cardiovascular: Negative. Gastrointestinal: Negative. Genitourinary: Negative. Musculoskeletal: Positive for arthralgias. Skin: Negative. Neurological: Negative. Hematological: Negative. Psychiatric/Behavioral: Negative. Physical exam   /60 (Site: Right Upper Arm, Position: Sitting, Cuff Size: Large Adult)   Pulse 93   Temp 98 °F (36.7 °C)   Resp 17   Ht 5' 8\" (1.727 m)   Wt 157 lb (71.2 kg)   SpO2 96% Comment: on RA  BMI 23.87 kg/m²    Wt Readings from Last 3 Encounters:   06/11/19 157 lb (71.2 kg)   04/12/19 166 lb 6.4 oz (75.5 kg)   04/12/19 166 lb 6.4 oz (75.5 kg)       Physical Exam   Constitutional: She is oriented to person, place, and time.  She appears well-developed and unspecified whether persistent  tiotropium (SPIRIVA HANDIHALER) 18 MCG inhalation capsule    albuterol sulfate  (90 Base) MCG/ACT inhaler   3. Mediastinal lymphadenopathy  CT Chest W Contrast   4. History of right breast cancer  CT Chest W Contrast    with left axillary lymph node metastasis. Plan   -Continue Dulera 200/5 BID  -Continue Ventolin PRN for SOB/wheezing  -Ordered Spiriva 18 mcg 1 inhalation daily  -PFT reviewed with patient; mild obstruction  -Recommend OTC allergy medication  -Recommend patient stop smoking,Smoking cessation discussed for 3-4 minutes. Educated patient on health hazards and negative effects of smoking. Counseled on ways to quit,  Offered cessation assistance with prescription mediations,  over the counter remedies, community resources/ phone APPs. Patient refuses additional assistance at this time.   -Tried Wellbutrin gave \"bad dreams\"   -Will repeat CT Chest w/contrast in 6 months to follow mediastinal lymphadenopathy. ..  Possible need for EBUS in the future  -Advised to maintain pneumonia vaccine with PCP and to take flu vaccine this coming season.  -Advised patient to call office with any changes, questions, or concerns regarding respiratory status    Will see Yen rojas in: 6 months with CT Chest with contrast before appt    Nancy Meadows CNP  6/11/2019

## 2019-06-17 ENCOUNTER — OFFICE VISIT (OUTPATIENT)
Dept: ONCOLOGY | Age: 49
End: 2019-06-17
Payer: COMMERCIAL

## 2019-06-17 ENCOUNTER — HOSPITAL ENCOUNTER (OUTPATIENT)
Dept: INFUSION THERAPY | Age: 49
Discharge: HOME | End: 2019-06-17
Payer: COMMERCIAL

## 2019-06-17 VITALS
HEIGHT: 68 IN | OXYGEN SATURATION: 96 % | WEIGHT: 156.8 LBS | TEMPERATURE: 98.2 F | DIASTOLIC BLOOD PRESSURE: 73 MMHG | HEART RATE: 75 BPM | SYSTOLIC BLOOD PRESSURE: 120 MMHG | RESPIRATION RATE: 18 BRPM | BODY MASS INDEX: 23.76 KG/M2

## 2019-06-17 DIAGNOSIS — G62.0 DRUG-INDUCED POLYNEUROPATHY (HCC): ICD-10-CM

## 2019-06-17 DIAGNOSIS — Z90.12 STATUS POST MASTECTOMY, LEFT: ICD-10-CM

## 2019-06-17 DIAGNOSIS — C77.3 BREAST CANCER METASTASIZED TO AXILLARY LYMPH NODE, LEFT (HCC): ICD-10-CM

## 2019-06-17 DIAGNOSIS — C50.912 BREAST CANCER METASTASIZED TO AXILLARY LYMPH NODE, LEFT (HCC): ICD-10-CM

## 2019-06-17 DIAGNOSIS — Z79.811 PROPHYLACTIC USE OF LETROZOLE (FEMARA): Primary | ICD-10-CM

## 2019-06-17 PROCEDURE — 3017F COLORECTAL CA SCREEN DOC REV: CPT | Performed by: INTERNAL MEDICINE

## 2019-06-17 PROCEDURE — G8420 CALC BMI NORM PARAMETERS: HCPCS | Performed by: INTERNAL MEDICINE

## 2019-06-17 PROCEDURE — G8427 DOCREV CUR MEDS BY ELIG CLIN: HCPCS | Performed by: INTERNAL MEDICINE

## 2019-06-17 PROCEDURE — 99211 OFF/OP EST MAY X REQ PHY/QHP: CPT

## 2019-06-17 PROCEDURE — 4004F PT TOBACCO SCREEN RCVD TLK: CPT | Performed by: INTERNAL MEDICINE

## 2019-06-17 PROCEDURE — 99214 OFFICE O/P EST MOD 30 MIN: CPT | Performed by: INTERNAL MEDICINE

## 2019-06-17 RX ORDER — LETROZOLE 2.5 MG/1
2.5 TABLET, FILM COATED ORAL DAILY
Qty: 30 TABLET | Refills: 3 | Status: SHIPPED | OUTPATIENT
Start: 2019-06-17 | End: 2019-09-05 | Stop reason: SDUPTHER

## 2019-06-17 ASSESSMENT — ENCOUNTER SYMPTOMS
TROUBLE SWALLOWING: 0
BACK PAIN: 1
SORE THROAT: 0
VOMITING: 0
SHORTNESS OF BREATH: 1
VOICE CHANGE: 0
COLOR CHANGE: 0
EYE ITCHING: 0
NAUSEA: 0
CONSTIPATION: 0
WHEEZING: 0
EYE REDNESS: 0
ABDOMINAL PAIN: 0
ABDOMINAL DISTENTION: 0
COUGH: 1
BLOOD IN STOOL: 0
SINUS PRESSURE: 1
CHEST TIGHTNESS: 0
DIARRHEA: 0

## 2019-06-17 NOTE — PROGRESS NOTES
[facility] PROFESSIONAL SERVS  [facility]  [address]  [address]  [place] [address]  Dept: [phone]  Dept Fax: [phone]: [phone]     Visit Date: 6/17/2019     Grace Gallego is a 44 y.o. female who presents today for:   Chief Complaint   Patient presents with    Follow-up     BREAST CA       HPI:   Ene Meneses is a 55-year-old female with h/o invasive ductal carcinoma of the left breast. The patient noted a mass in the upper aspect of her left breast, at first she thought it was a cyst which she had before. When it persisted, she elected to have further evaluation. She did undergo a diagnostic mammogram at [facility] in [place], [place]. It showed a 3.8 x 2.4 x 2.7 cm malignant-appearing spiculated mass within the left breast at 11:00 position. Evaluation of the left axilla with US showed suspicious appearing lymph node with increased cortical thickening and replacement of fatty hilum. On October 9, 2017, she underwent a left breast mass and left lymph node ultrasound-guided core biopsy. The final path report showed an invasive ductal carcinoma, ER positive at 95%, CT positive at 95%, and a HER-2/breezy by IHC was 2+. The FISH test was still equivocal.  HER-2/CEP17 ratio was 1.7, however the average HER-2 copy number was 4. She met with the surgical oncologists and medical oncologists at the [facility], however she decided to receive care closer to her home. The patient had metastatic workup: CT of the chest, abdomen and bone scan were negative for evidence of metastatic disease. On November 7, 2017 she underwent right breast mastectomy with axillary lymph node dissection. Final pathology report showed: FINAL DIAGNOSIS:  A. Left breast with portion of axillary contents, mastectomy:      Invasive ductal carcinoma with lobular features involving nipple      and all 4 quadrants of breast.  Maximum tumor dimension estimated      6.8 cm. pT3.  Margins negative.      3 of 9 axillary lymph nodes positive for metastatic carcinoma,      pN1a. B. Left level II axillary lymph nodes, dissection:   5 lymph nodes negative for metastatic carcinoma. BREAST BIOMARKERS   Inform HER-2 Dual CHARLENE Nonamplified -  Estrogen Receptor: positive (99%, strong intensity)  Progesterone Receptor: positive (99%, strong intensity)  When she recovered from her surgery, on December 13, 2017 she started adjuvant AC. On March 7, 2018 she started weekly Taxol and completed on May 30,2018. She completed postmastectomy radiation treatment on August 23, 2018. She was hospitalized from March 2, 2019 till March 4, 2019 for pneumonia. She was started on IV Levaquin and Solumedrol. During hospitalization she never became hypotensive, no need for pressors. She was discharged home on oral Levofloxacin for 7-day course and Prednisone for 5 days. Tiotropium 18 mcg daily added to her regimen. Interim history on 6/17/2019:  The patient presents to medical oncology clinic for follow up visit. The patient reports that since being discharged from the hospital she didn't have any fevers. Her shortness of breath and cough are improved, no recurrent sinus infection e. She denies any problems related to her breasts. Tolerates Femara well with stable hot flashes she denies having any arthralgia.      HPI   Past Medical History:   Diagnosis Date    Acid reflux     Anxiety     Asthma     Breast cancer ([address]     COPD with asthma ([address]     Depression     Depression     Dizziness - light-headed     HX OF:    Emphysema     History of chest pain     History of tinnitus     Hx of blood clots     Joint pain     Numbness and tingling     Osteoarthritis     PONV (postoperative nausea and vomiting)     SOB (shortness of breath) on exertion       Past Surgical History:   Procedure Laterality Date    BLADDER SURGERY  2014    BREAST BIOPSY  10/06/2017    [facility] every 8 hours as needed for Nausea or Vomiting 20 tablet 0    b complex vitamins capsule Take 1 capsule by mouth daily      lidocaine-prilocaine (EMLA) 2.5-2.5 % cream Apply topically as needed. 30 g 1    albuterol (PROVENTIL) (2.5 MG/3ML) 0.083% nebulizer solution Inhale 2.5 mg into the lungs every 4 hours as needed       butalbital-apap-caffeine -40 MG CAPS Take by mouth      ALPRAZolam (XANAX) 0.5 MG tablet Take 0.5 mg by mouth nightly as needed       cyclobenzaprine (FLEXERIL) 5 MG tablet Take 5 mg by mouth 3 times daily as needed       venlafaxine (EFFEXOR XR) 75 MG extended release capsule Take 75 mg by mouth daily       meloxicam (MOBIC) 15 MG tablet Take 15 mg by mouth daily       No current facility-administered medications for this visit. No Known Allergies   Health Maintenance   Topic Date Due    HIV screen  03/06/1983    DTaP/Tdap/Td vaccine (1 - Tdap) 03/06/1987    Cervical cancer screen  03/06/1989    Shingles Vaccine (1 of 2) 03/06/2018    Colon cancer screen colonoscopy  03/06/2018    Pneumococcal 0-64 years Vaccine (2 of 3 - PPSV23) 04/28/2019    Breast cancer screen  11/13/2019    Lipid screen  11/08/2023    Flu vaccine  Completed        Subjective:   Review of Systems   Constitutional: Negative for activity change, appetite change, chills, fatigue, fever and unexpected weight change. HENT: Positive for postnasal drip and sinus pressure. Negative for dental problem, hearing loss, mouth sores, nosebleeds, sore throat, trouble swallowing and voice change. Facial swelling: her potassium today is 3 4. she was instructed to eat potassium-rich diet. Eyes: Negative for redness, itching and visual disturbance. Respiratory: Positive for cough and shortness of breath. Negative for chest tightness and wheezing. Cardiovascular: Negative for chest pain, palpitations and leg swelling.    Gastrointestinal: Negative for abdominal distention, abdominal pain, blood in stool, constipation, diarrhea, nausea and vomiting. Genitourinary: Negative for difficulty urinating, dysuria, flank pain, frequency and hematuria. Musculoskeletal: Positive for back pain. Negative for arthralgias, gait problem, joint swelling, myalgias and neck stiffness. Skin: Negative for color change and rash. Neurological: Negative for dizziness, seizures, syncope, speech difficulty, weakness, light-headedness, numbness and headaches. Hematological: Negative for adenopathy. Does not bruise/bleed easily. Psychiatric/Behavioral: Negative for confusion and sleep disturbance. The patient is not nervous/anxious. Objective:   ECOG performance status is 1 today  Physical Exam   Constitutional: She is oriented to person, place, and time. She appears well-developed and well-nourished. No distress. HENT:   Head: Normocephalic. Mouth/Throat: Oropharynx is clear and moist. No oropharyngeal exudate. Eyes: Pupils are equal, round, and reactive to light. EOM are normal. Right eye exhibits no discharge. Left eye exhibits no discharge. No scleral icterus. Neck: Normal range of motion. Neck supple. No JVD present. No tracheal deviation present. No thyromegaly present. Cardiovascular: Normal rate and normal heart sounds. Exam reveals no gallop and no friction rub. No murmur heard. Pulmonary/Chest: Effort normal. No stridor. No respiratory distress. She has decreased breath sounds in the right lower field and the left lower field. She has no wheezes. She has no rales. She exhibits no tenderness. Left breast exhibits mass (status post left mastectomy. Incision nicely healed). Abdominal: Soft. Bowel sounds are normal. She exhibits no distension and no mass. There is no tenderness. There is no rebound. Musculoskeletal: Normal range of motion. She exhibits no edema. Good range of motion in all four extremities. Lymphadenopathy:     She has no cervical adenopathy.    Neurological: She is alert and oriented to person, place, and time. She has normal reflexes. No cranial nerve deficit. She exhibits normal muscle tone. Skin: Skin is warm. No rash noted. No erythema. Psychiatric: She has a normal mood and affect. Her behavior is normal. Judgment and thought content normal.   Vitals reviewed. /73 (Site: Right Upper Arm, Position: Sitting, Cuff Size: Medium Adult)   Pulse 75   Temp 98.2 °F (36.8 °C) (Oral)   Resp 18   Ht 5' 7.99\" (1.727 m)   Wt 156 lb 12.8 oz (71.1 kg)   SpO2 96%   BMI 23.85 kg/m²      Imaging studies and labs:     Lab Results   Component Value Date    WBC 4.0 (L) 04/12/2019    HGB 12.6 04/12/2019    HCT 36.3 (L) 04/12/2019    MCV 94 04/12/2019     04/12/2019       Chemistry        Component Value Date/Time     04/12/2019 1135     03/03/2019 0527    K 3.5 04/12/2019 1135    K 4.0 03/04/2019 0623     03/03/2019 0527    CO2 19 (L) 03/03/2019 0527    BUN 8 03/03/2019 0527    CREATININE 0.7 04/12/2019 1135    CREATININE 0.6 03/03/2019 0527        Component Value Date/Time    CALCIUM 8.7 03/03/2019 0527    ALKPHOS 57 04/12/2019 1135    AST 14 04/12/2019 1135    ALT 12 04/12/2019 1135    BILITOT 0.2 (L) 04/12/2019 1135        DEXA study on the September 26, 2018 showed: Normal bone density. Mammogram on November 13, 2018 showed:  Benign findings, BI-RADS Category 2. CTA on 03/02/2019 showed:    1. There is no pulmonary embolus. 2. Left mastectomy. The mild skin thickening, subcutaneous stranding densities and asymmetric prominence of the pectoralis muscle most likely is postsurgical. There is no well-defined mass within this region. 3. There is bronchiectasis within the bilateral lower lobes. There are nodular interstitial and alveolar infiltrates within the bilateral lower lobes.  A follow-up CT examination of the chest with intravenous contrast in one month is recommended to    confirm complete resolution in order to exclude a more significant mass drug, however study drug dose was reduced by 50%. Due to  hospitalization due to pneumonia possibly pneumonitis, the patient was taken off the study, since there is no any further dose reduction. She is currently on Femara which she tolerates well. Stable hot flashes and minimal occasional arthralgia. Her annual mammogram in November 2018 showed benign findings.   Denies any concerns related to her breast.  She will continue Femara

## 2019-07-08 ENCOUNTER — TELEPHONE (OUTPATIENT)
Dept: SURGERY | Age: 49
End: 2019-07-08

## 2019-07-08 ENCOUNTER — OFFICE VISIT (OUTPATIENT)
Dept: SURGERY | Age: 49
End: 2019-07-08
Payer: COMMERCIAL

## 2019-07-08 VITALS
HEART RATE: 74 BPM | RESPIRATION RATE: 16 BRPM | HEIGHT: 68 IN | TEMPERATURE: 98.1 F | SYSTOLIC BLOOD PRESSURE: 122 MMHG | BODY MASS INDEX: 23.64 KG/M2 | OXYGEN SATURATION: 99 % | WEIGHT: 156 LBS | DIASTOLIC BLOOD PRESSURE: 60 MMHG

## 2019-07-08 DIAGNOSIS — C50.912 BREAST CANCER METASTASIZED TO AXILLARY LYMPH NODE, LEFT (HCC): Primary | ICD-10-CM

## 2019-07-08 DIAGNOSIS — C77.3 BREAST CANCER METASTASIZED TO AXILLARY LYMPH NODE, LEFT (HCC): Primary | ICD-10-CM

## 2019-07-08 DIAGNOSIS — J44.9 COPD WITH ASTHMA (HCC): ICD-10-CM

## 2019-07-08 DIAGNOSIS — F17.200 TOBACCO DEPENDENCE: ICD-10-CM

## 2019-07-08 PROCEDURE — G8926 SPIRO NO PERF OR DOC: HCPCS | Performed by: SURGERY

## 2019-07-08 PROCEDURE — 3023F SPIROM DOC REV: CPT | Performed by: SURGERY

## 2019-07-08 PROCEDURE — 99214 OFFICE O/P EST MOD 30 MIN: CPT | Performed by: SURGERY

## 2019-07-08 PROCEDURE — 4004F PT TOBACCO SCREEN RCVD TLK: CPT | Performed by: SURGERY

## 2019-07-08 PROCEDURE — G8427 DOCREV CUR MEDS BY ELIG CLIN: HCPCS | Performed by: SURGERY

## 2019-07-08 PROCEDURE — G8420 CALC BMI NORM PARAMETERS: HCPCS | Performed by: SURGERY

## 2019-07-08 PROCEDURE — 3017F COLORECTAL CA SCREEN DOC REV: CPT | Performed by: SURGERY

## 2019-07-08 ASSESSMENT — ENCOUNTER SYMPTOMS
COUGH: 0
VOMITING: 0
EYE REDNESS: 0
BLOOD IN STOOL: 0
EYE PAIN: 0
DIARRHEA: 0
BACK PAIN: 0
RHINORRHEA: 0
COLOR CHANGE: 0
WHEEZING: 0
SORE THROAT: 0
ABDOMINAL PAIN: 0
CONSTIPATION: 0
NAUSEA: 0
TROUBLE SWALLOWING: 0
VOICE CHANGE: 0

## 2019-07-08 NOTE — PROGRESS NOTES
Surgical History  Past Surgical History:   Procedure Laterality Date    BLADDER SURGERY  2014    BREAST BIOPSY  10/06/2017    [facility]    CARDIAC CATHETERIZATION  2011    NO EVIDENCE OF PULMONARY HTN,NO EVIDENCE OF DD,NORMAL LVF    CARDIOVASCULAR STRESS TEST  2011    EF 60% MILD INFERIOR WALL REVERSIBLE STRESS-INDUCED ISCHEMIA      SECTION  1992    CHOLECYSTECTOMY  2015    CYST REMOVAL Right 2015    rt breast [facility]-benign breast cyst     ESOPHAGUS SURGERY  2014    HYSTERECTOMY  1999    INSERTION / REMOVAL / REPLACEMENT VENOUS ACCESS CATHETER Right 2017    RIGHT SIDE SINGLE LUMEN POWERPORT INSERTION performed by Zulay Charles MD at [address] Left 2017    LEFT MODIFIED RADICAL MASTECTOMY performed by Zulay Charles MD at [address] TUNNELED VENOUS PORT PLACEMENT Right 2017    Single Luman Smart Port Insertion- Right side. Medications  Current Outpatient Medications   Medication Sig Dispense Refill    letrozole (FEMARA) 2.5 MG tablet Take 1 tablet by mouth daily 30 tablet 3    tiotropium (SPIRIVA HANDIHALER) 18 MCG inhalation capsule Inhale 1 capsule into the lungs daily 90 capsule 3    DULERA 200-5 MCG/ACT inhaler Inhale 1 puff into the lungs 2 times daily 1 Inhaler 5    albuterol sulfate  (90 Base) MCG/ACT inhaler Inhale 1 puff into the lungs every 6 hours as needed for Wheezing or Shortness of Breath 1 Inhaler 5    NONFORMULARY 2 tablets daily Tumeric      ondansetron (ZOFRAN-ODT) 4 MG disintegrating tablet Take 1 tablet by mouth every 8 hours as needed for Nausea or Vomiting 20 tablet 0    b complex vitamins capsule Take 1 capsule by mouth daily      lidocaine-prilocaine (EMLA) 2.5-2.5 % cream Apply topically as needed.  30 g 1    albuterol (PROVENTIL) (2.5 MG/3ML) 0.083% nebulizer solution Inhale 2.5 mg into the lungs every 4 hours as needed       butalbital-apap-caffeine -40 MG CAPS Take  mammogram screening schedule is recommended.     The patient was notified of the results.         #[id number] - [facility] DIGITAL DIAGNOSTIC W OR WO CAD RIGHT   UNILATERAL RIGHT DIGITAL DIAGNOSTIC MAMMOGRAM WITH CAD:    11/13/2018   CLINICAL: Tomosynthesis. Personal history of breast cancer.         Comparison is made to exams dated:  9/28/2017 mammogram and    3/4/2015 mammogram - [facility].     There are scattered fibroglandular elements in the right breast    that could obscure a lesion on mammography.     Current study was also evaluated with a Computer Aided Detection    (CAD) system.     There are benign scattered calcifications and densities right    breast.     No significant masses, calcifications, or other findings are seen    in the breast.     There has been no significant interval change.               Reginald Castillo M.D.             rd/terence:11/13/2018 13:26:33     copy to: Jeff Chun M.D., Dr. Jeff Chun, ph:    [phone], fax: [phone]   copy to: Nicole Montes MD, [facility], ph:    [phone], fax: [phone]   copy to: Emily Puente M.D., [address] [place] [address]: [phone], fax: [phone]       Imaging Technologist: Ivette Kawasaki RT(R)(M), [address] [place] [address]   letter sent: Normal-[employer]                 #[id number] - DEXA BONE DENSITY AXIAL SKELETON   # BONE DENSITY EVALUATION: 09/26/2018       CLINICAL DATA: Post menopausal, estrogen deficient, and clinical    risk for osteoporosis.         RISK FACTORS:  race, long term use of corticosteroids,    maternal history of osteoporosis, cigarette smoking, and high    caffeine consumption.  History of breast cancer with chemotherapy    and radiation treatment.         FINDINGS:    Bone density evaluation was performed 09/26/2018 on the right    femur neck using a Hologic unit. The BMD average for the exam is    0.752  g/cm2.  The T-score

## 2019-07-09 ASSESSMENT — ENCOUNTER SYMPTOMS: SHORTNESS OF BREATH: 0

## 2019-07-15 ENCOUNTER — HOSPITAL ENCOUNTER (OUTPATIENT)
Dept: RADIATION ONCOLOGY | Age: 49
Discharge: HOME | End: 2019-07-15
Payer: COMMERCIAL

## 2019-08-13 ENCOUNTER — TELEPHONE (OUTPATIENT)
Dept: SURGERY | Age: 49
End: 2019-08-13

## 2019-08-26 ENCOUNTER — PROCEDURE VISIT (OUTPATIENT)
Dept: SURGERY | Age: 49
End: 2019-08-26
Payer: COMMERCIAL

## 2019-08-26 VITALS
OXYGEN SATURATION: 99 % | RESPIRATION RATE: 18 BRPM | DIASTOLIC BLOOD PRESSURE: 60 MMHG | WEIGHT: 152 LBS | HEART RATE: 81 BPM | BODY MASS INDEX: 23.04 KG/M2 | SYSTOLIC BLOOD PRESSURE: 110 MMHG | HEIGHT: 68 IN | TEMPERATURE: 98.2 F

## 2019-08-26 DIAGNOSIS — C50.912 MALIGNANT NEOPLASM OF LEFT FEMALE BREAST, UNSPECIFIED ESTROGEN RECEPTOR STATUS, UNSPECIFIED SITE OF BREAST (HCC): ICD-10-CM

## 2019-08-26 PROCEDURE — 36590 REMOVAL TUNNELED CV CATH: CPT | Performed by: SURGERY

## 2019-09-05 ENCOUNTER — HOSPITAL ENCOUNTER (OUTPATIENT)
Dept: INFUSION THERAPY | Age: 49
Discharge: HOME | End: 2019-09-05
Payer: COMMERCIAL

## 2019-09-05 ENCOUNTER — OFFICE VISIT (OUTPATIENT)
Dept: ONCOLOGY | Age: 49
End: 2019-09-05
Payer: COMMERCIAL

## 2019-09-05 VITALS
OXYGEN SATURATION: 98 % | BODY MASS INDEX: 22.55 KG/M2 | DIASTOLIC BLOOD PRESSURE: 70 MMHG | WEIGHT: 148.8 LBS | TEMPERATURE: 98.4 F | HEART RATE: 99 BPM | HEIGHT: 68 IN | SYSTOLIC BLOOD PRESSURE: 106 MMHG | RESPIRATION RATE: 16 BRPM

## 2019-09-05 DIAGNOSIS — Z79.811 PROPHYLACTIC USE OF LETROZOLE (FEMARA): Primary | ICD-10-CM

## 2019-09-05 DIAGNOSIS — J18.9 PNEUMONIA OF BOTH LUNGS DUE TO INFECTIOUS ORGANISM, UNSPECIFIED PART OF LUNG: ICD-10-CM

## 2019-09-05 DIAGNOSIS — Z90.12 STATUS POST MASTECTOMY, LEFT: ICD-10-CM

## 2019-09-05 DIAGNOSIS — G62.0 DRUG-INDUCED POLYNEUROPATHY (HCC): ICD-10-CM

## 2019-09-05 DIAGNOSIS — R05.9 COUGH: ICD-10-CM

## 2019-09-05 PROCEDURE — G8420 CALC BMI NORM PARAMETERS: HCPCS | Performed by: INTERNAL MEDICINE

## 2019-09-05 PROCEDURE — G8427 DOCREV CUR MEDS BY ELIG CLIN: HCPCS | Performed by: INTERNAL MEDICINE

## 2019-09-05 PROCEDURE — 99211 OFF/OP EST MAY X REQ PHY/QHP: CPT

## 2019-09-05 PROCEDURE — 4004F PT TOBACCO SCREEN RCVD TLK: CPT | Performed by: INTERNAL MEDICINE

## 2019-09-05 PROCEDURE — 99214 OFFICE O/P EST MOD 30 MIN: CPT | Performed by: INTERNAL MEDICINE

## 2019-09-05 PROCEDURE — 3017F COLORECTAL CA SCREEN DOC REV: CPT | Performed by: INTERNAL MEDICINE

## 2019-09-05 RX ORDER — LETROZOLE 2.5 MG/1
2.5 TABLET, FILM COATED ORAL DAILY
Qty: 30 TABLET | Refills: 3 | Status: SHIPPED | OUTPATIENT
Start: 2019-09-05 | End: 2020-03-16 | Stop reason: SDUPTHER

## 2019-09-06 ASSESSMENT — ENCOUNTER SYMPTOMS
VOMITING: 0
NAUSEA: 0
ABDOMINAL DISTENTION: 0
BACK PAIN: 1
WHEEZING: 0
ABDOMINAL PAIN: 0
CHEST TIGHTNESS: 0
COUGH: 1
VOICE CHANGE: 0
SHORTNESS OF BREATH: 1
DIARRHEA: 0
SORE THROAT: 0
COLOR CHANGE: 0
TROUBLE SWALLOWING: 0
SINUS PRESSURE: 1
EYE REDNESS: 0
BLOOD IN STOOL: 0
EYE ITCHING: 0
CONSTIPATION: 0

## 2019-09-07 NOTE — PROGRESS NOTES
[facility]    CARDIAC CATHETERIZATION  2011    NO EVIDENCE OF PULMONARY HTN,NO EVIDENCE OF DD,NORMAL LVF    CARDIOVASCULAR STRESS TEST  2011    EF 60% MILD INFERIOR WALL REVERSIBLE STRESS-INDUCED ISCHEMIA      SECTION  1992    CHOLECYSTECTOMY  2015    CYST REMOVAL Right 2015    rt breast [facility]-benign breast cyst     ESOPHAGUS SURGERY  2014    HYSTERECTOMY  1999    INSERTION / REMOVAL / REPLACEMENT VENOUS ACCESS CATHETER Right 2017    RIGHT SIDE SINGLE LUMEN POWERPORT INSERTION performed by Darrell Valladares MD at [address] Left 2017    LEFT MODIFIED RADICAL MASTECTOMY performed by Darrell Valladares MD at [address] [place] [address] TUNNELED VENOUS PORT PLACEMENT Right 2017    Single Luman Smart Port Insertion- Right side.        Family History   Problem Relation Age of Onset    Heart Disease Mother     Hypertension Mother     Diabetes Mother     Heart Disease Father     No Known Problems Sister     No Known Problems Brother       Social History     Tobacco Use    Smoking status: Current Every Day Smoker     Packs/day: 1.00     Years: 18.00     Pack years: 18.00     Types: Cigarettes     Start date: 1999    Smokeless tobacco: Never Used    Tobacco comment: down to less than 0.25 aday   Substance Use Topics    Alcohol use: Yes     Comment: SOCAIL      Current Outpatient Medications   Medication Sig Dispense Refill    letrozole (FEMARA) 2.5 MG tablet Take 1 tablet by mouth daily 30 tablet 3    tiotropium (SPIRIVA HANDIHALER) 18 MCG inhalation capsule Inhale 1 capsule into the lungs daily 90 capsule 3    DULERA 200-5 MCG/ACT inhaler Inhale 1 puff into the lungs 2 times daily 1 Inhaler 5    albuterol sulfate  (90 Base) MCG/ACT inhaler Inhale 1 puff into the lungs every 6 hours as needed for Wheezing or Shortness of Breath 1 Inhaler 5    NONFORMULARY 2 tablets daily Tumeric      ondansetron (ZOFRAN-ODT) 4 MG disintegrating tablet Take cranial nerve deficit. She exhibits normal muscle tone. Skin: Skin is warm. No rash noted. No erythema. Psychiatric: She has a normal mood and affect. Her behavior is normal. Judgment and thought content normal.   Vitals reviewed. /70 (Site: Right Upper Arm, Position: Sitting, Cuff Size: Medium Adult)   Pulse 99   Temp 98.4 °F (36.9 °C) (Oral)   Resp 16   Ht 5' 7.99\" (1.727 m)   Wt 148 lb 12.8 oz (67.5 kg)   SpO2 98%   BMI 22.63 kg/m²      Imaging studies and labs:     Lab Results   Component Value Date    WBC 4.0 (L) 04/12/2019    HGB 12.6 04/12/2019    HCT 36.3 (L) 04/12/2019    MCV 94 04/12/2019     04/12/2019       Chemistry        Component Value Date/Time     04/12/2019 1135     03/03/2019 0527    K 3.5 04/12/2019 1135    K 4.0 03/04/2019 0623     03/03/2019 0527    CO2 19 (L) 03/03/2019 0527    BUN 8 03/03/2019 0527    CREATININE 0.7 04/12/2019 1135    CREATININE 0.6 03/03/2019 0527        Component Value Date/Time    CALCIUM 8.7 03/03/2019 0527    ALKPHOS 57 04/12/2019 1135    AST 14 04/12/2019 1135    ALT 12 04/12/2019 1135    BILITOT 0.2 (L) 04/12/2019 1135        DEXA study on the September 26, 2018 showed: Normal bone density. Mammogram on November 13, 2018 showed:  Benign findings, BI-RADS Category 2. CTA on 03/02/2019 showed:    1. There is no pulmonary embolus. 2. Left mastectomy. The mild skin thickening, subcutaneous stranding densities and asymmetric prominence of the pectoralis muscle most likely is postsurgical. There is no well-defined mass within this region. 3. There is bronchiectasis within the bilateral lower lobes. There are nodular interstitial and alveolar infiltrates within the bilateral lower lobes. A follow-up CT examination of the chest with intravenous contrast in one month is recommended to    confirm complete resolution in order to exclude a more significant mass lesion/metastatic disease.    4. There are enlarged mediastinal nodule and groundglass opacities throughout the bilateral lower lobes. There was also persistent mediastinal lymphadenopathy. The patient will have  3 months follow-up of her CT of the chest in October 2019. I will see the patient again in 3 months.   Few days before return to clinic she will have her annual screening mammogram.  She was instructed to continue Femara

## 2019-09-10 ENCOUNTER — HOSPITAL ENCOUNTER (EMERGENCY)
Age: 49
Discharge: HOME | End: 2019-09-10
Payer: COMMERCIAL

## 2019-09-10 VITALS
DIASTOLIC BLOOD PRESSURE: 71 MMHG | BODY MASS INDEX: 22.43 KG/M2 | HEART RATE: 86 BPM | TEMPERATURE: 98.3 F | WEIGHT: 148 LBS | SYSTOLIC BLOOD PRESSURE: 120 MMHG | OXYGEN SATURATION: 100 % | RESPIRATION RATE: 15 BRPM | HEIGHT: 68 IN

## 2019-09-10 DIAGNOSIS — A09 DIARRHEA OF INFECTIOUS ORIGIN: Primary | ICD-10-CM

## 2019-09-10 DIAGNOSIS — A04.0 INTESTINAL INFECTION DUE TO ENTEROPATHOGENIC E. COLI: ICD-10-CM

## 2019-09-10 LAB
ADENOVIRUS F 40 41 PCR: NOT DETECTED
ALBUMIN SERPL-MCNC: 4.2 G/DL (ref 3.5–5.1)
ALP BLD-CCNC: 53 U/L (ref 38–126)
ALT SERPL-CCNC: 12 U/L (ref 11–66)
ANION GAP SERPL CALCULATED.3IONS-SCNC: 13 MEQ/L (ref 8–16)
AST SERPL-CCNC: 14 U/L (ref 5–40)
ASTROVIRUS PCR: NOT DETECTED
BASOPHILS # BLD: 0.2 %
BASOPHILS ABSOLUTE: 0 THOU/MM3 (ref 0–0.1)
BILIRUB SERPL-MCNC: 0.4 MG/DL (ref 0.3–1.2)
BUN BLDV-MCNC: 9 MG/DL (ref 7–22)
CALCIUM SERPL-MCNC: 9.7 MG/DL (ref 8.5–10.5)
CAMPYLOBACTER PCR: NOT DETECTED
CHLORIDE BLD-SCNC: 104 MEQ/L (ref 98–111)
CLOSTRIDIUM DIFFICILE, PCR: NOT DETECTED
CO2: 27 MEQ/L (ref 23–33)
CREAT SERPL-MCNC: 0.7 MG/DL (ref 0.4–1.2)
CRYPTOSPORIDIUM PCR: NOT DETECTED
CYCLOSPORA CAYETANENSIS PCR: NOT DETECTED
E COLI 0157 PCR: ABNORMAL
E COLI ENTEROAGGREGATIVE PCR: NOT DETECTED
E COLI ENTEROPATHOGENIC PCR: DETECTED
E COLI ENTEROTOXIGENIC PCR: NOT DETECTED
E COLI SHIGA LIKE TOXIN PCR: NOT DETECTED
E COLI SHIGELLA/ENTEROINVASIVE PCR: NOT DETECTED
E HISTOLYTICA GI FILM ARRAY: NOT DETECTED
EOSINOPHIL # BLD: 2.5 %
EOSINOPHILS ABSOLUTE: 0.1 THOU/MM3 (ref 0–0.4)
ERYTHROCYTE [DISTWIDTH] IN BLOOD BY AUTOMATED COUNT: 12.9 % (ref 11.5–14.5)
ERYTHROCYTE [DISTWIDTH] IN BLOOD BY AUTOMATED COUNT: 47 FL (ref 35–45)
GFR SERPL CREATININE-BSD FRML MDRD: 88 ML/MIN/1.73M2
GIARDIA LAMBLIA PCR: NOT DETECTED
GLUCOSE BLD-MCNC: 90 MG/DL (ref 70–108)
HCT VFR BLD CALC: 37.5 % (ref 37–47)
HEMOGLOBIN: 12.4 GM/DL (ref 12–16)
IMMATURE GRANS (ABS): 0.01 THOU/MM3 (ref 0–0.07)
IMMATURE GRANULOCYTES: 0 %
LYMPHOCYTES # BLD: 20.8 %
LYMPHOCYTES ABSOLUTE: 0.9 THOU/MM3 (ref 1–4.8)
MCH RBC QN AUTO: 32.9 PG (ref 26–33)
MCHC RBC AUTO-ENTMCNC: 33.1 GM/DL (ref 32.2–35.5)
MCV RBC AUTO: 99.5 FL (ref 81–99)
MONOCYTES # BLD: 7.8 %
MONOCYTES ABSOLUTE: 0.4 THOU/MM3 (ref 0.4–1.3)
NOROVIRUS GI GII PCR: NOT DETECTED
NUCLEATED RED BLOOD CELLS: 0 /100 WBC
OSMOLALITY CALCULATION: 285.1 MOSMOL/KG (ref 275–300)
PLATELET # BLD: 159 THOU/MM3 (ref 130–400)
PLESIOMONAS SHIGELLOIDES PCR: NOT DETECTED
PMV BLD AUTO: 9.6 FL (ref 9.4–12.4)
POTASSIUM SERPL-SCNC: 3.3 MEQ/L (ref 3.5–5.2)
PROCALCITONIN: 0.07 NG/ML (ref 0.01–0.09)
RBC # BLD: 3.77 MILL/MM3 (ref 4.2–5.4)
ROTAVIRUS A PCR: NOT DETECTED
SALMONELLA PCR: NOT DETECTED
SAPOVIRUS PCR: NOT DETECTED
SEG NEUTROPHILS: 68.5 %
SEGMENTED NEUTROPHILS ABSOLUTE COUNT: 3.1 THOU/MM3 (ref 1.8–7.7)
SODIUM BLD-SCNC: 144 MEQ/L (ref 135–145)
TOTAL PROTEIN: 6.5 G/DL (ref 6.1–8)
VIBRIO CHOLERAE PCR: NOT DETECTED
VIBRIO PCR: NOT DETECTED
WBC # BLD: 4.5 THOU/MM3 (ref 4.8–10.8)
YERSINIA ENTEROCOLITICA PCR: NOT DETECTED

## 2019-09-10 PROCEDURE — 2580000003 HC RX 258: Performed by: PHYSICIAN ASSISTANT

## 2019-09-10 PROCEDURE — 36415 COLL VENOUS BLD VENIPUNCTURE: CPT

## 2019-09-10 PROCEDURE — 99283 EMERGENCY DEPT VISIT LOW MDM: CPT

## 2019-09-10 PROCEDURE — 6360000002 HC RX W HCPCS: Performed by: PHYSICIAN ASSISTANT

## 2019-09-10 PROCEDURE — 96374 THER/PROPH/DIAG INJ IV PUSH: CPT

## 2019-09-10 PROCEDURE — 6370000000 HC RX 637 (ALT 250 FOR IP): Performed by: PHYSICIAN ASSISTANT

## 2019-09-10 PROCEDURE — 87507 IADNA-DNA/RNA PROBE TQ 12-25: CPT

## 2019-09-10 PROCEDURE — 84145 PROCALCITONIN (PCT): CPT

## 2019-09-10 PROCEDURE — 80053 COMPREHEN METABOLIC PANEL: CPT

## 2019-09-10 PROCEDURE — 85025 COMPLETE CBC W/AUTO DIFF WBC: CPT

## 2019-09-10 RX ORDER — DICYCLOMINE HYDROCHLORIDE 10 MG/1
10 CAPSULE ORAL EVERY 6 HOURS PRN
Qty: 20 CAPSULE | Refills: 0 | Status: SHIPPED | OUTPATIENT
Start: 2019-09-10 | End: 2021-11-12 | Stop reason: ALTCHOICE

## 2019-09-10 RX ORDER — ONDANSETRON 4 MG/1
4 TABLET, ORALLY DISINTEGRATING ORAL EVERY 8 HOURS PRN
Qty: 10 TABLET | Refills: 0 | Status: SHIPPED | OUTPATIENT
Start: 2019-09-10 | End: 2021-11-12 | Stop reason: ALTCHOICE

## 2019-09-10 RX ORDER — DIPHENOXYLATE HYDROCHLORIDE AND ATROPINE SULFATE 2.5; .025 MG/1; MG/1
2 TABLET ORAL ONCE
Status: COMPLETED | OUTPATIENT
Start: 2019-09-10 | End: 2019-09-10

## 2019-09-10 RX ORDER — DICYCLOMINE HYDROCHLORIDE 10 MG/ML
20 INJECTION INTRAMUSCULAR ONCE
Status: COMPLETED | OUTPATIENT
Start: 2019-09-10 | End: 2019-09-10

## 2019-09-10 RX ORDER — 0.9 % SODIUM CHLORIDE 0.9 %
1000 INTRAVENOUS SOLUTION INTRAVENOUS ONCE
Status: COMPLETED | OUTPATIENT
Start: 2019-09-10 | End: 2019-09-10

## 2019-09-10 RX ADMIN — SODIUM CHLORIDE 1000 ML: 9 INJECTION, SOLUTION INTRAVENOUS at 11:41

## 2019-09-10 RX ADMIN — DICYCLOMINE HYDROCHLORIDE 20 MG: 20 INJECTION, SOLUTION INTRAMUSCULAR at 11:40

## 2019-09-10 RX ADMIN — DIPHENOXYLATE HYDROCHLORIDE AND ATROPINE SULFATE 2 TABLET: 2.5; .025 TABLET ORAL at 11:40

## 2019-09-10 ASSESSMENT — ENCOUNTER SYMPTOMS
VOMITING: 0
WHEEZING: 0
COUGH: 0
ABDOMINAL PAIN: 1
SHORTNESS OF BREATH: 0
EYE DISCHARGE: 0
EYE PAIN: 0
DIARRHEA: 1
SORE THROAT: 0
BACK PAIN: 0
RHINORRHEA: 0
NAUSEA: 1

## 2019-09-10 ASSESSMENT — PAIN SCALES - GENERAL: PAINLEVEL_OUTOF10: 5

## 2019-09-10 ASSESSMENT — PAIN DESCRIPTION - ORIENTATION: ORIENTATION: LEFT;UPPER

## 2019-09-10 ASSESSMENT — PAIN DESCRIPTION - PAIN TYPE: TYPE: ACUTE PAIN

## 2019-09-10 ASSESSMENT — PAIN DESCRIPTION - LOCATION: LOCATION: ABDOMEN

## 2019-10-05 ENCOUNTER — HOSPITAL ENCOUNTER (OUTPATIENT)
Dept: CT IMAGING | Age: 49
Discharge: HOME | End: 2019-10-05
Payer: COMMERCIAL

## 2019-10-05 DIAGNOSIS — Z79.811 PROPHYLACTIC USE OF LETROZOLE (FEMARA): ICD-10-CM

## 2019-10-05 DIAGNOSIS — R05.9 COUGH: ICD-10-CM

## 2019-10-05 DIAGNOSIS — Z90.12 STATUS POST MASTECTOMY, LEFT: ICD-10-CM

## 2019-10-05 PROCEDURE — 6360000004 HC RX CONTRAST MEDICATION: Performed by: INTERNAL MEDICINE

## 2019-10-05 PROCEDURE — 71260 CT THORAX DX C+: CPT

## 2019-10-05 RX ADMIN — IOPAMIDOL 80 ML: 755 INJECTION, SOLUTION INTRAVENOUS at 10:35

## 2019-12-04 PROBLEM — R35.89 POLYURIA: Status: ACTIVE | Noted: 2019-12-04

## 2019-12-04 PROBLEM — R06.00 DYSPNEA: Status: ACTIVE | Noted: 2019-12-04

## 2019-12-04 PROBLEM — R32 URINARY INCONTINENCE: Status: ACTIVE | Noted: 2019-12-04

## 2019-12-04 PROBLEM — N63.0 BREAST LUMP: Status: ACTIVE | Noted: 2017-09-28

## 2019-12-04 PROBLEM — M54.50 LOW BACK PAIN: Status: ACTIVE | Noted: 2017-09-28

## 2019-12-04 PROBLEM — R10.13 EPIGASTRIC PAIN: Status: ACTIVE | Noted: 2019-12-04

## 2019-12-04 PROBLEM — R53.81 MALAISE AND FATIGUE: Status: ACTIVE | Noted: 2019-12-04

## 2019-12-04 PROBLEM — K29.70 GASTRITIS DUE TO HELICOBACTER SPECIES: Status: ACTIVE | Noted: 2019-12-04

## 2019-12-04 PROBLEM — R91.1 SOLITARY PULMONARY NODULE: Status: ACTIVE | Noted: 2019-12-04

## 2019-12-04 PROBLEM — R53.83 MALAISE AND FATIGUE: Status: ACTIVE | Noted: 2019-12-04

## 2019-12-04 PROBLEM — B96.81 GASTRITIS DUE TO HELICOBACTER SPECIES: Status: ACTIVE | Noted: 2019-12-04

## 2019-12-04 PROBLEM — A09 INTESTINAL INFECTIOUS DISEASE: Status: ACTIVE | Noted: 2019-12-04

## 2019-12-04 PROBLEM — R31.29 MICROSCOPIC HEMATURIA: Status: ACTIVE | Noted: 2019-12-04

## 2019-12-04 PROBLEM — R51.9 HEADACHE: Status: ACTIVE | Noted: 2019-12-04

## 2020-01-28 ENCOUNTER — OFFICE VISIT (OUTPATIENT)
Dept: PULMONOLOGY | Age: 50
End: 2020-01-28
Payer: COMMERCIAL

## 2020-01-28 VITALS
WEIGHT: 149 LBS | BODY MASS INDEX: 22.58 KG/M2 | OXYGEN SATURATION: 98 % | SYSTOLIC BLOOD PRESSURE: 108 MMHG | HEART RATE: 78 BPM | HEIGHT: 68 IN | DIASTOLIC BLOOD PRESSURE: 66 MMHG

## 2020-01-28 PROCEDURE — 4004F PT TOBACCO SCREEN RCVD TLK: CPT | Performed by: NURSE PRACTITIONER

## 2020-01-28 PROCEDURE — 3017F COLORECTAL CA SCREEN DOC REV: CPT | Performed by: NURSE PRACTITIONER

## 2020-01-28 PROCEDURE — G8427 DOCREV CUR MEDS BY ELIG CLIN: HCPCS | Performed by: NURSE PRACTITIONER

## 2020-01-28 PROCEDURE — G8420 CALC BMI NORM PARAMETERS: HCPCS | Performed by: NURSE PRACTITIONER

## 2020-01-28 PROCEDURE — G8484 FLU IMMUNIZE NO ADMIN: HCPCS | Performed by: NURSE PRACTITIONER

## 2020-01-28 PROCEDURE — 99406 BEHAV CHNG SMOKING 3-10 MIN: CPT | Performed by: NURSE PRACTITIONER

## 2020-01-28 PROCEDURE — 99213 OFFICE O/P EST LOW 20 MIN: CPT | Performed by: NURSE PRACTITIONER

## 2020-01-28 PROCEDURE — G8926 SPIRO NO PERF OR DOC: HCPCS | Performed by: NURSE PRACTITIONER

## 2020-01-28 PROCEDURE — 3023F SPIROM DOC REV: CPT | Performed by: NURSE PRACTITIONER

## 2020-01-28 ASSESSMENT — ENCOUNTER SYMPTOMS
GASTROINTESTINAL NEGATIVE: 1
WHEEZING: 0
SHORTNESS OF BREATH: 1
EYES NEGATIVE: 1
ALLERGIC/IMMUNOLOGIC NEGATIVE: 1
COUGH: 0

## 2020-01-28 NOTE — PROGRESS NOTES
[facility]    Patient: Kei Sung, 44 y.o.   : 1970  2020    Pt of Dr. Amita Harley   Patient presents with    Follow-up     Mediastinal Lymphadenopathy Ct Chest 10-5-19 Dr Cheryl Narayanan is here for follow up for mediastinal lymphadenopathy. H/O invasive ductal carcinoma of the left breast. 15 patient underwent left breast mastectomy with axillary node dissection. Patient follows with Dr. Ravinder Fraser outpatient. Still smoking 0.5 PPD- no real desire to quit as of now   No CP or increased SOB  No weight loss  Intermittent cough mostly NP  No fever or chills   Currently on Femara for oncology treatment   No complaints today- denies any breathing issues  Ct chest reviewed- decreasing nodular sizes (see report below)     Progress History:   Since last visit any new medical issues? No  New ER or hospital visits? No  Any new or changes in medicines? No  Using inhalers? Yes Dulera and PIEDAD  Are they helpful?  Yes rarely uses PIEDAD  Flu vaccine not received yet  Pneumonia vaccine UTD  Past Medical hx   PMH:  Past Medical History:   Diagnosis Date    Acid reflux     Anxiety     Asthma     [facility]     COPD with asthma [facility]     Depression     Depression     Dizziness - light-headed     HX OF:    Emphysema     History of chest pain     History of tinnitus     Hx of blood clots     Joint pain     Numbness and tingling     Osteoarthritis     PONV (postoperative nausea and vomiting)     SOB (shortness of breath) on exertion      SURGICAL HISTORY:  Past Surgical History:   Procedure Laterality Date    BLADDER SURGERY      BREAST BIOPSY  10/06/2017    Ara Carrizales    CARDIAC CATHETERIZATION  2011    NO EVIDENCE OF PULMONARY HTN,NO EVIDENCE OF DD,NORMAL LVF    CARDIOVASCULAR STRESS TEST  2011    EF 60% MILD INFERIOR WALL REVERSIBLE STRESS-INDUCED ISCHEMIA      SECTION reactive to light. Neck:      Musculoskeletal: Normal range of motion and neck supple. Vascular: No JVD. Cardiovascular:      Rate and Rhythm: Normal rate and regular rhythm. Heart sounds: Normal heart sounds. No murmur. No friction rub. No gallop. Pulmonary:      Effort: Pulmonary effort is normal. No respiratory distress. Breath sounds: Examination of the right-lower field reveals decreased breath sounds. Examination of the left-lower field reveals decreased breath sounds. Decreased breath sounds present. No wheezing or rales. Chest:      Breasts:         Left: Absent. Comments: Left mastectomy  Abdominal:      General: Bowel sounds are normal.      Palpations: Abdomen is soft. Musculoskeletal: Normal range of motion. Skin:     General: Skin is warm and dry. Capillary Refill: Capillary refill takes less than 2 seconds. Neurological:      Mental Status: She is alert and oriented to person, place, and time. Psychiatric:         Behavior: Behavior normal.         Thought Content: Thought content normal.         Judgment: Judgment normal.          results   Lung Nodule Screening     [] Qualifies    [x] Does not qualify   [] Declined    [] Completed  -patient 46years old   The USPSTF recommends annual screening for lung cancer with low-dose computed tomography (LDCT) in adults aged 54 to [de-identified] years who have a 30 pack-year smoking history and currently smoke or have quit within the past 15 years. Screening should be discontinued once a person has not smoked for 15 years or develops a health problem that substantially limits life expectancy or the ability or willingness to have curative lung surgery. 10/5/2019   CT CHEST W CONTRAST   FINDINGS: Nodularity at the bilateral lower lungs has further decreased in the interval with minimal residual nodularity. There are minimal residual groundglass opacities.  There is mild bronchiectasis at the bilateral lung bases, similar to prior

## 2020-01-29 ENCOUNTER — HOSPITAL ENCOUNTER (OUTPATIENT)
Dept: MAMMOGRAPHY | Age: 50
Discharge: HOME | End: 2020-01-29
Payer: COMMERCIAL

## 2020-01-29 PROCEDURE — 77067 SCR MAMMO BI INCL CAD: CPT

## 2020-03-16 ENCOUNTER — HOSPITAL ENCOUNTER (OUTPATIENT)
Dept: INFUSION THERAPY | Age: 50
Discharge: HOME | End: 2020-03-16
Payer: COMMERCIAL

## 2020-03-16 ENCOUNTER — OFFICE VISIT (OUTPATIENT)
Dept: ONCOLOGY | Age: 50
End: 2020-03-16
Payer: COMMERCIAL

## 2020-03-16 VITALS
HEIGHT: 68 IN | DIASTOLIC BLOOD PRESSURE: 70 MMHG | WEIGHT: 153.4 LBS | SYSTOLIC BLOOD PRESSURE: 114 MMHG | BODY MASS INDEX: 23.25 KG/M2 | OXYGEN SATURATION: 100 % | TEMPERATURE: 98 F | HEART RATE: 73 BPM | RESPIRATION RATE: 16 BRPM

## 2020-03-16 PROCEDURE — 99214 OFFICE O/P EST MOD 30 MIN: CPT | Performed by: INTERNAL MEDICINE

## 2020-03-16 PROCEDURE — 4004F PT TOBACCO SCREEN RCVD TLK: CPT | Performed by: INTERNAL MEDICINE

## 2020-03-16 PROCEDURE — 3017F COLORECTAL CA SCREEN DOC REV: CPT | Performed by: INTERNAL MEDICINE

## 2020-03-16 PROCEDURE — G8484 FLU IMMUNIZE NO ADMIN: HCPCS | Performed by: INTERNAL MEDICINE

## 2020-03-16 PROCEDURE — G8420 CALC BMI NORM PARAMETERS: HCPCS | Performed by: INTERNAL MEDICINE

## 2020-03-16 PROCEDURE — 99211 OFF/OP EST MAY X REQ PHY/QHP: CPT

## 2020-03-16 PROCEDURE — G8428 CUR MEDS NOT DOCUMENT: HCPCS | Performed by: INTERNAL MEDICINE

## 2020-03-16 RX ORDER — LETROZOLE 2.5 MG/1
2.5 TABLET, FILM COATED ORAL DAILY
Qty: 30 TABLET | Refills: 3 | Status: SHIPPED | OUTPATIENT
Start: 2020-03-16 | End: 2020-03-26 | Stop reason: SDUPTHER

## 2020-03-16 ASSESSMENT — ENCOUNTER SYMPTOMS
EYE ITCHING: 0
VOICE CHANGE: 0
BLOOD IN STOOL: 0
CONSTIPATION: 0
COLOR CHANGE: 0
NAUSEA: 0
WHEEZING: 0
ABDOMINAL PAIN: 0
CHEST TIGHTNESS: 0
VOMITING: 0
SORE THROAT: 0
ABDOMINAL DISTENTION: 0
DIARRHEA: 0
EYE REDNESS: 0
TROUBLE SWALLOWING: 0

## 2020-03-16 NOTE — PROGRESS NOTES
[facility] PROFESSIONAL SERVS  [facility]  [address] [facility] [address]  [address] [address],[address]  [address] [address]  Dept: [phone]  Dept Fax: [phone]: [phone]     Visit Date: 3/16/2020     Latia Adame is a 44 y.o. female who presents today for:   Chief Complaint   Patient presents with    Follow-up     breast cancer       HPI:   Mely Miranda is a 73-year-old female with h/o invasive ductal carcinoma of the left breast. The patient noted a mass in the upper aspect of her left breast, at first she thought it was a cyst which she had before. When it persisted, she elected to have further evaluation. She did undergo a diagnostic mammogram at [facility] in [place], [place]. It showed a 3.8 x 2.4 x 2.7 cm malignant-appearing spiculated mass within the left breast at 11:00 position. Evaluation of the left axilla with US showed suspicious appearing lymph node with increased cortical thickening and replacement of fatty hilum. On October 9, 2017, she underwent a left breast mass and left lymph node ultrasound-guided core biopsy. The final path report showed an invasive ductal carcinoma, ER positive at 95%, IL positive at 95%, and a HER-2/breezy by IHC was 2+. The FISH test was still equivocal.  HER-2/CEP17 ratio was 1.7, however the average HER-2 copy number was 4. She met with the surgical oncologists and medical oncologists at the [facility], however she decided to receive care closer to her home. The patient had metastatic workup: CT of the chest, abdomen and bone scan were negative for evidence of metastatic disease. On November 7, 2017 she underwent right breast mastectomy with axillary lymph node dissection. Final pathology report showed: FINAL DIAGNOSIS:  A.  Left breast with portion of axillary contents, mastectomy:      Invasive ductal carcinoma with lobular features involving nipple      and all 4 quadrants of breast.  Maximum tumor dimension estimated      6.8 solution Inhale 2.5 mg into the lungs every 4 hours as needed       butalbital-apap-caffeine -40 MG CAPS Take by mouth      ALPRAZolam (XANAX) 0.5 MG tablet Take 0.5 mg by mouth nightly as needed       cyclobenzaprine (FLEXERIL) 5 MG tablet Take 5 mg by mouth 3 times daily as needed       venlafaxine (EFFEXOR XR) 75 MG extended release capsule Take 75 mg by mouth daily       meloxicam (MOBIC) 15 MG tablet Take 15 mg by mouth daily      dicyclomine (BENTYL) 10 MG capsule Take 1 capsule by mouth every 6 hours as needed (cramps) (Patient not taking: Reported on 3/16/2020) 20 capsule 0    tiotropium (SPIRIVA HANDIHALER) 18 MCG inhalation capsule Inhale 1 capsule into the lungs daily (Patient not taking: Reported on 3/16/2020) 90 capsule 3     No current facility-administered medications for this visit. No Known Allergies   Health Maintenance   Topic Date Due    HIV screen  03/06/1983    DTaP/Tdap/Td vaccine (1 - Tdap) 03/06/1987    Cervical cancer screen  03/06/1989    Shingles Vaccine (1 of 2) 03/06/2018    Colon cancer screen colonoscopy  03/06/2018    Pneumococcal 0-64 years Vaccine (2 of 3 - PPSV23) 04/28/2019    Flu vaccine (1) 09/01/2019    Breast cancer screen  01/29/2021    Lipid screen  11/08/2023    Hepatitis A vaccine  Aged Out    Hepatitis B vaccine  Aged Out    Hib vaccine  Aged Out    Meningococcal (ACWY) vaccine  Aged Out        Subjective:   Review of Systems   Constitutional: Negative for activity change, appetite change, chills, fatigue, fever and unexpected weight change. HENT: Negative for dental problem, facial swelling, hearing loss, mouth sores, nosebleeds, postnasal drip, sore throat, trouble swallowing and voice change. Eyes: Negative for redness, itching and visual disturbance. Respiratory: Negative for cough, chest tightness, shortness of breath and wheezing. Cardiovascular: Negative for chest pain, palpitations and leg swelling.    Gastrointestinal: as evidence for old granulomatous disease. Mammogram on January 29, 2020 showed:  IMPRESSION: Mammogram BI-RADS: 2: Benign    Assessment/Plan:   1. Stage pT3, pN1, M0, ER positive left breast cancer. Since she had  lymph nodes involved and her tumor was quite large, my recommendation was to proceed with adjuvant chemotherapy. Since the patient has strong family history of breast cancer and she is diagnosed with breast cancer before age of 46 she had genetic testing that showed variant of unknown significance. On 12/13/2017 she started adjuvant AC. On March 7, 2018 she started weekly Taxol and completed on May 30, 2018. She completed postmastectomy radiation treatment on August 23, 2018. The patient had a hysterectomy without oophorectomy. We did check her estradiol and FSH level on 3   occasions. [facility] level was in the postmenopausal range, estradiol is below 5.0. On September 15, 2018 she started adjuvant hormonal therapy with Femara. The patient is enrolled in the clinical study combining aromatase inhibitor with everolimus versus placebo. Due to  hospitalization for pneumonia possibly pneumonitis, the patient was taken off the study, since there were no any further dose reduction allowed. She is currently on Femara which she tolerates well. Stable hot flashes and minimal occasional arthralgia. Her annual mammogram in January 2020 showed benign findings. There is no evidence of disease recurrence on today's physical examination, the patient denies having any new complaints. The patient was instructed to continue Femara  In the June 2019 she had CT of the chest that showed markedly improved nodule and groundglass opacities throughout the bilateral lower lobes. There was also persistent mediastinal lymphadenopathy.   The patient had  3 months follow-up of her CT of the chest in October 2019 that showed further decrease in nodularity and groundglass opacities in both lung bases.

## 2020-03-17 ASSESSMENT — ENCOUNTER SYMPTOMS
SHORTNESS OF BREATH: 0
BACK PAIN: 0
COUGH: 0
FACIAL SWELLING: 0

## 2020-03-25 PROBLEM — F32.A DEPRESSION: Status: RESOLVED | Noted: 2020-03-25 | Resolved: 2020-03-24

## 2020-03-26 RX ORDER — LETROZOLE 2.5 MG/1
2.5 TABLET, FILM COATED ORAL DAILY
Qty: 30 TABLET | Refills: 3 | Status: SHIPPED | OUTPATIENT
Start: 2020-03-26 | End: 2020-09-17 | Stop reason: SDUPTHER

## 2020-05-22 RX ORDER — MOMETASONE FUROATE AND FORMOTEROL FUMARATE DIHYDRATE 200; 5 UG/1; UG/1
AEROSOL RESPIRATORY (INHALATION)
Qty: 13 G | Refills: 0 | Status: SHIPPED | OUTPATIENT
Start: 2020-05-22 | End: 2020-07-20

## 2020-07-20 RX ORDER — MOMETASONE FUROATE AND FORMOTEROL FUMARATE DIHYDRATE 200; 5 UG/1; UG/1
AEROSOL RESPIRATORY (INHALATION)
Qty: 13 G | Refills: 0 | Status: SHIPPED | OUTPATIENT
Start: 2020-07-20 | End: 2020-10-01

## 2020-09-16 ENCOUNTER — APPOINTMENT (OUTPATIENT)
Dept: CT IMAGING | Age: 50
End: 2020-09-16
Payer: COMMERCIAL

## 2020-09-16 ENCOUNTER — APPOINTMENT (OUTPATIENT)
Dept: GENERAL RADIOLOGY | Age: 50
End: 2020-09-16
Payer: COMMERCIAL

## 2020-09-16 ENCOUNTER — HOSPITAL ENCOUNTER (EMERGENCY)
Age: 50
Discharge: HOME | End: 2020-09-16
Payer: COMMERCIAL

## 2020-09-16 VITALS
OXYGEN SATURATION: 93 % | SYSTOLIC BLOOD PRESSURE: 122 MMHG | TEMPERATURE: 98 F | HEART RATE: 78 BPM | BODY MASS INDEX: 22.81 KG/M2 | DIASTOLIC BLOOD PRESSURE: 68 MMHG | WEIGHT: 150 LBS | RESPIRATION RATE: 14 BRPM

## 2020-09-16 LAB
ALBUMIN SERPL-MCNC: 4 G/DL (ref 3.5–5.1)
ALP BLD-CCNC: 77 U/L (ref 38–126)
ALT SERPL-CCNC: 18 U/L (ref 11–66)
ANION GAP SERPL CALCULATED.3IONS-SCNC: 10 MEQ/L (ref 8–16)
AST SERPL-CCNC: 15 U/L (ref 5–40)
BASOPHILS # BLD: 0.4 %
BASOPHILS ABSOLUTE: 0 THOU/MM3 (ref 0–0.1)
BILIRUB SERPL-MCNC: 0.3 MG/DL (ref 0.3–1.2)
BUN BLDV-MCNC: 14 MG/DL (ref 7–22)
CALCIUM SERPL-MCNC: 9.6 MG/DL (ref 8.5–10.5)
CHLORIDE BLD-SCNC: 107 MEQ/L (ref 98–111)
CO2: 26 MEQ/L (ref 23–33)
CREAT SERPL-MCNC: 0.7 MG/DL (ref 0.4–1.2)
D-DIMER QUANTITATIVE: 628 NG/ML FEU (ref 0–500)
EKG ATRIAL RATE: 84 BPM
EKG P AXIS: 80 DEGREES
EKG P-R INTERVAL: 134 MS
EKG Q-T INTERVAL: 370 MS
EKG QRS DURATION: 70 MS
EKG QTC CALCULATION (BAZETT): 437 MS
EKG R AXIS: 71 DEGREES
EKG T AXIS: 78 DEGREES
EKG VENTRICULAR RATE: 84 BPM
EOSINOPHIL # BLD: 2.5 %
EOSINOPHILS ABSOLUTE: 0.2 THOU/MM3 (ref 0–0.4)
ERYTHROCYTE [DISTWIDTH] IN BLOOD BY AUTOMATED COUNT: 13.5 % (ref 11.5–14.5)
ERYTHROCYTE [DISTWIDTH] IN BLOOD BY AUTOMATED COUNT: 51.1 FL (ref 35–45)
GFR SERPL CREATININE-BSD FRML MDRD: 88 ML/MIN/1.73M2
GLUCOSE BLD-MCNC: 105 MG/DL (ref 70–108)
HCT VFR BLD CALC: 43.5 % (ref 37–47)
HEMOGLOBIN: 14.2 GM/DL (ref 12–16)
IMMATURE GRANS (ABS): 0.03 THOU/MM3 (ref 0–0.07)
IMMATURE GRANULOCYTES: 0.4 %
LYMPHOCYTES # BLD: 13.8 %
LYMPHOCYTES ABSOLUTE: 1.1 THOU/MM3 (ref 1–4.8)
MCH RBC QN AUTO: 33.6 PG (ref 26–33)
MCHC RBC AUTO-ENTMCNC: 32.6 GM/DL (ref 32.2–35.5)
MCV RBC AUTO: 103.1 FL (ref 81–99)
MONOCYTES # BLD: 5.8 %
MONOCYTES ABSOLUTE: 0.4 THOU/MM3 (ref 0.4–1.3)
NUCLEATED RED BLOOD CELLS: 0 /100 WBC
OSMOLALITY CALCULATION: 285.8 MOSMOL/KG (ref 275–300)
PLATELET # BLD: 271 THOU/MM3 (ref 130–400)
PMV BLD AUTO: 9.7 FL (ref 9.4–12.4)
POTASSIUM REFLEX MAGNESIUM: 4.1 MEQ/L (ref 3.5–5.2)
PRO-BNP: 133.3 PG/ML (ref 0–900)
PROCALCITONIN: 0.04 NG/ML (ref 0.01–0.09)
RBC # BLD: 4.22 MILL/MM3 (ref 4.2–5.4)
SEG NEUTROPHILS: 77.1 %
SEGMENTED NEUTROPHILS ABSOLUTE COUNT: 5.9 THOU/MM3 (ref 1.8–7.7)
SODIUM BLD-SCNC: 143 MEQ/L (ref 135–145)
TOTAL PROTEIN: 7.1 G/DL (ref 6.1–8)
TROPONIN T: < 0.01 NG/ML
WBC # BLD: 7.7 THOU/MM3 (ref 4.8–10.8)

## 2020-09-16 PROCEDURE — U0002 COVID-19 LAB TEST NON-CDC: HCPCS

## 2020-09-16 PROCEDURE — 71045 X-RAY EXAM CHEST 1 VIEW: CPT

## 2020-09-16 PROCEDURE — 6370000000 HC RX 637 (ALT 250 FOR IP): Performed by: EMERGENCY MEDICINE

## 2020-09-16 PROCEDURE — 36415 COLL VENOUS BLD VENIPUNCTURE: CPT

## 2020-09-16 PROCEDURE — 6360000004 HC RX CONTRAST MEDICATION: Performed by: EMERGENCY MEDICINE

## 2020-09-16 PROCEDURE — 85025 COMPLETE CBC W/AUTO DIFF WBC: CPT

## 2020-09-16 PROCEDURE — 94640 AIRWAY INHALATION TREATMENT: CPT

## 2020-09-16 PROCEDURE — U0003 INFECTIOUS AGENT DETECTION BY NUCLEIC ACID (DNA OR RNA); SEVERE ACUTE RESPIRATORY SYNDROME CORONAVIRUS 2 (SARS-COV-2) (CORONAVIRUS DISEASE [COVID-19]), AMPLIFIED PROBE TECHNIQUE, MAKING USE OF HIGH THROUGHPUT TECHNOLOGIES AS DESCRIBED BY CMS-2020-01-R: HCPCS

## 2020-09-16 PROCEDURE — 84484 ASSAY OF TROPONIN QUANT: CPT

## 2020-09-16 PROCEDURE — 71275 CT ANGIOGRAPHY CHEST: CPT

## 2020-09-16 PROCEDURE — 2580000003 HC RX 258: Performed by: EMERGENCY MEDICINE

## 2020-09-16 PROCEDURE — 85379 FIBRIN DEGRADATION QUANT: CPT

## 2020-09-16 PROCEDURE — 83880 ASSAY OF NATRIURETIC PEPTIDE: CPT

## 2020-09-16 PROCEDURE — 93005 ELECTROCARDIOGRAM TRACING: CPT | Performed by: EMERGENCY MEDICINE

## 2020-09-16 PROCEDURE — 96375 TX/PRO/DX INJ NEW DRUG ADDON: CPT

## 2020-09-16 PROCEDURE — 94761 N-INVAS EAR/PLS OXIMETRY MLT: CPT

## 2020-09-16 PROCEDURE — 99285 EMERGENCY DEPT VISIT HI MDM: CPT

## 2020-09-16 PROCEDURE — 96365 THER/PROPH/DIAG IV INF INIT: CPT

## 2020-09-16 PROCEDURE — 93010 ELECTROCARDIOGRAM REPORT: CPT | Performed by: NUCLEAR MEDICINE

## 2020-09-16 PROCEDURE — 84145 PROCALCITONIN (PCT): CPT

## 2020-09-16 PROCEDURE — 6360000002 HC RX W HCPCS: Performed by: EMERGENCY MEDICINE

## 2020-09-16 PROCEDURE — 80053 COMPREHEN METABOLIC PANEL: CPT

## 2020-09-16 RX ORDER — AZITHROMYCIN 250 MG/1
500 TABLET, FILM COATED ORAL ONCE
Status: COMPLETED | OUTPATIENT
Start: 2020-09-16 | End: 2020-09-16

## 2020-09-16 RX ORDER — AZITHROMYCIN 250 MG/1
250 TABLET, FILM COATED ORAL DAILY
Qty: 4 TABLET | Refills: 0 | Status: SHIPPED | OUTPATIENT
Start: 2020-09-16 | End: 2020-09-20

## 2020-09-16 RX ORDER — METHYLPREDNISOLONE SODIUM SUCCINATE 125 MG/2ML
80 INJECTION, POWDER, LYOPHILIZED, FOR SOLUTION INTRAMUSCULAR; INTRAVENOUS ONCE
Status: COMPLETED | OUTPATIENT
Start: 2020-09-16 | End: 2020-09-16

## 2020-09-16 RX ORDER — IPRATROPIUM BROMIDE AND ALBUTEROL SULFATE 2.5; .5 MG/3ML; MG/3ML
1 SOLUTION RESPIRATORY (INHALATION) ONCE
Status: COMPLETED | OUTPATIENT
Start: 2020-09-16 | End: 2020-09-16

## 2020-09-16 RX ADMIN — APIXABAN 10 MG: 5 TABLET, FILM COATED ORAL at 14:45

## 2020-09-16 RX ADMIN — IPRATROPIUM BROMIDE AND ALBUTEROL SULFATE 1 AMPULE: .5; 3 SOLUTION RESPIRATORY (INHALATION) at 11:21

## 2020-09-16 RX ADMIN — CEFTRIAXONE SODIUM 1 G: 1 INJECTION, POWDER, FOR SOLUTION INTRAMUSCULAR; INTRAVENOUS at 14:10

## 2020-09-16 RX ADMIN — AZITHROMYCIN 500 MG: 250 TABLET, FILM COATED ORAL at 14:10

## 2020-09-16 RX ADMIN — IOPAMIDOL 80 ML: 755 INJECTION, SOLUTION INTRAVENOUS at 12:42

## 2020-09-16 RX ADMIN — METHYLPREDNISOLONE SODIUM SUCCINATE 80 MG: 125 INJECTION, POWDER, FOR SOLUTION INTRAMUSCULAR; INTRAVENOUS at 11:17

## 2020-09-16 ASSESSMENT — PAIN DESCRIPTION - DESCRIPTORS: DESCRIPTORS: ACHING

## 2020-09-16 ASSESSMENT — PAIN DESCRIPTION - LOCATION: LOCATION: GENERALIZED

## 2020-09-16 ASSESSMENT — PAIN SCALES - GENERAL: PAINLEVEL_OUTOF10: 4

## 2020-09-16 ASSESSMENT — ENCOUNTER SYMPTOMS
SINUS PAIN: 0
SORE THROAT: 0
RHINORRHEA: 0
VOMITING: 0
ABDOMINAL DISTENTION: 0
ABDOMINAL PAIN: 0
NAUSEA: 0
BACK PAIN: 0
COLOR CHANGE: 0
CHEST TIGHTNESS: 1
WHEEZING: 1
SHORTNESS OF BREATH: 1
SINUS PRESSURE: 0
VOICE CHANGE: 1
COUGH: 1

## 2020-09-16 ASSESSMENT — PAIN DESCRIPTION - PROGRESSION: CLINICAL_PROGRESSION: NOT CHANGED

## 2020-09-16 ASSESSMENT — PAIN DESCRIPTION - FREQUENCY: FREQUENCY: CONTINUOUS

## 2020-09-16 ASSESSMENT — PAIN DESCRIPTION - ONSET: ONSET: ON-GOING

## 2020-09-16 ASSESSMENT — PAIN DESCRIPTION - PAIN TYPE: TYPE: CHRONIC PAIN

## 2020-09-16 NOTE — ED PROVIDER NOTES
light-headedness. Psychiatric/Behavioral: Negative for behavioral problems. PAST MEDICAL HISTORY    has a past medical history of Acid reflux, Anxiety, Asthma, Breast cancer ([facility] COPD with asthma ([facility] Depression, Depression, Dizziness - light-headed, Emphysema, History of chest pain, History of tinnitus, Hx of blood clots, Joint pain, Numbness and tingling, Osteoarthritis, PONV (postoperative nausea and vomiting), and SOB (shortness of breath) on exertion. SURGICAL HISTORY      has a past surgical history that includes  section (); Hysterectomy (); Bladder surgery (); cardiovascular stress test (2011); Cardiac catheterization (2011); Cholecystectomy (); Tonsillectomy (); Esophagus surgery (); Breast biopsy (10/06/2017); cyst removal (Right, ); Mastectomy (Left, 2017); Tunneled venous port placement (Right, 2017); and INSERTION / REMOVAL / REPLACEMENT VENOUS ACCESS CATHETER (Right, 2017).     CURRENT MEDICATIONS       Discharge Medication List as of 2020  3:02 PM      CONTINUE these medications which have NOT CHANGED    Details   DULERA 200-5 MCG/ACT inhaler INHALE 1 PUFF BY MOUTH TWICE DAILY, Disp-13 g,R-0,DAWNormal      letrozole (FEMARA) 2.5 MG tablet Take 1 tablet by mouth daily, Disp-30 tablet,R-3Normal      dicyclomine (BENTYL) 10 MG capsule Take 1 capsule by mouth every 6 hours as needed (cramps), Disp-20 capsule, R-0Print      ondansetron (ZOFRAN ODT) 4 MG disintegrating tablet Take 1 tablet by mouth every 8 hours as needed for Nausea, Disp-10 tablet, R-0Print      tiotropium (SPIRIVA HANDIHALER) 18 MCG inhalation capsule Inhale 1 capsule into the lungs daily, Disp-90 capsule, R-3Normal      albuterol sulfate  (90 Base) MCG/ACT inhaler Inhale 1 puff into the lungs every 6 hours as needed for Wheezing or Shortness of Breath, Disp-1 Inhaler, R-5Normal      NONFORMULARY 2 tablets daily TumericHistorical Med      b complex field reveals wheezing. Examination of the left-upper field reveals wheezing. Examination of the right-middle field reveals wheezing. Examination of the left-middle field reveals wheezing. Examination of the right-lower field reveals wheezing. Examination of the left-lower field reveals wheezing. Wheezing present. Abdominal:      General: Abdomen is flat. There is no distension. Palpations: Abdomen is soft. Tenderness: There is no abdominal tenderness. Skin:     General: Skin is warm and dry. Capillary Refill: Capillary refill takes less than 2 seconds. Neurological:      General: No focal deficit present. Mental Status: She is alert. Psychiatric:         Mood and Affect: Mood normal.         Behavior: Behavior normal.          DIFFERENTIAL DIAGNOSIS:   COPD, pneumonia, bronchitis, less likely pulmonary embolism, COVID-19, CHF    DIAGNOSTIC RESULTS     EKG: All EKG's are interpreted by the Emergency Department Physician who either signs or Co-signs this chart in the absence of a cardiologist.    Normal sinus rhythm ventricular rate 84 bpm.  AK interval 134, QRS duration 70, corrected  ms. No ST elevation or depression concerning for ischemia or infarction noted. RADIOLOGY: non-plainfilm images(s) such as CT, Ultrasound and MRI are read by the radiologist.    CTA [address]   Final Result       1. Small pulmonary embolus in right posterior descending segmental artery without evidence of heart strain. 2. Patchy opacities and groundglass nodules bilaterally as evidence for infectious/inflammatory process. **This report has been created using voice recognition software. It may contain minor errors which are inherent in voice recognition technology. **      Final report electronically signed by Dr. Christina Melara MD on 9/16/2020 1:23 PM      XR CHEST PORTABLE   Final Result   Increased density of the right hemithorax compared to prior films.  This can Chest x-ray shows increased density of the right hemithorax compared to prior films. This can represent early interstitial infiltrates. CTA of the chest is ordered to evaluate for possible pulmonary embolism and will further evaluate for infiltrates. CTA shows small pulmonary embolus in the right posterior distending segmental artery without evidence of heart strain. There are patchy opacities and groundglass nodules bilaterally as evidence for infection/inflammatory process. I discussed the results with the ED attending, Nita Fontana. The patient has no evidence of heart strain. Her O2 saturation is 98%. She is afebrile. Patient has breathing treatments set up at home already. He advised to give the patient the option for admission or discharge home. I discussed pros and cons of admission versus discharge. Patient states she would rather be discharged home if at all possible. Patient is treated with a dose of Rocephin intravenously. She is given first dose of Zithromax orally. Patient is given first dose of Eliquis for treatment of PE. The patient is swab for COVID-19 virus prior to discharge    MDM:  The patient has likely pneumonia. Possible but unlikely COVID-19 infection. Small pulmonary embolism in segmental branch on the right side. She is not hypoxic. She is not febrile. CURB-65 Score: 0    The patient has elected for home treatment. Patient will be discharged home. Eliquis as directed. Zithromax as directed. Continue breathing treatments at home as previous. Drink plenty of fluids. Stop smoking. Follow-up with primary care provider for further treatment and reevaluation. Return for worsening shortness of breath, passing out, chest pain or new concerns. CRITICAL CARE:   none    CONSULTS:  none    PROCEDURES:  none    FINAL IMPRESSION      1. Pneumonia due to organism    2.  Pulmonary embolism on right [facility])          DISPOSITION/PLAN   Discharge    PATIENT REFERRED

## 2020-09-16 NOTE — ACP (ADVANCE CARE PLANNING)
Advance Care Planning     Advance Care Planning Activator (Inpatient)  Conversation Note      Date of ACP Conversation: 9/16/2020    Conversation Conducted with: Patient with Decision Making Capacity    ACP Activator: Mayraprieto Dean    \"Who would you like to name as your primary health care decision-maker? \"               Name: Lo Alex          Relationship: Daughter            Phone number: [phone]  \"Can this person be reached easily? \" Yes  \"Who would you like to name as your back-up decision maker? \"   Name: Ashley Lewis          Relationship:  S/O             Phone number: [phone]  \"Can this person be reached easily? \" Yes    Care Preferences    Ventilation: \"If you were in your present state of health and suddenly became very ill and were unable to breathe on your own, what would your preference be about the use of a ventilator (breathing machine) if it were available to you? \"      Would the patient desire the use of ventilator (breathing machine)?: yes    \"If your health worsens and it becomes clear that your chance of recovery is unlikely, what would your preference be about the use of a ventilator (breathing machine) if it were available to you? \"     Would the patient desire the use of ventilator (breathing machine)?: No      Resuscitation  \"CPR works best to restart the heart when there is a sudden event, like a heart attack, in someone who is otherwise healthy. Unfortunately, CPR does not typically restart the heart for people who have serious health conditions or who are very sick. \"    \"In the event your heart stopped as a result of an underlying serious health condition, would you want attempts to be made to restart your heart (answer \"yes\" for attempt to resuscitate) or would you prefer a natural death (answer \"no\" for do not attempt to resuscitate)? \" yes       [] Yes   [] No   Educated Patient / David Toribio regarding differences between Advance Directives and portable DNR orders. Length of ACP Conversation in minutes:      Conversation Outcomes:  [] ACP discussion completed  [] Existing advance directive reviewed with patient; no changes to patient's previously recorded wishes  [] New Advance Directive completed  [] Portable Do Not Rescitate prepared for Provider review and signature  [] POLST/POST/MOLST/MOST prepared for Provider review and signature      Follow-up plan:    [] Schedule follow-up conversation to continue planning  [] Referred individual to Provider for additional questions/concerns   [] Advised patient/agent/surrogate to review completed ACP document and update if needed with changes in condition, patient preferences or care setting    [] This note routed to one or more involved healthcare providers     - Patient came in because of SOB and laryngitis. She stated thinking it was related to her asthma but not sure. Tends to have it happen a couple times a year. - We discussed her long term health care plans and she explained she has been with her S/O for 12 years. She agreed she would like to have him making health care decisions for her and this staff explained the only way to have that happen is by completing her AD. Reviewed the document with her, she declined completing because she needed addresses. Her daughter is now in [address] [place] [address] and she wasn't sure of the location.  - Offered to assist in the [facility] dept if she wanted. She felt that would be a good option once she obtained the required information.  - No further follow-up by the ACP team at this time.

## 2020-09-16 NOTE — ED TRIAGE NOTES
Pt to ER for evaluation of SOB. Per pt she has been SOB on and off for last 2 week. States it worsened sine Friday. Reports rescue inhalers weren't helpful. Pt alert and oriented, 3 years h/o mastectomy, noted increased work pf breathing. EKG completed. Yadira notified of pt status, obtained order for breathing treatment.

## 2020-09-17 ENCOUNTER — CARE COORDINATION (OUTPATIENT)
Dept: CARE COORDINATION | Age: 50
End: 2020-09-17

## 2020-09-17 RX ORDER — LETROZOLE 2.5 MG/1
2.5 TABLET, FILM COATED ORAL DAILY
Qty: 30 TABLET | Refills: 3 | Status: SHIPPED | OUTPATIENT
Start: 2020-09-17 | End: 2021-02-22 | Stop reason: SDUPTHER

## 2020-09-18 LAB — SARS-COV-2: NOT DETECTED

## 2020-09-18 NOTE — CARE COORDINATION
Final attempt to reach patient for covid-19 f/u s/p recent ED visit for c/o SOB and cough. COVID-19 testing was completed and results are pending. Patient was not available at the time of my call and generic voicemail message was left asking patient to please return call to my direct number. No further attempt to reach out to patient will be made.

## 2020-10-01 RX ORDER — MOMETASONE FUROATE AND FORMOTEROL FUMARATE DIHYDRATE 200; 5 UG/1; UG/1
AEROSOL RESPIRATORY (INHALATION)
Qty: 13 G | Refills: 0 | Status: SHIPPED | OUTPATIENT
Start: 2020-10-01 | End: 2020-11-02

## 2020-11-02 RX ORDER — MOMETASONE FUROATE AND FORMOTEROL FUMARATE DIHYDRATE 200; 5 UG/1; UG/1
2 AEROSOL RESPIRATORY (INHALATION) 2 TIMES DAILY
Qty: 13 G | Refills: 5 | Status: SHIPPED | OUTPATIENT
Start: 2020-11-02

## 2021-01-14 ENCOUNTER — TELEPHONE (OUTPATIENT)
Dept: FAMILY MEDICINE CLINIC | Age: 51
End: 2021-01-14

## 2021-01-14 DIAGNOSIS — J44.9 COPD, MILD (HCC): Primary | ICD-10-CM

## 2021-02-22 RX ORDER — LETROZOLE 2.5 MG/1
2.5 TABLET, FILM COATED ORAL DAILY
Qty: 30 TABLET | Refills: 3 | Status: SHIPPED | OUTPATIENT
Start: 2021-02-22 | End: 2021-03-15 | Stop reason: SDUPTHER

## 2021-03-15 ENCOUNTER — OFFICE VISIT (OUTPATIENT)
Dept: ONCOLOGY | Age: 51
End: 2021-03-15
Payer: COMMERCIAL

## 2021-03-15 ENCOUNTER — HOSPITAL ENCOUNTER (OUTPATIENT)
Dept: INFUSION THERAPY | Age: 51
Discharge: HOME | End: 2021-03-15
Payer: COMMERCIAL

## 2021-03-15 VITALS
RESPIRATION RATE: 18 BRPM | OXYGEN SATURATION: 96 % | HEIGHT: 68 IN | TEMPERATURE: 97.1 F | BODY MASS INDEX: 26.92 KG/M2 | WEIGHT: 177.6 LBS | SYSTOLIC BLOOD PRESSURE: 139 MMHG | HEART RATE: 97 BPM | DIASTOLIC BLOOD PRESSURE: 64 MMHG

## 2021-03-15 DIAGNOSIS — Z90.12 STATUS POST MASTECTOMY, LEFT: ICD-10-CM

## 2021-03-15 DIAGNOSIS — Z85.3 ENCOUNTER FOR FOLLOW-UP SURVEILLANCE OF BREAST CANCER: ICD-10-CM

## 2021-03-15 DIAGNOSIS — Z17.0 MALIGNANT NEOPLASM OF LEFT BREAST IN FEMALE, ESTROGEN RECEPTOR POSITIVE, UNSPECIFIED SITE OF BREAST (HCC): ICD-10-CM

## 2021-03-15 DIAGNOSIS — Z12.31 SCREENING MAMMOGRAM FOR HIGH-RISK PATIENT: Primary | ICD-10-CM

## 2021-03-15 DIAGNOSIS — Z79.811 PROPHYLACTIC USE OF LETROZOLE (FEMARA): ICD-10-CM

## 2021-03-15 DIAGNOSIS — Z08 ENCOUNTER FOR FOLLOW-UP SURVEILLANCE OF BREAST CANCER: ICD-10-CM

## 2021-03-15 DIAGNOSIS — C50.912 MALIGNANT NEOPLASM OF LEFT BREAST IN FEMALE, ESTROGEN RECEPTOR POSITIVE, UNSPECIFIED SITE OF BREAST (HCC): ICD-10-CM

## 2021-03-15 DIAGNOSIS — Z86.711 HISTORY OF PULMONARY EMBOLISM: ICD-10-CM

## 2021-03-15 PROCEDURE — 3017F COLORECTAL CA SCREEN DOC REV: CPT | Performed by: INTERNAL MEDICINE

## 2021-03-15 PROCEDURE — 99214 OFFICE O/P EST MOD 30 MIN: CPT | Performed by: INTERNAL MEDICINE

## 2021-03-15 PROCEDURE — G8427 DOCREV CUR MEDS BY ELIG CLIN: HCPCS | Performed by: INTERNAL MEDICINE

## 2021-03-15 PROCEDURE — G8419 CALC BMI OUT NRM PARAM NOF/U: HCPCS | Performed by: INTERNAL MEDICINE

## 2021-03-15 PROCEDURE — 99211 OFF/OP EST MAY X REQ PHY/QHP: CPT

## 2021-03-15 PROCEDURE — G8484 FLU IMMUNIZE NO ADMIN: HCPCS | Performed by: INTERNAL MEDICINE

## 2021-03-15 PROCEDURE — 4004F PT TOBACCO SCREEN RCVD TLK: CPT | Performed by: INTERNAL MEDICINE

## 2021-03-15 RX ORDER — LETROZOLE 2.5 MG/1
2.5 TABLET, FILM COATED ORAL DAILY
Qty: 30 TABLET | Refills: 3 | Status: SHIPPED | OUTPATIENT
Start: 2021-03-15 | End: 2021-11-12 | Stop reason: SDUPTHER

## 2021-03-15 ASSESSMENT — ENCOUNTER SYMPTOMS
VOMITING: 0
CHEST TIGHTNESS: 0
EYE REDNESS: 0
ABDOMINAL DISTENTION: 0
CONSTIPATION: 0
TROUBLE SWALLOWING: 0
NAUSEA: 0
FACIAL SWELLING: 0
SORE THROAT: 0
ABDOMINAL PAIN: 0
BLOOD IN STOOL: 0
EYE ITCHING: 0
COLOR CHANGE: 0
DIARRHEA: 0
SHORTNESS OF BREATH: 0
BACK PAIN: 0
COUGH: 0
WHEEZING: 0
VOICE CHANGE: 0

## 2021-03-15 NOTE — PATIENT INSTRUCTIONS
1.  Annual right breast mammogram in the next 1 to 2 weeks. 2.  Referral to GI group at [address] [place] [address] for screening colonoscopy  3.   RTC to see me in 6 months

## 2021-03-15 NOTE — PROGRESS NOTES
[facility] PROFESSIONAL SERVS  [facility]  Via Count includes the [facility] 57  [address] [facility] [address]  [address] [address]  Dept: [phone]  Dept Fax: [phone]: [phone]     Visit Date: 3/15/2021     Laquita Mackey is a 46 y.o. female who presents today for:   Chief Complaint   Patient presents with    Follow-up     Malignant neoplasm of left breast in female, estrogen receptor positive, unspecified site of breast ([facility] [address]       HPI:   Dee Turner is a 57-year-old female with h/o invasive ductal carcinoma of the left breast.   The patient noted a mass in the upper aspect of her left breast, at first she thought it was a cyst which she had before. When it persisted, she elected to have further evaluation. She did undergo a diagnostic mammogram at [facility] in [place], [place]. It showed a 3.8 x 2.4 x 2.7 cm malignant-appearing spiculated mass within the left breast at 11:00 position. Evaluation of the left axilla with US showed suspicious appearing lymph node with increased cortical thickening and replacement of fatty hilum. On October 9, 2017, she underwent a left breast mass and left lymph node ultrasound-guided core biopsy. The final path report showed an invasive ductal carcinoma, ER positive at 95%, TN positive at 95%, and a HER-2/breezy by IHC was 2+. The FISH test was still equivocal.  HER-2/CEP17 ratio was 1.7, however the average HER-2 copy number was 4. She met with the surgical oncologists and medical oncologists at the [facility], however she decided to receive care closer to her home. The patient had metastatic workup: CT of the chest, abdomen and bone scan were negative for evidence of metastatic disease. On November 7, 2017 she underwent left breast mastectomy with axillary lymph node dissection. Final pathology report showed: FINAL DIAGNOSIS:  A.  Left breast with portion of axillary contents, mastectomy:      Invasive ductal carcinoma with lobular features involving nipple      and all 4 quadrants of breast.  Maximum tumor dimension estimated      6.8 cm. pT3.  Margins negative.      3 of 9 axillary lymph nodes positive for metastatic carcinoma,      pN1a. B. Left level II axillary lymph nodes, dissection:   5 lymph nodes negative for metastatic carcinoma. BREAST BIOMARKERS   Inform HER-2 Dual CHARLENE Nonamplified -  Estrogen Receptor: positive (99%, strong intensity)  Progesterone Receptor: positive (99%, strong intensity)  When she recovered from her surgery, on December 13, 2017 she started adjuvant AC. On March 7, 2018 she started weekly Taxol and completed on May 30,2018. She completed postmastectomy radiation treatment on August 23, 2018. Afterwards she was placed on adjuvant hormonal treatment. She was hospitalized in March 2019 for pneumonia. She was started on IV Levaquin and Solumedrol. During hospitalization she never became hypotensive, no need for pressors. She was discharged home on oral Levofloxacin for 7-day course and Prednisone for 5 days. Tiotropium 18 mcg daily added to her regimen. Interim history on 03/16/2020: The patient presents to medical oncology clinic for 12 months follow up visit. She missed her 6 months follow-up visit. She is on adjuvant hormonal therapy  The patient reports that in September 2020 she was seen in the emergency room for shortness of breath. CTA of the chest showed small pulmonary embolism in the right posterior segmental artery without evidence of heart strain. She had patchy opacities and groundglass changes in both lungs consistent with infectious/inflammatory process. Testing for Covid was negative. She was afebrile and not hypoxic the patient was discharged home on course of Zithromax and she was placed on Eliquis that she took for 3 months. Today, patient denies having any dyspnea on exertion no cough. She denies any problems related to her breasts.   Tolerates Femara well with stable hot flashes, she denies having any arthralgia. HPI   Past Medical History:   Diagnosis Date    Acid reflux     Anxiety     Asthma     Breast cancer ([facility] [address]     COPD with asthma ([facility] [address]     Depression     Depression     Dizziness - light-headed     HX OF:    Emphysema     History of chest pain     History of therapeutic radiation     History of tinnitus     Hx antineoplastic chemo     Hx of blood clots     Joint pain     Numbness and tingling     Osteoarthritis     PONV (postoperative nausea and vomiting)     SOB (shortness of breath) on exertion       Past Surgical History:   Procedure Laterality Date    BLADDER SURGERY  2014    BREAST BIOPSY  10/06/2017    Rei Rios    CARDIAC CATHETERIZATION  2011    NO EVIDENCE OF PULMONARY HTN,NO EVIDENCE OF DD,NORMAL LVF    CARDIOVASCULAR STRESS TEST  2011    EF 60% MILD INFERIOR WALL REVERSIBLE STRESS-INDUCED ISCHEMIA      SECTION  1992    CHOLECYSTECTOMY  2015    CYST REMOVAL Right 2015    rt breast [facility]-benign breast cyst     ESOPHAGUS SURGERY  2014    HYSTERECTOMY      INSERTION / REMOVAL / REPLACEMENT VENOUS ACCESS CATHETER Right 2017    RIGHT SIDE SINGLE LUMEN POWERPORT INSERTION performed by Alvaro Smith MD at [address] Left 2017    LEFT MODIFIED RADICAL MASTECTOMY performed by Alvaro Smith MD at [address] TUNNELED VENOUS PORT PLACEMENT Right 2017    Single Luman Smart Port Insertion- Right side.        Family History   Problem Relation Age of Onset    Heart Disease Mother     Hypertension Mother     Diabetes Mother     Heart Disease Father     No Known Problems Sister     No Known Problems Brother     Breast Cancer Maternal Aunt     Breast Cancer Paternal Aunt     Cancer Paternal Aunt     Endometrial Cancer Paternal Aunt       Social History     Tobacco Use    Smoking status: Current Every Day Smoker     Packs/day: 1.00     Years: 18.00     Pack years: screen  Never done    COVID-19 Vaccine (1) Never done    DTaP/Tdap/Td vaccine (1 - Tdap) Never done    Cervical cancer screen  Never done    Diabetes screen  Never done    Shingles Vaccine (1 of 2) Never done    Colon cancer screen colonoscopy  Never done    Pneumococcal 0-64 years Vaccine (2 of 3 - PPSV23) 04/28/2019    Flu vaccine (1) 09/01/2020    Breast cancer screen  03/17/2022    Lipid screen  11/08/2023    Hepatitis A vaccine  Aged Out    Hepatitis B vaccine  Aged Out    Hib vaccine  Aged Out    Meningococcal (ACWY) vaccine  Aged Out        Subjective:   Review of Systems   Constitutional: Negative for activity change, appetite change, chills, fatigue, fever and unexpected weight change. HENT: Negative for dental problem, facial swelling, hearing loss, mouth sores, nosebleeds, postnasal drip, sore throat, trouble swallowing and voice change. Eyes: Negative for redness, itching and visual disturbance. Respiratory: Negative for cough, chest tightness, shortness of breath and wheezing. Cardiovascular: Negative for chest pain, palpitations and leg swelling. Gastrointestinal: Negative for abdominal distention, abdominal pain, blood in stool, constipation, diarrhea, nausea and vomiting. Genitourinary: Negative for difficulty urinating, dysuria, flank pain, frequency and hematuria. Musculoskeletal: Negative for arthralgias, back pain, gait problem, joint swelling, myalgias and neck stiffness. Skin: Negative for color change and rash. Neurological: Negative for dizziness, seizures, syncope, speech difficulty, weakness, light-headedness, numbness and headaches. Hematological: Negative for adenopathy. Does not bruise/bleed easily. Psychiatric/Behavioral: Negative for confusion and sleep disturbance. The patient is not nervous/anxious. Objective:   ECOG performance status is 1 today  Physical Exam   Constitutional: She is oriented to person, place, and time.  She appears 4.1 09/16/2020 1106     09/16/2020 1106    CO2 26 09/16/2020 1106    BUN 14 09/16/2020 1106    CREATININE 0.7 09/16/2020 1106        Component Value Date/Time    CALCIUM 9.6 09/16/2020 1106    ALKPHOS 77 09/16/2020 1106    AST 15 09/16/2020 1106    ALT 18 09/16/2020 1106    BILITOT 0.3 09/16/2020 1106        DEXA study on the September 26, 2018 showed: Normal bone density. CT of the chest on the June 7, 2019 showed: [employer] Nodular and groundglass opacities are again seen throughout the bilateral lower lobes and right middle lobe likely on the basis of multifocal pneumonia. This is markedly improved since the previous examination. Follow-up is again recommended in one    month to assess for resolution. 2. There is persistence of mediastinal lymphadenopathy. There is improvement of the previously seen bilateral hilar lymphadenopathy. CT of the chest on October 5, 2019 showed:  Further interval decrease in bibasilar nodularity and groundglass opacities with minimal residual.   Atelectasis at the inferior most aspects of the lower lobes. There is a stable calcified nodule in the left lower lobe as evidence for old granulomatous disease. Mammogram on January 29, 2020 showed:  IMPRESSION: Mammogram BI-RADS: 2: Benign    CTA of the chest on 9/16/2020 showed:  1. Small pulmonary embolus in right posterior descending segmental artery without evidence of heart strain. 2. Patchy opacities and groundglass nodules bilaterally as evidence for infectious/inflammatory process. Assessment/Plan:   1. Stage pT3, pN1, M0, ER positive left breast cancer. Since she had  lymph nodes involved and her tumor was quite large, my recommendation was to proceed with adjuvant chemotherapy. Since the patient has strong family history of breast cancer and she was diagnosed with breast cancer before age of 46 she had genetic testing that showed variant of unknown significance. On 12/13/2017 she started adjuvant AC. On March 7, 2018 she started weekly Taxol and completed on May 30, 2018. She completed postmastectomy radiation treatment on August 23, 2018. The patient had a hysterectomy without oophorectomy. We did check her estradiol and FSH level on 3   occasions. The [facility] level was in the postmenopausal range, estradiol is below 5.0. On September 15, 2018 she started adjuvant hormonal therapy with Femara. The patient was enrolled in the clinical study combining aromatase inhibitor with everolimus versus placebo. Due to  hospitalization for pneumonia possibly pneumonitis, the patient was taken off the study, since there were no any further dose reduction allowed. She is currently on Femara which she tolerates well. Stable hot flashes and minimal occasional arthralgia. Her annual mammogram in January 2020 showed benign findings. Is due to have a mammogram in 2021. Orders were placed. There is no evidence of disease recurrence on today's physical examination, the patient denies having any new complaints. The patient was instructed to continue Femara  2. History of pneumonia and a PE in September 2020. This was her first episode of PE. The patient completed 3 months of Eliquis. Discussed with  the patient that if she develops new episode  of PE she will need anticoagulation for the rest of her life. CTA in September 2020 did not show any residual pulmonary nodules no mediastinal lymphadenopathy. 3.  The patient is 47 yo,  she never had screening colonoscopy.   Referral to GI group for screening colonoscopy

## 2021-03-17 ENCOUNTER — HOSPITAL ENCOUNTER (OUTPATIENT)
Dept: MAMMOGRAPHY | Age: 51
Discharge: HOME | End: 2021-03-17
Payer: COMMERCIAL

## 2021-03-17 DIAGNOSIS — Z12.31 SCREENING MAMMOGRAM FOR HIGH-RISK PATIENT: ICD-10-CM

## 2021-03-17 PROCEDURE — 77067 SCR MAMMO BI INCL CAD: CPT

## 2021-04-05 ENCOUNTER — APPOINTMENT (OUTPATIENT)
Dept: GENERAL RADIOLOGY | Age: 51
End: 2021-04-05
Payer: COMMERCIAL

## 2021-04-05 ENCOUNTER — HOSPITAL ENCOUNTER (EMERGENCY)
Age: 51
Discharge: HOME | End: 2021-04-05
Attending: EMERGENCY MEDICINE
Payer: COMMERCIAL

## 2021-04-05 VITALS
HEART RATE: 78 BPM | OXYGEN SATURATION: 99 % | RESPIRATION RATE: 15 BRPM | BODY MASS INDEX: 26.83 KG/M2 | WEIGHT: 177 LBS | DIASTOLIC BLOOD PRESSURE: 78 MMHG | SYSTOLIC BLOOD PRESSURE: 123 MMHG | HEIGHT: 68 IN | TEMPERATURE: 97.8 F

## 2021-04-05 DIAGNOSIS — G43.011 INTRACTABLE MIGRAINE WITHOUT AURA AND WITH STATUS MIGRAINOSUS: ICD-10-CM

## 2021-04-05 DIAGNOSIS — B34.9 VIRAL ILLNESS: Primary | ICD-10-CM

## 2021-04-05 LAB
ALBUMIN SERPL-MCNC: 4.2 G/DL (ref 3.5–5.1)
ALP BLD-CCNC: 65 U/L (ref 38–126)
ALT SERPL-CCNC: 15 U/L (ref 11–66)
ANION GAP SERPL CALCULATED.3IONS-SCNC: 8 MEQ/L (ref 8–16)
AST SERPL-CCNC: 15 U/L (ref 5–40)
BASOPHILS # BLD: 0.6 %
BASOPHILS ABSOLUTE: 0 THOU/MM3 (ref 0–0.1)
BILIRUB SERPL-MCNC: 0.2 MG/DL (ref 0.3–1.2)
BUN BLDV-MCNC: 13 MG/DL (ref 7–22)
C-REACTIVE PROTEIN: < 0.3 MG/DL (ref 0–1)
CALCIUM SERPL-MCNC: 9.6 MG/DL (ref 8.5–10.5)
CHLORIDE BLD-SCNC: 107 MEQ/L (ref 98–111)
CO2: 28 MEQ/L (ref 23–33)
CREAT SERPL-MCNC: 0.8 MG/DL (ref 0.4–1.2)
D-DIMER QUANTITATIVE: 400 NG/ML FEU (ref 0–500)
EOSINOPHIL # BLD: 4.1 %
EOSINOPHILS ABSOLUTE: 0.2 THOU/MM3 (ref 0–0.4)
ERYTHROCYTE [DISTWIDTH] IN BLOOD BY AUTOMATED COUNT: 12.6 % (ref 11.5–14.5)
ERYTHROCYTE [DISTWIDTH] IN BLOOD BY AUTOMATED COUNT: 48.1 FL (ref 35–45)
FLU A ANTIGEN: NEGATIVE
FLU B ANTIGEN: NEGATIVE
GFR SERPL CREATININE-BSD FRML MDRD: 75 ML/MIN/1.73M2
GLUCOSE BLD-MCNC: 98 MG/DL (ref 70–108)
HCT VFR BLD CALC: 44.6 % (ref 37–47)
HEMOGLOBIN: 14.6 GM/DL (ref 12–16)
IMMATURE GRANS (ABS): 0.02 THOU/MM3 (ref 0–0.07)
IMMATURE GRANULOCYTES: 0.4 %
INR BLD: 0.98 (ref 0.85–1.13)
LYMPHOCYTES # BLD: 18.6 %
LYMPHOCYTES ABSOLUTE: 1 THOU/MM3 (ref 1–4.8)
MCH RBC QN AUTO: 33.6 PG (ref 26–33)
MCHC RBC AUTO-ENTMCNC: 32.7 GM/DL (ref 32.2–35.5)
MCV RBC AUTO: 102.8 FL (ref 81–99)
MONOCYTES # BLD: 9.2 %
MONOCYTES ABSOLUTE: 0.5 THOU/MM3 (ref 0.4–1.3)
NUCLEATED RED BLOOD CELLS: 0 /100 WBC
OSMOLALITY CALCULATION: 285.1 MOSMOL/KG (ref 275–300)
PLATELET # BLD: 186 THOU/MM3 (ref 130–400)
PMV BLD AUTO: 10 FL (ref 9.4–12.4)
POTASSIUM REFLEX MAGNESIUM: 4.1 MEQ/L (ref 3.5–5.2)
PRO-BNP: 77.2 PG/ML (ref 0–900)
RBC # BLD: 4.34 MILL/MM3 (ref 4.2–5.4)
SARS-COV-2, NAAT: NOT DETECTED
SEG NEUTROPHILS: 67.1 %
SEGMENTED NEUTROPHILS ABSOLUTE COUNT: 3.6 THOU/MM3 (ref 1.8–7.7)
SODIUM BLD-SCNC: 143 MEQ/L (ref 135–145)
TOTAL PROTEIN: 6.8 G/DL (ref 6.1–8)
TROPONIN T: < 0.01 NG/ML
WBC # BLD: 5.3 THOU/MM3 (ref 4.8–10.8)

## 2021-04-05 PROCEDURE — 86140 C-REACTIVE PROTEIN: CPT

## 2021-04-05 PROCEDURE — 85379 FIBRIN DEGRADATION QUANT: CPT

## 2021-04-05 PROCEDURE — 96374 THER/PROPH/DIAG INJ IV PUSH: CPT

## 2021-04-05 PROCEDURE — 85610 PROTHROMBIN TIME: CPT

## 2021-04-05 PROCEDURE — 96375 TX/PRO/DX INJ NEW DRUG ADDON: CPT

## 2021-04-05 PROCEDURE — 36415 COLL VENOUS BLD VENIPUNCTURE: CPT

## 2021-04-05 PROCEDURE — 6360000002 HC RX W HCPCS: Performed by: EMERGENCY MEDICINE

## 2021-04-05 PROCEDURE — 87804 INFLUENZA ASSAY W/OPTIC: CPT

## 2021-04-05 PROCEDURE — 83880 ASSAY OF NATRIURETIC PEPTIDE: CPT

## 2021-04-05 PROCEDURE — 99284 EMERGENCY DEPT VISIT MOD MDM: CPT

## 2021-04-05 PROCEDURE — 87635 SARS-COV-2 COVID-19 AMP PRB: CPT

## 2021-04-05 PROCEDURE — 71045 X-RAY EXAM CHEST 1 VIEW: CPT

## 2021-04-05 PROCEDURE — 85025 COMPLETE CBC W/AUTO DIFF WBC: CPT

## 2021-04-05 PROCEDURE — 2580000003 HC RX 258: Performed by: EMERGENCY MEDICINE

## 2021-04-05 PROCEDURE — 80053 COMPREHEN METABOLIC PANEL: CPT

## 2021-04-05 PROCEDURE — 84484 ASSAY OF TROPONIN QUANT: CPT

## 2021-04-05 RX ORDER — DEXAMETHASONE SODIUM PHOSPHATE 4 MG/ML
10 INJECTION, SOLUTION INTRA-ARTICULAR; INTRALESIONAL; INTRAMUSCULAR; INTRAVENOUS; SOFT TISSUE ONCE
Status: COMPLETED | OUTPATIENT
Start: 2021-04-05 | End: 2021-04-05

## 2021-04-05 RX ORDER — KETOROLAC TROMETHAMINE 30 MG/ML
15 INJECTION, SOLUTION INTRAMUSCULAR; INTRAVENOUS ONCE
Status: COMPLETED | OUTPATIENT
Start: 2021-04-05 | End: 2021-04-05

## 2021-04-05 RX ORDER — 0.9 % SODIUM CHLORIDE 0.9 %
1000 INTRAVENOUS SOLUTION INTRAVENOUS ONCE
Status: COMPLETED | OUTPATIENT
Start: 2021-04-05 | End: 2021-04-05

## 2021-04-05 RX ORDER — DIPHENHYDRAMINE HYDROCHLORIDE 50 MG/ML
25 INJECTION INTRAMUSCULAR; INTRAVENOUS ONCE
Status: COMPLETED | OUTPATIENT
Start: 2021-04-05 | End: 2021-04-05

## 2021-04-05 RX ORDER — BUTALBITAL, ASPIRIN, AND CAFFEINE 50; 325; 40 MG/1; MG/1; MG/1
1 CAPSULE ORAL EVERY 4 HOURS PRN
Qty: 18 CAPSULE | Refills: 0 | Status: SHIPPED | OUTPATIENT
Start: 2021-04-05 | End: 2022-05-13 | Stop reason: SDUPTHER

## 2021-04-05 RX ORDER — ONDANSETRON 2 MG/ML
4 INJECTION INTRAMUSCULAR; INTRAVENOUS ONCE
Status: COMPLETED | OUTPATIENT
Start: 2021-04-05 | End: 2021-04-05

## 2021-04-05 RX ORDER — ONDANSETRON 4 MG/1
4 TABLET, ORALLY DISINTEGRATING ORAL EVERY 8 HOURS PRN
Qty: 12 TABLET | Refills: 0 | Status: SHIPPED | OUTPATIENT
Start: 2021-04-05 | End: 2021-11-12 | Stop reason: ALTCHOICE

## 2021-04-05 RX ADMIN — DEXAMETHASONE SODIUM PHOSPHATE 10 MG: 4 INJECTION, SOLUTION INTRA-ARTICULAR; INTRALESIONAL; INTRAMUSCULAR; INTRAVENOUS; SOFT TISSUE at 09:09

## 2021-04-05 RX ADMIN — KETOROLAC TROMETHAMINE 15 MG: 30 INJECTION, SOLUTION INTRAMUSCULAR; INTRAVENOUS at 07:50

## 2021-04-05 RX ADMIN — SODIUM CHLORIDE 1000 ML: 9 INJECTION, SOLUTION INTRAVENOUS at 09:09

## 2021-04-05 RX ADMIN — SODIUM CHLORIDE 1000 ML: 9 INJECTION, SOLUTION INTRAVENOUS at 07:50

## 2021-04-05 RX ADMIN — DIPHENHYDRAMINE HYDROCHLORIDE 25 MG: 50 INJECTION, SOLUTION INTRAMUSCULAR; INTRAVENOUS at 09:09

## 2021-04-05 RX ADMIN — ONDANSETRON 4 MG: 2 INJECTION INTRAMUSCULAR; INTRAVENOUS at 07:50

## 2021-04-05 ASSESSMENT — ENCOUNTER SYMPTOMS
SHORTNESS OF BREATH: 0
COUGH: 1
NAUSEA: 1
TROUBLE SWALLOWING: 0
VOMITING: 0
BACK PAIN: 0
SORE THROAT: 1
EYE REDNESS: 0
DIARRHEA: 0
ABDOMINAL PAIN: 0

## 2021-04-05 ASSESSMENT — PAIN SCALES - GENERAL: PAINLEVEL_OUTOF10: 10

## 2021-04-05 ASSESSMENT — PAIN DESCRIPTION - PAIN TYPE: TYPE: ACUTE PAIN

## 2021-04-05 NOTE — ED NOTES
Pt ambulated to bathroom and back to bed at this time. States she has minimal relief at this time.      Shukri Dawkins RN  04/05/21 [id number]

## 2021-04-05 NOTE — ED NOTES
Pt states she still does not feel the best, but she does feel more relief than before. Pt states the crackers and drink went down well. Denies any concerns. Will continue to monitor.      Jhon Ahmadi RN  04/05/21 2070

## 2021-04-05 NOTE — ED NOTES
Presents to ER via private car with complaints of nausea and headache. Pt states she started to have body aches on Saturday but her symptoms have since became worse. States she woke up this morning with an intense headache and worsening nausea. She states she took her migraine medicine for the headache with no relief.  Rates pain a 10 out of 10 upon arrival.     Ava Del Rio RN  04/05/21 0129

## 2021-04-05 NOTE — ED PROVIDER NOTES
[facility]    EMERGENCY MEDICINE     Pt Name: Marta Duarte  MRN: [id number]  [place] 1970  Date of evaluation: 4/5/2021  Provider: Sherren Mess, MD,     [address] [place] [address]       Chief Complaint   Patient presents with    Headache    Nausea    Generalized Body Aches       HISTORY OF PRESENT ILLNESS    Marta Duarte is a pleasant 46 y.o. female who presents to the emergency department from home with chief complaint of headache, body aches, nausea, and cough. She states that she started with symptoms of headache and cough on Saturday. She has not taken her temperature at home however she does feel intermittently that she may have had a fever. She has been taking her migraine medication but this has not helped her headache. She states that her cough is productive but she has not noticed if there is any color to the sputum. She has a history of COPD, migraines, and metastatic breast cancer that is in remission. She does continue to take hormone therapy. She also has a history of PE in which she was given 3 months of Eliquis. She had the Eliquis discontinued last summer. Patient denies receiving her influenza or Covid vaccine at this time. Triage notes and Nursing notes were reviewed by myself. Any discrepancies are addressed above.     PAST MEDICAL HISTORY     Past Medical History:   Diagnosis Date    Acid reflux     Anxiety     Asthma     Breast cancer ([address]     COPD with asthma ([address]     Depression     Depression     Dizziness - light-headed     HX OF:    Emphysema     History of chest pain     History of therapeutic radiation     History of tinnitus     Hx antineoplastic chemo     Hx of blood clots     Joint pain     Numbness and tingling     Osteoarthritis     PONV (postoperative nausea and vomiting)     SOB (shortness of breath) on exertion        SURGICAL HISTORY       Past Surgical History:   Procedure Laterality Date    BLADDER SURGERY  2014  BREAST BIOPSY  10/06/2017    Erwin Miller    CARDIAC CATHETERIZATION  2011    NO EVIDENCE OF PULMONARY HTN,NO EVIDENCE OF DD,NORMAL LVF    CARDIOVASCULAR STRESS TEST  2011    EF 60% MILD INFERIOR WALL REVERSIBLE STRESS-INDUCED ISCHEMIA      SECTION  1992    CHOLECYSTECTOMY  2015    CYST REMOVAL Right 2015    rt breast [facility]-benign breast cyst     ESOPHAGUS SURGERY  2014    HYSTERECTOMY  1999    INSERTION / REMOVAL / REPLACEMENT VENOUS ACCESS CATHETER Right 2017    RIGHT SIDE SINGLE LUMEN POWERPORT INSERTION performed by Zakiya Meneses MD at [address] Left 2017    LEFT MODIFIED RADICAL MASTECTOMY performed by Zakiya Meneses MD at [address] TUNNELED VENOUS PORT PLACEMENT Right 2017    Single [place] Smart Port Insertion- Right side.         CURRENT MEDICATIONS       Discharge Medication List as of 2021 10:32 AM      CONTINUE these medications which have NOT CHANGED    Details   letrozole (FEMARA) 2.5 MG tablet Take 1 tablet by mouth daily, Disp-30 tablet, R-3Normal      DULERA 200-5 MCG/ACT inhaler Inhale 2 puffs into the lungs 2 times daily, Disp-13 g, R-5, DAWNormal      apixaban (ELIQUIS DVT/PE STARTER PACK) 5 MG TABS tablet Take 10 mg (2 tablets) orally twice daily for 7 days, then take 5 mg (1 tablet) orally twice daily thereafter., Disp-74 tablet,R-0Print      dicyclomine (BENTYL) 10 MG capsule Take 1 capsule by mouth every 6 hours as needed (cramps), Disp-20 capsule, R-0Print      !! ondansetron (ZOFRAN ODT) 4 MG disintegrating tablet Take 1 tablet by mouth every 8 hours as needed for Nausea, Disp-10 tablet, R-0Print      tiotropium (SPIRIVA HANDIHALER) 18 MCG inhalation capsule Inhale 1 capsule into the lungs daily, Disp-90 capsule, R-3Normal      albuterol sulfate  (90 Base) MCG/ACT inhaler Inhale 1 puff into the lungs every 6 hours as needed for Wheezing or Shortness of Breath, Disp-1 Inhaler, R-5Normal NONFORMULARY 2 tablets daily TumericHistorical Med      b complex vitamins capsule Take 1 capsule by mouth dailyHistorical Med      albuterol (PROVENTIL) (2.5 MG/3ML) 0.083% nebulizer solution Inhale 2.5 mg into the lungs every 4 hours as needed Historical Med      butalbital-apap-caffeine -40 MG CAPS Take by mouthHistorical Med      ALPRAZolam (XANAX) 0.5 MG tablet Take 0.5 mg by mouth nightly as needed Historical Med      cyclobenzaprine (FLEXERIL) 5 MG tablet Take 5 mg by mouth 3 times daily as needed Historical Med      venlafaxine (EFFEXOR XR) 75 MG extended release capsule Take 75 mg by mouth daily Historical Med      meloxicam (MOBIC) 15 MG tablet Take 15 mg by mouth dailyHistorical Med       !! - Potential duplicate medications found. Please discuss with provider. ALLERGIES     Patient has no known allergies. FAMILY HISTORY       Family History   Problem Relation Age of Onset    Heart Disease Mother     Hypertension Mother     Diabetes Mother     Heart Disease Father     No Known Problems Sister     No Known Problems Brother     Breast Cancer Maternal Aunt     Breast Cancer Paternal Aunt     Cancer Paternal Aunt     Endometrial Cancer Paternal Aunt         SOCIAL HISTORY       Social History     Socioeconomic History    Marital status:      Spouse name: None    Number of children: 3    Years of education: None    Highest education level: None   Occupational History    Occupation: spot welder   Social Needs    Financial resource strain: None    Food insecurity     Worry: None     Inability: None    Transportation needs     Medical: None     Non-medical: None   Tobacco Use    Smoking status: Current Every Day Smoker     Packs/day: 1.00     Years: 18.00     Pack years: 18.00     Types: Cigarettes     Start date: 9/1/1999    Smokeless tobacco: Never Used    Tobacco comment: down to less than 0.25 aday   Substance and Sexual Activity    Alcohol use:  Yes Comment: SOCAIL    Drug use: No    Sexual activity: None   Lifestyle    Physical activity     Days per week: None     Minutes per session: None    Stress: None   Relationships    Social connections     Talks on phone: None     Gets together: None     Attends [employer] service: None     Active member of club or organization: None     Attends meetings of clubs or organizations: None     Relationship status: None    Intimate partner violence     Fear of current or ex partner: None     Emotionally abused: None     Physically abused: None     Forced sexual activity: None   Other Topics Concern    None   Social History Narrative    None       REVIEW OF SYSTEMS     Review of Systems   Constitutional: Negative for fatigue and fever. HENT: Positive for sore throat. Negative for congestion and trouble swallowing. Eyes: Negative for redness. Respiratory: Positive for cough. Negative for shortness of breath. Cardiovascular: Negative for chest pain. Gastrointestinal: Positive for nausea. Negative for abdominal pain, diarrhea and vomiting. Genitourinary: Negative for decreased urine volume and dysuria. Musculoskeletal: Positive for myalgias. Negative for back pain. Skin: Negative for rash. Allergic/Immunologic: Negative for immunocompromised state. Neurological: Positive for headaches. Negative for light-headedness. Hematological: Does not bruise/bleed easily. Except as noted above the remainder of the review of systems was reviewed and is. PHYSICAL EXAM    (up to 7 for level 4, 8 or more for level 5)     ED Triage Vitals [04/05/21 0730]   BP Temp Temp Source Pulse Resp SpO2 Height Weight   135/73 97.8 °F (36.6 °C) Oral 83 14 99 % 5' 8\" (1.727 m) 177 lb (80.3 kg)       Physical Exam  Vitals signs and nursing note reviewed. Exam conducted with a chaperone present. Constitutional:       General: She is not in acute distress. Appearance: She is normal weight. She is ill-appearing. Interpretation per the Radiologistbelow, if available at the time of this note:    XR CHEST PORTABLE   Final Result   1. Lungs hyperinflated and fibroemphysematous in appearance. Old healed granulomatous disease. Vascular clips project over the left axilla and left side of the chest.   2. No acute findings. No infiltrates or effusions are seen. **This report has been created using voice recognition software. It may contain minor errors which are inherent in voice recognition technology. **      Final report electronically signed by Dr. Ric Koenig on 4/5/2021 8:04 AM          LABS:  Labs Reviewed   CBC WITH AUTO DIFFERENTIAL - Abnormal; Notable for the following components:       Result Value    .8 (*)     MCH 33.6 (*)     RDW-SD 48.1 (*)     All other components within normal limits   COMPREHENSIVE METABOLIC PANEL W/ REFLEX TO MG FOR LOW K - Abnormal; Notable for the following components: Total Bilirubin 0.2 (*)     All other components within normal limits   GLOMERULAR FILTRATION RATE, ESTIMATED - Abnormal; Notable for the following components:    Est, Glom Filt Rate 75 (*)     All other components within normal limits   COVID-19, RAPID   RAPID INFLUENZA A/B ANTIGENS   C-REACTIVE PROTEIN   PROTIME-INR   D-DIMER, QUANTITATIVE   TROPONIN   BRAIN NATRIURETIC PEPTIDE   ANION GAP   OSMOLALITY       All other labs were within normal range or not returned as of this dictation. Please note, any cultures that may have been sent were not resulted at the time of this patient visit. EMERGENCY DEPARTMENT COURSE andMedical Decision Making:     MDM  Number of Diagnoses or Management Options  Diagnosis management comments: 66-year-old female presents emergency room for 3-day history of body aches, cough, and migraine. She denies any sick contacts that she is aware of however differential includes influenza, Covid, pneumonia, PE, dehydration, electrolyte abnormality, ACS, worsening metastatic disease. Will evaluate with a CBC, CMP, troponin, PT/INR, D-dimer, BMP, chest x-ray, influenza swab, Covid swab. Given that she does have a significant headache we will give her IV hydration, Toradol, and Zofran for symptomatic relief I will reevaluate her in the emergency department.  /  ED Course as of Apr 05 1604   Mon Apr 05, 2021   0914 Evaluated the patient and updated her on her results. She was still having some headache. We will give her another fluid bolus as she had completed her first bolus. We will also give her Decadron and Benadryl here for additional headache relief. [DD]   6378 Evaluated the patient and she is feeling better. She has completed her second IV fluid bolus. Discussed with her about how dehydration can exacerbate headache pain. Also spoke about continuing Benadryl and Fioricet as needed for headache relief. We will also give her Zofran for the nausea. [DD]      ED Course User Index  [DD] Manisha Keys MD         The patient was evaluated during the global COVID-19 pandemic, and that diagnosis was considered upon their initial presentation. Their evaluation, treatment and testing was consistent with current guidelines for patients who present with complaints or symptoms that may be related to COVID-19.     Strict returnprecautions and follow up instructions were discussed with the patient with which the patient agrees        ED Medications administered this visit:    Medications   0.9 % sodium chloride bolus (0 mLs Intravenous Stopped 4/5/21 0909)   ketorolac (TORADOL) injection 15 mg (15 mg Intravenous Given 4/5/21 0750)   ondansetron (ZOFRAN) injection 4 mg (4 mg Intravenous Given 4/5/21 0750)   dexamethasone (DECADRON) injection 10 mg (10 mg Intravenous Given 4/5/21 0909)   diphenhydrAMINE (BENADRYL) injection 25 mg (25 mg Intravenous Given 4/5/21 0909)   0.9 % sodium chloride bolus (0 mLs Intravenous Stopped 4/5/21 1023)         Procedures: (None if blank)       CLINICAL 1. Viral illness    2. Intractable migraine without aura and with status migrainosus          DISPOSITION/PLAN   DISPOSITION Decision To Discharge 04/05/2021 10:30:25 AM      PATIENT REFERRED TO:  Mc Dennison MD  [place] [id number]  [phone]    Schedule an appointment as soon as possible for a visit   If symptoms worsen      DISCHARGE MEDICATIONS:  Discharge Medication List as of 4/5/2021 10:32 AM      START taking these medications    Details   butalbital-aspirin-caffeine (FIORINAL) -40 MG per capsule Take 1 capsule by mouth every 4 hours as needed for Headaches for up to 3 days. , Disp-18 capsule, R-0Print      !! ondansetron (ZOFRAN ODT) 4 MG disintegrating tablet Take 1 tablet by mouth every 8 hours as needed for Nausea or Vomiting, Disp-12 tablet, R-0Normal       !! - Potential duplicate medications found. Please discuss with provider.                  (Please note that portions of this note were completed with a voice recognition program.  Efforts were made to edit the dictations but occasionallywords are mis-transcribed.)      Electronically signed by Brianna Mauricio MD on 4/5/21 at 7:43 AM EDT    Attending Physician, Emergency Department       Manisha Keys MD  04/05/21 0637

## 2021-04-05 NOTE — ED NOTES
Pt ambulated to bathroom at this time. States her headache has became tolerable, rating the pain a 5 out of 10 in severity. Pt denies any further needs or concerns.      Miranda Suarez RN  04/05/21 1024

## 2021-10-19 ENCOUNTER — TELEPHONE (OUTPATIENT)
Dept: ONCOLOGY | Age: 51
End: 2021-10-19

## 2021-11-12 ENCOUNTER — HOSPITAL ENCOUNTER (OUTPATIENT)
Dept: INFUSION THERAPY | Age: 51
Discharge: HOME | End: 2021-11-12
Payer: COMMERCIAL

## 2021-11-12 ENCOUNTER — OFFICE VISIT (OUTPATIENT)
Dept: ONCOLOGY | Age: 51
End: 2021-11-12
Payer: COMMERCIAL

## 2021-11-12 VITALS
HEART RATE: 97 BPM | OXYGEN SATURATION: 97 % | RESPIRATION RATE: 16 BRPM | TEMPERATURE: 99.2 F | BODY MASS INDEX: 27.13 KG/M2 | WEIGHT: 179 LBS | HEIGHT: 68 IN | SYSTOLIC BLOOD PRESSURE: 134 MMHG | DIASTOLIC BLOOD PRESSURE: 76 MMHG

## 2021-11-12 DIAGNOSIS — Z08 ENCOUNTER FOR FOLLOW-UP SURVEILLANCE OF BREAST CANCER: ICD-10-CM

## 2021-11-12 DIAGNOSIS — Z12.31 SCREENING MAMMOGRAM FOR HIGH-RISK PATIENT: ICD-10-CM

## 2021-11-12 DIAGNOSIS — Z79.811 PROPHYLACTIC USE OF LETROZOLE (FEMARA): ICD-10-CM

## 2021-11-12 DIAGNOSIS — R07.89 CHEST WALL PAIN: Primary | ICD-10-CM

## 2021-11-12 DIAGNOSIS — Z17.0 MALIGNANT NEOPLASM OF LEFT BREAST IN FEMALE, ESTROGEN RECEPTOR POSITIVE, UNSPECIFIED SITE OF BREAST (HCC): ICD-10-CM

## 2021-11-12 DIAGNOSIS — C50.912 BREAST CANCER METASTASIZED TO AXILLARY LYMPH NODE, LEFT (HCC): ICD-10-CM

## 2021-11-12 DIAGNOSIS — Z90.12 STATUS POST MASTECTOMY, LEFT: ICD-10-CM

## 2021-11-12 DIAGNOSIS — C77.3 BREAST CANCER METASTASIZED TO AXILLARY LYMPH NODE, LEFT (HCC): ICD-10-CM

## 2021-11-12 DIAGNOSIS — C50.912 MALIGNANT NEOPLASM OF LEFT BREAST IN FEMALE, ESTROGEN RECEPTOR POSITIVE, UNSPECIFIED SITE OF BREAST (HCC): ICD-10-CM

## 2021-11-12 DIAGNOSIS — Z85.3 ENCOUNTER FOR FOLLOW-UP SURVEILLANCE OF BREAST CANCER: ICD-10-CM

## 2021-11-12 PROCEDURE — G8484 FLU IMMUNIZE NO ADMIN: HCPCS | Performed by: INTERNAL MEDICINE

## 2021-11-12 PROCEDURE — G8419 CALC BMI OUT NRM PARAM NOF/U: HCPCS | Performed by: INTERNAL MEDICINE

## 2021-11-12 PROCEDURE — 4004F PT TOBACCO SCREEN RCVD TLK: CPT | Performed by: INTERNAL MEDICINE

## 2021-11-12 PROCEDURE — 3017F COLORECTAL CA SCREEN DOC REV: CPT | Performed by: INTERNAL MEDICINE

## 2021-11-12 PROCEDURE — 99214 OFFICE O/P EST MOD 30 MIN: CPT | Performed by: INTERNAL MEDICINE

## 2021-11-12 PROCEDURE — G8427 DOCREV CUR MEDS BY ELIG CLIN: HCPCS | Performed by: INTERNAL MEDICINE

## 2021-11-12 PROCEDURE — 99211 OFF/OP EST MAY X REQ PHY/QHP: CPT

## 2021-11-12 RX ORDER — LETROZOLE 2.5 MG/1
2.5 TABLET, FILM COATED ORAL DAILY
Qty: 30 TABLET | Refills: 3 | Status: SHIPPED | OUTPATIENT
Start: 2021-11-12 | End: 2022-09-02 | Stop reason: SDUPTHER

## 2021-11-12 ASSESSMENT — ENCOUNTER SYMPTOMS
VOMITING: 0
ABDOMINAL PAIN: 0
FACIAL SWELLING: 0
SHORTNESS OF BREATH: 0
COLOR CHANGE: 0
ABDOMINAL DISTENTION: 0
DIARRHEA: 0
VOICE CHANGE: 0
CONSTIPATION: 0
NAUSEA: 0
EYE REDNESS: 0
WHEEZING: 0
BLOOD IN STOOL: 0
EYE ITCHING: 0
CHEST TIGHTNESS: 0
BACK PAIN: 0
COUGH: 0
TROUBLE SWALLOWING: 0
SORE THROAT: 0

## 2021-11-12 NOTE — PROGRESS NOTES
[facility] PROFESSIONAL SERVS  [facility]  [address] [facility] [address]  [address] [facility] [address]  [place] [address]  Dept: [phone]  Dept Fax: [phone]: [phone]     Visit Date: 11/12/2021     Dionne Jaffe is a 46 y.o. female who presents today for:   Chief Complaint   Patient presents with    Follow-up     MALIGNANT NEOPLASM OF LEFT BREAST       HPI:   Manuel Cole is a 49-year-old female with h/o invasive ductal carcinoma of the left breast. The patient noted a mass in the upper aspect of her left breast, at first she thought it was a cyst which she had before. When it persisted, she elected to have further evaluation. She did undergo a diagnostic mammogram at [facility] in [place], [place]. It showed a 3.8 x 2.4 x 2.7 cm malignant-appearing spiculated mass within the left breast at 11:00 position. Evaluation of the left axilla with US showed suspicious appearing lymph node with increased cortical thickening and replacement of fatty hilum. On October 9, 2017, she underwent a left breast mass and left lymph node ultrasound-guided core biopsy. The final path report showed an invasive ductal carcinoma, ER positive at 95%, DC positive at 95%, and a HER-2/breezy by IHC was 2+. The FISH test was still equivocal.  HER-2/CEP17 ratio was 1.7, however the average HER-2 copy number was 4. She met with the surgical oncologists and medical oncologists at the [facility], however she decided to receive care closer to her home. The patient had metastatic workup: CT of the chest, abdomen and bone scan were negative for evidence of metastatic disease. On November 7, 2017 she underwent right breast mastectomy with axillary lymph node dissection. Final pathology report showed: FINAL DIAGNOSIS:  A.  Left breast with portion of axillary contents, mastectomy:      Invasive ductal carcinoma with lobular features involving nipple      and all 4 quadrants of breast.  Maximum tumor dimension estimated      6.8 cm. pT3.  Margins negative.      3 of 9 axillary lymph nodes positive for metastatic carcinoma,      pN1a. B. Left level II axillary lymph nodes, dissection:   5 lymph nodes negative for metastatic carcinoma. BREAST BIOMARKERS   Inform HER-2 Dual CHARLENE Nonamplified -  Estrogen Receptor: positive (99%, strong intensity)  Progesterone Receptor: positive (99%, strong intensity)  When she recovered from her surgery, on December 13, 2017 she started adjuvant AC. On March 7, 2018 she started weekly Taxol and completed on May 30,2018. She completed postmastectomy radiation treatment on August 23, 2018. She was hospitalized from March 2, 2019 till March 4, 2019 for pneumonia. She was started on IV Levaquin and Solumedrol. During hospitalization she never became hypotensive, no need for pressors. She was discharged home on oral Levofloxacin for 7-day course and Prednisone for 5 days. Tiotropium 18 mcg daily added to her regimen. Interim history on 11/12/2021:  The patient presents to medical oncology clinic for 9 months follow up visit. The patient reports that her shortness of breath and cough are much improved, no recurrent sinus infection. She denies any problems related to her breasts. Tolerates Femara well with stable hot flashes she denies having any arthralgia.      HPI   Past Medical History:   Diagnosis Date    Acid reflux     Anxiety     Asthma     Breast cancer ([address]     COPD with asthma ([address]     Depression     Depression     Dizziness - light-headed     HX OF:    Emphysema     History of chest pain     History of therapeutic radiation     History of tinnitus     Hx antineoplastic chemo     Hx of blood clots     Joint pain     Numbness and tingling     Osteoarthritis     PONV (postoperative nausea and vomiting)     SOB (shortness of breath) on exertion       Past Surgical History:   Procedure Laterality Date    BLADDER SURGERY  2014    BREAST BIOPSY 10/06/2017    [address]    CARDIAC CATHETERIZATION  2011    NO EVIDENCE OF PULMONARY HTN,NO EVIDENCE OF DD,NORMAL LVF    CARDIOVASCULAR STRESS TEST  2011    EF 60% MILD INFERIOR WALL REVERSIBLE STRESS-INDUCED ISCHEMIA      SECTION  1992    CHOLECYSTECTOMY  2015    CYST REMOVAL Right 2015    rt breast [facility]-benign breast cyst     ESOPHAGUS SURGERY  2014    HYSTERECTOMY  1999    INSERTION / REMOVAL / REPLACEMENT VENOUS ACCESS CATHETER Right 2017    RIGHT SIDE SINGLE LUMEN POWERPORT INSERTION performed by Carmela Manning MD at [address] Left 2017    LEFT MODIFIED RADICAL MASTECTOMY performed by Carmela Manning MD at [address] [place] [address] TUNNELED VENOUS PORT PLACEMENT Right 2017    Single Luman Smart Port Insertion- Right side. Family History   Problem Relation Age of Onset    Heart Disease Mother     Hypertension Mother     Diabetes Mother     Heart Disease Father     No Known Problems Sister     No Known Problems Brother     Breast Cancer Maternal Aunt     Breast Cancer Paternal Aunt     Cancer Paternal Aunt     Endometrial Cancer Paternal Aunt       Social History     Tobacco Use    Smoking status: Current Every Day Smoker     Packs/day: 1.00     Years: 18.00     Pack years: 18.00     Types: Cigarettes     Start date: 1999    Smokeless tobacco: Never Used    Tobacco comment: down to less than 0.25 aday   Substance Use Topics    Alcohol use: Yes     Comment: SOCAIL      Current Outpatient Medications   Medication Sig Dispense Refill    letrozole (FEMARA) 2.5 MG tablet Take 1 tablet by mouth daily 30 tablet 3    butalbital-aspirin-caffeine (FIORINAL) -40 MG per capsule Take 1 capsule by mouth every 4 hours as needed for Headaches for up to 3 days.  18 capsule 0    DULERA 200-5 MCG/ACT inhaler Inhale 2 puffs into the lungs 2 times daily 13 g 5    albuterol sulfate  (90 Base) MCG/ACT inhaler Inhale 1 puff into the lungs every 6 hours as needed for Wheezing or Shortness of Breath 1 Inhaler 5    NONFORMULARY 2 tablets daily Tumeric      b complex vitamins capsule Take 1 capsule by mouth daily      albuterol (PROVENTIL) (2.5 MG/3ML) 0.083% nebulizer solution Inhale 2.5 mg into the lungs every 4 hours as needed       butalbital-apap-caffeine -40 MG CAPS Take by mouth      ALPRAZolam (XANAX) 0.5 MG tablet Take 0.5 mg by mouth nightly as needed       cyclobenzaprine (FLEXERIL) 5 MG tablet Take 5 mg by mouth 3 times daily as needed       venlafaxine (EFFEXOR XR) 75 MG extended release capsule Take 75 mg by mouth daily       meloxicam (MOBIC) 15 MG tablet Take 15 mg by mouth daily       No current facility-administered medications for this visit. No Known Allergies   Health Maintenance   Topic Date Due    COVID-19 Vaccine (1) Never done    DTaP/Tdap/Td vaccine (1 - Tdap) Never done    Shingles Vaccine (1 of 2) Never done    Pneumococcal 0-64 years Vaccine (2 of 4 - PPSV23) 04/28/2019    Flu vaccine (1) 09/01/2021    Hepatitis A vaccine  Aged Out    Hepatitis B vaccine  Aged Out    Hib vaccine  Aged Out    Meningococcal (ACWY) vaccine  Aged Out        Subjective:   Review of Systems   Constitutional: Negative for activity change, appetite change, chills, fatigue, fever and unexpected weight change. HENT: Negative for dental problem, facial swelling, hearing loss, mouth sores, nosebleeds, postnasal drip, sore throat, trouble swallowing and voice change. Eyes: Negative for redness, itching and visual disturbance. Respiratory: Negative for cough, chest tightness, shortness of breath and wheezing. Cardiovascular: Negative for chest pain, palpitations and leg swelling. Gastrointestinal: Negative for abdominal distention, abdominal pain, blood in stool, constipation, diarrhea, nausea and vomiting.    Genitourinary: Negative for difficulty urinating, dysuria, flank pain, frequency and hematuria. Musculoskeletal: Negative for arthralgias, back pain, gait problem, joint swelling, myalgias and neck stiffness. Skin: Negative for color change and rash. Neurological: Negative for dizziness, seizures, syncope, speech difficulty, weakness, light-headedness, numbness and headaches. Hematological: Negative for adenopathy. Does not bruise/bleed easily. Psychiatric/Behavioral: Negative for confusion and sleep disturbance. The patient is not nervous/anxious. Objective:   ECOG performance status is 1 today  Physical Exam  Vitals reviewed. Constitutional:       General: She is not in acute distress. Appearance: She is well-developed. HENT:      Head: Normocephalic. Mouth/Throat:      Pharynx: No oropharyngeal exudate. Eyes:      General: No scleral icterus. Right eye: No discharge. Left eye: No discharge. Pupils: Pupils are equal, round, and reactive to light. Neck:      Thyroid: No thyromegaly. Vascular: No JVD. Trachea: No tracheal deviation. Cardiovascular:      Rate and Rhythm: Normal rate. Heart sounds: Normal heart sounds. No murmur heard. No friction rub. No gallop. Pulmonary:      Effort: Pulmonary effort is normal. No respiratory distress. Breath sounds: No stridor. No decreased breath sounds, wheezing or rales. Chest:      Chest wall: No tenderness. Breasts:      Left: Mass (status post left mastectomy. Incision nicely healed) present. Abdominal:      General: Bowel sounds are normal. There is no distension. Palpations: Abdomen is soft. There is no mass. Tenderness: There is no abdominal tenderness. There is no rebound. Musculoskeletal:         General: Normal range of motion. Cervical back: Normal range of motion and neck supple. Comments: Good range of motion in all four extremities. Lymphadenopathy:      Cervical: No cervical adenopathy. Skin:     General: Skin is warm. Findings: No erythema or rash. Neurological:      Mental Status: She is alert and oriented to person, place, and time. Cranial Nerves: No cranial nerve deficit. Motor: No abnormal muscle tone. Deep Tendon Reflexes: Reflexes are normal and symmetric. Psychiatric:         Behavior: Behavior normal.         Thought Content: Thought content normal.         Judgment: Judgment normal.        /76 (Site: Right Upper Arm, Position: Sitting, Cuff Size: Large Adult)   Pulse 97   Temp 99.2 °F (37.3 °C) (Oral)   Resp 16   Ht 5' 8\" (1.727 m)   Wt 179 lb (81.2 kg)   SpO2 97%   BMI 27.22 kg/m²      Imaging studies and labs:     Lab Results   Component Value Date    WBC 5.3 04/05/2021    HGB 14.6 04/05/2021    HCT 44.6 04/05/2021    .8 (H) 04/05/2021     04/05/2021       Chemistry        Component Value Date/Time     04/05/2021 0739    K 4.1 04/05/2021 0739     04/05/2021 0739    CO2 28 04/05/2021 0739    BUN 13 04/05/2021 0739    CREATININE 0.8 04/05/2021 0739        Component Value Date/Time    CALCIUM 9.6 04/05/2021 0739    ALKPHOS 65 04/05/2021 0739    AST 15 04/05/2021 0739    ALT 15 04/05/2021 0739    BILITOT 0.2 (L) 04/05/2021 0739        DEXA study on the September 26, 2018 showed: Normal bone density. CT of the chest on the June 7, 2019 showed: Lawernce Roughen Nodular and groundglass opacities are again seen throughout the bilateral lower lobes and right middle lobe likely on the basis of multifocal pneumonia. This is markedly improved since the previous examination. Follow-up is again recommended in one    month to assess for resolution. 2. There is persistence of mediastinal lymphadenopathy. There is improvement of the previously seen bilateral hilar lymphadenopathy. CT of the chest in September 2020 showed:  1. Small pulmonary embolus in right posterior descending segmental artery without evidence of heart strain.    2. Patchy opacities and groundglass nodules bilaterally as evidence for infectious/inflammatory process. Mammogram on March 17, 2021 showed:  IMPRESSION: Mammogram BI-RADS: 2: Benign    Assessment/Plan:   1. Stage pT3, pN1, M0, ER positive left breast cancer. Since she had  lymph nodes involved and her tumor was quite large, my recommendation was to proceed with adjuvant chemotherapy. Since the patient has strong family history of breast cancer and she is diagnosed with breast cancer before age of 46 she had genetic testing that showed variant of unknown significance. On 12/13/2017 she started adjuvant AC. On March 7, 2018 she started weekly Taxol and completed on May 30, 2018. She completed postmastectomy radiation treatment on August 23, 2018. The patient had a hysterectomy without oophorectomy. We did check her estradiol and FSH level on 3   occasions. The [facility] level was in the postmenopausal range, estradiol is below 5.0. On September 15, 2018 she started adjuvant hormonal therapy with Femara. The patient is enrolled in the clinical study combining aromatase inhibitor with everolimus versus placebo. Due to  hospitalization for pneumonia possibly pneumonitis, the patient was taken off the study, since there were no any further dose reduction allowed. 2. Adjuvant hormonal therapy. She is currently on Femara which she tolerates well. Stable hot flashes and minimal occasional arthralgia. Her annual mammogram in March 2021 showed benign findings. There is no evidence of disease recurrence on today's physical examination, the patient denies having any new complaints. The patient was instructed to continue Femara  In the June 2019 she had CT of the chest that showed markedly improved nodule and groundglass opacities throughout the bilateral lower lobes. There was also persistent mediastinal lymphadenopathy.   The patient had  3 months follow-up of her CT of the chest in October 2019 that showed further decrease in nodularity and groundglass opacities in both lung bases. 3.  History of pulmonary emboli diagnosed in September 2020. The patient completed 6 months of anticoagulation. At that time, she had infiltrates and groundglass changes.   We will repeat her CT of the chest, especially that the patient reports occasional chest wall pain

## 2021-12-02 ENCOUNTER — HOSPITAL ENCOUNTER (OUTPATIENT)
Dept: CT IMAGING | Age: 51
Discharge: HOME | End: 2021-12-02
Payer: COMMERCIAL

## 2021-12-02 DIAGNOSIS — R07.89 CHEST WALL PAIN: ICD-10-CM

## 2021-12-02 PROCEDURE — 6360000004 HC RX CONTRAST MEDICATION: Performed by: INTERNAL MEDICINE

## 2021-12-02 PROCEDURE — 71260 CT THORAX DX C+: CPT

## 2021-12-02 RX ADMIN — IOPAMIDOL 80 ML: 755 INJECTION, SOLUTION INTRAVENOUS at 08:15

## 2022-01-27 ENCOUNTER — HOSPITAL ENCOUNTER (EMERGENCY)
Age: 52
Discharge: HOME | End: 2022-01-27
Payer: COMMERCIAL

## 2022-01-27 ENCOUNTER — APPOINTMENT (OUTPATIENT)
Dept: GENERAL RADIOLOGY | Age: 52
End: 2022-01-27
Payer: COMMERCIAL

## 2022-01-27 VITALS
DIASTOLIC BLOOD PRESSURE: 71 MMHG | SYSTOLIC BLOOD PRESSURE: 124 MMHG | WEIGHT: 172 LBS | HEART RATE: 79 BPM | OXYGEN SATURATION: 96 % | RESPIRATION RATE: 19 BRPM | HEIGHT: 68 IN | TEMPERATURE: 98.2 F | BODY MASS INDEX: 26.07 KG/M2

## 2022-01-27 DIAGNOSIS — J44.1 COPD EXACERBATION (HCC): Primary | ICD-10-CM

## 2022-01-27 DIAGNOSIS — R07.89 OTHER CHEST PAIN: ICD-10-CM

## 2022-01-27 LAB
ALBUMIN SERPL-MCNC: 4.1 G/DL (ref 3.5–5.1)
ALP BLD-CCNC: 66 U/L (ref 38–126)
ALT SERPL-CCNC: 17 U/L (ref 11–66)
ANION GAP SERPL CALCULATED.3IONS-SCNC: 11 MEQ/L (ref 8–16)
AST SERPL-CCNC: 17 U/L (ref 5–40)
BASOPHILS # BLD: 0.4 %
BASOPHILS ABSOLUTE: 0 THOU/MM3 (ref 0–0.1)
BILIRUB SERPL-MCNC: < 0.2 MG/DL (ref 0.3–1.2)
BILIRUBIN DIRECT: < 0.2 MG/DL (ref 0–0.3)
BUN BLDV-MCNC: 14 MG/DL (ref 7–22)
CALCIUM SERPL-MCNC: 8.8 MG/DL (ref 8.5–10.5)
CHLORIDE BLD-SCNC: 108 MEQ/L (ref 98–111)
CO2: 24 MEQ/L (ref 23–33)
CREAT SERPL-MCNC: 0.7 MG/DL (ref 0.4–1.2)
D-DIMER QUANTITATIVE: 467 NG/ML FEU (ref 0–500)
EKG ATRIAL RATE: 71 BPM
EKG P AXIS: 68 DEGREES
EKG P-R INTERVAL: 148 MS
EKG Q-T INTERVAL: 422 MS
EKG QRS DURATION: 64 MS
EKG QTC CALCULATION (BAZETT): 458 MS
EKG R AXIS: 65 DEGREES
EKG T AXIS: 75 DEGREES
EKG VENTRICULAR RATE: 71 BPM
EOSINOPHIL # BLD: 3.9 %
EOSINOPHILS ABSOLUTE: 0.2 THOU/MM3 (ref 0–0.4)
ERYTHROCYTE [DISTWIDTH] IN BLOOD BY AUTOMATED COUNT: 12.6 % (ref 11.5–14.5)
ERYTHROCYTE [DISTWIDTH] IN BLOOD BY AUTOMATED COUNT: 45.4 FL (ref 35–45)
FLU A ANTIGEN: NEGATIVE
FLU B ANTIGEN: NEGATIVE
GFR SERPL CREATININE-BSD FRML MDRD: 87 ML/MIN/1.73M2
GLUCOSE BLD-MCNC: 95 MG/DL (ref 70–108)
HCT VFR BLD CALC: 38 % (ref 37–47)
HEMOGLOBIN: 12.9 GM/DL (ref 12–16)
IMMATURE GRANS (ABS): 0.02 THOU/MM3 (ref 0–0.07)
IMMATURE GRANULOCYTES: 0.4 %
LIPASE: 54.4 U/L (ref 5.6–51.3)
LYMPHOCYTES # BLD: 26.9 %
LYMPHOCYTES ABSOLUTE: 1.4 THOU/MM3 (ref 1–4.8)
MCH RBC QN AUTO: 33.8 PG (ref 26–33)
MCHC RBC AUTO-ENTMCNC: 33.9 GM/DL (ref 32.2–35.5)
MCV RBC AUTO: 99.5 FL (ref 81–99)
MONOCYTES # BLD: 7.8 %
MONOCYTES ABSOLUTE: 0.4 THOU/MM3 (ref 0.4–1.3)
NUCLEATED RED BLOOD CELLS: 0 /100 WBC
OSMOLALITY CALCULATION: 285.3 MOSMOL/KG (ref 275–300)
PLATELET # BLD: 195 THOU/MM3 (ref 130–400)
PMV BLD AUTO: 10.1 FL (ref 9.4–12.4)
POTASSIUM SERPL-SCNC: 3.9 MEQ/L (ref 3.5–5.2)
PRO-BNP: 156.7 PG/ML (ref 0–900)
RBC # BLD: 3.82 MILL/MM3 (ref 4.2–5.4)
SARS-COV-2, NAAT: NOT  DETECTED
SARS-COV-2, PCR: NOT DETECTED
SEG NEUTROPHILS: 60.6 %
SEGMENTED NEUTROPHILS ABSOLUTE COUNT: 3.2 THOU/MM3 (ref 1.8–7.7)
SODIUM BLD-SCNC: 143 MEQ/L (ref 135–145)
TOTAL PROTEIN: 5.9 G/DL (ref 6.1–8)
TROPONIN T: < 0.01 NG/ML
WBC # BLD: 5.2 THOU/MM3 (ref 4.8–10.8)

## 2022-01-27 PROCEDURE — 6370000000 HC RX 637 (ALT 250 FOR IP): Performed by: NURSE PRACTITIONER

## 2022-01-27 PROCEDURE — 82248 BILIRUBIN DIRECT: CPT

## 2022-01-27 PROCEDURE — 84484 ASSAY OF TROPONIN QUANT: CPT

## 2022-01-27 PROCEDURE — 83690 ASSAY OF LIPASE: CPT

## 2022-01-27 PROCEDURE — 85379 FIBRIN DEGRADATION QUANT: CPT

## 2022-01-27 PROCEDURE — 93010 ELECTROCARDIOGRAM REPORT: CPT | Performed by: INTERNAL MEDICINE

## 2022-01-27 PROCEDURE — 96374 THER/PROPH/DIAG INJ IV PUSH: CPT

## 2022-01-27 PROCEDURE — 85025 COMPLETE CBC W/AUTO DIFF WBC: CPT

## 2022-01-27 PROCEDURE — 6360000002 HC RX W HCPCS: Performed by: NURSE PRACTITIONER

## 2022-01-27 PROCEDURE — 36415 COLL VENOUS BLD VENIPUNCTURE: CPT

## 2022-01-27 PROCEDURE — 71046 X-RAY EXAM CHEST 2 VIEWS: CPT

## 2022-01-27 PROCEDURE — 80053 COMPREHEN METABOLIC PANEL: CPT

## 2022-01-27 PROCEDURE — 83880 ASSAY OF NATRIURETIC PEPTIDE: CPT

## 2022-01-27 PROCEDURE — 2580000003 HC RX 258: Performed by: NURSE PRACTITIONER

## 2022-01-27 PROCEDURE — 99283 EMERGENCY DEPT VISIT LOW MDM: CPT

## 2022-01-27 PROCEDURE — 87804 INFLUENZA ASSAY W/OPTIC: CPT

## 2022-01-27 PROCEDURE — 87635 SARS-COV-2 COVID-19 AMP PRB: CPT

## 2022-01-27 PROCEDURE — 93005 ELECTROCARDIOGRAM TRACING: CPT | Performed by: EMERGENCY MEDICINE

## 2022-01-27 RX ORDER — METHYLPREDNISOLONE 4 MG/1
TABLET ORAL
Qty: 21 TABLET | Refills: 0 | Status: SHIPPED | OUTPATIENT
Start: 2022-01-27 | End: 2022-02-02

## 2022-01-27 RX ORDER — GUAIFENESIN 600 MG/1
600 TABLET, EXTENDED RELEASE ORAL 2 TIMES DAILY
Qty: 30 TABLET | Refills: 0 | Status: SHIPPED | OUTPATIENT
Start: 2022-01-27 | End: 2022-02-11

## 2022-01-27 RX ORDER — METHYLPREDNISOLONE SODIUM SUCCINATE 125 MG/2ML
125 INJECTION, POWDER, LYOPHILIZED, FOR SOLUTION INTRAMUSCULAR; INTRAVENOUS ONCE
Status: COMPLETED | OUTPATIENT
Start: 2022-01-27 | End: 2022-01-27

## 2022-01-27 RX ORDER — 0.9 % SODIUM CHLORIDE 0.9 %
1000 INTRAVENOUS SOLUTION INTRAVENOUS ONCE
Status: COMPLETED | OUTPATIENT
Start: 2022-01-27 | End: 2022-01-27

## 2022-01-27 RX ORDER — IPRATROPIUM BROMIDE AND ALBUTEROL SULFATE 2.5; .5 MG/3ML; MG/3ML
1 SOLUTION RESPIRATORY (INHALATION) ONCE
Status: COMPLETED | OUTPATIENT
Start: 2022-01-27 | End: 2022-01-27

## 2022-01-27 RX ADMIN — SODIUM CHLORIDE 1000 ML: 9 INJECTION, SOLUTION INTRAVENOUS at 19:08

## 2022-01-27 RX ADMIN — METHYLPREDNISOLONE SODIUM SUCCINATE 125 MG: 125 INJECTION, POWDER, FOR SOLUTION INTRAMUSCULAR; INTRAVENOUS at 19:12

## 2022-01-27 RX ADMIN — LIDOCAINE HYDROCHLORIDE: 20 SOLUTION ORAL; TOPICAL at 21:12

## 2022-01-27 RX ADMIN — IPRATROPIUM BROMIDE AND ALBUTEROL SULFATE 1 AMPULE: .5; 3 SOLUTION RESPIRATORY (INHALATION) at 19:15

## 2022-01-27 ASSESSMENT — ENCOUNTER SYMPTOMS
COUGH: 1
ABDOMINAL PAIN: 0
NAUSEA: 1
BLOOD IN STOOL: 0
SHORTNESS OF BREATH: 1
CONSTIPATION: 0
VOMITING: 0
EYE PAIN: 0
DIARRHEA: 1
WHEEZING: 0
RHINORRHEA: 0

## 2022-01-27 ASSESSMENT — PAIN SCALES - GENERAL: PAINLEVEL_OUTOF10: 5

## 2022-01-27 ASSESSMENT — HEART SCORE: ECG: 0

## 2022-01-27 ASSESSMENT — PAIN DESCRIPTION - FREQUENCY: FREQUENCY: CONTINUOUS

## 2022-01-27 ASSESSMENT — PAIN DESCRIPTION - PAIN TYPE: TYPE: ACUTE PAIN

## 2022-01-27 ASSESSMENT — PAIN DESCRIPTION - DESCRIPTORS: DESCRIPTORS: ACHING

## 2022-01-27 ASSESSMENT — PAIN DESCRIPTION - LOCATION: LOCATION: CHEST

## 2022-01-27 NOTE — ED NOTES
Bed: 031A  Expected date:   Expected time:   Means of arrival:   Comments:  Marcellus ríos left 1444 Gualberto Slaughter RN  01/27/22 [id number]

## 2022-01-27 NOTE — LETTER
[facility] [address] [address] EMERGENCY DEPT  [address] [place] [id number]  Phone: [phone]               January 27, 2022    Patient: Kamari Corey   YOB: 1970   Date of Visit: 1/27/2022       To Whom It May Concern:    Dejah Ma was seen and treated in our emergency department on 1/27/2022. She may return to work on 01/31/2022.       Sincerely,       Adrian Baird RN         Signature:__________________________________

## 2022-01-27 NOTE — ED PROVIDER NOTES
[address] [facility] [address] [address] EMERGENCY DEPT  EMERGENCY MEDICINE     Pt Name: Bowen Mei  MRN: [id number]  [place]omin[place]urt 1970  Date of evaluation: 1/27/2022  PCP:    Tavon Ashley MD  Provider: IRON Abebe - CNP    CHIEF COMPLAINT       Chief Complaint   Patient presents with    Shortness of Breath           HISTORY OF PRESENT ILLNESS    Elliot Hudson is a 29-year-old female patient presents to ER with complaint of shortness of breath, chest pain and cough for the last several days. She states she does have asthma and COPD. She does states she smokes. She states that she has been taking her breathing treatments with little to no relief. She also endorses nausea and diarrhea. She denies fever or vomiting. Upon examination patient is obviously short of breath. Lung sounds are diminished in the bases. Her initial blood pressure when this provider entered the room was 73/57. This was retaken and it was 112/71. Patient appears ill, but not toxic. Triage notes and Nursing notes were reviewed by myself. Any discrepancies are addressed above.     PAST MEDICAL HISTORY     Past Medical History:   Diagnosis Date    Acid reflux     Anxiety     Asthma     Breast cancer ([address]     COPD with asthma [address] [address]     Depression     Depression     Dizziness - light-headed     HX OF:    Emphysema     History of chest pain     History of therapeutic radiation     History of tinnitus     Hx antineoplastic chemo     Hx of blood clots     Joint pain     Numbness and tingling     Osteoarthritis     PONV (postoperative nausea and vomiting)     SOB (shortness of breath) on exertion        SURGICAL HISTORY       Past Surgical History:   Procedure Laterality Date    BLADDER SURGERY  2014    BREAST BIOPSY  10/06/2017    Oz Stevens    CARDIAC CATHETERIZATION  05/24/2011    NO EVIDENCE OF PULMONARY HTN,NO EVIDENCE OF DD,NORMAL LVF    CARDIOVASCULAR STRESS TEST  04/06/2011    EF 60% MILD INFERIOR WALL REVERSIBLE STRESS-INDUCED ISCHEMIA      SECTION      CHOLECYSTECTOMY  2015    CYST REMOVAL Right 2015    rt breast [facility]-benign breast cyst     ESOPHAGUS SURGERY  2014    HYSTERECTOMY      INSERTION / REMOVAL / REPLACEMENT VENOUS ACCESS CATHETER Right 2017    RIGHT SIDE SINGLE LUMEN POWERPORT INSERTION performed by Gisselle Mg MD at [facility] [address] Left 2017    LEFT MODIFIED RADICAL MASTECTOMY performed by Gisselle Mg MD at [address] TUNNELED VENOUS PORT PLACEMENT Right 2017    Single [place] Smart Port Insertion- Right side. CURRENT MEDICATIONS       Previous Medications    ALBUTEROL (PROVENTIL) (2.5 MG/3ML) 0.083% NEBULIZER SOLUTION    Inhale 2.5 mg into the lungs every 4 hours as needed     ALBUTEROL SULFATE  (90 BASE) MCG/ACT INHALER    Inhale 1 puff into the lungs every 6 hours as needed for Wheezing or Shortness of Breath    ALPRAZOLAM (XANAX) 0.5 MG TABLET    Take 0.5 mg by mouth nightly as needed     B COMPLEX VITAMINS CAPSULE    Take 1 capsule by mouth daily    BUTALBITAL-APAP-CAFFEINE -40 MG CAPS    Take by mouth    BUTALBITAL-ASPIRIN-CAFFEINE (FIORINAL) -40 MG PER CAPSULE    Take 1 capsule by mouth every 4 hours as needed for Headaches for up to 3 days.     CYCLOBENZAPRINE (FLEXERIL) 5 MG TABLET    Take 5 mg by mouth 3 times daily as needed     DULERA 200-5 MCG/ACT INHALER    Inhale 2 puffs into the lungs 2 times daily    LETROZOLE (FEMARA) 2.5 MG TABLET    Take 1 tablet by mouth daily    MELOXICAM (MOBIC) 15 MG TABLET    Take 15 mg by mouth daily    NONFORMULARY    2 tablets daily Tumeric    VENLAFAXINE (EFFEXOR XR) 75 MG EXTENDED RELEASE CAPSULE    Take 75 mg by mouth daily        ALLERGIES     No Known Allergies    FAMILY HISTORY       Family History   Problem Relation Age of Onset    Heart Disease Mother     Hypertension Mother     Diabetes Mother     Heart Disease Father     No Known Problems Sister  No Known Problems Brother     Breast Cancer Maternal Aunt     Breast Cancer Paternal Aunt     Cancer Paternal Aunt     Endometrial Cancer Paternal Aunt         SOCIAL HISTORY       Social History     Socioeconomic History    Marital status:      Spouse name: None    Number of children: 3    Years of education: None    Highest education level: None   Occupational History    Occupation:    Tobacco Use    Smoking status: Current Every Day Smoker     Packs/day: 1.00     Years: 18.00     Pack years: 18.00     Types: Cigarettes     Start date: 9/1/1999    Smokeless tobacco: Never Used    Tobacco comment: down to less than 0.25 aday   Substance and Sexual Activity    Alcohol use: Yes     Comment: SOCAIL    Drug use: No    Sexual activity: None   Other Topics Concern    None   Social History Narrative    None     Social Determinants of Health     Financial Resource Strain:     Difficulty of Paying Living Expenses: Not on file   Food Insecurity:     Worried About Running Out of Food in the Last Year: Not on file    Daisy of Food in the Last Year: Not on file   Transportation Needs:     Lack of Transportation (Medical): Not on file    Lack of Transportation (Non-Medical):  Not on file   Physical Activity:     Days of Exercise per Week: Not on file    Minutes of Exercise per Session: Not on file   Stress:     Feeling of Stress : Not on file   Social Connections:     Frequency of Communication with Friends and Family: Not on file    Frequency of Social Gatherings with Friends and Family: Not on file    Attends Confucianism Services: Not on file    Active Member of Clubs or Organizations: Not on file    Attends Club or Organization Meetings: Not on file    Marital Status: Not on file   Intimate Partner Violence:     Fear of Current or Ex-Partner: Not on file    Emotionally Abused: Not on file    Physically Abused: Not on file    Sexually Abused: Not on file   Housing Stability:     Unable to Pay for Housing in the Last Year: Not on file    Number of Places Lived in the Last Year: Not on file    Unstable Housing in the Last Year: Not on file       REVIEW OF SYSTEMS     Review of Systems   Constitutional: Positive for fatigue. Negative for appetite change, chills, fever and unexpected weight change. HENT: Negative for congestion, ear pain and rhinorrhea. Eyes: Negative for pain and visual disturbance. Respiratory: Positive for cough and shortness of breath. Negative for wheezing. Cardiovascular: Positive for chest pain. Negative for palpitations and leg swelling. Gastrointestinal: Positive for diarrhea and nausea. Negative for abdominal pain, blood in stool, constipation and vomiting. Genitourinary: Negative for dysuria, frequency and hematuria. Musculoskeletal: Negative for arthralgias, joint swelling and neck stiffness. Skin: Negative for rash. Neurological: Positive for light-headedness. Negative for dizziness, syncope, weakness and headaches. Hematological: Does not bruise/bleed easily. Except as noted above the remainder of the review of systems was reviewed and is negative. SCREENINGS          Heart Score for chest pain patients  History: Slightly Suspicious  ECG: Normal  Patient Age: > 39 and < 65 years  *Risk factors for Atherosclerotic disease: Cigarette smoking,Positive family History  Risk Factors: 1 or 2 risk factors  Troponin: < 1X normal limit  Heart Score Total: 2             PHYSICAL EXAM    (up to 7 for level 4, 8 or more for level 5)     ED Triage Vitals [01/27/22 1724]   BP Temp Temp Source Pulse Resp SpO2 Height Weight   130/69 99.1 °F (37.3 °C) Oral 87 20 99 % 5' 8\" (1.727 m) 172 lb (78 kg)       Physical Exam  Vitals and nursing note reviewed. Constitutional:       Appearance: She is well-developed. HENT:      Head: Normocephalic and atraumatic.    Eyes:      Conjunctiva/sclera: Conjunctivae normal.      Pupils: Pupils are equal, round, and reactive to light. Cardiovascular:      Rate and Rhythm: Normal rate and regular rhythm. Heart sounds: Normal heart sounds. No murmur heard. No gallop. Pulmonary:      Effort: Pulmonary effort is normal. No respiratory distress. Breath sounds: Examination of the right-lower field reveals decreased breath sounds. Examination of the left-lower field reveals decreased breath sounds. Decreased breath sounds present. No wheezing or rales. Abdominal:      General: Bowel sounds are normal.      Palpations: Abdomen is soft. Musculoskeletal:         General: Normal range of motion. Cervical back: Normal range of motion. Skin:     General: Skin is warm and dry. Capillary Refill: Capillary refill takes less than 2 seconds. Neurological:      Mental Status: She is alert and oriented to person, place, and time. DIAGNOSTIC RESULTS     EKG:(none if blank)  All EKGs are interpreted by the Emergency Department Physician who either signs or Co-signs this chart in the absence of a cardiologist.    Normal sinus rhythm    RADIOLOGY: (none if blank)   I directly visualized the following images and reviewed the radiologist interpretations. Interpretation per the Radiologist below, if available at the time of this note:  XR CHEST (2 VW)   Final Result   There is no acute intrathoracic process. **This report has been created using voice recognition software. It may contain minor errors which are inherent in voice recognition technology. **      Final report electronically signed by Dr Balaji Mcconnell on 1/27/2022 6:16 PM          LABS:  Labs Reviewed   CBC WITH AUTO DIFFERENTIAL - Abnormal; Notable for the following components:       Result Value    RBC 3.82 (*)     MCV 99.5 (*)     MCH 33.8 (*)     RDW-SD 45.4 (*)     All other components within normal limits   HEPATIC FUNCTION PANEL - Abnormal; Notable for the following components:     Total Bilirubin <0.2 (*) influenza, COVID-19 infection, COPD exacerbation, pneumonia, COVID-19 pneumonia and less likely pulmonary embolism.'s, chest x-ray and EKG are reassuring. Ambulatory pulse ox as reported by nurse is 96% on room air. Patient will be discharged home to follow-up with cardiology or primary care provider at patient's discretion. Patient is instructed to return for worsening symptoms, shortness of breath or new symptoms. Prescription for Medrol Dosepak and Mucinex were sent to patient's pharmacy. Strict return precautions and follow up instructions were discussed with the patient with which the patient agrees    ED Medications administered this visit:    Medications   aluminum & magnesium hydroxide-simethicone (MAALOX) 30 mL, lidocaine viscous hcl (XYLOCAINE) 5 mL (GI COCKTAIL) (has no administration in time range)   ipratropium-albuterol (DUONEB) nebulizer solution 1 ampule (1 ampule Inhalation Given 1/27/22 1915)   0.9 % sodium chloride bolus (0 mLs IntraVENous Stopped 1/27/22 2020)   methylPREDNISolone sodium (SOLU-MEDROL) injection 125 mg (125 mg IntraVENous Given 1/27/22 1912)         FINAL IMPRESSION      1. COPD exacerbation ([facility] [address]    2. Other chest pain          DISPOSITION/PLAN   DISPOSITION Decision To Discharge 01/27/2022 09:12:15 PM      PATIENT REFERRED TO:  Melissa Bell MD  [place] [address].  Critical access hospital           Kuldip Salmeron MD  [place], [address] [facility] [place] [id number] [phone]    Schedule an appointment as soon as possible for a visit         DISCHARGE MEDICATIONS:  New Prescriptions    GUAIFENESIN (MUCINEX) 600 MG EXTENDED RELEASE TABLET    Take 1 tablet by mouth 2 times daily for 15 days    METHYLPREDNISOLONE (MEDROL, VANDANA,) 4 MG TABLET    Take by mouth.               IRON Armenta CNP (electronically signed)           IRON Armenta CNP  01/27/22 2110

## 2022-01-27 NOTE — ED NOTES
Pt moved to room 31A. Pt is in bed and had vitals taken and was put on tele monitor. Pt has no further needs at this time.       Christianne Barreto  01/27/22 4895

## 2022-01-28 NOTE — ED NOTES
This RN introduced self to patient during administration of DUONEB with Will RN and Veronica Cassandra, nurse extern. Patient resting in bed, on monitor. VSS. Call light in reach of patient. Patient denies needs at this time.       Neal Watson RN  01/27/22 1925

## 2022-01-28 NOTE — ED NOTES
Patient's oxygen 96% upon reentering room from ambulating to bathroom and back to ER room 31.      Cristy Carlos RN  01/27/22 2057

## 2022-01-28 NOTE — ED NOTES
Patient up to bathroom at this time. Patient ambulatory without difficulty.      Virgen Stewart RN  01/27/22 7977

## 2022-05-09 ENCOUNTER — HOSPITAL ENCOUNTER (OUTPATIENT)
Dept: MAMMOGRAPHY | Age: 52
Discharge: HOME | End: 2022-05-09
Payer: COMMERCIAL

## 2022-05-09 DIAGNOSIS — Z12.31 VISIT FOR SCREENING MAMMOGRAM: ICD-10-CM

## 2022-05-09 PROCEDURE — 77067 SCR MAMMO BI INCL CAD: CPT

## 2022-05-13 ENCOUNTER — OFFICE VISIT (OUTPATIENT)
Dept: ONCOLOGY | Age: 52
End: 2022-05-13
Payer: COMMERCIAL

## 2022-05-13 ENCOUNTER — HOSPITAL ENCOUNTER (OUTPATIENT)
Dept: INFUSION THERAPY | Age: 52
Discharge: HOME | End: 2022-05-13
Payer: COMMERCIAL

## 2022-05-13 VITALS — HEART RATE: 82 BPM | SYSTOLIC BLOOD PRESSURE: 128 MMHG | RESPIRATION RATE: 14 BRPM | DIASTOLIC BLOOD PRESSURE: 68 MMHG

## 2022-05-13 DIAGNOSIS — Z79.811 PROPHYLACTIC USE OF LETROZOLE (FEMARA): ICD-10-CM

## 2022-05-13 DIAGNOSIS — Z90.12 STATUS POST MASTECTOMY, LEFT: ICD-10-CM

## 2022-05-13 DIAGNOSIS — C50.912 BREAST CANCER METASTASIZED TO AXILLARY LYMPH NODE, LEFT (HCC): Primary | ICD-10-CM

## 2022-05-13 DIAGNOSIS — Z85.3 ENCOUNTER FOR FOLLOW-UP SURVEILLANCE OF BREAST CANCER: ICD-10-CM

## 2022-05-13 DIAGNOSIS — Z86.711 HISTORY OF PULMONARY EMBOLISM: ICD-10-CM

## 2022-05-13 DIAGNOSIS — C77.3 BREAST CANCER METASTASIZED TO AXILLARY LYMPH NODE, LEFT (HCC): Primary | ICD-10-CM

## 2022-05-13 DIAGNOSIS — Z08 ENCOUNTER FOR FOLLOW-UP SURVEILLANCE OF BREAST CANCER: ICD-10-CM

## 2022-05-13 DIAGNOSIS — T45.1X5A AROMATASE INHIBITOR-ASSOCIATED ARTHRALGIA: ICD-10-CM

## 2022-05-13 DIAGNOSIS — M25.50 AROMATASE INHIBITOR-ASSOCIATED ARTHRALGIA: ICD-10-CM

## 2022-05-13 PROCEDURE — 99211 OFF/OP EST MAY X REQ PHY/QHP: CPT

## 2022-05-13 PROCEDURE — 4004F PT TOBACCO SCREEN RCVD TLK: CPT | Performed by: INTERNAL MEDICINE

## 2022-05-13 PROCEDURE — 3017F COLORECTAL CA SCREEN DOC REV: CPT | Performed by: INTERNAL MEDICINE

## 2022-05-13 PROCEDURE — 99214 OFFICE O/P EST MOD 30 MIN: CPT | Performed by: INTERNAL MEDICINE

## 2022-05-13 PROCEDURE — G8427 DOCREV CUR MEDS BY ELIG CLIN: HCPCS | Performed by: INTERNAL MEDICINE

## 2022-05-13 PROCEDURE — G8419 CALC BMI OUT NRM PARAM NOF/U: HCPCS | Performed by: INTERNAL MEDICINE

## 2022-05-13 ASSESSMENT — ENCOUNTER SYMPTOMS
EYE REDNESS: 0
VOICE CHANGE: 0
SORE THROAT: 0
BACK PAIN: 0
FACIAL SWELLING: 0
VOMITING: 0
TROUBLE SWALLOWING: 0
WHEEZING: 0
CHEST TIGHTNESS: 0
CONSTIPATION: 0
EYE ITCHING: 0
BLOOD IN STOOL: 0
COLOR CHANGE: 0
COUGH: 0
SHORTNESS OF BREATH: 0
DIARRHEA: 0
NAUSEA: 0
ABDOMINAL DISTENTION: 0
ABDOMINAL PAIN: 0

## 2022-05-13 NOTE — PROGRESS NOTES
[facility] PROFESSIONAL SERVS  [facility]  [address]  [address] [facility] [address]  [facility] [address]  Dept: [phone]  Dept Fax: [phone]: [phone]     Visit Date: 5/13/2022     London Dias is a 45 y.o. female who presents today for:   Chief Complaint   Patient presents with    Follow-up     Malignant neoplasm of left breast in female, estrogen receptor positive, unspecified site of breast [facility])    Results     mammogram 5/9       HPI:   Stephany Gunter is a 26-year-old female with h/o invasive ductal carcinoma of the left breast.  Currently on adjuvant hormonal therapy. Oncology history:  The patient noted a mass in the upper aspect of her left breast, at first she thought it was a cyst which she had before. When it persisted, she elected to have further evaluation. She did undergo a diagnostic mammogram at [facility] in [place], [place]. It showed a 3.8 x 2.4 x 2.7 cm malignant-appearing spiculated mass within the left breast at 11:00 position. Evaluation of the left axilla with US showed suspicious appearing lymph node with increased cortical thickening and replacement of fatty hilum. On October 9, 2017, she underwent a left breast mass and left lymph node ultrasound-guided core biopsy. The final path report showed an invasive ductal carcinoma, ER positive at 95%, CA positive at 95%, and a HER-2/breezy by IHC was 2+. The FISH test was still equivocal.  HER-2/CEP17 ratio was 1.7, however the average HER-2 copy number was 4. She met with the surgical oncologists and medical oncologists at the [facility], however she decided to receive care closer to her home. The patient had metastatic workup: CT of the chest, abdomen and bone scan were negative for evidence of metastatic disease. On November 7, 2017 she underwent right breast mastectomy with axillary lymph node dissection. Final pathology report showed: FINAL DIAGNOSIS:  A.  Left breast with portion of axillary contents, mastectomy:      Invasive ductal carcinoma with lobular features involving nipple      and all 4 quadrants of breast.  Maximum tumor dimension estimated      6.8 cm. pT3.  Margins negative.      3 of 9 axillary lymph nodes positive for metastatic carcinoma,      pN1a. B. Left level II axillary lymph nodes, dissection:   5 lymph nodes negative for metastatic carcinoma. BREAST BIOMARKERS   Inform HER-2 Dual CHARLENE Nonamplified -  Estrogen Receptor: positive (99%, strong intensity)  Progesterone Receptor: positive (99%, strong intensity)  When she recovered from her surgery, on December 13, 2017 she started adjuvant AC. On March 7, 2018 she started weekly Taxol and completed on May 30,2018. She completed postmastectomy radiation treatment on August 23, 2018. She was hospitalized from March 2, 2019 till March 4, 2019 for pneumonia. She was started on IV Levaquin and Solumedrol. During hospitalization she never became hypotensive, no need for pressors. She was discharged home on oral Levofloxacin for 7-day course and Prednisone for 5 days. Tiotropium 18 mcg daily added to her regimen. Interim history on 05/13/2022:  The patient presents to medical oncology clinic for 6 months follow up visit. The patient reports that her shortness of breath and cough are much improved, no recurrent sinus infection. She denies any problems related to her breasts. Tolerates Femara well with stable hot flashes, however her diffuse arthralgia is getting worse. Had annual mammogram on May 9, 2022.   No hospitalizations since last visit with me     HPI   Past Medical History:   Diagnosis Date    Acid reflux     Anxiety     Asthma     Breast cancer ([address]     COPD with asthma ([address]     Depression     Depression     Dizziness - light-headed     HX OF:    Emphysema     History of chest pain     History of therapeutic radiation     History of tinnitus     Hx antineoplastic chemo     Hx of blood clots     Joint pain     Numbness and tingling     Osteoarthritis     PONV (postoperative nausea and vomiting)     SOB (shortness of breath) on exertion       Past Surgical History:   Procedure Laterality Date    BLADDER SURGERY  2014    BREAST BIOPSY  10/06/2017    Zhanna Macias    CARDIAC CATHETERIZATION  2011    NO EVIDENCE OF PULMONARY HTN,NO EVIDENCE OF DD,NORMAL LVF    CARDIOVASCULAR STRESS TEST  2011    EF 60% MILD INFERIOR WALL REVERSIBLE STRESS-INDUCED ISCHEMIA      SECTION  1992    CHOLECYSTECTOMY  2015    CYST REMOVAL Right 2015    rt breast [facility]-benign breast cyst     ESOPHAGUS SURGERY  2014    HYSTERECTOMY      INSERTION / REMOVAL / REPLACEMENT VENOUS ACCESS CATHETER Right 2017    RIGHT SIDE SINGLE LUMEN POWERPORT INSERTION performed by Wilver Kramer MD at [address] Left 2017    LEFT MODIFIED RADICAL MASTECTOMY performed by Wilver Kramer MD at [address] TUNNELED VENOUS PORT PLACEMENT Right 2017    Single [place] Smart Port Insertion- Right side.        Family History   Problem Relation Age of Onset    Heart Disease Mother     Hypertension Mother     Diabetes Mother     Heart Disease Father     No Known Problems Sister     No Known Problems Brother     Breast Cancer Maternal Aunt     Breast Cancer Paternal Aunt     Endometrial Cancer Paternal Aunt     Lung Cancer Paternal Aunt       Social History     Tobacco Use    Smoking status: Current Every Day Smoker     Packs/day: 1.00     Years: 23.00     Pack years: 23.00     Types: Cigarettes     Start date: 1999    Smokeless tobacco: Never Used    Tobacco comment: down to less than 0.25 aday   Substance Use Topics    Alcohol use: Yes     Comment: rarely      Current Outpatient Medications   Medication Sig Dispense Refill    letrozole (FEMARA) 2.5 MG tablet Take 1 tablet by mouth daily 30 tablet 3    DULERA 200-5 MCG/ACT inhaler Inhale 2 puffs into the lungs 2 times daily 13 g 5    albuterol sulfate  (90 Base) MCG/ACT inhaler Inhale 1 puff into the lungs every 6 hours as needed for Wheezing or Shortness of Breath 1 Inhaler 5    NONFORMULARY 2 tablets daily Tumeric      b complex vitamins capsule Take 1 capsule by mouth daily      albuterol (PROVENTIL) (2.5 MG/3ML) 0.083% nebulizer solution Inhale 2.5 mg into the lungs every 4 hours as needed       butalbital-apap-caffeine -40 MG CAPS Take by mouth      ALPRAZolam (XANAX) 0.5 MG tablet Take 0.5 mg by mouth nightly as needed       cyclobenzaprine (FLEXERIL) 5 MG tablet Take 5 mg by mouth 3 times daily as needed       venlafaxine (EFFEXOR XR) 75 MG extended release capsule Take 75 mg by mouth daily       meloxicam (MOBIC) 15 MG tablet Take 15 mg by mouth daily       No current facility-administered medications for this visit. No Known Allergies   Health Maintenance   Topic Date Due    Depression Monitoring  Never done    COVID-19 Vaccine (1) Never done    DTaP/Tdap/Td vaccine (1 - Tdap) Never done    Shingles vaccine (1 of 2) Never done    Pneumococcal 0-64 years Vaccine (2 - PPSV23 or PCV20) 03/03/2020    Flu vaccine (Season Ended) 09/01/2022    Hepatitis A vaccine  Aged Out    Hepatitis B vaccine  Aged Out    Hib vaccine  Aged Out    Meningococcal (ACWY) vaccine  Aged Out        Subjective:   Review of Systems   Constitutional: Negative for activity change, appetite change, chills, fatigue, fever and unexpected weight change. HENT: Negative for dental problem, facial swelling, hearing loss, mouth sores, nosebleeds, postnasal drip, sore throat, trouble swallowing and voice change. Eyes: Negative for redness, itching and visual disturbance. Respiratory: Negative for cough, chest tightness, shortness of breath and wheezing. Cardiovascular: Negative for chest pain, palpitations and leg swelling.    Gastrointestinal: Negative for abdominal distention, abdominal pain, blood in stool, constipation, diarrhea, nausea and vomiting. Genitourinary: Negative for difficulty urinating, dysuria, flank pain, frequency and hematuria. Musculoskeletal: Negative for arthralgias, back pain, gait problem, joint swelling, myalgias and neck stiffness. Skin: Negative for color change and rash. Neurological: Negative for dizziness, seizures, syncope, speech difficulty, weakness, light-headedness, numbness and headaches. Hematological: Negative for adenopathy. Does not bruise/bleed easily. Psychiatric/Behavioral: Negative for confusion and sleep disturbance. The patient is not nervous/anxious. Objective:   ECOG performance status is 1 today  Physical Exam  Vitals reviewed. Constitutional:       General: She is not in acute distress. Appearance: She is well-developed. HENT:      Head: Normocephalic. Mouth/Throat:      Pharynx: No oropharyngeal exudate. Eyes:      General: No scleral icterus. Right eye: No discharge. Left eye: No discharge. Pupils: Pupils are equal, round, and reactive to light. Neck:      Thyroid: No thyromegaly. Vascular: No JVD. Trachea: No tracheal deviation. Cardiovascular:      Rate and Rhythm: Normal rate. Heart sounds: Normal heart sounds. No murmur heard. No friction rub. No gallop. Pulmonary:      Effort: Pulmonary effort is normal. No respiratory distress. Breath sounds: No stridor. No decreased breath sounds, wheezing or rales. Chest:      Chest wall: No tenderness. Breasts:      Left: Mass (status post left mastectomy. Incision nicely healed) present. Abdominal:      General: Bowel sounds are normal. There is no distension. Palpations: Abdomen is soft. There is no mass. Tenderness: There is no abdominal tenderness. There is no rebound. Musculoskeletal:         General: Normal range of motion. Cervical back: Normal range of motion and neck supple.       Comments: Good range of motion in all four extremities. Lymphadenopathy:      Cervical: No cervical adenopathy. Skin:     General: Skin is warm. Findings: No erythema or rash. Neurological:      Mental Status: She is alert and oriented to person, place, and time. Cranial Nerves: No cranial nerve deficit. Motor: No abnormal muscle tone. Deep Tendon Reflexes: Reflexes are normal and symmetric. Psychiatric:         Behavior: Behavior normal.         Thought Content: Thought content normal.         Judgment: Judgment normal.        /68 (Site: Right Upper Arm, Position: Sitting)   Pulse 82   Resp 14      Imaging studies and labs:     Lab Results   Component Value Date    WBC 5.2 01/27/2022    HGB 12.9 01/27/2022    HCT 38.0 01/27/2022    MCV 99.5 (H) 01/27/2022     01/27/2022       Chemistry        Component Value Date/Time     01/27/2022 1758    K 3.9 01/27/2022 1758    K 4.1 04/05/2021 0739     01/27/2022 1758    CO2 24 01/27/2022 1758    BUN 14 01/27/2022 1758    CREATININE 0.7 01/27/2022 1758        Component Value Date/Time    CALCIUM 8.8 01/27/2022 1758    ALKPHOS 66 01/27/2022 1758    AST 17 01/27/2022 1758    ALT 17 01/27/2022 1758    BILITOT <0.2 (L) 01/27/2022 1758        DEXA study on the September 26, 2018 showed: Normal bone density. CT of the chest on the June 7, 2019 showed: Kehinde Dagoberto Nodular and groundglass opacities are again seen throughout the bilateral lower lobes and right middle lobe likely on the basis of multifocal pneumonia. This is markedly improved since the previous examination. Follow-up is again recommended in one    month to assess for resolution. 2. There is persistence of mediastinal lymphadenopathy. There is improvement of the previously seen bilateral hilar lymphadenopathy. CT of the chest in September 2020 showed:  1. Small pulmonary embolus in right posterior descending segmental artery without evidence of heart strain.    2. Patchy opacities and groundglass nodules bilaterally as evidence for infectious/inflammatory process. CT of the chest in December 2021 showed: Rhys Founds No chest wall mass is seen. Status post left mastectomy with axillary node dissection.       2. Focal area of tree-in-bud opacities seen in the left lung base is nonspecific. Clinical correlation for active infection is recommended.       3. Mild emphysema. Mammogram on May 9, 2022 showed:  IMPRESSION: Mammogram BI-RADS: 2: Benign    Assessment/Plan:   1. Stage pT3, pN1, M0, ER positive left breast cancer. Since she had  lymph nodes involved and her tumor was quite large, my recommendation was to proceed with adjuvant chemotherapy. Since the patient has strong family history of breast cancer and she is diagnosed with breast cancer before age of 46 she had genetic testing that showed variant of unknown significance. On 12/13/2017 she started adjuvant AC. On March 7, 2018 she started weekly Taxol and completed on May 30, 2018. She completed postmastectomy radiation treatment on August 23, 2018. The patient had a hysterectomy without oophorectomy. We did check her estradiol and FSH level on 3   occasions. The [address] [place] [address] level was in the postmenopausal range, estradiol is below 5.0. On September 15, 2018 she started adjuvant hormonal therapy with Femara. The patient is enrolled in the clinical study combining aromatase inhibitor with everolimus versus placebo. Due to  hospitalization for pneumonia possibly pneumonitis, the patient was taken off the study, since there were no any further dose reduction allowed. 2. Adjuvant hormonal therapy. She is currently on Femara. She reports stable hot flashes, but her diffuse arthralgia is getting worse. Her annual mammogram in May 2022 showed benign findings. There is no evidence of disease recurrence on today's physical examination, the patient denies having any new complaints.   Discussed with the patient that due to lymph node involvement current recommendation is to continue aromatase inhibitor for at least 7 to 10 years. However, due to worsening arthralgia I wanted the patient to have 2 months off Femara. Hopefully, after this break she will be able to continue Femara for total of 7 to 10 years  In June 2019 she had CT of the chest that showed markedly improved nodule and groundglass opacities throughout the bilateral lower lobes. There was also persistent mediastinal lymphadenopathy. The patient had  3 months follow-up of her CT of the chest in October 2019 that showed further decrease in nodularity and groundglass opacities in both lung bases. Recent CT of the chest in December 2021 showed no suspicious findings. 3.  History of pulmonary emboli diagnosed in September 2020. The patient completed 6 months of anticoagulation.

## 2022-08-05 ENCOUNTER — TELEPHONE (OUTPATIENT)
Dept: ONCOLOGY | Age: 52
End: 2022-08-05

## 2022-08-16 ENCOUNTER — TELEPHONE (OUTPATIENT)
Dept: ONCOLOGY | Age: 52
End: 2022-08-16

## 2022-09-02 ENCOUNTER — OFFICE VISIT (OUTPATIENT)
Dept: ONCOLOGY | Age: 52
End: 2022-09-02
Payer: COMMERCIAL

## 2022-09-02 ENCOUNTER — HOSPITAL ENCOUNTER (OUTPATIENT)
Dept: INFUSION THERAPY | Age: 52
Discharge: HOME | End: 2022-09-02
Payer: COMMERCIAL

## 2022-09-02 VITALS
WEIGHT: 173 LBS | BODY MASS INDEX: 26.22 KG/M2 | OXYGEN SATURATION: 98 % | HEIGHT: 68 IN | HEART RATE: 84 BPM | RESPIRATION RATE: 16 BRPM | TEMPERATURE: 98.5 F | SYSTOLIC BLOOD PRESSURE: 114 MMHG | DIASTOLIC BLOOD PRESSURE: 64 MMHG

## 2022-09-02 DIAGNOSIS — C50.912 BREAST CANCER METASTASIZED TO AXILLARY LYMPH NODE, LEFT (HCC): Primary | ICD-10-CM

## 2022-09-02 DIAGNOSIS — Z79.811 PROPHYLACTIC USE OF LETROZOLE (FEMARA): ICD-10-CM

## 2022-09-02 DIAGNOSIS — R07.89 CHEST WALL PAIN: ICD-10-CM

## 2022-09-02 DIAGNOSIS — Z90.12 STATUS POST MASTECTOMY, LEFT: ICD-10-CM

## 2022-09-02 DIAGNOSIS — C77.3 BREAST CANCER METASTASIZED TO AXILLARY LYMPH NODE, LEFT (HCC): Primary | ICD-10-CM

## 2022-09-02 PROCEDURE — G8427 DOCREV CUR MEDS BY ELIG CLIN: HCPCS | Performed by: PHYSICIAN ASSISTANT

## 2022-09-02 PROCEDURE — 3017F COLORECTAL CA SCREEN DOC REV: CPT | Performed by: PHYSICIAN ASSISTANT

## 2022-09-02 PROCEDURE — 99211 OFF/OP EST MAY X REQ PHY/QHP: CPT

## 2022-09-02 PROCEDURE — 4004F PT TOBACCO SCREEN RCVD TLK: CPT | Performed by: PHYSICIAN ASSISTANT

## 2022-09-02 PROCEDURE — G8419 CALC BMI OUT NRM PARAM NOF/U: HCPCS | Performed by: PHYSICIAN ASSISTANT

## 2022-09-02 PROCEDURE — 99214 OFFICE O/P EST MOD 30 MIN: CPT | Performed by: PHYSICIAN ASSISTANT

## 2022-09-02 RX ORDER — AMOXICILLIN AND CLAVULANATE POTASSIUM 875; 125 MG/1; MG/1
1 TABLET, FILM COATED ORAL 2 TIMES DAILY
COMMUNITY
Start: 2022-08-26

## 2022-09-02 RX ORDER — DICLOFENAC SODIUM 75 MG/1
75 TABLET, DELAYED RELEASE ORAL 2 TIMES DAILY
COMMUNITY

## 2022-09-02 RX ORDER — LETROZOLE 2.5 MG/1
2.5 TABLET, FILM COATED ORAL EVERY OTHER DAY
Qty: 30 TABLET | Refills: 0 | Status: SHIPPED
Start: 2022-09-02 | End: 2022-09-06 | Stop reason: SDUPTHER

## 2022-09-02 NOTE — PATIENT INSTRUCTIONS
Return to clinic 3 months with Dr. Saba Sorto  Patient instructed to resume Femara every other day  Will obtain DEXA prior to RTC  Will refer to PT for decreased range of motion of left upper arm, lymphedema evaluation  Please call for questions or concerns.
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
## 2022-09-06 RX ORDER — LETROZOLE 2.5 MG/1
2.5 TABLET, FILM COATED ORAL EVERY OTHER DAY
Qty: 30 TABLET | Refills: 3 | Status: SHIPPED | OUTPATIENT
Start: 2022-09-06

## 2022-09-13 ENCOUNTER — HOSPITAL ENCOUNTER (OUTPATIENT)
Dept: PHYSICAL THERAPY | Age: 52
Setting detail: THERAPIES SERIES
Discharge: HOME | End: 2022-09-13
Payer: COMMERCIAL

## 2022-09-13 PROCEDURE — 97161 PT EVAL LOW COMPLEX 20 MIN: CPT

## 2022-09-13 NOTE — PROGRESS NOTES
** PLEASE SIGN, DATE AND TIME CERTIFICATION BELOW AND RETURN TO [facility] OUTPATIENT REHABILITATION (FAX #: [phone]). ATTEST/CO-SIGN IF ACCESSING VIA INPlann[employer]. THANK YOU.**    I certify that I have examined the patient below and determined that Physical Medicine and Rehabilitation service is necessary and that I approve the established plan of care for up to 90 days or as specifically noted.   Attestation, signature or co-signature of physician indicates approval of certification requirements.    ________________________ ____________ __________  Physician Signature   Date   Time  [facility]  PHYSICAL THERAPY  [x] EVALUATION    [x] SPECIALIZED THERAPY SERVICES - [place]     Date: 2022  Patient Name:  Steve Guerrero  : 1970  MRN: [id number]  CSN: [id number]      Referring Practitioner Jacqueline Shah, MAURISIO Navarrete*   Diagnosis C50.912, C77.3 (ICD-10-CM) - Breast cancer metastasized to axillary lymph node, left ([facility] [address]  Z90.12 (ICD-10-CM) - Status post mastectomy, left  R07.89 (ICD-10-CM) - Chest wall pain    Treatment Diagnosis L shoulder stiffness, L chest wall and shoulder pain, L chest wall edema   Date of Evaluation 22    Additional Pertinent History Asthma, COPD, anxiety, arthritis      Functional Outcome Measure Used O: QuickDASH   Functional Outcome Score 13.6% impaired (22)       Insurance: Primary: Payor: Bindu Underwood /  /  / ,   Secondary:    Authorization Information: Eval only, then precert   Visit # 1,  for progress note   Visits Allowed: 1   Recertification Date:    Physician Follow-Up: 22 Tadeo Valladares   Physician Orders: Eric Clark and treat   History of Present Illness: 10/9/17 ER/AL+, HER2- invasive ductal carcinoma, mastectomy on 17 with SLNB 3/14 +, completed 4 cycles of Adriamycin and cytoxan with 12 weekly cycles of Taxol from 17 thru 18, then radiation from 18 thru 18 and now on Femara for hormonal therapy       SUBJECTIVE: Reports started with L anterior chest wall and axilla pain approximately 2 months and admits overall has intermittent worsening of symptoms. Denies any changes in activity levels. Has continued to work with lifting up to 30# throughout her day. Social/Functional History and Current Status:  Medications and Allergies have been reviewed and are listed on Medical History Questionnaire. Abby Molina lives with significant other and with family in a multiple floor home with stairs and a handrail to enter. Task Previous Current   ADLs  Independent Independent   IADL's Independent Independent with pain   Ambulation Independent Independent   Transfers Independent Independent   Recreation Independent Independent - [employer] for [employer] Independent Independent   Driving Active  Active    Work [employer]. 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 With pain     OBJECTIVE:   Posture: Fair  Palpation: Moderate tenderness at L chest wall to lateral flank, all along rib cage and intercostals with soft edema noted. Observation: No skin changes related to edema.     Range of Motion/Strength (Range of Motion in degrees)    Right Left Comments   Shoulder Flexion PROM 180 176 Pain on L   Shoulder ABDuction PROM 180 180    Shoulder Strength 4+/5 4+/5    Elbow Strength 5/5 5/5      Circumferential Measurements:   Upper Extremity Circumferential Measurements    Right (cm) Left (cm) Comments   Met Heads 19.5 19.5    Ulnar Styloid Process 15.9 16    10 cm distal to Lateral Epicondyle 23.4 23.3    Elbow Crease 25.5 25.5    10 cm proximal from Lateral Epicondyle 32.4 31.4    Axilla 36.1 34.6    Total 152.8 150.3 Axilla - to - Axilla:     10/9/18:  151.4 150.2      Last PN on 9/6/18:     153.8      153.7                                      PN on 7/9/18:             152.9      156.8 PN on 6/26/18:           153.7      154.6                                      PN on 4/19/18:           150.7      150.5                                      PN on 2/26/18:           151.9      151.8                                      Eval on 11/28/17:       152.5      153.5    Brief Fatigue Inventory: 2.7 (0: none, 1-3: mild, 4-6: moderate, 7-10: severe)  Quick Dash: 13.6% impaired    Treatment Initiated: Not permitted per insurance. TREATMENT   Precautions: L UE lymphedema risk   Pain: 4/10 L chest wall    X in shaded column indicates activity completed today   Modalities Parameters/  Location  Notes         Manual Therapy Time/Technique  Notes         Exercise/Intervention   Notes              Specific Interventions Next Treatment: MLD (PORi protocol), gentle stretches of chest wall    Activity Tolerance: Tolerated evaluation well    ASSESSMENT:  Assessment: Pt presents with L chest wall pain and edema with L shoulder pain and stiffness. This is causing her increased pain with her work duties. She would benefit from skilled PT/Oncology Rehab to address these issues and allow full participation with work without pain. Body Structures/Functions/Activity Limitations:Decreased tolerance of activities, Decreased affected limb range of motion, Difficulty reaching/carrying/pushing/pulling, Pain, and Lymphedema  Prognosis: good    GOALS:  Patient Goal: \"Get rid of pain and swelling\"    Short Term Goals to be met in 4 weeks:  Pt to demo L shoulder PROM flexion improved from 176 deg with pain to 180 deg without pain for ease with dressing. Pt to demo compliance with self MLD and stretches to improve chest wall pain and swelling for ease with shoulder mobility. Long Term Goals to be met in 8 weeks:   Pt to demo QuickDASH score improved from 13.6% impaired to <10% impaired for improved ease with work duties.   Pt to report L chest wall edema reduced by 75% for improved ease with shoulder function in the home and at work. Pt to be compliant with compression garment use for control of lymphedema and reduced risk of condition progression. Patient Education: Plan of care, goals. See \"Treatment Initiated\" for further details. Encouraged pt to use ice, perform gentle MLD to chest wall, stretch morning and evening and wear compression tank at bedtime. Education Outcome: Verbalized understanding  Education Barriers: None    PLAN:  Treatment Recommendations: Range of Motion, Lymphedema Management, Manual Techniques, Pain Management, Home Exercise Prescription, and Safety Education    Plan of care initiated. Plan to see patient 2x times per week for 8 weeks to address the treatment planned outlined above.     Time In 1510   Time Out 1600   Timed Code Minutes: 0 min   Total Treatment Time: 50 min       Electronically Signed by: Drea Estevez, PT, DPT, ALLEY, CLT [id number] 9/13/2022

## 2022-09-19 ENCOUNTER — HOSPITAL ENCOUNTER (OUTPATIENT)
Dept: PHYSICAL THERAPY | Age: 52
Setting detail: THERAPIES SERIES
Discharge: HOME | End: 2022-09-19
Payer: COMMERCIAL

## 2022-09-19 PROCEDURE — 97140 MANUAL THERAPY 1/> REGIONS: CPT

## 2022-09-19 NOTE — PROGRESS NOTES
[facility]  ONCOLOGY REHABILITATION  PHYSICAL THERAPY  [x] DAILY NOTE [] PROGRESS NOTE [] DISCHARGE NOTE    [x] SPECIALIZED THERAPY SERVICES - [place]     Date: 2022  Patient Name:  Yoselin Tapia  : 1970  MRN: [id number]  CSN: [id number]       Referring Practitioner Elayne Boas, Konrad Cedar, PA*   Diagnosis C50.912, C77.3 (ICD-10-CM) - Breast cancer metastasized to axillary lymph node, left ([facility] [address]  Z90.12 (ICD-10-CM) - Status post mastectomy, left  R07.89 (ICD-10-CM) - Chest wall pain    Treatment Diagnosis L shoulder stiffness, L chest wall and shoulder pain, L chest wall edema   Date of Evaluation 22    Additional Pertinent History Asthma, COPD, anxiety, arthritis       Functional Outcome Measure Used O: QuickDASH   Functional Outcome Score 13.6% impaired (22)        Insurance: Primary: Payor: Padilla Bocanegra /  /  / ,   Secondary:    Authorization Information: Eval only, then precert   Visit # 2,  for progress note   Visits Allowed: 160 units from 22 to 22 in addition to eval   Recertification Date:    Physician Follow-Up: 22 Jeanette Prado   Physician Orders: Santos Valdovinos and treat   History of Present Illness: 10/9/17 ER/TX+, HER2- invasive ductal carcinoma, mastectomy on 17 with SLNB 3/14 +, completed 4 cycles of Adriamycin and cytoxan with 12 weekly cycles of Taxol from 17 thru 18, then radiation from 18 thru 18 and now on Femara for hormonal therapy      SUBJECTIVE: Pt reports very tired today from not sleeping. Admits has not yet complied with self massage but trying to stretch more.       TREATMENT   Precautions: L lymphedema risk   Pain: 2-3/10 L chest wall    X in shaded column indicates activity completed today   Modalities Parameters/  Location  Notes         Manual Therapy Time/Technique  Notes   PORi protocol 60 minutes x Utilized PORi protocol to left upper quarter for gentle manual lymphatic drainage and myofascial release x60 minutes. Note used LymphaTouch at 80 mmHg pulsed 60-20Hz with small treatment cup along anterior L chest wall. Exercise/Intervention   Notes                     Specific Interventions for Next Treatment:  MLD (PORi protocol), gentle stretches of chest wall    Activity Tolerance: Patient tolerated treatment well    ASSESSMENT:  Assessment: Pt with edema throughout L chest wall and flank however states less tenderness and area feels \"softer\" per pt's reports by end of session. GOALS:  Patient Goal: \"Get rid of pain and swelling\"     Short Term Goals to be met in 4 weeks:  Pt to demo L shoulder PROM flexion improved from 176 deg with pain to 180 deg without pain for ease with dressing. Pt to demo compliance with self MLD and stretches to improve chest wall pain and swelling for ease with shoulder mobility. Long Term Goals to be met in 8 weeks:   Pt to demo QuickDASH score improved from 13.6% impaired to <10% impaired for improved ease with work duties. Pt to report L chest wall edema reduced by 75% for improved ease with shoulder function in the home and at work. Pt to be compliant with compression garment use for control of lymphedema and reduced risk of condition progression.     Patient Education: self massage, use compression tank and stretch often  Education Outcome: Verbalized understanding  Education Barriers: None    PLAN: Continue established plan of care      Time In 1400   Time Out 1500   Timed Code Minutes: 60 min   Total Treatment Time: 60 min   Total Units/160 approved 4/160       Electronically Signed by: Yola Tran, PT, DPT, ALLEY, CLT [id number] 9/19/2022

## 2022-09-21 ENCOUNTER — HOSPITAL ENCOUNTER (OUTPATIENT)
Dept: WOMENS IMAGING | Age: 52
Discharge: HOME | End: 2022-09-21
Payer: COMMERCIAL

## 2022-09-21 DIAGNOSIS — Z79.811 PROPHYLACTIC USE OF LETROZOLE (FEMARA): ICD-10-CM

## 2022-09-21 DIAGNOSIS — C50.912 BREAST CANCER METASTASIZED TO AXILLARY LYMPH NODE, LEFT (HCC): ICD-10-CM

## 2022-09-21 DIAGNOSIS — C77.3 BREAST CANCER METASTASIZED TO AXILLARY LYMPH NODE, LEFT (HCC): ICD-10-CM

## 2022-09-21 PROCEDURE — 77080 DXA BONE DENSITY AXIAL: CPT

## 2022-09-22 ENCOUNTER — HOSPITAL ENCOUNTER (OUTPATIENT)
Dept: PHYSICAL THERAPY | Age: 52
Setting detail: THERAPIES SERIES
Discharge: HOME | End: 2022-09-22
Payer: COMMERCIAL

## 2022-09-22 PROCEDURE — 97140 MANUAL THERAPY 1/> REGIONS: CPT

## 2022-09-22 NOTE — PROGRESS NOTES
[address] [facility]  ONCOLOGY REHABILITATION  PHYSICAL THERAPY  [x] DAILY NOTE [] PROGRESS NOTE [] DISCHARGE NOTE    [x] SPECIALIZED THERAPY SERVICES - [place]     Date: 2022  Patient Name:  Steve Guerrero  : 1970  MRN: [id number]  CSN: [id number]       Referring Practitioner Amor Rowland PA*   Diagnosis C50.912, C77.3 (ICD-10-CM) - Breast cancer metastasized to axillary lymph node, left ([facility] [address]  Z90.12 (ICD-10-CM) - Status post mastectomy, left  R07.89 (ICD-10-CM) - Chest wall pain    Treatment Diagnosis L shoulder stiffness, L chest wall and shoulder pain, L chest wall edema   Date of Evaluation 22    Additional Pertinent History Asthma, COPD, anxiety, arthritis       Functional Outcome Measure Used O: QuickDASH   Functional Outcome Score 13.6% impaired (22)        Insurance: Primary: Payor: Bindu Underwood /  /  / ,   Secondary:    Authorization Information: Eval only, then precert   Visit # 3,  for progress note   Visits Allowed: 160 units from 22 to 22 in addition to eval   Recertification Date: 70   Physician Follow-Up: 22 Tadeo Valladares   Physician Orders: Eric Clark and treat   History of Present Illness: 10/9/17 ER/OK+, HER2- invasive ductal carcinoma, mastectomy on 17 with SLNB 3/14 +, completed 4 cycles of Adriamycin and cytoxan with 12 weekly cycles of Taxol from 17 thru 18, then radiation from 18 thru 18 and now on Femara for hormonal therapy      SUBJECTIVE: Pt arrives reporting 3/10 pain through L chest wall. States she was sore the night of her last session but did feel better the next day.        TREATMENT   Precautions: L lymphedema risk   Pain: 3/10 L chest wall    X in shaded column indicates activity completed today   Modalities Parameters/  Location  Notes         Manual Therapy Time/Technique  Notes   PORi protocol 60 minutes x Utilized PORi protocol to left upper quarter for gentle manual lymphatic drainage and myofascial release x60 minutes. Note used LymphaTouch at 80 mmHg pulsed 60-20Hz with small treatment cup along anterior L chest wall. Exercise/Intervention   Notes                     Specific Interventions for Next Treatment:  MLD (PORi protocol), gentle stretches of chest wall    Activity Tolerance: Patient tolerated treatment well    ASSESSMENT:  Assessment: Pt initially very tender to the touch right above incision line and laterally down L side. Firm tissue texture noted through L chest. Tissue texture did improve after MLD and pt reporting decreased pain, also not as painful to the touch. Upper Extremity Circumferential Measurements    Right (cm) Left (cm) Comments   Met Heads 19.5 19.5    Ulnar Styloid Process 16.1 16.6    10 cm distal to Lateral Epicondyle 23.4 22.9    Elbow Crease 25.8 26    10 cm proximal from Lateral Epicondyle 31.5 32.5    Axilla 35.4 35    Total 151.7 152.5 Axilla - to - Axilla:       9/13/22       152.8      150.3      10/9/18:                        151.4   150.2                              Last PN on 9/6/18:     153.8      153.7                                      PN on 7/9/18:             152.9      156.8                                      PN on 6/26/18:           153.7      154.6                                      PN on 4/19/18:           150.7      150.5                                      PN on 2/26/18:           151.9      151.8                                      Eval on 11/28/17:       152.5      153.5     GOALS:  Patient Goal: \"Get rid of pain and swelling\"     Short Term Goals to be met in 4 weeks:  Pt to demo L shoulder PROM flexion improved from 176 deg with pain to 180 deg without pain for ease with dressing. Pt to demo compliance with self MLD and stretches to improve chest wall pain and swelling for ease with shoulder mobility.      Long Term Goals to be met in 8 weeks:   Pt to demo QuickDASH score improved from 13.6% impaired to <10% impaired for improved ease with work duties. Pt to report L chest wall edema reduced by 75% for improved ease with shoulder function in the home and at work. Pt to be compliant with compression garment use for control of lymphedema and reduced risk of condition progression. Patient Education: Pt to try laying prone to soften tissue before completing self massage.    Education Outcome: Verbalized understanding  Education Barriers: None    PLAN: Continue established plan of care      Time In 1355   Time Out 1450   Timed Code Minutes: 55 min   Total Treatment Time: 55 min   Total Units/160 approved 8/160       Electronically Signed by: Brandi Deluca PTA, 9/22/2022

## 2022-09-26 ENCOUNTER — HOSPITAL ENCOUNTER (OUTPATIENT)
Dept: PHYSICAL THERAPY | Age: 52
Setting detail: THERAPIES SERIES
Discharge: HOME | End: 2022-09-26
Payer: COMMERCIAL

## 2022-09-26 PROCEDURE — 97140 MANUAL THERAPY 1/> REGIONS: CPT

## 2022-09-26 NOTE — PROGRESS NOTES
[address] [facility]  ONCOLOGY REHABILITATION  PHYSICAL THERAPY  [x] DAILY NOTE [] PROGRESS NOTE [] DISCHARGE NOTE    [x] SPECIALIZED THERAPY SERVICES - [place]     Date: 2022  Patient Name:  Serafin Ruggiero  : 1970  MRN: [id number]  CSN: [id number]       Referring Practitioner Anton Arizmendi PA*   Diagnosis C50.912, C77.3 (ICD-10-CM) - Breast cancer metastasized to axillary lymph node, left ([facility] [address]  Z90.12 (ICD-10-CM) - Status post mastectomy, left  R07.89 (ICD-10-CM) - Chest wall pain    Treatment Diagnosis L shoulder stiffness, L chest wall and shoulder pain, L chest wall edema   Date of Evaluation 22    Additional Pertinent History Asthma, COPD, anxiety, arthritis       Functional Outcome Measure Used O: QuickDASH   Functional Outcome Score 13.6% impaired (22)        Insurance: Primary: Payor: Thao Lu /  /  / ,   Secondary:    Authorization Information: Eval only, then precert   Visit # 4,  for progress note   Visits Allowed: 160 units from 22 to 22 in addition to eval   Recertification Date:    Physician Follow-Up: 22 Brianne Romano   Physician Orders: Gisele Riggins and treat   History of Present Illness: 10/9/17 ER/AL+, HER2- invasive ductal carcinoma, mastectomy on 17 with SLNB 3/14 +, completed 4 cycles of Adriamycin and cytoxan with 12 weekly cycles of Taxol from 17 thru 18, then radiation from 18 thru 18 and now on Femara for hormonal therapy      SUBJECTIVE: Pt arrives denying any pain and reporting decreased pain and tenderness while lifting at work. TREATMENT   Precautions: L lymphedema risk   Pain: Denies pain    X in shaded column indicates activity completed today   Modalities Parameters/  Location  Notes         Manual Therapy Time/Technique  Notes   PORi protocol 45 minutes x Utilized PORi protocol to left upper quarter for gentle manual lymphatic drainage and myofascial release x60 minutes.  Note used LymphaTouch at 80 mmHg pulsed 60-20Hz with small treatment cup along anterior L chest wall. Exercise/Intervention   Notes                   Specific Interventions for Next Treatment:  MLD (PORi protocol), gentle stretches of chest wall    Activity Tolerance: Patient tolerated treatment well    ASSESSMENT:  Assessment: Pt with minimal firm tissue throughout L inferior chest wall. Much less tender to the touch and tolerated PORi protocol without any complaints today. Tissue very soft and mobile at end of session. GOALS:  Patient Goal: \"Get rid of pain and swelling\"     Short Term Goals to be met in 4 weeks:  Pt to demo L shoulder PROM flexion improved from 176 deg with pain to 180 deg without pain for ease with dressing. Pt to demo compliance with self MLD and stretches to improve chest wall pain and swelling for ease with shoulder mobility. Long Term Goals to be met in 8 weeks:   Pt to demo QuickDASH score improved from 13.6% impaired to <10% impaired for improved ease with work duties. Pt to report L chest wall edema reduced by 75% for improved ease with shoulder function in the home and at work. Pt to be compliant with compression garment use for control of lymphedema and reduced risk of condition progression.     Patient Education: Continue self massage  Education Outcome: Verbalized understanding  Education Barriers: None    PLAN: Continue established plan of care      Time In 1400   Time Out 1445   Timed Code Minutes: 45 min   Total Treatment Time: 45 min   Total Units/160 approved 11/160       Electronically Signed by: Jeffy Garcia PTA, 9/26/2022

## 2022-09-29 ENCOUNTER — HOSPITAL ENCOUNTER (OUTPATIENT)
Dept: PHYSICAL THERAPY | Age: 52
Setting detail: THERAPIES SERIES
End: 2022-09-29
Payer: COMMERCIAL

## 2022-10-10 ENCOUNTER — HOSPITAL ENCOUNTER (OUTPATIENT)
Dept: PHYSICAL THERAPY | Age: 52
Setting detail: THERAPIES SERIES
Discharge: HOME | End: 2022-10-10
Payer: COMMERCIAL

## 2022-10-10 PROCEDURE — 97140 MANUAL THERAPY 1/> REGIONS: CPT

## 2022-10-10 NOTE — PROGRESS NOTES
[address] [facility]  ONCOLOGY REHABILITATION  PHYSICAL THERAPY  [x] DAILY NOTE [] PROGRESS NOTE [] DISCHARGE NOTE    [x] SPECIALIZED THERAPY SERVICES - [place]     Date: 10/10/2022  Patient Name:  Eryn Green  : 1970  MRN: [id number]  CSN: [id number]       Referring Practitioner Sofia Andres PA*   Diagnosis C50.912, C77.3 (ICD-10-CM) - Breast cancer metastasized to axillary lymph node, left ([facility] [address]  Z90.12 (ICD-10-CM) - Status post mastectomy, left  R07.89 (ICD-10-CM) - Chest wall pain    Treatment Diagnosis L shoulder stiffness, L chest wall and shoulder pain, L chest wall edema   Date of Evaluation 22    Additional Pertinent History Asthma, COPD, anxiety, arthritis       Functional Outcome Measure Used O: QuickDASH   Functional Outcome Score 13.6% impaired (22)        Insurance: Primary: Payor: Carmenza Vargas /  /  / ,   Secondary:    Authorization Information: Eval only, then precert   Visit # 5,  for progress note   Visits Allowed: 160 units from 22 to 22 in addition to eval   Recertification Date: 61   Physician Follow-Up: 22 Misael Mayberry   Physician Orders: Jose A Milton and treat   History of Present Illness: 10/9/17 ER/CA+, HER2- invasive ductal carcinoma, mastectomy on 17 with SLNB 3/14 +, completed 4 cycles of Adriamycin and cytoxan with 12 weekly cycles of Taxol from 17 thru 18, then radiation from 18 thru 18 and now on Femara for hormonal therapy      SUBJECTIVE: Reports she can tell that she missed her last appointment as has more pain and stiffness today and while at work.       TREATMENT   Precautions: L lymphedema risk   Pain: 2/10 L chest wall    X in shaded column indicates activity completed today   Modalities Parameters/  Location  Notes         Manual Therapy Time/Technique  Notes   PORi protocol 45 minutes x Utilized PORi protocol to left upper quarter for gentle manual lymphatic drainage and myofascial release with sidelying anterior ribcage stretch   Exercise/Intervention   Notes   Supine pec stretch with arms in T-position 15 sec x3  x    Doorway pec stretch with L forearm on doorframe and twist 15 sec x3  x      Specific Interventions for Next Treatment:  MLD (PORi protocol), gentle stretches of chest wall    Activity Tolerance: Patient tolerated treatment well    ASSESSMENT:  Assessment: Pt with increased tightness throughout L chest wall with multiple trigger points in the intercostals and pec major and minor. Mild improvement by end of session. As no significant edema, just muscle tightness, did not utilize LymphaTouch this date. GOALS:  Patient Goal: \"Get rid of pain and swelling\"     Short Term Goals to be met in 4 weeks:  Pt to demo L shoulder PROM flexion improved from 176 deg with pain to 180 deg without pain for ease with dressing. Pt to demo compliance with self MLD and stretches to improve chest wall pain and swelling for ease with shoulder mobility. Long Term Goals to be met in 8 weeks:   Pt to demo QuickDASH score improved from 13.6% impaired to <10% impaired for improved ease with work duties. Pt to report L chest wall edema reduced by 75% for improved ease with shoulder function in the home and at work. Pt to be compliant with compression garment use for control of lymphedema and reduced risk of condition progression. Patient Education: Reiterated importance of stretching frequently throughout the day.   Education Outcome: Verbalized understanding  Education Barriers: None    PLAN: Continue established plan of care      Time In 1500   Time Out 1545   Timed Code Minutes: 45 min   Total Treatment Time: 45 min   Total Units/160 approved 14/160       Electronically Signed by: Carey Dixon, PT, DPT, ALLEY, CLT [id number] 10/10/2022

## 2022-10-19 ENCOUNTER — HOSPITAL ENCOUNTER (OUTPATIENT)
Dept: PHYSICAL THERAPY | Age: 52
Setting detail: THERAPIES SERIES
Discharge: HOME | End: 2022-10-19
Payer: COMMERCIAL

## 2022-10-19 PROCEDURE — 97140 MANUAL THERAPY 1/> REGIONS: CPT

## 2022-10-19 NOTE — PROGRESS NOTES
[address] [facility]  ONCOLOGY REHABILITATION  PHYSICAL THERAPY  [x] DAILY NOTE [] PROGRESS NOTE [] DISCHARGE NOTE    [x] SPECIALIZED THERAPY SERVICES - [place]     Date: 10/19/2022  Patient Name:  Estrellita Jeff  : 1970  MRN: [id number]  CSN: [id number]       Referring Practitioner Rissa Edwards PA*   Diagnosis C50.912, C77.3 (ICD-10-CM) - Breast cancer metastasized to axillary lymph node, left ([facility] [address]  Z90.12 (ICD-10-CM) - Status post mastectomy, left  R07.89 (ICD-10-CM) - Chest wall pain    Treatment Diagnosis L shoulder stiffness, L chest wall and shoulder pain, L chest wall edema   Date of Evaluation 22    Additional Pertinent History Asthma, COPD, anxiety, arthritis       Functional Outcome Measure Used O: QuickDASH   Functional Outcome Score 13.6% impaired (22)        Insurance: Primary: Payor: Pilar Lazcano /  /  / ,   Secondary:    Authorization Information: Eval only, then precert   Visit # 6,  for progress note   Visits Allowed: 160 units from 22 to 22 in addition to eval   Recertification Date:    Physician Follow-Up: 22 Jovani Lawson   Physician Orders: Relda Bence and treat   History of Present Illness: 10/9/17 [place]+, HER2- invasive ductal carcinoma, mastectomy on 17 with SLNB 3/14 +, completed 4 cycles of Adriamycin and cytoxan with 12 weekly cycles of Taxol from 17 thru 18, then radiation from 18 thru 18 and now on Femara for hormonal therapy      SUBJECTIVE: Pt arrives reporting 2/10 pain and tightness through L chest wall. Reports stretching has been going better and does seem to help decrease her pain.        TREATMENT   Precautions: L lymphedema risk   Pain: 2/10 L chest wall    X in shaded column indicates activity completed today   Modalities Parameters/  Location  Notes         Manual Therapy Time/Technique  Notes   PORi protocol 45 minutes x Utilized PORi protocol to left upper quarter for gentle manual lymphatic drainage and myofascial release with sidelying anterior ribcage stretch   Exercise/Intervention   Notes   Supine pec stretch with arms in T-position 15 sec x3  x    Doorway pec stretch with L forearm on doorframe and twist 15 sec x3        Specific Interventions for Next Treatment:  MLD (PORi protocol), gentle stretches of chest wall    Activity Tolerance: Patient tolerated treatment well    ASSESSMENT:  Assessment: Pt tender through L pec and down side of L chest wall. No firmness or edema noted. Pt reporting feeling \"a little better\" after PORI and stretching technique. GOALS:  Patient Goal: \"Get rid of pain and swelling\"     Short Term Goals to be met in 4 weeks:  Pt to demo L shoulder PROM flexion improved from 176 deg with pain to 180 deg without pain for ease with dressing. Pt to demo compliance with self MLD and stretches to improve chest wall pain and swelling for ease with shoulder mobility. Long Term Goals to be met in 8 weeks:   Pt to demo QuickDASH score improved from 13.6% impaired to <10% impaired for improved ease with work duties. Pt to report L chest wall edema reduced by 75% for improved ease with shoulder function in the home and at work. Pt to be compliant with compression garment use for control of lymphedema and reduced risk of condition progression. Patient Education: Reiterated importance of stretching frequently throughout the day.   Education Outcome: Verbalized understanding  Education Barriers: None    PLAN: Continue established plan of care      Time In 1445   Time Out 1530   Timed Code Minutes: 45 min   Total Treatment Time: 45 min   Total Units/160 approved 17/160       Electronically Signed by: Florencia Mckeon, YAMILEX, DPT, ALLEY, CLT [id number] 10/19/2022

## 2022-10-26 ENCOUNTER — HOSPITAL ENCOUNTER (OUTPATIENT)
Dept: PHYSICAL THERAPY | Age: 52
Setting detail: THERAPIES SERIES
Discharge: HOME | End: 2022-10-26
Payer: COMMERCIAL

## 2022-10-26 PROCEDURE — 97110 THERAPEUTIC EXERCISES: CPT

## 2022-10-26 PROCEDURE — 97140 MANUAL THERAPY 1/> REGIONS: CPT

## 2022-10-26 NOTE — PROGRESS NOTES
[address] [facility]  ONCOLOGY REHABILITATION  PHYSICAL THERAPY  [] DAILY NOTE [x] PROGRESS NOTE [] DISCHARGE NOTE    [x] SPECIALIZED THERAPY SERVICES - [place]     Date: 10/26/2022  Patient Name:  Jackei Kendrick  : 1970  MRN: [id number]  CSN: [id number]       Referring Practitioner Ramses Mccray PA*   Diagnosis C50.912, C77.3 (ICD-10-CM) - Breast cancer metastasized to axillary lymph node, left ([facility] [address]  Z90.12 (ICD-10-CM) - Status post mastectomy, left  R07.89 (ICD-10-CM) - Chest wall pain    Treatment Diagnosis L shoulder stiffness, L chest wall and shoulder pain, L chest wall edema   Date of Evaluation 22    Additional Pertinent History Asthma, COPD, anxiety, arthritis       Functional Outcome Measure Used O: QuickDASH   Functional Outcome Score 13.6% impaired (22); 20.45% impaired (10/26/22)       Insurance: Primary: Payor: Trudi Mcclellan /  /  / ,   Secondary:    Authorization Information: Eval only, then precert   Visit # 7,  / for progress note   Visits Allowed: 160 units from 22 to 22 in addition to eval   Recertification Date:    Physician Follow-Up: 22 [facility]   Physician Orders: Stephen Huston and treat   History of Present Illness: 10/9/17 ER/[place]+, HER2- invasive ductal carcinoma, mastectomy on 17 with SLNB 3/14 +, completed 4 cycles of Adriamycin and cytoxan with 12 weekly cycles of Taxol from 17 thru 18, then radiation from 18 thru 18 and now on Femara for hormonal therapy      SUBJECTIVE: Patient reports she forgot about her appointment on Monday and could tell that she got more tight and pain/soreness. At this time she is feeling about a 70% improvement since beginning therapy.     TREATMENT   Precautions: L lymphedema risk   Pain: 2/10 L chest wall    X in shaded column indicates activity completed today   Modalities Parameters/  Location  Notes         Manual Therapy Time/Technique  Notes   Manual Lymphatic Drainage left upper quadrant 30 minutes x Used following sequence of clearance: short neck, abdominal and deep abdominal work with diaphragmatic breathing, established and cleared anterior and posterior inter-axillary anastomosis, established and cleared anterior and posterior axillary-inguinalanastomosis, and anterior and posterior left upper extremity from proximal to distal. Re-work and fibrotic techniques used as needed. PORi protocol 45 minutes  Utilized PORi protocol to left upper quarter for gentle manual lymphatic drainage and myofascial release with sidelying anterior ribcage stretch   Exercise/Intervention   Notes   Supine pec stretch with arms in T-position 15 sec x3  x    Doorway pec stretch with L forearm on doorframe and twist 15 sec x3        Specific Interventions for Next Treatment:  MLD (PORi protocol), gentle stretches of chest wall    Activity Tolerance: Patient tolerated treatment well    ASSESSMENT:  Assessment: Reassessment completed today. Patient is feeling a 70% improvement in symptoms, is wearing compression tanks, and self MLD. She is tender at left pec and down left side of chest wall. At this time she is feeling an improvement, but can tell when she does not have therapy. At this time, plan to continue working with manual therapy as well as stretches to assist with decreasing symptoms. GOALS:  Patient Goal: \"Get rid of pain and swelling\"     Short Term Goals to be met in 4 weeks:  Pt to demo L shoulder PROM flexion improved from 176 deg with pain to 180 deg without pain for ease with dressing. NOT MET: Left shoulder 171 degrees today. Continue. Pt to demo compliance with self MLD and stretches to improve chest wall pain and swelling for ease with shoulder mobility. MET: Is completing stretches and massage at home normally 2 times per day. Continue.        Long Term Goals to be met in 8 weeks:   Pt to demo QuickDASH score improved from 13.6% impaired to <10% impaired for improved ease with work duties. NOT MET: 20.45% impaired. Continue  Pt to report L chest wall edema reduced by 75% for improved ease with shoulder function in the home and at work. NOT MET: Not met, but significant improvement with patient reporting a 70% reduction in symptoms. Continue. Pt to be compliant with compression garment use for control of lymphedema and reduced risk of condition progression. MET: Wears compression tank top normally everyday. Ongoing. Patient Education: Stretching daily, self MLD  Education Outcome: Verbalized understanding  Education Barriers: None    PLAN: Alter plan of care as follows: Up to 2 times per week for up to 12 weeks. Is returning in 1 week due to her schedule.       Time In 1455   Time Out 1535   Timed Code Minutes: 40 min   Total Treatment Time: 40 min   Total Units/160 approved 20/160       Electronically Signed by: Glendy Rivera, PT  10/26/2022

## 2022-11-28 ENCOUNTER — APPOINTMENT (OUTPATIENT)
Dept: GENERAL RADIOLOGY | Age: 52
DRG: 312 | End: 2022-11-28
Payer: COMMERCIAL

## 2022-11-28 ENCOUNTER — HOSPITAL ENCOUNTER (INPATIENT)
Age: 52
LOS: 3 days | Discharge: HOME | DRG: 312 | End: 2022-12-06
Attending: STUDENT IN AN ORGANIZED HEALTH CARE EDUCATION/TRAINING PROGRAM | Admitting: ORTHOPAEDIC SURGERY
Payer: COMMERCIAL
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
## 2022-11-28 DIAGNOSIS — S51.852A DOG BITE OF FOREARM, LEFT, INITIAL ENCOUNTER: Primary | ICD-10-CM
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
## 2022-11-28 DIAGNOSIS — S41.152A DOG BITE OF LEFT UPPER EXTREMITY, INITIAL ENCOUNTER: ICD-10-CM

## 2022-11-28 LAB
ABO: NORMAL
ANION GAP SERPL CALCULATED.3IONS-SCNC: 11 MEQ/L (ref 8–16)
ANTIBODY SCREEN: NORMAL
BASOPHILS # BLD: 0.5 %
BASOPHILS ABSOLUTE: 0 THOU/MM3 (ref 0–0.1)
BUN BLDV-MCNC: 13 MG/DL (ref 7–22)
CALCIUM SERPL-MCNC: 8.3 MG/DL (ref 8.5–10.5)
CHLORIDE BLD-SCNC: 109 MEQ/L (ref 98–111)
CO2: 22 MEQ/L (ref 23–33)
CREAT SERPL-MCNC: 0.9 MG/DL (ref 0.4–1.2)
EOSINOPHIL # BLD: 1.6 %
EOSINOPHILS ABSOLUTE: 0.1 THOU/MM3 (ref 0–0.4)
ERYTHROCYTE [DISTWIDTH] IN BLOOD BY AUTOMATED COUNT: 12.8 % (ref 11.5–14.5)
ERYTHROCYTE [DISTWIDTH] IN BLOOD BY AUTOMATED COUNT: 47.7 FL (ref 35–45)
GFR SERPL CREATININE-BSD FRML MDRD: > 60 ML/MIN/1.73M2
GLUCOSE BLD-MCNC: 142 MG/DL (ref 70–108)
HCT VFR BLD CALC: 35.7 % (ref 37–47)
HEMOGLOBIN: 11.6 GM/DL (ref 12–16)
IMMATURE GRANS (ABS): 0.03 THOU/MM3 (ref 0–0.07)
IMMATURE GRANULOCYTES: 0.3 %
LYMPHOCYTES # BLD: 22 %
LYMPHOCYTES ABSOLUTE: 1.9 THOU/MM3 (ref 1–4.8)
MCH RBC QN AUTO: 33 PG (ref 26–33)
MCHC RBC AUTO-ENTMCNC: 32.5 GM/DL (ref 32.2–35.5)
MCV RBC AUTO: 101.4 FL (ref 81–99)
MONOCYTES # BLD: 7.4 %
MONOCYTES ABSOLUTE: 0.7 THOU/MM3 (ref 0.4–1.3)
NUCLEATED RED BLOOD CELLS: 0 /100 WBC
OSMOLALITY CALCULATION: 285.7 MOSMOL/KG (ref 275–300)
PLATELET # BLD: 190 THOU/MM3 (ref 130–400)
PMV BLD AUTO: 10.4 FL (ref 9.4–12.4)
POTASSIUM SERPL-SCNC: 3.6 MEQ/L (ref 3.5–5.2)
RBC # BLD: 3.52 MILL/MM3 (ref 4.2–5.4)
REASON FOR REJECTION: NORMAL
REJECTED TEST: NORMAL
RH FACTOR: NORMAL
SEG NEUTROPHILS: 68.2 %
SEGMENTED NEUTROPHILS ABSOLUTE COUNT: 6 THOU/MM3 (ref 1.8–7.7)
SODIUM BLD-SCNC: 142 MEQ/L (ref 135–145)
WBC # BLD: 8.8 THOU/MM3 (ref 4.8–10.8)

## 2022-11-28 PROCEDURE — 36415 COLL VENOUS BLD VENIPUNCTURE: CPT

## 2022-11-28 PROCEDURE — G0378 HOSPITAL OBSERVATION PER HR: HCPCS

## 2022-11-28 PROCEDURE — 6360000002 HC RX W HCPCS: Performed by: PHYSICIAN ASSISTANT

## 2022-11-28 PROCEDURE — 6360000002 HC RX W HCPCS: Performed by: STUDENT IN AN ORGANIZED HEALTH CARE EDUCATION/TRAINING PROGRAM

## 2022-11-28 PROCEDURE — 99285 EMERGENCY DEPT VISIT HI MDM: CPT

## 2022-11-28 PROCEDURE — 6370000000 HC RX 637 (ALT 250 FOR IP): Performed by: PHYSICIAN ASSISTANT

## 2022-11-28 PROCEDURE — 96375 TX/PRO/DX INJ NEW DRUG ADDON: CPT

## 2022-11-28 PROCEDURE — 86901 BLOOD TYPING SEROLOGIC RH(D): CPT

## 2022-11-28 PROCEDURE — 6360000002 HC RX W HCPCS

## 2022-11-28 PROCEDURE — 6820000001 HC L2 TRAUMA SURGERY EVALUATION: Performed by: ORTHOPAEDIC SURGERY

## 2022-11-28 PROCEDURE — 80048 BASIC METABOLIC PNL TOTAL CA: CPT

## 2022-11-28 PROCEDURE — 86900 BLOOD TYPING SEROLOGIC ABO: CPT

## 2022-11-28 PROCEDURE — 85025 COMPLETE CBC W/AUTO DIFF WBC: CPT

## 2022-11-28 PROCEDURE — 90714 TD VACC NO PRESV 7 YRS+ IM: CPT | Performed by: STUDENT IN AN ORGANIZED HEALTH CARE EDUCATION/TRAINING PROGRAM

## 2022-11-28 PROCEDURE — 2580000003 HC RX 258: Performed by: STUDENT IN AN ORGANIZED HEALTH CARE EDUCATION/TRAINING PROGRAM

## 2022-11-28 PROCEDURE — 90471 IMMUNIZATION ADMIN: CPT | Performed by: STUDENT IN AN ORGANIZED HEALTH CARE EDUCATION/TRAINING PROGRAM

## 2022-11-28 PROCEDURE — 73130 X-RAY EXAM OF HAND: CPT

## 2022-11-28 PROCEDURE — 96374 THER/PROPH/DIAG INJ IV PUSH: CPT

## 2022-11-28 PROCEDURE — 73090 X-RAY EXAM OF FOREARM: CPT

## 2022-11-28 PROCEDURE — 86850 RBC ANTIBODY SCREEN: CPT

## 2022-11-28 RX ORDER — FENTANYL CITRATE 50 UG/ML
INJECTION, SOLUTION INTRAMUSCULAR; INTRAVENOUS
Status: COMPLETED
Start: 2022-11-28 | End: 2022-11-28

## 2022-11-28 RX ORDER — MORPHINE SULFATE 2 MG/ML
2 INJECTION, SOLUTION INTRAMUSCULAR; INTRAVENOUS
Status: DISCONTINUED | OUTPATIENT
Start: 2022-11-28 | End: 2022-12-06 | Stop reason: HOSPADM

## 2022-11-28 RX ORDER — HYDROCODONE BITARTRATE AND ACETAMINOPHEN 5; 325 MG/1; MG/1
2 TABLET ORAL EVERY 4 HOURS PRN
Status: DISCONTINUED | OUTPATIENT
Start: 2022-11-28 | End: 2022-12-06 | Stop reason: HOSPADM

## 2022-11-28 RX ORDER — LETROZOLE 2.5 MG/1
2.5 TABLET, FILM COATED ORAL EVERY OTHER DAY
Status: DISCONTINUED | OUTPATIENT
Start: 2022-11-29 | End: 2022-12-06 | Stop reason: HOSPADM

## 2022-11-28 RX ORDER — HYDROCODONE BITARTRATE AND ACETAMINOPHEN 5; 325 MG/1; MG/1
1 TABLET ORAL EVERY 4 HOURS PRN
Status: DISCONTINUED | OUTPATIENT
Start: 2022-11-28 | End: 2022-12-06 | Stop reason: HOSPADM

## 2022-11-28 RX ORDER — TETANUS AND DIPHTHERIA TOXOIDS ADSORBED 2; 2 [LF]/.5ML; [LF]/.5ML
0.5 INJECTION INTRAMUSCULAR ONCE
Status: COMPLETED | OUTPATIENT
Start: 2022-11-28 | End: 2022-11-28

## 2022-11-28 RX ORDER — VENLAFAXINE HYDROCHLORIDE 75 MG/1
75 CAPSULE, EXTENDED RELEASE ORAL DAILY
Status: DISCONTINUED | OUTPATIENT
Start: 2022-11-29 | End: 2022-11-29 | Stop reason: DRUGHIGH

## 2022-11-28 RX ORDER — FENTANYL CITRATE 50 UG/ML
100 INJECTION, SOLUTION INTRAMUSCULAR; INTRAVENOUS ONCE
Status: COMPLETED | OUTPATIENT
Start: 2022-11-28 | End: 2022-11-28

## 2022-11-28 RX ORDER — ALBUTEROL SULFATE 2.5 MG/3ML
2.5 SOLUTION RESPIRATORY (INHALATION) EVERY 4 HOURS PRN
Status: DISCONTINUED | OUTPATIENT
Start: 2022-11-28 | End: 2022-12-06 | Stop reason: HOSPADM

## 2022-11-28 RX ORDER — MORPHINE SULFATE 4 MG/ML
4 INJECTION, SOLUTION INTRAMUSCULAR; INTRAVENOUS
Status: DISCONTINUED | OUTPATIENT
Start: 2022-11-28 | End: 2022-12-06 | Stop reason: HOSPADM

## 2022-11-28 RX ORDER — CYCLOBENZAPRINE HCL 10 MG
10 TABLET ORAL 3 TIMES DAILY PRN
Status: DISCONTINUED | OUTPATIENT
Start: 2022-11-28 | End: 2022-12-06 | Stop reason: HOSPADM

## 2022-11-28 RX ORDER — SODIUM CHLORIDE 9 MG/ML
INJECTION, SOLUTION INTRAVENOUS CONTINUOUS
Status: DISCONTINUED | OUTPATIENT
Start: 2022-11-29 | End: 2022-12-03

## 2022-11-28 RX ORDER — ALPRAZOLAM 0.5 MG/1
0.5 TABLET ORAL NIGHTLY PRN
Status: DISCONTINUED | OUTPATIENT
Start: 2022-11-29 | End: 2022-11-28

## 2022-11-28 RX ORDER — ONDANSETRON 2 MG/ML
4 INJECTION INTRAMUSCULAR; INTRAVENOUS EVERY 6 HOURS PRN
Status: DISCONTINUED | OUTPATIENT
Start: 2022-11-28 | End: 2022-12-06 | Stop reason: HOSPADM

## 2022-11-28 RX ORDER — ALBUTEROL SULFATE 90 UG/1
1 AEROSOL, METERED RESPIRATORY (INHALATION) EVERY 6 HOURS PRN
Status: DISCONTINUED | OUTPATIENT
Start: 2022-11-28 | End: 2022-11-29

## 2022-11-28 RX ORDER — ALPRAZOLAM 0.5 MG/1
0.5 TABLET ORAL NIGHTLY PRN
Status: DISCONTINUED | OUTPATIENT
Start: 2022-11-28 | End: 2022-11-29

## 2022-11-28 RX ADMIN — CYCLOBENZAPRINE 10 MG: 10 TABLET, FILM COATED ORAL at 23:25

## 2022-11-28 RX ADMIN — MORPHINE SULFATE 4 MG: 4 INJECTION, SOLUTION INTRAMUSCULAR; INTRAVENOUS at 21:59

## 2022-11-28 RX ADMIN — FENTANYL CITRATE 100 MCG: 50 INJECTION, SOLUTION INTRAMUSCULAR; INTRAVENOUS at 21:04

## 2022-11-28 RX ADMIN — SODIUM CHLORIDE 3000 MG: 900 INJECTION INTRAVENOUS at 21:55

## 2022-11-28 RX ADMIN — HYDROCODONE BITARTRATE AND ACETAMINOPHEN 2 TABLET: 5; 325 TABLET ORAL at 23:02

## 2022-11-28 RX ADMIN — TETANUS AND DIPHTHERIA TOXOIDS ADSORBED 0.5 ML: 2; 2 INJECTION INTRAMUSCULAR at 21:49

## 2022-11-28 ASSESSMENT — ENCOUNTER SYMPTOMS
VOMITING: 0
CONSTIPATION: 0
CHEST TIGHTNESS: 0
ABDOMINAL DISTENTION: 0
ABDOMINAL PAIN: 0
SHORTNESS OF BREATH: 0
EYE ITCHING: 0
SORE THROAT: 0
NAUSEA: 0
WHEEZING: 0
FACIAL SWELLING: 0
EYE REDNESS: 0
STRIDOR: 0
PHOTOPHOBIA: 0
COUGH: 0
CHOKING: 0
EYE PAIN: 0
BACK PAIN: 0
DIARRHEA: 0

## 2022-11-28 ASSESSMENT — PAIN DESCRIPTION - PAIN TYPE: TYPE: ACUTE PAIN

## 2022-11-28 ASSESSMENT — PAIN - FUNCTIONAL ASSESSMENT
PAIN_FUNCTIONAL_ASSESSMENT: PREVENTS OR INTERFERES SOME ACTIVE ACTIVITIES AND ADLS
PAIN_FUNCTIONAL_ASSESSMENT: PREVENTS OR INTERFERES SOME ACTIVE ACTIVITIES AND ADLS
PAIN_FUNCTIONAL_ASSESSMENT: 0-10

## 2022-11-28 ASSESSMENT — PAIN DESCRIPTION - DESCRIPTORS: DESCRIPTORS: ACHING;BURNING;STABBING

## 2022-11-28 ASSESSMENT — PAIN DESCRIPTION - LOCATION
LOCATION: ARM

## 2022-11-28 ASSESSMENT — PAIN DESCRIPTION - ORIENTATION
ORIENTATION: LEFT

## 2022-11-28 ASSESSMENT — PAIN SCALES - GENERAL
PAINLEVEL_OUTOF10: 10
PAINLEVEL_OUTOF10: 9
PAINLEVEL_OUTOF10: 9
PAINLEVEL_OUTOF10: 10
PAINLEVEL_OUTOF10: 9
PAINLEVEL_OUTOF10: 10

## 2022-11-28 ASSESSMENT — PAIN DESCRIPTION - FREQUENCY: FREQUENCY: CONTINUOUS

## 2022-11-28 ASSESSMENT — PAIN DESCRIPTION - ONSET: ONSET: ON-GOING

## 2022-11-29 ENCOUNTER — ANESTHESIA (OUTPATIENT)
Dept: OPERATING ROOM | Age: 52
End: 2022-11-29
Payer: COMMERCIAL

## 2022-11-29 ENCOUNTER — ANESTHESIA EVENT (OUTPATIENT)
Dept: OPERATING ROOM | Age: 52
End: 2022-11-29
Payer: COMMERCIAL

## 2022-11-29 PROCEDURE — 6370000000 HC RX 637 (ALT 250 FOR IP): Performed by: PHYSICIAN ASSISTANT

## 2022-11-29 PROCEDURE — 7100000001 HC PACU RECOVERY - ADDTL 15 MIN: Performed by: ORTHOPAEDIC SURGERY

## 2022-11-29 PROCEDURE — 0KDB0ZZ EXTRACTION OF LEFT LOWER ARM AND WRIST MUSCLE, OPEN APPROACH: ICD-10-PCS | Performed by: ORTHOPAEDIC SURGERY

## 2022-11-29 PROCEDURE — 6360000002 HC RX W HCPCS: Performed by: NURSE PRACTITIONER

## 2022-11-29 PROCEDURE — G0378 HOSPITAL OBSERVATION PER HR: HCPCS

## 2022-11-29 PROCEDURE — 2580000003 HC RX 258: Performed by: NURSE PRACTITIONER

## 2022-11-29 PROCEDURE — 3600000013 HC SURGERY LEVEL 3 ADDTL 15MIN: Performed by: ORTHOPAEDIC SURGERY

## 2022-11-29 PROCEDURE — 3700000000 HC ANESTHESIA ATTENDED CARE: Performed by: ORTHOPAEDIC SURGERY

## 2022-11-29 PROCEDURE — 6360000002 HC RX W HCPCS: Performed by: PHYSICIAN ASSISTANT

## 2022-11-29 PROCEDURE — 3700000001 HC ADD 15 MINUTES (ANESTHESIA): Performed by: ORTHOPAEDIC SURGERY

## 2022-11-29 PROCEDURE — 6370000000 HC RX 637 (ALT 250 FOR IP): Performed by: NURSE PRACTITIONER

## 2022-11-29 PROCEDURE — 2709999900 HC NON-CHARGEABLE SUPPLY: Performed by: ORTHOPAEDIC SURGERY

## 2022-11-29 PROCEDURE — 0HQEXZZ REPAIR LEFT LOWER ARM SKIN, EXTERNAL APPROACH: ICD-10-PCS | Performed by: ORTHOPAEDIC SURGERY

## 2022-11-29 PROCEDURE — 94640 AIRWAY INHALATION TREATMENT: CPT

## 2022-11-29 PROCEDURE — 7100000000 HC PACU RECOVERY - FIRST 15 MIN: Performed by: ORTHOPAEDIC SURGERY

## 2022-11-29 PROCEDURE — 6360000002 HC RX W HCPCS: Performed by: NURSE ANESTHETIST, CERTIFIED REGISTERED

## 2022-11-29 PROCEDURE — 2580000003 HC RX 258: Performed by: PHYSICIAN ASSISTANT

## 2022-11-29 PROCEDURE — 2500000003 HC RX 250 WO HCPCS: Performed by: NURSE ANESTHETIST, CERTIFIED REGISTERED

## 2022-11-29 PROCEDURE — 3600000003 HC SURGERY LEVEL 3 BASE: Performed by: ORTHOPAEDIC SURGERY

## 2022-11-29 RX ORDER — LORAZEPAM 2 MG/ML
0.5 INJECTION INTRAMUSCULAR
Status: DISCONTINUED | OUTPATIENT
Start: 2022-11-29 | End: 2022-11-29 | Stop reason: HOSPADM

## 2022-11-29 RX ORDER — PROPOFOL 10 MG/ML
INJECTION, EMULSION INTRAVENOUS PRN
Status: DISCONTINUED | OUTPATIENT
Start: 2022-11-29 | End: 2022-11-29 | Stop reason: SDUPTHER

## 2022-11-29 RX ORDER — SODIUM CHLORIDE 0.9 % (FLUSH) 0.9 %
5-40 SYRINGE (ML) INJECTION EVERY 12 HOURS SCHEDULED
Status: DISCONTINUED | OUTPATIENT
Start: 2022-11-29 | End: 2022-11-29 | Stop reason: HOSPADM

## 2022-11-29 RX ORDER — ALBUTEROL SULFATE 90 UG/1
2 AEROSOL, METERED RESPIRATORY (INHALATION) 2 TIMES DAILY
Status: DISCONTINUED | OUTPATIENT
Start: 2022-11-29 | End: 2022-12-06 | Stop reason: HOSPADM

## 2022-11-29 RX ORDER — DEXAMETHASONE SODIUM PHOSPHATE 4 MG/ML
INJECTION, SOLUTION INTRA-ARTICULAR; INTRALESIONAL; INTRAMUSCULAR; INTRAVENOUS; SOFT TISSUE PRN
Status: DISCONTINUED | OUTPATIENT
Start: 2022-11-29 | End: 2022-11-29 | Stop reason: SDUPTHER

## 2022-11-29 RX ORDER — FENTANYL CITRATE 50 UG/ML
50 INJECTION, SOLUTION INTRAMUSCULAR; INTRAVENOUS EVERY 5 MIN PRN
Status: DISCONTINUED | OUTPATIENT
Start: 2022-11-29 | End: 2022-11-29 | Stop reason: HOSPADM

## 2022-11-29 RX ORDER — ONDANSETRON 2 MG/ML
INJECTION INTRAMUSCULAR; INTRAVENOUS PRN
Status: DISCONTINUED | OUTPATIENT
Start: 2022-11-29 | End: 2022-11-29 | Stop reason: SDUPTHER

## 2022-11-29 RX ORDER — MEPERIDINE HYDROCHLORIDE 25 MG/ML
12.5 INJECTION INTRAMUSCULAR; INTRAVENOUS; SUBCUTANEOUS EVERY 5 MIN PRN
Status: DISCONTINUED | OUTPATIENT
Start: 2022-11-29 | End: 2022-11-29 | Stop reason: HOSPADM

## 2022-11-29 RX ORDER — MIDAZOLAM HYDROCHLORIDE 1 MG/ML
INJECTION INTRAMUSCULAR; INTRAVENOUS PRN
Status: DISCONTINUED | OUTPATIENT
Start: 2022-11-29 | End: 2022-11-29 | Stop reason: SDUPTHER

## 2022-11-29 RX ORDER — SODIUM CHLORIDE 9 MG/ML
25 INJECTION, SOLUTION INTRAVENOUS PRN
Status: DISCONTINUED | OUTPATIENT
Start: 2022-11-29 | End: 2022-11-29 | Stop reason: HOSPADM

## 2022-11-29 RX ORDER — DROPERIDOL 2.5 MG/ML
0.62 INJECTION, SOLUTION INTRAMUSCULAR; INTRAVENOUS
Status: DISCONTINUED | OUTPATIENT
Start: 2022-11-29 | End: 2022-11-29 | Stop reason: HOSPADM

## 2022-11-29 RX ORDER — LIDOCAINE HYDROCHLORIDE 20 MG/ML
INJECTION, SOLUTION INFILTRATION; PERINEURAL PRN
Status: DISCONTINUED | OUTPATIENT
Start: 2022-11-29 | End: 2022-11-29 | Stop reason: SDUPTHER

## 2022-11-29 RX ORDER — DIPHENHYDRAMINE HYDROCHLORIDE 50 MG/ML
12.5 INJECTION INTRAMUSCULAR; INTRAVENOUS
Status: DISCONTINUED | OUTPATIENT
Start: 2022-11-29 | End: 2022-11-29 | Stop reason: HOSPADM

## 2022-11-29 RX ORDER — HYDRALAZINE HYDROCHLORIDE 20 MG/ML
10 INJECTION INTRAMUSCULAR; INTRAVENOUS
Status: DISCONTINUED | OUTPATIENT
Start: 2022-11-29 | End: 2022-11-29 | Stop reason: HOSPADM

## 2022-11-29 RX ORDER — LABETALOL HYDROCHLORIDE 5 MG/ML
10 INJECTION, SOLUTION INTRAVENOUS
Status: DISCONTINUED | OUTPATIENT
Start: 2022-11-29 | End: 2022-11-29 | Stop reason: HOSPADM

## 2022-11-29 RX ORDER — SODIUM CHLORIDE 0.9 % (FLUSH) 0.9 %
5-40 SYRINGE (ML) INJECTION PRN
Status: DISCONTINUED | OUTPATIENT
Start: 2022-11-29 | End: 2022-11-29 | Stop reason: HOSPADM

## 2022-11-29 RX ORDER — FENTANYL CITRATE 50 UG/ML
INJECTION, SOLUTION INTRAMUSCULAR; INTRAVENOUS PRN
Status: DISCONTINUED | OUTPATIENT
Start: 2022-11-29 | End: 2022-11-29 | Stop reason: SDUPTHER

## 2022-11-29 RX ORDER — VENLAFAXINE HYDROCHLORIDE 150 MG/1
150 CAPSULE, EXTENDED RELEASE ORAL DAILY
Status: DISCONTINUED | OUTPATIENT
Start: 2022-11-29 | End: 2022-12-06 | Stop reason: HOSPADM

## 2022-11-29 RX ORDER — ONDANSETRON 2 MG/ML
4 INJECTION INTRAMUSCULAR; INTRAVENOUS
Status: DISCONTINUED | OUTPATIENT
Start: 2022-11-29 | End: 2022-11-29 | Stop reason: HOSPADM

## 2022-11-29 RX ORDER — MORPHINE SULFATE 2 MG/ML
2 INJECTION, SOLUTION INTRAMUSCULAR; INTRAVENOUS EVERY 5 MIN PRN
Status: DISCONTINUED | OUTPATIENT
Start: 2022-11-29 | End: 2022-11-29 | Stop reason: HOSPADM

## 2022-11-29 RX ORDER — IPRATROPIUM BROMIDE AND ALBUTEROL SULFATE 2.5; .5 MG/3ML; MG/3ML
1 SOLUTION RESPIRATORY (INHALATION)
Status: DISCONTINUED | OUTPATIENT
Start: 2022-11-29 | End: 2022-11-29 | Stop reason: HOSPADM

## 2022-11-29 RX ADMIN — AMPICILLIN SODIUM AND SULBACTAM SODIUM 1500 MG: 1; .5 INJECTION, POWDER, FOR SOLUTION INTRAMUSCULAR; INTRAVENOUS at 14:55

## 2022-11-29 RX ADMIN — MIDAZOLAM 2 MG: 1 INJECTION INTRAMUSCULAR; INTRAVENOUS at 14:45

## 2022-11-29 RX ADMIN — MORPHINE SULFATE 4 MG: 4 INJECTION, SOLUTION INTRAMUSCULAR; INTRAVENOUS at 02:05

## 2022-11-29 RX ADMIN — DEXAMETHASONE SODIUM PHOSPHATE 8 MG: 4 INJECTION, SOLUTION INTRAMUSCULAR; INTRAVENOUS at 14:52

## 2022-11-29 RX ADMIN — HYDROCODONE BITARTRATE AND ACETAMINOPHEN 2 TABLET: 5; 325 TABLET ORAL at 23:20

## 2022-11-29 RX ADMIN — MORPHINE SULFATE 4 MG: 4 INJECTION, SOLUTION INTRAMUSCULAR; INTRAVENOUS at 04:29

## 2022-11-29 RX ADMIN — VENLAFAXINE HYDROCHLORIDE 150 MG: 150 CAPSULE, EXTENDED RELEASE ORAL at 09:08

## 2022-11-29 RX ADMIN — AMPICILLIN SODIUM AND SULBACTAM SODIUM 1500 MG: 1; .5 INJECTION, POWDER, FOR SOLUTION INTRAMUSCULAR; INTRAVENOUS at 04:22

## 2022-11-29 RX ADMIN — HYDROCODONE BITARTRATE AND ACETAMINOPHEN 2 TABLET: 5; 325 TABLET ORAL at 03:29

## 2022-11-29 RX ADMIN — MOMETASONE FUROATE AND FORMOTEROL FUMARATE DIHYDRATE 2 PUFF: 200; 5 AEROSOL RESPIRATORY (INHALATION) at 17:34

## 2022-11-29 RX ADMIN — ALBUTEROL SULFATE 1 PUFF: 90 AEROSOL, METERED RESPIRATORY (INHALATION) at 07:40

## 2022-11-29 RX ADMIN — MORPHINE SULFATE 4 MG: 4 INJECTION, SOLUTION INTRAMUSCULAR; INTRAVENOUS at 16:53

## 2022-11-29 RX ADMIN — MORPHINE SULFATE 4 MG: 4 INJECTION, SOLUTION INTRAMUSCULAR; INTRAVENOUS at 10:28

## 2022-11-29 RX ADMIN — PROPOFOL 200 MG: 10 INJECTION, EMULSION INTRAVENOUS at 14:52

## 2022-11-29 RX ADMIN — HYDROCODONE BITARTRATE AND ACETAMINOPHEN 2 TABLET: 5; 325 TABLET ORAL at 19:00

## 2022-11-29 RX ADMIN — HYDROCODONE BITARTRATE AND ACETAMINOPHEN 2 TABLET: 5; 325 TABLET ORAL at 07:37

## 2022-11-29 RX ADMIN — LETROZOLE 2.5 MG: 2.5 TABLET ORAL at 09:08

## 2022-11-29 RX ADMIN — FENTANYL CITRATE 100 MCG: 50 INJECTION, SOLUTION INTRAMUSCULAR; INTRAVENOUS at 14:52

## 2022-11-29 RX ADMIN — MORPHINE SULFATE 4 MG: 4 INJECTION, SOLUTION INTRAMUSCULAR; INTRAVENOUS at 00:05

## 2022-11-29 RX ADMIN — ONDANSETRON 4 MG: 2 INJECTION INTRAMUSCULAR; INTRAVENOUS at 15:12

## 2022-11-29 RX ADMIN — MOMETASONE FUROATE AND FORMOTEROL FUMARATE DIHYDRATE 2 PUFF: 200; 5 AEROSOL RESPIRATORY (INHALATION) at 07:39

## 2022-11-29 RX ADMIN — LIDOCAINE HYDROCHLORIDE 100 MG: 20 INJECTION, SOLUTION INFILTRATION; PERINEURAL at 14:52

## 2022-11-29 RX ADMIN — SODIUM CHLORIDE: 9 INJECTION, SOLUTION INTRAVENOUS at 00:09

## 2022-11-29 RX ADMIN — AMPICILLIN SODIUM AND SULBACTAM SODIUM 1500 MG: 1; .5 INJECTION, POWDER, FOR SOLUTION INTRAMUSCULAR; INTRAVENOUS at 20:43

## 2022-11-29 ASSESSMENT — PAIN DESCRIPTION - LOCATION
LOCATION: ARM

## 2022-11-29 ASSESSMENT — PAIN DESCRIPTION - PAIN TYPE
TYPE: ACUTE PAIN
TYPE: SURGICAL PAIN
TYPE: ACUTE PAIN
TYPE: SURGICAL PAIN;ACUTE PAIN

## 2022-11-29 ASSESSMENT — PAIN DESCRIPTION - DESCRIPTORS
DESCRIPTORS: THROBBING
DESCRIPTORS: THROBBING
DESCRIPTORS: THROBBING;STABBING
DESCRIPTORS: THROBBING;STABBING
DESCRIPTORS: THROBBING
DESCRIPTORS: SHOOTING;THROBBING
DESCRIPTORS: SHARP
DESCRIPTORS: STABBING

## 2022-11-29 ASSESSMENT — PAIN - FUNCTIONAL ASSESSMENT

## 2022-11-29 ASSESSMENT — PAIN SCALES - GENERAL
PAINLEVEL_OUTOF10: 7
PAINLEVEL_OUTOF10: 10
PAINLEVEL_OUTOF10: 8
PAINLEVEL_OUTOF10: 5
PAINLEVEL_OUTOF10: 9
PAINLEVEL_OUTOF10: 10
PAINLEVEL_OUTOF10: 9
PAINLEVEL_OUTOF10: 10
PAINLEVEL_OUTOF10: 10
PAINLEVEL_OUTOF10: 8
PAINLEVEL_OUTOF10: 8
PAINLEVEL_OUTOF10: 10
PAINLEVEL_OUTOF10: 5
PAINLEVEL_OUTOF10: 10
PAINLEVEL_OUTOF10: 5
PAINLEVEL_OUTOF10: 8
PAINLEVEL_OUTOF10: 7
PAINLEVEL_OUTOF10: 5

## 2022-11-29 ASSESSMENT — PAIN DESCRIPTION - ORIENTATION
ORIENTATION: LEFT

## 2022-11-29 ASSESSMENT — PAIN DESCRIPTION - FREQUENCY
FREQUENCY: CONTINUOUS

## 2022-11-29 ASSESSMENT — PAIN DESCRIPTION - ONSET
ONSET: ON-GOING

## 2022-11-29 ASSESSMENT — ENCOUNTER SYMPTOMS: SHORTNESS OF BREATH: 1

## 2022-11-29 NOTE — ANESTHESIA PRE PROCEDURE
Department of Anesthesiology  Preprocedure Note       Name:  Brittany Lima   Age:  45 y.o.  :  1970                                          MRN:  [id number]         Date:  2022      Surgeon: Zack Brush):  Genesis Trejo MD    Procedure: Procedure(s):  LEFT FOREARM INCISION AND DRAINAGE    Medications prior to admission:   Prior to Admission medications    Medication Sig Start Date End Date Taking?  Authorizing Provider   letrozole [facility]) 2.5 MG tablet Take 1 tablet by mouth every other day 22   Gayathri Padilla PA-C   diclofenac (VOLTAREN) 75 MG EC tablet Take 75 mg by mouth 2 times daily  Patient not taking: Reported on 2022    Historical Provider, MD   amoxicillin-clavulanate (AUGMENTIN) 875-125 MG per tablet Take 1 tablet by mouth 2 times daily  Patient not taking: Reported on 2022   Historical Provider, MD Sami Uribe 200-5 MCG/ACT inhaler Inhale 2 puffs into the lungs 2 times daily 20   [facility] Stephenson CNP   albuterol sulfate  (90 Base) MCG/ACT inhaler Inhale 1 puff into the lungs every 6 hours as needed for Wheezing or Shortness of Breath 19   [facility] IR[facility] Stephenson CNP   NONFORMULARY 2 tablets daily Tumeric    Historical Provider, MD   b complex vitamins capsule Take 1 capsule by mouth daily    Historical Provider, MD   albuterol (PROVENTIL) (2.5 MG/3ML) 0.083% nebulizer solution Inhale 2.5 mg into the lungs every 4 hours as needed  10/17/17   Historical Provider, MD   butalbital-apap-caffeine -40 MG CAPS Take by mouth 10/17/17   Historical Provider, MD   ALPRAZolam Prudence Sole) 0.5 MG tablet Take 0.5 mg by mouth nightly as needed  10/17/17   Historical Provider, MD   cyclobenzaprine (FLEXERIL) 5 MG tablet Take 5 mg by mouth 3 times daily as needed  10/17/17   Historical Provider, MD   venlafaxine 150 MG extended release tablet Take 150 mg by mouth daily 10/17/17   Historical Provider, MD       Current medications:    Current Facility-Administered Medications   Medication Dose Route Frequency Provider Last Rate Last Admin    venlafaxine (EFFEXOR XR) extended release capsule 150 mg  150 mg Oral Daily Rolando Conklin PA-C   150 mg at 11/29/22 0908    albuterol sulfate HFA (PROVENTIL;VENTOLIN;PROAIR) 108 (90 Base) MCG/ACT inhaler 2 puff  2 puff Inhalation BID Rolando Conklin PA-C        ampicillin-sulbactam (UNASYN) 1,500 mg in sodium chloride 0.9 % 50 mL IVPB (mini-bag)  1,500 mg IntraVENous 30 Min Pre-Op Basilio Conklin PA-C        cyclobenzaprine (FLEXERIL) tablet 10 mg  10 mg Oral TID PRN Rolando Conklin PA-C   10 mg at 11/28/22 2325    ondansetron (ZOFRAN) injection 4 mg  4 mg IntraVENous Q6H PRN Rolando Conklin PA-C        HYDROcodone-acetaminophen (NORCO) 5-325 MG per tablet 1 tablet  1 tablet Oral Q4H PRN Rolando Conklin PA-C        Or    HYDROcodone-acetaminophen (NORCO) 5-325 MG per tablet 2 tablet  2 tablet Oral Q4H PRN Oscar Randhawa PA-C   2 tablet at 11/29/22 0737    morphine (PF) injection 2 mg  2 mg IntraVENous Q2H PRN Rolando Conklin PA-C        Or    morphine injection 4 mg  4 mg IntraVENous Q2H PRN Rolando Conklin PA-C   4 mg at 11/29/22 1028    ampicillin-sulbactam (UNASYN) 1,500 mg in sodium chloride 0.9 % 50 mL IVPB (mini-bag)  1,500 mg IntraVENous Q6H Rolando Conklin PA-C   Stopped at 11/29/22 0453    albuterol (PROVENTIL) nebulizer solution 2.5 mg  2.5 mg Nebulization Q4H PRN Rolando Conklin PA-C        mometasone-formoterol (DULERA) 200-5 MCG/ACT inhaler 2 puff  2 puff Inhalation BID Rolando Conklin PA-C   2 puff at 11/29/22 0504    letrozole [facility]) tablet 2.5 mg  2.5 mg Oral Every Other Day Oscar Randhawa PA-C   2.5 mg at 11/29/22 0908    0.9 % sodium chloride infusion   IntraVENous Continuous Rolando Conklin PA-C 100 mL/hr at 11/29/22 0630 Rate Verify at 11/29/22 0630       Allergies:  No Known Allergies    Problem List:    Patient Active Problem List   Diagnosis Code  Asthma J45.909    COPD with asthma ([facility] [address] J44.9    Osteoarthritis M19.90    History of chest pain Z87.898    Pulmonary emphysema (HCC) J43.9    Acid reflux K21.9    History of tinnitus Z86.69    Depression F32. A    Anxiety F41.9    Joint pain M25.50    Numbness and tingling R20.0, R20.2    Light headed R42    Breast cancer metastasized to axillary lymph node, left (HCC) C50.912, C77.3    Malignant neoplasm of left female breast ([facility]) C50.912    Chronic obstructive pulmonary disease with acute exacerbation ([facility]) J44.1    Elevated lactic acid level R79.89    Nausea R11.0    Pneumonia of both lungs due to infectious organism J18.9    COPD exacerbation ([facility]) J44.1    Diarrhea of presumed infectious origin R19.7    S[facility] to [facility] J18.9, A41.9    Breast lump N63.0    Dyspnea R06.00    Epigastric pain R10.13    Gastritis due to Helicobacter species M19.07, B96.81    Headache R51.9    Intestinal infectious disease A09    Low back pain M54.50    Malaise and fatigue R53.81, R53.83    Microscopic hematuria R31.29    Polyuria R35.89    Solitary pulmonary nodule R91.1    Urinary incontinence R32    Dog bite of arm, left, initial encounter S41.152A, W54. Kale       Past Medical History:        Diagnosis Date    Acid reflux     Anxiety     Asthma     [facility]     COPD with [facility]     Depression     Depression     Dizziness - light-headed     HX OF:    Emphysema     History of chest pain     History of therapeutic radiation     History of tinnitus     Hx antineoplastic chemo     Hx of blood clots     Joint pain     Numbness and tingling     Osteoarthritis     PONV (postoperative nausea and vomiting)     SOB (shortness of breath) on exertion        Past Surgical History:        Procedure Laterality Date    BLADDER SURGERY  2014    BREAST BIOPSY  10/06/2017    Perri Jeffries    CARDIAC CATHETERIZATION  05/24/2011    NO EVIDENCE OF PULMONARY HTN,NO EVIDENCE OF DD,NORMAL LVF    CARDIOVASCULAR STRESS TEST  2011    EF 60% MILD INFERIOR WALL REVERSIBLE STRESS-INDUCED ISCHEMIA      SECTION  1992    CHOLECYSTECTOMY  2015    CYST REMOVAL Right 2015    rt breast [facility]-benign breast cyst     ESOPHAGUS SURGERY  2014    HYSTERECTOMY (CERVIX STATUS UNKNOWN)      INSERTION / REMOVAL / REPLACEMENT VENOUS ACCESS CATHETER Right 2017    RIGHT SIDE SINGLE LUMEN POWERPORT INSERTION performed by Drew Haddad MD at [address] Left 2017    LEFT MODIFIED RADICAL MASTECTOMY performed by Drew Haddad MD at [address] TUNNELED VENOUS PORT PLACEMENT Right 2017    Single Luman Smart Port Insertion- Right side.         Social History:    Social History     Tobacco Use    Smoking status: Every Day     Packs/day: 1.00     Years: 23.00     Pack years: 23.00     Types: Cigarettes     Start date: 1999    Smokeless tobacco: Never    Tobacco comments:     Just received smoking cessation information from pcp per patient 22   Substance Use Topics    Alcohol use: Yes     Comment: rarely                                Ready to quit: Not Answered  Counseling given: Not Answered  Tobacco comments: Just received smoking cessation information from pcp per patient 22      Vital Signs (Current):   Vitals:    22 0459 22 0737 22 1028 22 1130   BP: 124/69 118/60  (!) 122/55   Pulse: 78 72  83   Resp: 14 16 20 16   Temp: 98.3 °F (36.8 °C)   98.2 °F (36.8 °C)   TempSrc: Oral   Oral   SpO2: 96% 95%     Weight:       Height:                                                  BP Readings from Last 3 Encounters:   22 (!) 122/55   22 114/64   22 128/68       NPO Status:                                                                                 BMI:   Wt Readings from Last 3 Encounters:   22 170 lb (77.1 kg)   22 173 lb (78.5 kg)   22 172 lb (78 kg)     Body mass index is 25.85 kg/m². CBC:   Lab Results   Component Value Date/Time    WBC 8.8 11/28/2022 09:02 PM    RBC 3.52 11/28/2022 09:02 PM    HGB 11.6 11/28/2022 09:02 PM    HCT 35.7 11/28/2022 09:02 PM    .4 11/28/2022 09:02 PM    RDW 15.0 04/12/2019 11:35 AM     11/28/2022 09:02 PM       CMP:   Lab Results   Component Value Date/Time     11/28/2022 09:02 PM    K 3.6 11/28/2022 09:02 PM    K 4.1 04/05/2021 07:39 AM     11/28/2022 09:02 PM    CO2 22 11/28/2022 09:02 PM    BUN 13 11/28/2022 09:02 PM    CREATININE 0.9 11/28/2022 09:02 PM    LABGLOM >60 11/28/2022 09:12 PM    GLUCOSE 142 11/28/2022 09:02 PM    GLUCOSE 97 07/21/2017 06:20 AM    PROT 5.9 01/27/2022 05:58 PM    CALCIUM 8.3 11/28/2022 09:02 PM    BILITOT <0.2 01/27/2022 05:58 PM    ALKPHOS 66 01/27/2022 05:58 PM    AST 17 01/27/2022 05:58 PM    ALT 17 01/27/2022 05:58 PM       POC Tests: No results for input(s): POCGLU, POCNA, POCK, POCCL, POCBUN, POCHEMO, POCHCT in the last 72 hours.     Coags:   Lab Results   Component Value Date/Time    INR 0.98 04/05/2021 07:39 AM       HCG (If Applicable):   Lab Results   Component Value Date    PREGSERUM NEGATIVE 03/02/2019        ABGs: No results found for: PHART, PO2ART, LYA5AWY, BZD8VFB, BEART, W8SNGADC     Type & Screen (If Applicable):  Lab Results   Component Value Date    LABRH POS 11/28/2022       Drug/Infectious Status (If Applicable):  No results found for: HIV, HEPCAB    COVID-19 Screening (If Applicable):   Lab Results   Component Value Date/Time    COVID19 NOT DETECTED 01/27/2022 07:40 PM    COVID19 NOT  DETECTED 01/27/2022 05:15 PM           Anesthesia Evaluation    Airway: Mallampati: II          Dental:          Pulmonary:   (+) COPD:  shortness of breath:  asthma:                            Cardiovascular:    (+) HERNANDEZ:,                   Neuro/Psych:   (+) headaches:, psychiatric history:            GI/Hepatic/Renal:             Endo/Other: Abdominal:             Vascular: Other Findings:           Anesthesia Plan      general     ASA 2       Induction: intravenous. Anesthetic plan and risks discussed with patient.                         Christiano Russell MD   11/29/2022

## 2022-11-29 NOTE — ED NOTES
Patient medicated per [facility] and updated on POC at this time. Patient resting in bed. Respirations easy and unlabored. No distress noted. Call light within reach. Family at the bedside.       Lulu Turner RN  11/28/22 2200

## 2022-11-29 NOTE — ED NOTES
Phone call placed to Oral Kurtz to approve pt transport to  in stable condition.      Niki Ordonez LPN  88/01/30 0913

## 2022-11-29 NOTE — CARE COORDINATION
11/29/22, 3:24 PM EST      DISCHARGE PLANNING EVALUATION    Terrell Ramos  Admitted: 11/28/2022  Hospital Day: 0    Location: [facility] OR (General) POOL R* Reason for admit: Dog bite, initial encounter [W54. 0XXA]  Dog bite of arm, left, initial encounter [S41.152A, W54. 0XXA]    Past Medical History:   Diagnosis Date    Acid reflux     Anxiety     Asthma     Breast cancer ([facility] [address]     COPD with asthma ([facility] [address]     Depression     Depression     Dizziness - light-headed     HX OF:    Emphysema     History of chest pain     History of therapeutic radiation     History of tinnitus     Hx antineoplastic chemo     Hx of blood clots     Joint pain     Numbness and tingling     Osteoarthritis     PONV (postoperative nausea and vomiting)     SOB (shortness of breath) on exertion        Procedure: 11/29 LEFT FOREARM INCISION AND DRAINAGE simple closure of wound 25 cm  Barriers to Discharge: Admit through ER after dog bite. PCP: Law Barriga MD    Readmission Risk              Risk of Unplanned Readmission:  0         Patient's Healthcare Decision Maker: Legal Next of Kin    Patient Goals/Plan/Treatment Preferences: From home with  and daughter. Denies having any HH. Has a nebulizer. Drives. Denies needs at this time. Will follow. Transportation/Food Security/Housekeeping Addressed: No issues identified.

## 2022-11-29 NOTE — PLAN OF CARE
Problem: Discharge Planning  Goal: Discharge to home or other facility with appropriate resources  Outcome: Progressing  Flowsheets (Taken 11/29/2022 1553)  Discharge to home or other facility with appropriate resources:   Identify barriers to discharge with patient and caregiver   Identify discharge learning needs (meds, wound care, etc)   Refer to discharge planning if patient needs post-hospital services based on physician order or complex needs related to functional status, cognitive ability or social support system   Arrange for needed discharge resources and transportation as appropriate     Problem: Pain  Goal: Verbalizes/displays adequate comfort level or baseline comfort level  Outcome: Progressing  Flowsheets (Taken 11/29/2022 1553)  Verbalizes/displays adequate comfort level or baseline comfort level:   Encourage patient to monitor pain and request assistance   Administer analgesics based on type and severity of pain and evaluate response   Consider cultural and social influences on pain and pain management   Assess pain using appropriate pain scale   Implement non-pharmacological measures as appropriate and evaluate response   Notify Licensed Independent Practitioner if interventions unsuccessful or patient reports new pain     Problem: ABCDS Injury Assessment  Goal: Absence of physical injury  Outcome: Progressing  Flowsheets (Taken 11/29/2022 1553)  Absence of Physical Injury: Implement safety measures based on patient assessment     Problem: Cardiovascular - Adult  Goal: Maintains optimal cardiac output and hemodynamic stability  Outcome: Progressing  Flowsheets (Taken 11/29/2022 1553)  Maintains optimal cardiac output and hemodynamic stability:   Monitor blood pressure and heart rate   Monitor urine output and notify Licensed Independent Practitioner for values outside of normal range   Assess for signs of decreased cardiac output   Administer fluid and/or volume expanders as ordered     Problem: Skin/Tissue Integrity - Adult  Goal: Incisions, wounds, or drain sites healing without S/S of infection  Outcome: Progressing  Flowsheets (Taken 11/29/2022 1553)  Incisions, Wounds, or Drain Sites Healing Without Sign and Symptoms of Infection:   ADMISSION and DAILY: Assess and document risk factors for pressure ulcer development   TWICE DAILY: Assess and document skin integrity   TWICE DAILY: Assess and document dressing/incision, wound bed, drain sites and surrounding tissue   Implement wound care per orders     Problem: Musculoskeletal - Adult  Goal: Return mobility to safest level of function  Outcome: Progressing  Flowsheets (Taken 11/29/2022 1553)  Return Mobility to Safest Level of Function:   Assess patient stability and activity tolerance for standing, transferring and ambulating with or without assistive devices   Assist with transfers and ambulation using safe patient handling equipment as needed   Ensure adequate protection for wounds/incisions during mobilization   Obtain physical therapy/occupational therapy consults as needed   Instruct patient/family in ordered activity level  Goal: Return ADL status to a safe level of function  Outcome: Progressing  Flowsheets (Taken 11/29/2022 1553)  Return ADL Status to a Safe Level of Function:   Administer medication as ordered   Obtain physical therapy/occupational therapy consults as needed   Assess activities of daily living deficits and provide assistive devices as needed   Assist and instruct patient to increase activity and self care as tolerated     Problem: Infection - Adult  Goal: Absence of infection at discharge  Outcome: Progressing  Flowsheets (Taken 11/29/2022 1553)  Absence of infection at discharge:   Assess and monitor for signs and symptoms of infection   Monitor lab/diagnostic results   Monitor all insertion sites i.e., indwelling lines, tubes and drains   Administer medications as ordered   Instruct and encourage patient and family to use good hand hygiene technique     Problem: Metabolic/Fluid and Electrolytes - Adult  Goal: Electrolytes maintained within normal limits  Outcome: Progressing  Flowsheets (Taken 11/29/2022 1553)  Electrolytes maintained within normal limits:   Monitor labs and assess patient for signs and symptoms of electrolyte imbalances   Administer electrolyte replacement as ordered     Problem: Respiratory - Adult  Goal: Clear lung sounds  11/29/2022 0749 by Dillon Plata RCP  Outcome: Progressing  Note: Patient agrees on goals  Goal: Achieves optimal ventilation and oxygenation  Outcome: Progressing  Flowsheets (Taken 11/29/2022 1553)  Achieves optimal ventilation and oxygenation:   Assess for changes in respiratory status   Assess for changes in mentation and behavior   Encourage broncho-pulmonary hygiene including cough, deep breathe, incentive spirometry   Assess and instruct to report shortness of breath or any respiratory difficulty     Problem: Gastrointestinal - Adult  Goal: Maintains or returns to baseline bowel function  Outcome: Progressing  Flowsheets (Taken 11/29/2022 1553)  Maintains or returns to baseline bowel function:   Assess bowel function   Encourage oral fluids to ensure adequate hydration   Administer ordered medications as needed   Administer IV fluids as ordered to ensure adequate hydration   Encourage mobilization and activity     Problem: Genitourinary - Adult  Goal: Absence of urinary retention  Outcome: Progressing  Flowsheets (Taken 11/29/2022 1553)  Absence of urinary retention:   Assess patients ability to void and empty bladder   Monitor intake/output and perform bladder scan as needed     Problem: Hematologic - Adult  Goal: Maintains hematologic stability  Outcome: Progressing  Flowsheets (Taken 11/29/2022 1553)  Maintains hematologic stability:   Assess for signs and symptoms of bleeding or hemorrhage   Monitor labs for bleeding or clotting disorders   Care plan reviewed with patient and family. Patient and family verbalize understanding of the plan of care and contribute to goal setting.

## 2022-11-29 NOTE — H&P
History and Physical        CHIEF COMPLAINT:  Dog Bite left forearm    HISTORY OF PRESENT ILLNESS:      The patient is a 45 y.o. female  who presents with an unfortunate history of being bitten on her left forearm by a pit bull last evening. She states this was a fight between 2 of her dogs. She tried to break it up. She has significant laceration of the dorsal aspect of her left forearm. She was seen in the ER. Decision to admit was made. She has difficulty during this examination with any finger extension at this time. Sensation appears to be altered in the radial ulnar and median nerve distribution of the left hand. She is resting comfortably on 7K in no acute distress. She is receiving IV antibiotics in the form of Unasyn every 6 hours. Tetanus shot was updated in the ER.     Past Medical History:    Past Medical History:   Diagnosis Date    Acid reflux     Anxiety     Asthma     Breast cancer ([address]     COPD with asthma ([address]     Depression     Depression     Dizziness - light-headed     HX OF:    Emphysema     History of chest pain     History of therapeutic radiation     History of tinnitus     Hx antineoplastic chemo     Hx of blood clots     Joint pain     Numbness and tingling     Osteoarthritis     PONV (postoperative nausea and vomiting)     SOB (shortness of breath) on exertion        Past Surgical History:    Past Surgical History:   Procedure Laterality Date    BLADDER SURGERY  2014    BREAST BIOPSY  10/06/2017    [address] [address]  2011    NO EVIDENCE OF PULMONARY HTN,NO EVIDENCE OF DD,NORMAL LVF    CARDIOVASCULAR STRESS TEST  2011    EF 60% MILD INFERIOR WALL REVERSIBLE STRESS-INDUCED ISCHEMIA      SECTION  1992    CHOLECYSTECTOMY  2015    CYST REMOVAL Right 2015    rt breast [facility]-benign breast cyst     ESOPHAGUS SURGERY  2014    HYSTERECTOMY (CERVIX STATUS UNKNOWN)      INSERTION / REMOVAL / REPLACEMENT VENOUS ACCESS CATHETER Right 12/1/2017    RIGHT SIDE SINGLE LUMEN POWERPORT INSERTION performed by Donna Cole MD at [address] Left 11/7/2017    LEFT MODIFIED RADICAL MASTECTOMY performed by Donna Cole MD at [address]    TUNNELED VENOUS PORT PLACEMENT Right 12/01/2017    Single Luman Smart Port Insertion- Right side. Medications Prior to Admission:   Prior to Admission medications    Medication Sig Start Date End Date Taking?  Authorizing Provider   letrozole [facility]) 2.5 MG tablet Take 1 tablet by mouth every other day 9/6/22   Gayathri Padilla PA-C   diclofenac (VOLTAREN) 75 MG EC tablet Take 75 mg by mouth 2 times daily  Patient not taking: Reported on 11/28/2022    Historical Provider, MD   amoxicillin-clavulanate (AUGMENTIN) 875-125 MG per tablet Take 1 tablet by mouth 2 times daily  Patient not taking: Reported on 11/28/2022 8/26/22   Historical Provider, MD [address] MCG/ACT inhaler Inhale 2 puffs into the lungs 2 times daily 11/2/20   Jadine Kocher, APRN - CNP   albuterol sulfate  (90 Base) MCG/ACT inhaler Inhale 1 puff into the lungs every 6 hours as needed for Wheezing or Shortness of Breath 6/11/19   Jadine Kocher, APRN - CNP   NONFORMULARY 2 tablets daily Tumeric    Historical Provider, MD   b complex vitamins capsule Take 1 capsule by mouth daily    Historical Provider, MD   albuterol (PROVENTIL) (2.5 MG/3ML) 0.083% nebulizer solution Inhale 2.5 mg into the lungs every 4 hours as needed  10/17/17   Historical Provider, MD   butalbital-apap-caffeine -40 MG CAPS Take by mouth 10/17/17   Historical Provider, MD   ALPRAZolam Edward Elva) 0.5 MG tablet Take 0.5 mg by mouth nightly as needed  10/17/17   Historical Provider, MD   cyclobenzaprine (FLEXERIL) 5 MG tablet Take 5 mg by mouth 3 times daily as needed  10/17/17   Historical Provider, MD   venlafaxine 150 MG extended release tablet Take 150 mg by mouth daily 10/17/17   Historical Provider, MD    Scheduled Meds: venlafaxine  150 mg Oral Daily    albuterol sulfate HFA  2 puff Inhalation BID    ampicillin-sulbactam  1,500 mg IntraVENous 30 Min Pre-Op    ampicillin-sulbactam  1,500 mg IntraVENous Q6H    mometasone-formoterol  2 puff Inhalation BID    letrozole  2.5 mg Oral Every Other Day     Continuous Infusions:   sodium chloride 100 mL/hr at 11/29/22 0630     PRN Meds:.cyclobenzaprine, ondansetron, HYDROcodone 5 mg - acetaminophen **OR** HYDROcodone 5 mg - acetaminophen, morphine **OR** morphine, albuterol    Allergies:  Patient has no known allergies.     Social History:   Social History     Socioeconomic History    Marital status:      Spouse name: None    Number of children: 4    Years of education: None    Highest education level: None   Occupational History    Occupation: spot welder   Tobacco Use    Smoking status: Every Day     Packs/day: 1.00     Years: 23.00     Pack years: 23.00     Types: Cigarettes     Start date: 9/1/1999    Smokeless tobacco: Never    Tobacco comments:     Just received smoking cessation information from pcp per patient 9/2/22   Vaping Use    Vaping Use: Never used   Substance and Sexual Activity    Alcohol use: Yes     Comment: rarely    Drug use: No     Social History     Tobacco Use   Smoking Status Every Day    Packs/day: 1.00    Years: 23.00    Pack years: 23.00    Types: Cigarettes    Start date: 9/1/1999   Smokeless Tobacco Never   Tobacco Comments    Just received smoking cessation information from pcp per patient 9/2/22     Social History     Substance and Sexual Activity   Alcohol Use Yes    Comment: rarely     Social History     Substance and Sexual Activity   Drug Use No       Family History:  Family History   Problem Relation Age of Onset    Heart Disease Mother     Hypertension Mother     Diabetes Mother     Heart Disease Father     No Known Problems Sister     No Known Problems Brother     Breast Cancer Maternal Aunt     Breast Cancer Paternal Aunt     Endometrial Cancer Paternal Aunt     Lung Cancer Paternal Aunt        REVIEW OF SYSTEMS:  Constitutional: Denies any fever, chills. Derm: Denies any rash or skin color change. Eyes: Denies blurred or decreased in vision. Ent: Denies any tinnitus or vertigo. Resp: Denies any cough or shortness of breath. CV: Denies any syncope, palpitations or chest pain. GI:  Denies any abdominal pain, nausea, vomiting, constipation or diarrhea. : Denies any hematuria, hesitancy, or dysuria. Heme/Lymph: Denies any bleeding. Musculoskeletal: Positive for dog bite left forearm. Neuro: Denies any dizziness, paresthesia or weakness. PHYSICAL EXAM:  Patient Vitals for the past 24 hrs:   BP Temp Temp src Pulse Resp SpO2 Height Weight   11/29/22 1130 (!) 122/55 98.2 °F (36.8 °C) Oral 83 16 -- -- --   11/29/22 1028 -- -- -- -- 20 -- -- --   11/29/22 0737 118/60 -- -- 72 16 95 % -- --   11/29/22 0459 124/69 98.3 °F (36.8 °C) Oral 78 14 96 % -- --   11/29/22 0359 -- -- -- -- 14 -- -- --   11/29/22 0329 119/71 98.1 °F (36.7 °C) Oral 76 16 98 % -- --   11/29/22 0235 -- -- -- -- 14 -- -- --   11/29/22 0205 -- -- -- -- 18 -- -- --   11/29/22 0100 123/67 98.1 °F (36.7 °C) Oral 71 18 97 % -- --   11/29/22 0035 -- -- -- -- 16 -- -- --   11/29/22 0005 -- -- -- -- 18 -- -- --   11/28/22 2332 -- -- -- -- 18 -- -- --   11/28/22 2302 -- -- -- -- 18 -- -- --   11/28/22 2215 128/74 97.9 °F (36.6 °C) Oral 89 18 99 % -- --   11/28/22 2200 138/79 -- -- 94 16 98 % -- --   11/28/22 2053 129/88 97.6 °F (36.4 °C) Oral -- -- -- -- --   11/28/22 2050 -- -- Oral 92 16 100 % 5' 8\" (1.727 m) 170 lb (77.1 kg)     General appearance:  Alert and oriented x 3. No apparent distress, appears stated age and cooperative. HEENT:  Normal cephalic, atraumatic without obvious deformity. Pupils equal, round, and reactive to light. Conjunctivae/corneas clear. Neck: Supple, with full range of motion. Trachea midline. Respiratory:  Normal respiratory effort.  No audible Wheezes or Rhonchi. Cardiovascular:  Regular rate and rhythm. Abdomen: Soft, non-tender, non-distended. Musculoskeletal: Left upper extremity was inspected she has a large full-thickness laceration over the extensor wad of the left forearm. There is exposed tendon exposed muscle. She has difficulty with any finger extension at this time. Sensation appears to be a bit altered in the tips of the thumb long and small fingers at this time. She has a 2+ brisk radial pulse. Skin: Skin color, texture, turgor normal.  No rashes or lesions. Neurologic:  Neurovascularly intact without any focal sensory/motor deficits. Sensation intact. DATA:  CBC:   Lab Results   Component Value Date/Time    WBC 8.8 11/28/2022 09:02 PM    HGB 11.6 11/28/2022 09:02 PM     11/28/2022 09:02 PM     BMP:    Lab Results   Component Value Date/Time     11/28/2022 09:02 PM    K 3.6 11/28/2022 09:02 PM    K 4.1 04/05/2021 07:39 AM     11/28/2022 09:02 PM    CO2 22 11/28/2022 09:02 PM    BUN 13 11/28/2022 09:02 PM    CREATININE 0.9 11/28/2022 09:02 PM    CALCIUM 8.3 11/28/2022 09:02 PM    GLUCOSE 142 11/28/2022 09:02 PM    GLUCOSE 97 07/21/2017 06:20 AM     PT/INR:    Lab Results   Component Value Date/Time    INR 0.98 04/05/2021 07:39 AM     Troponin:    Lab Results   Component Value Date/Time    TROPONINI < 0.01 07/21/2017 06:20 AM     No results for input(s): LIPASE, AMYLASE in the last 72 hours. No results for input(s): AST, ALT, BILIDIR, BILITOT, ALKPHOS in the last 72 hours. Radiology:  XR RADIUS ULNA LEFT (2 VIEWS)    Result Date: 11/28/2022  2 view left forearm Comparison: None Findings: No fractures or dislocations. No joint effusion. No significant arthritic change. No radiopaque foreign body. Significant soft tissue lacerations, irregularity, swelling, and soft tissue gas. No foreign body. This document has been electronically signed by:  Marcin Coronado MD on 11/28/2022 10:11 PM    XR HAND RIGHT (MIN 3 VIEWS)    Result Date: 11/28/2022  3 view right hand Comparison: None Findings: No acute fracture or dislocation. Osteoarthritic changes in several interphalangeal joints. No erosions. No radiopaque foreign body. No acute osseous injury. No radiopaque foreign body. This document has been electronically signed by: Seth Eastman MD on 11/28/2022 10:22 PM      ASSESSMENT:Principal Problem:    Dog bite of arm, left, initial encounter  Resolved Problems:    * No resolved hospital problems. *       PLAN as discussed with Dr. Ponce Brow:  1. We recommend at this time taken to the operative room for wound exploration formal debridement. I had a long discussion with the patient at this time that if there is significant nerve and tendon involvement we may need to have her get set up with a upper extremity specialist.  The patient did express understanding. We will proceed as outlined later today. The patient was counseled at length about the risks of tiffany Covid-19 during their perioperative period and any recovery window from their procedure. The patient was made aware that tiffany Covid-19  may worsen their prognosis for recovering from their procedure  and lend to a higher morbidity and/or mortality risk. All material risks, benefits, and reasonable alternatives including postponing the procedure were discussed. The patient does wish to proceed with the procedure at this time.          Electronically signed by Malissa De Santiago PA-C on 11/29/2022 at 12:46 PM

## 2022-11-29 NOTE — PLAN OF CARE
Problem: Respiratory - Adult  Goal: Clear lung sounds  Outcome: Progressing  Note: Patient agrees on goals

## 2022-11-29 NOTE — ANESTHESIA POSTPROCEDURE EVALUATION
Department of Anesthesiology  Postprocedure Note    Patient: Jerrod Cool  MRN: [id number]  YOB: 1970  Date of evaluation: 11/29/2022      Procedure Summary     Date: 11/29/22 Room / Location: [address] [facility]    Anesthesia Start: 0727 Anesthesia Stop: 4863    Procedure: LEFT FOREARM INCISION AND DRAINAGE (Left) Diagnosis:       Animal bite      (Animal bite [T14. 8XXA])    Surgeons: Angy Ibarra MD Responsible Provider: Aleja Wyatt MD    Anesthesia Type: General ASA Status: 2          Anesthesia Type: General    Malcolm Phase I: Malcolm Score: 8    Malcolm Phase II:        Anesthesia Post Evaluation    Complications: no

## 2022-11-29 NOTE — ED NOTES
Dr. Leigh Ann Sow at the bedside redressing left arm wound at this time. Sling put in place. Patient tolerated fairly well.       Lori Ewing, RN  11/28/22 5958

## 2022-11-29 NOTE — PROGRESS NOTES
Patient admitted to [facility]. Patient IV is clean, dry, and intact. Patient family at bedside. Admission orders reviewed with patient and questions and concerns addressed. Patient oriented to room and unit.

## 2022-11-29 NOTE — RT PROTOCOL NOTE
RT Inhaler-Nebulizer Bronchodilator Protocol Note    There is a bronchodilator order in the chart from a provider indicating to follow the RT Bronchodilator Protocol and there is an Initiate RT Inhaler-Nebulizer Bronchodilator Protocol order as well (see protocol at bottom of note). CXR Findings:  No results found. The findings from the last RT Protocol Assessment were as follows:   History Pulmonary Disease: Chronic pulmonary disease  Respiratory Pattern: Regular pattern and RR 12-20 bpm  Breath Sounds: Slightly diminished and/or crackles  Cough: Strong, spontaneous, non-productive  Indication for Bronchodilator Therapy: Decreased or absent breath sounds, On home bronchodilators  Bronchodilator Assessment Score: 4    Aerosolized bronchodilator medication orders have been revised according to the RT Inhaler-Nebulizer Bronchodilator Protocol below. Respiratory Therapist to perform RT Therapy Protocol Assessment initially then follow the protocol. Repeat RT Therapy Protocol Assessment PRN for score 0-3 or on second treatment, BID, and PRN for scores above 3. No Indications - adjust the frequency to every 6 hours PRN wheezing or bronchospasm, if no treatments needed after 48 hours then discontinue using Per Protocol order mode. If indication present, adjust the RT bronchodilator orders based on the Bronchodilator Assessment Score as indicated below. Use Inhaler orders unless patient has one or more of the following: on home nebulizer, not able to hold breath for 10 seconds, is not alert and oriented, cannot activate and use MDI correctly, or respiratory rate 25 breaths per minute or more, then use the equivalent nebulizer order(s) with same Frequency and PRN reasons based on the score. If a patient is on this medication at home then do not decrease Frequency below that used at home.     0-3 - enter or revise RT bronchodilator order(s) to equivalent RT Bronchodilator order with Frequency of every 4 hours PRN for wheezing or increased work of breathing using Per Protocol order mode. 4-6 - enter or revise RT Bronchodilator order(s) to two equivalent RT bronchodilator orders with one order with BID Frequency and one order with Frequency of every 4 hours PRN wheezing or increased work of breathing using Per Protocol order mode. 7-10 - enter or revise RT Bronchodilator order(s) to two equivalent RT bronchodilator orders with one order with TID Frequency and one order with Frequency of every 4 hours PRN wheezing or increased work of breathing using Per Protocol order mode. 11-13 - enter or revise RT Bronchodilator order(s) to one equivalent RT bronchodilator order with QID Frequency and an Albuterol order with Frequency of every 4 hours PRN wheezing or increased work of breathing using Per Protocol order mode. Greater than 13 - enter or revise RT Bronchodilator order(s) to one equivalent RT bronchodilator order with every 4 hours Frequency and an Albuterol order with Frequency of every 2 hours PRN wheezing or increased work of breathing using Per Protocol order mode. RT to enter RT Home Evaluation for COPD & MDI Assessment order using Per Protocol order mode.     Electronically signed by Gregg Beach RCP on 11/29/2022 at 7:43 AM

## 2022-11-29 NOTE — PROGRESS NOTES
Pt admitted to  [address] [place] [address] from [address]. Complaints: Dog bite left forearm. IV normal saline infusing into the antecubital right, condition patent and no redness at a rate of 100 mls/ hour with about 800 mls in the bag still. IV site free of s/s of infection or infiltration. Vital signs obtained. Assessment and data collection initiated. Two nurse skin assessment performed by Anita NICOLAS and Mckenna Mcmullen LPN. Oriented to room. Explained patients right to have family, representative or physician notified of their admission. Patient has Declined for physician to be notified. Patient has Declined for family/representative to be notified. Policies and procedures for 7 explained. All questions answered with no further questions at this time. Fall prevention and safety  discussed with patient. Bed alarm on. Call light in reach.

## 2022-11-29 NOTE — ED TRIAGE NOTES
Patient presents to ED via EMS with chief complaint of dog bite to left arm. Patient has large deep laceration to left arm that was dressed and splinted by EMS. Bleeding controlled. EMS reports giving 20 mg ketamine IV en route to hospital. Radial pulse present in left arm. Patient also has superficial laceration to left thigh. Patient resting in bed. Respirations easy and unlabored. Call light within reach.

## 2022-11-29 NOTE — ED NOTES
ED to inpatient nurses report    Chief Complaint   Patient presents with    Animal Bite      Present to ED from home  LOC: alert and orientated to name, place, date  Vital signs   Vitals:    11/28/22 2050 11/28/22 2053 11/28/22 2200   BP:  129/88 138/79   Pulse: 92  94   Resp: 16  16   Temp:  97.6 °F (36.4 °C)    TempSrc: Oral Oral    SpO2: 100%  98%   Weight: 170 lb (77.1 kg)     Height: 5' 8\" (1.727 m)        Oxygen Baseline room air    Current needs required none Bipap/Cpap No  LDAs:   Peripheral IV 11/28/22 Right Antecubital (Active)     Mobility: Requires assistance * 2  Pending ED orders: none  Present condition: stable    C-SSRS    Swallow Screening    Preferred Language: [place]     Electronically signed by Leidy Knott, RN on 11/28/2022 at 10:06 PM      Leidy Knott RN  11/28/22 2206

## 2022-11-29 NOTE — ED PROVIDER NOTES
[place] ENCOUNTER          Pt Name: Timothy Sofia  MRN: [id number]  [place] 1970  Date of evaluation: 11/28/2022  Treating Resident Physician: Baron Andrade MD  Supervising Physician: Vicky Lutz MD    History obtained from chart review and the patient. CHIEF COMPLAINT       Chief Complaint   Patient presents with    Animal Bite           HISTORY OF PRESENT ILLNESS    HPI  Timothy Sofia is a 45 y.o. female with PMH of CAD, HTN, DM who presents to the emergency department for evaluation of dog bite. Patient stated that just after 8 PM tonight one of the dogs escaped from their cage and began fighting with another of her dogs. She was trying to break up the fight between her 2 dogs and was bitten. She stated that the dog that bit her was a pitbull and last vaccination was 3 years ago. Patient has multiple injuries with a large avulsion on the left forearm with positive distal pulses, sensation, and decreased mobility. Patient was denying any nausea, vomiting, chest pain, shortness of breath, injuries to her torso. The patient has no other acute complaints at this time. REVIEW OF SYSTEMS   Review of Systems   Constitutional:  Negative for chills, diaphoresis, fatigue and fever. HENT:  Negative for dental problem, ear discharge, ear pain, facial swelling, hearing loss and sore throat. Eyes:  Negative for photophobia, pain, redness, itching and visual disturbance. Respiratory:  Negative for cough, choking, chest tightness, shortness of breath, wheezing and stridor. Cardiovascular:  Negative for chest pain, palpitations and leg swelling. Gastrointestinal:  Negative for abdominal distention, abdominal pain, constipation, diarrhea, nausea and vomiting. Endocrine: Negative for polydipsia, polyphagia and polyuria.    Genitourinary:  Negative for difficulty urinating, dyspareunia, dysuria, flank pain, genital sores, pelvic pain, vaginal bleeding, vaginal discharge and vaginal pain. Musculoskeletal:  Negative for arthralgias, back pain, myalgias and neck pain. Skin:  Negative for pallor, rash and wound. Allergic/Immunologic: Negative for immunocompromised state. Neurological:  Negative for dizziness, seizures, syncope, light-headedness, numbness and headaches. Psychiatric/Behavioral:  Negative for confusion, dysphoric mood, hallucinations and suicidal ideas. The patient is not nervous/anxious.         PAST MEDICAL AND SURGICAL HISTORY     Past Medical History:   Diagnosis Date    Acid reflux     Anxiety     Asthma     Breast cancer ([facility] [address]     COPD with asthma ([facility] [address]     Depression     Depression     Dizziness - light-headed     HX OF:    Emphysema     History of chest pain     History of therapeutic radiation     History of tinnitus     Hx antineoplastic chemo     Hx of blood clots     Joint pain     Numbness and tingling     Osteoarthritis     PONV (postoperative nausea and vomiting)     SOB (shortness of breath) on exertion      Past Surgical History:   Procedure Laterality Date    BLADDER SURGERY  2014    BREAST BIOPSY  10/06/2017    [address] [facility]  2011    NO EVIDENCE OF PULMONARY HTN,NO EVIDENCE OF DD,NORMAL LVF    CARDIOVASCULAR STRESS TEST  2011    EF 60% MILD INFERIOR WALL REVERSIBLE STRESS-INDUCED ISCHEMIA      SECTION  1992    CHOLECYSTECTOMY  2015    CYST REMOVAL Right 2015    rt breast [facility]-benign breast cyst     ESOPHAGUS SURGERY  2014    HYSTERECTOMY (CERVIX STATUS UNKNOWN)      INSERTION / REMOVAL / REPLACEMENT VENOUS ACCESS CATHETER Right 2017    RIGHT SIDE SINGLE LUMEN POWERPORT INSERTION performed by Emery Talley MD at [address] Left 2017    LEFT MODIFIED RADICAL MASTECTOMY performed by Emery Talley MD at [facility]    TUNNELED VENOUS PORT PLACEMENT Right 2017    [facility] [address] Insertion- Right side.           MEDICATIONS     Current Facility-Administered Medications:     ampicillin-sulbactam (UNASYN) 3,000 mg in sodium chloride 0.9 % 100 mL IVPB (mini-bag), 3,000 mg, IntraVENous, Once, Gregory Figueroa MD, Last Rate: 200 mL/hr at 11/28/22 2155, 3,000 mg at 11/28/22 2155    morphine (PF) injection 2 mg, 2 mg, IntraVENous, Q2H PRN **OR** morphine injection 4 mg, 4 mg, IntraVENous, Q2H PRN, Elkin Conklin PA-C, 4 mg at 11/28/22 2159    Current Outpatient Medications:     letrozole (FEMARA) 2.5 MG tablet, Take 1 tablet by mouth every other day, Disp: 30 tablet, Rfl: 3    diclofenac (VOLTAREN) 75 MG EC tablet, Take 75 mg by mouth 2 times daily, Disp: , Rfl:     amoxicillin-clavulanate (AUGMENTIN) 875-125 MG per tablet, Take 1 tablet by mouth 2 times daily, Disp: , Rfl:     DULERA 200-5 MCG/ACT inhaler, Inhale 2 puffs into the lungs 2 times daily, Disp: 13 g, Rfl: 5    albuterol sulfate  (90 Base) MCG/ACT inhaler, Inhale 1 puff into the lungs every 6 hours as needed for Wheezing or Shortness of Breath, Disp: 1 Inhaler, Rfl: 5    NONFORMULARY, 2 tablets daily Tumeric, Disp: , Rfl:     b complex vitamins capsule, Take 1 capsule by mouth daily, Disp: , Rfl:     albuterol (PROVENTIL) (2.5 MG/3ML) 0.083% nebulizer solution, Inhale 2.5 mg into the lungs every 4 hours as needed , Disp: , Rfl:     butalbital-apap-caffeine -40 MG CAPS, Take by mouth, Disp: , Rfl:     ALPRAZolam (XANAX) 0.5 MG tablet, Take 0.5 mg by mouth nightly as needed , Disp: , Rfl:     cyclobenzaprine (FLEXERIL) 5 MG tablet, Take 5 mg by mouth 3 times daily as needed , Disp: , Rfl:     venlafaxine (EFFEXOR XR) 75 MG extended release capsule, Take 75 mg by mouth daily , Disp: , Rfl:       SOCIAL HISTORY     Social History     Social History Narrative    Not on file     Social History     Tobacco Use    Smoking status: Every Day     Packs/day: 1.00     Years: 23.00     Pack years: 23.00     Types: Cigarettes Start date: 9/1/1999    Smokeless tobacco: Never    Tobacco comments:     Just received smoking cessation information from pcp per patient 9/2/22   Vaping Use    Vaping Use: Never used   Substance Use Topics    Alcohol use: Yes     Comment: rarely    Drug use: No         ALLERGIES   No Known Allergies      FAMILY HISTORY     Family History   Problem Relation Age of Onset    Heart Disease Mother     Hypertension Mother     Diabetes Mother     Heart Disease Father     No Known Problems Sister     No Known Problems Brother     Breast Cancer Maternal Aunt     Breast Cancer Paternal Aunt     Endometrial Cancer Paternal Aunt     Lung Cancer Paternal Aunt          PREVIOUS RECORDS   Previous records reviewed:     PHYSICAL EXAM     ED Triage Vitals   BP Temp Temp Source Heart Rate Resp SpO2 Height Weight   11/28/22 2053 11/28/22 2053 11/28/22 2050 11/28/22 2050 11/28/22 2050 11/28/22 2050 11/28/22 2050 11/28/22 2050   129/88 97.6 °F (36.4 °C) Oral 92 16 100 % 5' 8\" (1.727 m) 170 lb (77.1 kg)     Initial vital signs and nursing assessment reviewed and normal. Body mass index is 25.85 kg/m². Pulsoximetry is normal per my interpretation. Additional Vital Signs:  Vitals:    11/28/22 2200   BP: 138/79   Pulse: 94   Resp: 16   Temp:    SpO2: 98%       Physical Exam  Vitals and nursing note reviewed. Constitutional:       General: She is not in acute distress. Appearance: Normal appearance. She is not ill-appearing, toxic-appearing or diaphoretic. HENT:      Head: Normocephalic and atraumatic. Right Ear: External ear normal.      Left Ear: External ear normal.      Nose: Nose normal. No congestion or rhinorrhea. Mouth/Throat:      Mouth: Mucous membranes are moist.      Pharynx: Oropharynx is clear. No oropharyngeal exudate or posterior oropharyngeal erythema. Eyes:      General: No scleral icterus. Right eye: No discharge. Left eye: No discharge.       Extraocular Movements: Extraocular movements intact. Conjunctiva/sclera: Conjunctivae normal.      Pupils: Pupils are equal, round, and reactive to light. Cardiovascular:      Rate and Rhythm: Normal rate and regular rhythm. Pulses: Normal pulses. Heart sounds: Normal heart sounds. No murmur heard. No gallop. Pulmonary:      Effort: Pulmonary effort is normal. No respiratory distress. Breath sounds: Normal breath sounds. No stridor. No wheezing, rhonchi or rales. Abdominal:      General: There is no distension. Palpations: Abdomen is soft. Tenderness: There is no abdominal tenderness. There is no guarding or rebound. Musculoskeletal:         General: Swelling, tenderness, deformity and signs of injury present. Right upper arm: Normal.      Right forearm: Normal.      Left forearm: Deformity and laceration present. Arms:       Cervical back: Neck supple. No rigidity or tenderness. Right lower leg: No edema. Left lower leg: No edema. Legs:       Comments: Large dog bite left forearm with avulsion muscle and tendinous involvement. Picture added to note. Multiple small puncture wounds to the right hand from dog bite. Multiple superficial abrasions and lacerations on left lateral thigh from dog bite. Lymphadenopathy:      Cervical: No cervical adenopathy. Skin:     General: Skin is warm and dry. Coloration: Skin is not jaundiced or pale. Findings: No bruising, erythema, lesion or rash. Neurological:      General: No focal deficit present. Mental Status: She is alert and oriented to person, place, and time. Mental status is at baseline. Psychiatric:         Mood and Affect: Mood normal.         Behavior: Behavior normal.         Thought Content: Thought content normal.         Judgment: Judgment normal.               MEDICAL DECISION MAKING   Initial Assessment:   61-year-old female come the ED for evaluation of dog bite.   Initially observed patient had 11/28/22 2155)   morphine (PF) injection 2 mg ( IntraVENous See Alternative 11/28/22 2159)     Or   morphine injection 4 mg (4 mg IntraVENous Given 11/28/22 2159)   fentaNYL (SUBLIMAZE) injection 100 mcg (100 mcg IntraVENous Given 11/28/22 2104)   diptheria-tetanus toxoids [facility]) 2-2 LF/0.5ML injection 0.5 mL (0.5 mLs IntraMUSCular Given 11/28/22 2149)         ED COURSE     ED Course as of 11/28/22 2215 Mon Nov 28, 2022 2128 Spoke with Mr. Nica Espinal with orthopedics who agreed to admit the patient. Advised to place bulky dressing which was done. [AA]      ED Course User Index  [AA] Raffy Rutledge MD         MEDICATION CHANGES     New Prescriptions    No medications on file         FINAL DISPOSITION     Final diagnoses:   Dog bite, initial encounter     Condition: condition: poor  Dispo: Admit to Ortho      This transcription was electronically signed. Parts of this transcriptions may have been dictated by use of voice recognition software and electronically transcribed, and parts may have been transcribed with the assistance of an ED scribe. The transcription may contain errors not detected in proofreading. Please refer to my supervising physician's documentation if my documentation differs.     Electronically Signed: Raffy Rutledge MD, 11/28/22, 10:15 PM        Raffy Rutledge MD  Resident  11/29/22 [id number]

## 2022-11-29 NOTE — PROGRESS NOTES
32 61 16- Pt to PACU. OPA and simple mask. LUE in sling, elevated on pillow and ice applied. VSS no distress noted. 1550- States having pain and closes eyes, appears comfortable, no acute distress noted. VSS on 2LNC.    1605- Pt met PACU discharge criteria.  Called report to [facility]

## 2022-11-29 NOTE — PLAN OF CARE
Problem: Discharge Planning  Goal: Discharge to home or other facility with appropriate resources  Recent Flowsheet Documentation  Taken 11/28/2022 2215 by Alessia Tinajero RN  Discharge to home or other facility with appropriate resources:   Identify barriers to discharge with patient and caregiver   Arrange for needed discharge resources and transportation as appropriate   Identify discharge learning needs (meds, wound care, etc)   Refer to discharge planning if patient needs post-hospital services based on physician order or complex needs related to functional status, cognitive ability or social support system     Problem: Cardiovascular - Adult  Goal: Maintains optimal cardiac output and hemodynamic stability  Flowsheets (Taken 11/28/2022 2343)  Maintains optimal cardiac output and hemodynamic stability:   Monitor blood pressure and heart rate   Assess for signs of decreased cardiac output   Administer fluid and/or volume expanders as ordered     Problem: Skin/Tissue Integrity - Adult  Goal: Skin integrity remains intact  Flowsheets (Taken 11/28/2022 2343)  Skin Integrity Remains Intact:   Monitor for areas of redness and/or skin breakdown   Assess vascular access sites hourly  Goal: Incisions, wounds, or drain sites healing without S/S of infection  Flowsheets (Taken 11/28/2022 2343)  Incisions, Wounds, or Drain Sites Healing Without Sign and Symptoms of Infection:   ADMISSION and DAILY: Assess and document risk factors for pressure ulcer development   TWICE DAILY: Assess and document skin integrity   TWICE DAILY: Assess and document dressing/incision, wound bed, drain sites and surrounding tissue   Implement wound care per orders     Problem: Musculoskeletal - Adult  Goal: Return mobility to safest level of function  Flowsheets (Taken 11/28/2022 2343)  Return Mobility to Safest Level of Function:   Assess patient stability and activity tolerance for standing, transferring and ambulating with or without assistive devices   Ensure adequate protection for wounds/incisions during mobilization   Instruct patient/family in ordered activity level     Problem: Infection - Adult  Goal: Absence of infection at discharge  Flowsheets (Taken 11/28/2022 2343)  Absence of infection at discharge:   Assess and monitor for signs and symptoms of infection   Monitor lab/diagnostic results   Monitor all insertion sites i.e., indwelling lines, tubes and drains   Administer medications as ordered   Instruct and encourage patient and family to use good hand hygiene technique     Problem: Metabolic/Fluid and Electrolytes - Adult  Goal: Electrolytes maintained within normal limits  Flowsheets (Taken 11/28/2022 2343)  Electrolytes maintained within normal limits: Monitor labs and assess patient for signs and symptoms of electrolyte imbalances     Care plan reviewed with patient and mother. Patient and mother verbalized understanding of the plan of care and contribute to goal setting.

## 2022-11-29 NOTE — FLOWSHEET NOTE
Patient alert and oriented x4. Cooperative with care. Vitals WNL. Pain rated 8/10. PRN medication given. Lung sounds diminished in lower lobes bilaterally. Bowel sounds present in all 4 quads. Radial pulses equal bilaterally. Pedal pulse equal bilaterally. Skin is normal for ethnicity, warm, and dry. Wound on left forearm covered with dressing. Dressing is clean, dry, and intact. Left Hand drop noted. Wounds also on left thigh, open to air. IV in right AC with normal saline infusing. Resting in bed with call light in reach at this time. Will continue to monitor.

## 2022-11-29 NOTE — PROGRESS NOTES
Left forearm dressing saturated. Changed dressing with 4X4 gauze, abd pads, and kerlex. Patient tolerated procedure.

## 2022-11-29 NOTE — OP NOTE
Operative Note      Patient: Bandar Varner  YOB: 1970  MRN: [id number]    Date of Procedure: 11/29/2022    Pre-Op Diagnosis: Animal bite [T14. 8XXA] left forearm with significant soft tissue loss    Post-Op Diagnosis: Same       Procedure(s):  LEFT FOREARM INCISION AND DRAINAGE simple closure of wound 25 cm    Surgeon(s):  Bria Page MD    Assistant:   Physician Assistant: Christy Mccain PA-C; Oscar Randhawa PA-C    Anesthesia: General    Estimated Blood Loss (mL): less than 841     Complications: None    Specimens:   * No specimens in log *    Implants:  * No implants in log *      Drains: * No LDAs found *    Findings: Confirmed    Detailed Description of Procedure: Indications  This is a 47-year-old female who sustained a significant dog bite to the left upper forearm. Was admitted last night IV antibiotics. Felt she would benefit from a debridement and evaluation. At. She has a wrist drop from the injury. Discussed with her we would do a wound evaluation debridement and likely will need further reconstruction. Patient agreed. Narrative  Patient taken the operating room underwent a general anesthetic. Left upper extremities prepped draped normal sterile fashion. Timeout was taken consent was confirmed. Already on IV Unasyn. She has significant loss of her extensor mechanism of the upper forearm. Goes down to the radius. Likely posterior interosseous nerve is gone. Debrided some of the muscle that appeared to be necrotic. Total of about 40 cm² were removed. We thoroughly irrigated and scrubbed with a sterile scrub brush. There appears to be there is actual loss of muscle and skin. Skin was loosely approximated using nylon suture about 25 cm in length. Dry dressings were applied. Patient was then awakened and taken to recovery room in good condition. Postoperative plan  I will refer her to one of my upper extremity specialist for reconstruction options.   We will keep her in the hospital IV antibiotics until he has a chance to evaluate and come up with a definitive plan.     Electronically signed by Gisela Munoz MD on 11/29/2022 at 3:17 PM

## 2022-11-30 PROCEDURE — 2580000003 HC RX 258: Performed by: NURSE PRACTITIONER

## 2022-11-30 PROCEDURE — G0378 HOSPITAL OBSERVATION PER HR: HCPCS

## 2022-11-30 PROCEDURE — 96361 HYDRATE IV INFUSION ADD-ON: CPT

## 2022-11-30 PROCEDURE — 6370000000 HC RX 637 (ALT 250 FOR IP): Performed by: NURSE PRACTITIONER

## 2022-11-30 PROCEDURE — 94640 AIRWAY INHALATION TREATMENT: CPT

## 2022-11-30 PROCEDURE — 96376 TX/PRO/DX INJ SAME DRUG ADON: CPT

## 2022-11-30 PROCEDURE — 6360000002 HC RX W HCPCS: Performed by: NURSE PRACTITIONER

## 2022-11-30 RX ADMIN — MORPHINE SULFATE 4 MG: 4 INJECTION, SOLUTION INTRAMUSCULAR; INTRAVENOUS at 15:53

## 2022-11-30 RX ADMIN — SODIUM CHLORIDE: 9 INJECTION, SOLUTION INTRAVENOUS at 04:43

## 2022-11-30 RX ADMIN — AMPICILLIN SODIUM AND SULBACTAM SODIUM 1500 MG: 1; .5 INJECTION, POWDER, FOR SOLUTION INTRAMUSCULAR; INTRAVENOUS at 03:19

## 2022-11-30 RX ADMIN — ALBUTEROL SULFATE 2 PUFF: 90 AEROSOL, METERED RESPIRATORY (INHALATION) at 15:51

## 2022-11-30 RX ADMIN — SODIUM CHLORIDE: 9 INJECTION, SOLUTION INTRAVENOUS at 20:30

## 2022-11-30 RX ADMIN — HYDROCODONE BITARTRATE AND ACETAMINOPHEN 2 TABLET: 5; 325 TABLET ORAL at 18:28

## 2022-11-30 RX ADMIN — HYDROCODONE BITARTRATE AND ACETAMINOPHEN 1 TABLET: 5; 325 TABLET ORAL at 22:54

## 2022-11-30 RX ADMIN — HYDROCODONE BITARTRATE AND ACETAMINOPHEN 2 TABLET: 5; 325 TABLET ORAL at 13:16

## 2022-11-30 RX ADMIN — HYDROCODONE BITARTRATE AND ACETAMINOPHEN 1 TABLET: 5; 325 TABLET ORAL at 04:40

## 2022-11-30 RX ADMIN — MORPHINE SULFATE 4 MG: 4 INJECTION, SOLUTION INTRAMUSCULAR; INTRAVENOUS at 08:24

## 2022-11-30 RX ADMIN — AMPICILLIN SODIUM AND SULBACTAM SODIUM 1500 MG: 1; .5 INJECTION, POWDER, FOR SOLUTION INTRAMUSCULAR; INTRAVENOUS at 15:16

## 2022-11-30 RX ADMIN — AMPICILLIN SODIUM AND SULBACTAM SODIUM 1500 MG: 1; .5 INJECTION, POWDER, FOR SOLUTION INTRAMUSCULAR; INTRAVENOUS at 08:31

## 2022-11-30 RX ADMIN — VENLAFAXINE HYDROCHLORIDE 150 MG: 150 CAPSULE, EXTENDED RELEASE ORAL at 08:32

## 2022-11-30 RX ADMIN — MOMETASONE FUROATE AND FORMOTEROL FUMARATE DIHYDRATE 2 PUFF: 200; 5 AEROSOL RESPIRATORY (INHALATION) at 05:26

## 2022-11-30 RX ADMIN — ALBUTEROL SULFATE 2 PUFF: 90 AEROSOL, METERED RESPIRATORY (INHALATION) at 05:24

## 2022-11-30 RX ADMIN — MOMETASONE FUROATE AND FORMOTEROL FUMARATE DIHYDRATE 2 PUFF: 200; 5 AEROSOL RESPIRATORY (INHALATION) at 15:52

## 2022-11-30 RX ADMIN — AMPICILLIN SODIUM AND SULBACTAM SODIUM 1500 MG: 1; .5 INJECTION, POWDER, FOR SOLUTION INTRAMUSCULAR; INTRAVENOUS at 20:31

## 2022-11-30 ASSESSMENT — PAIN DESCRIPTION - LOCATION
LOCATION: ARM

## 2022-11-30 ASSESSMENT — PAIN SCALES - GENERAL
PAINLEVEL_OUTOF10: 8
PAINLEVEL_OUTOF10: 6
PAINLEVEL_OUTOF10: 8
PAINLEVEL_OUTOF10: 8
PAINLEVEL_OUTOF10: 9
PAINLEVEL_OUTOF10: 4
PAINLEVEL_OUTOF10: 8

## 2022-11-30 ASSESSMENT — PAIN DESCRIPTION - DESCRIPTORS: DESCRIPTORS: SHARP;SORE

## 2022-11-30 ASSESSMENT — LIFESTYLE VARIABLES
HOW OFTEN DO YOU HAVE A DRINK CONTAINING ALCOHOL: NEVER
HOW MANY STANDARD DRINKS CONTAINING ALCOHOL DO YOU HAVE ON A TYPICAL DAY: PATIENT DOES NOT DRINK

## 2022-11-30 ASSESSMENT — PAIN DESCRIPTION - ORIENTATION
ORIENTATION: LEFT

## 2022-11-30 ASSESSMENT — PAIN DESCRIPTION - PAIN TYPE
TYPE: SURGICAL PAIN
TYPE: SURGICAL PAIN

## 2022-11-30 ASSESSMENT — PATIENT HEALTH QUESTIONNAIRE - PHQ9: SUM OF ALL RESPONSES TO PHQ QUESTIONS 1-9: 15

## 2022-11-30 NOTE — PLAN OF CARE
Problem: Respiratory - Adult  Goal: Clear lung sounds  11/30/2022 1557 by Kaley Sullivan RCP  Outcome: Progressing  Note: Mdi to maintain open airways. Patient mutually agreed on goals.

## 2022-11-30 NOTE — PROGRESS NOTES
SBIRT screening completed per trauma protocol. Brief Intervention and Referral to Treatment Summary     Patient was provided PHQ-9, AUDIT-C and DAST Screening:      PHQ-9 Score:  15  AUDIT-C Score:  Negative  DAST Score:   Negative    Patients substance use is considered-Negative    Low Risk/Healthy  Moderate Risk  Harmful  Dependent    Patients depression is considered: Moderately Severe    Brief Education Was Provided N/A    Patient was receptive  Patient was not receptive      Brief Intervention Is Provided (Only for AUDIT-C or DAST) N/A    Patient reports readiness to decrease and/or stop use and a plan was discussed   Patient denies readiness to decrease and/or stop use and a plan was not discussed      Recommendations/Referrals for Brief and/or Specialized Treatment Provided to Patient      [facility] Is currently managed with PCP. Reports passive suicidal thoughts, no plan/intent.

## 2022-11-30 NOTE — PROGRESS NOTES
Orthopaedic Progress Note      SUBJECTIVE   Ms. Tatianna Jarrett is post op day # 1     Patient notes that she has significant pain in her left upper extremity at this time. She feels though his sensation is improved in the tips of her fingers following surgery. S/P I&D for an extensive left upper extremity dog bite. Awaiting one of her partners evaluation from an upper extremity specialist standpoint. OBJECTIVE      Physical    VITALS:  /65   Pulse 90   Temp 99.3 °F (37.4 °C) (Oral)   Resp 14   Ht 5' 8\" (1.727 m)   Wt 170 lb (77.1 kg)   SpO2 99%   BMI 25.85 kg/m²   INTAKE/OUTPUT:    Intake/Output Summary (Last 24 hours) at 11/30/2022 1147  Last data filed at 11/30/2022 0921  Gross per 24 hour   Intake 590 ml   Output 20 ml   Net 570 ml     I/O last 3 completed shifts: In: 1325 [P.O.:460; I.V.:765; IV Piggyback:100]  Out: 20 [Blood:20]      Left upper extremity has a well-padded dry dressing in place. She is unable to extend the thumb index long ring or small finger at this time. Refill appears to be less than 3 seconds in all digits of the left hand.       Data  CBC:   Lab Results   Component Value Date/Time    WBC 8.8 11/28/2022 09:02 PM    HGB 11.6 11/28/2022 09:02 PM     11/28/2022 09:02 PM     BMP:    Lab Results   Component Value Date/Time     11/28/2022 09:02 PM    K 3.6 11/28/2022 09:02 PM    K 4.1 04/05/2021 07:39 AM     11/28/2022 09:02 PM    CO2 22 11/28/2022 09:02 PM    BUN 13 11/28/2022 09:02 PM    CREATININE 0.9 11/28/2022 09:02 PM    CALCIUM 8.3 11/28/2022 09:02 PM    GLUCOSE 142 11/28/2022 09:02 PM    GLUCOSE 97 07/21/2017 06:20 AM     Uric Acid:  No components found for: URIC  PT/INR:    Lab Results   Component Value Date/Time    INR 0.98 04/05/2021 07:39 AM     Troponin:    Lab Results   Component Value Date/Time    TROPONINI < 0.01 07/21/2017 06:20 AM     Urine Culture:  No components found for: STARLAINE      Current Inpatient Medications    Current Facility-Administered Medications: venlafaxine (EFFEXOR XR) extended release capsule 150 mg, 150 mg, Oral, Daily  albuterol sulfate HFA (PROVENTIL;VENTOLIN;PROAIR) 108 (90 Base) MCG/ACT inhaler 2 puff, 2 puff, Inhalation, BID  cyclobenzaprine (FLEXERIL) tablet 10 mg, 10 mg, Oral, TID PRN  ondansetron (ZOFRAN) injection 4 mg, 4 mg, IntraVENous, Q6H PRN  HYDROcodone-acetaminophen (NORCO) 5-325 MG per tablet 1 tablet, 1 tablet, Oral, Q4H PRN **OR** HYDROcodone-acetaminophen (NORCO) 5-325 MG per tablet 2 tablet, 2 tablet, Oral, Q4H PRN  morphine (PF) injection 2 mg, 2 mg, IntraVENous, Q2H PRN **OR** morphine injection 4 mg, 4 mg, IntraVENous, Q2H PRN  ampicillin-sulbactam (UNASYN) 1,500 mg in sodium chloride 0.9 % 50 mL IVPB (mini-bag), 1,500 mg, IntraVENous, Q6H  albuterol (PROVENTIL) nebulizer solution 2.5 mg, 2.5 mg, Nebulization, Q4H PRN  mometasone-formoterol (DULERA) 200-5 MCG/ACT inhaler 2 puff, 2 puff, Inhalation, BID  letrozole (FEMARA) tablet 2.5 mg, 2.5 mg, Oral, Every Other Day  0.9 % sodium chloride infusion, , IntraVENous, Continuous        PLAN    Continue with dry dressing changes daily and as needed. We will await final disposition per one of our upper extremity specialist for possible reconstruction options. Continue with IV antibiotics as prescribed.

## 2022-11-30 NOTE — RT PROTOCOL NOTE
RT Inhaler-Nebulizer Bronchodilator Protocol Note    There is a bronchodilator order in the chart from a provider indicating to follow the RT Bronchodilator Protocol and there is an Initiate RT Inhaler-Nebulizer Bronchodilator Protocol order as well (see protocol at bottom of note). CXR Findings:  No results found. The findings from the last RT Protocol Assessment were as follows:   History Pulmonary Disease: Chronic pulmonary disease  Respiratory Pattern: Regular pattern and RR 12-20 bpm  Breath Sounds: Slightly diminished and/or crackles  Cough: Strong, spontaneous, non-productive  Indication for Bronchodilator Therapy: Decreased or absent breath sounds, On home bronchodilators  Bronchodilator Assessment Score: 4    Aerosolized bronchodilator medication orders have been revised according to the RT Inhaler-Nebulizer Bronchodilator Protocol below. Respiratory Therapist to perform RT Therapy Protocol Assessment initially then follow the protocol. Repeat RT Therapy Protocol Assessment PRN for score 0-3 or on second treatment, BID, and PRN for scores above 3. No Indications - adjust the frequency to every 6 hours PRN wheezing or bronchospasm, if no treatments needed after 48 hours then discontinue using Per Protocol order mode. If indication present, adjust the RT bronchodilator orders based on the Bronchodilator Assessment Score as indicated below. Use Inhaler orders unless patient has one or more of the following: on home nebulizer, not able to hold breath for 10 seconds, is not alert and oriented, cannot activate and use MDI correctly, or respiratory rate 25 breaths per minute or more, then use the equivalent nebulizer order(s) with same Frequency and PRN reasons based on the score. If a patient is on this medication at home then do not decrease Frequency below that used at home.     0-3 - enter or revise RT bronchodilator order(s) to equivalent RT Bronchodilator order with Frequency of every 4 hours PRN for wheezing or increased work of breathing using Per Protocol order mode. 4-6 - enter or revise RT Bronchodilator order(s) to two equivalent RT bronchodilator orders with one order with BID Frequency and one order with Frequency of every 4 hours PRN wheezing or increased work of breathing using Per Protocol order mode. 7-10 - enter or revise RT Bronchodilator order(s) to two equivalent RT bronchodilator orders with one order with TID Frequency and one order with Frequency of every 4 hours PRN wheezing or increased work of breathing using Per Protocol order mode. 11-13 - enter or revise RT Bronchodilator order(s) to one equivalent RT bronchodilator order with QID Frequency and an Albuterol order with Frequency of every 4 hours PRN wheezing or increased work of breathing using Per Protocol order mode. Greater than 13 - enter or revise RT Bronchodilator order(s) to one equivalent RT bronchodilator order with every 4 hours Frequency and an Albuterol order with Frequency of every 2 hours PRN wheezing or increased work of breathing using Per Protocol order mode. RT to enter RT Home Evaluation for COPD & MDI Assessment order using Per Protocol order mode.     Electronically signed by Shantel Rubalcava RCP on 11/30/2022 at 5:29 AM

## 2022-11-30 NOTE — PLAN OF CARE
Problem: Discharge Planning  Goal: Discharge to home or other facility with appropriate resources  11/30/2022 0511 by Osiel Vazquez RN  Outcome: Progressing  Flowsheets (Taken 11/30/2022 0695)  Discharge to home or other facility with appropriate resources:   Identify barriers to discharge with patient and caregiver   Arrange for needed discharge resources and transportation as appropriate  Note: CM: home with family, follow potential needs     Problem: Pain  Goal: Verbalizes/displays adequate comfort level or baseline comfort level  11/30/2022 0511 by Osiel Vazquez RN  Outcome: Progressing  Flowsheets (Taken 11/30/2022 0511)  Verbalizes/displays adequate comfort level or baseline comfort level:   Encourage patient to monitor pain and request assistance   Assess pain using appropriate pain scale   Administer analgesics based on type and severity of pain and evaluate response     Problem: ABCDS Injury Assessment  Goal: Absence of physical injury  11/30/2022 0511 by Osiel Vazquez RN  Outcome: Progressing  Flowsheets (Taken 11/30/2022 0511)  Absence of Physical Injury: Implement safety measures based on patient assessment  Note: Patient has remained free of physical injury during this shift. Safe environment provided, call light within reach, and hourly rounding completed.        Problem: Skin/Tissue Integrity - Adult  Goal: Incisions, wounds, or drain sites healing without S/S of infection  11/30/2022 0511 by Osiel Vazquez RN  Outcome: Progressing  Flowsheets (Taken 11/30/2022 0511)  Incisions, Wounds, or Drain Sites Healing Without Sign and Symptoms of Infection: ADMISSION and DAILY: Assess and document risk factors for pressure ulcer development     Problem: Musculoskeletal - Adult  Goal: Return mobility to safest level of function  11/30/2022 0511 by Osiel Vazquez RN  Outcome: Progressing    Flowsheets (Taken 11/29/2022 1553)  Return Mobility to Safest Level of Function:   Assess patient stability and activity tolerance for standing, transferring and ambulating with or without assistive devices   Assist with transfers and ambulation using safe patient handling equipment as needed   Ensure adequate protection for wounds/incisions during mobilization   Obtain physical therapy/occupational therapy consults as needed   Instruct patient/family in ordered activity level     Problem: Infection - Adult  Goal: Absence of infection at discharge  11/30/2022 0511 by Robe Dupree RN  Outcome: Progressing  [address] (Taken 11/30/2022 0511)  Absence of infection at discharge:   Assess and monitor for signs and symptoms of infection   Monitor lab/diagnostic results   Administer medications as ordered     Problem: Metabolic/Fluid and Electrolytes - Adult  Goal: Electrolytes maintained within normal limits  11/30/2022 0511 by Robe Dupree RN  Outcome: Progressing  Flowsheets (Taken 11/30/2022 0511)  Electrolytes maintained within normal limits:   Monitor labs and assess patient for signs and symptoms of electrolyte imbalances   Administer electrolyte replacement as ordered     Problem: Respiratory - Adult  Goal: Achieves optimal ventilation and oxygenation  11/30/2022 0511 by Robe Dupree RN  Outcome: Progressing    Outcome: Progressing  Flowsheets (Taken 11/29/2022 1553)  Achieves optimal ventilation and oxygenation:   Assess for changes in respiratory status   Assess for changes in mentation and behavior   Encourage broncho-pulmonary hygiene including cough, deep breathe, incentive spirometry   Assess and instruct to report shortness of breath or any respiratory difficulty     Problem: Gastrointestinal - Adult  Goal: Maintains or returns to baseline bowel function  11/30/2022 0511 by Robe Dupree RN  Outcome: Progressing     Problem: Genitourinary - Adult  Goal: Absence of urinary retention  11/30/2022 0511 by Robe Dupree RN  Outcome: Progressing  11/29/2022 1553 by Jeniffer Magana RN  Outcome: Progressing  Flowsheets (Taken 11/29/2022 1553)  Absence of urinary retention:   Assess patients ability to void and empty bladder   Monitor intake/output and perform bladder scan as needed     Problem: Hematologic - Adult  Goal: Maintains hematologic stability  11/30/2022 0511 by Kat Wilder RN  Outcome: Progressing    Outcome: Progressing  Flowsheets (Taken 11/29/2022 1553)  Maintains hematologic stability:   Assess for signs and symptoms of bleeding or hemorrhage   Monitor labs for bleeding or clotting disorders     Problem: Safety - Adult  Goal: Free from fall injury  Outcome: Progressing  Flowsheets (Taken 11/30/2022 0511)  Free From Fall Injury: Instruct family/caregiver on patient safety  Note: Patient has remained free of physical injury during this shift. Safe environment provided, call light within reach, and hourly rounding completed. Care plan reviewed with patient . Patient verbalize understanding of the plan of care and contribute to goal setting.

## 2022-12-01 ENCOUNTER — ANESTHESIA (OUTPATIENT)
Dept: OPERATING ROOM | Age: 52
End: 2022-12-01
Payer: COMMERCIAL

## 2022-12-01 ENCOUNTER — ANESTHESIA EVENT (OUTPATIENT)
Dept: OPERATING ROOM | Age: 52
End: 2022-12-01
Payer: COMMERCIAL

## 2022-12-01 PROCEDURE — 3700000000 HC ANESTHESIA ATTENDED CARE: Performed by: ORTHOPAEDIC SURGERY

## 2022-12-01 PROCEDURE — 3600000002 HC SURGERY LEVEL 2 BASE: Performed by: ORTHOPAEDIC SURGERY

## 2022-12-01 PROCEDURE — 6360000002 HC RX W HCPCS: Performed by: ANESTHESIOLOGY

## 2022-12-01 PROCEDURE — 7100000001 HC PACU RECOVERY - ADDTL 15 MIN: Performed by: ORTHOPAEDIC SURGERY

## 2022-12-01 PROCEDURE — 87186 SC STD MICRODIL/AGAR DIL: CPT

## 2022-12-01 PROCEDURE — 87070 CULTURE OTHR SPECIMN AEROBIC: CPT

## 2022-12-01 PROCEDURE — 87077 CULTURE AEROBIC IDENTIFY: CPT

## 2022-12-01 PROCEDURE — 2580000003 HC RX 258: Performed by: NURSE PRACTITIONER

## 2022-12-01 PROCEDURE — 87205 SMEAR GRAM STAIN: CPT

## 2022-12-01 PROCEDURE — 3700000001 HC ADD 15 MINUTES (ANESTHESIA): Performed by: ORTHOPAEDIC SURGERY

## 2022-12-01 PROCEDURE — 87075 CULTR BACTERIA EXCEPT BLOOD: CPT

## 2022-12-01 PROCEDURE — 6370000000 HC RX 637 (ALT 250 FOR IP): Performed by: NURSE PRACTITIONER

## 2022-12-01 PROCEDURE — G0378 HOSPITAL OBSERVATION PER HR: HCPCS

## 2022-12-01 PROCEDURE — 2709999900 HC NON-CHARGEABLE SUPPLY: Performed by: ORTHOPAEDIC SURGERY

## 2022-12-01 PROCEDURE — 0KBB0ZZ EXCISION OF LEFT LOWER ARM AND WRIST MUSCLE, OPEN APPROACH: ICD-10-PCS | Performed by: ORTHOPAEDIC SURGERY

## 2022-12-01 PROCEDURE — 6360000002 HC RX W HCPCS: Performed by: NURSE PRACTITIONER

## 2022-12-01 PROCEDURE — 2580000003 HC RX 258: Performed by: NURSE ANESTHETIST, CERTIFIED REGISTERED

## 2022-12-01 PROCEDURE — 2500000003 HC RX 250 WO HCPCS: Performed by: NURSE ANESTHETIST, CERTIFIED REGISTERED

## 2022-12-01 PROCEDURE — 3E0T3BZ INTRODUCTION OF ANESTHETIC AGENT INTO PERIPHERAL NERVES AND PLEXI, PERCUTANEOUS APPROACH: ICD-10-PCS | Performed by: ORTHOPAEDIC SURGERY

## 2022-12-01 PROCEDURE — 3600000012 HC SURGERY LEVEL 2 ADDTL 15MIN: Performed by: ORTHOPAEDIC SURGERY

## 2022-12-01 PROCEDURE — 6360000002 HC RX W HCPCS: Performed by: NURSE ANESTHETIST, CERTIFIED REGISTERED

## 2022-12-01 PROCEDURE — 94640 AIRWAY INHALATION TREATMENT: CPT

## 2022-12-01 PROCEDURE — 7100000000 HC PACU RECOVERY - FIRST 15 MIN: Performed by: ORTHOPAEDIC SURGERY

## 2022-12-01 RX ORDER — ONDANSETRON 2 MG/ML
INJECTION INTRAMUSCULAR; INTRAVENOUS PRN
Status: DISCONTINUED | OUTPATIENT
Start: 2022-12-01 | End: 2022-12-01 | Stop reason: SDUPTHER

## 2022-12-01 RX ORDER — SODIUM CHLORIDE 0.9 % (FLUSH) 0.9 %
5-40 SYRINGE (ML) INJECTION EVERY 12 HOURS SCHEDULED
Status: DISCONTINUED | OUTPATIENT
Start: 2022-12-01 | End: 2022-12-01 | Stop reason: HOSPADM

## 2022-12-01 RX ORDER — MORPHINE SULFATE 2 MG/ML
1 INJECTION, SOLUTION INTRAMUSCULAR; INTRAVENOUS EVERY 5 MIN PRN
Status: DISCONTINUED | OUTPATIENT
Start: 2022-12-01 | End: 2022-12-01 | Stop reason: HOSPADM

## 2022-12-01 RX ORDER — LIDOCAINE HYDROCHLORIDE 20 MG/ML
INJECTION, SOLUTION EPIDURAL; INFILTRATION; INTRACAUDAL; PERINEURAL PRN
Status: DISCONTINUED | OUTPATIENT
Start: 2022-12-01 | End: 2022-12-01 | Stop reason: SDUPTHER

## 2022-12-01 RX ORDER — PROPOFOL 10 MG/ML
INJECTION, EMULSION INTRAVENOUS PRN
Status: DISCONTINUED | OUTPATIENT
Start: 2022-12-01 | End: 2022-12-01

## 2022-12-01 RX ORDER — FENTANYL CITRATE 50 UG/ML
INJECTION, SOLUTION INTRAMUSCULAR; INTRAVENOUS PRN
Status: DISCONTINUED | OUTPATIENT
Start: 2022-12-01 | End: 2022-12-01 | Stop reason: SDUPTHER

## 2022-12-01 RX ORDER — FENTANYL CITRATE 50 UG/ML
50 INJECTION, SOLUTION INTRAMUSCULAR; INTRAVENOUS EVERY 5 MIN PRN
Status: COMPLETED | OUTPATIENT
Start: 2022-12-01 | End: 2022-12-01

## 2022-12-01 RX ORDER — SODIUM CHLORIDE 9 MG/ML
INJECTION, SOLUTION INTRAVENOUS PRN
Status: DISCONTINUED | OUTPATIENT
Start: 2022-12-01 | End: 2022-12-01 | Stop reason: HOSPADM

## 2022-12-01 RX ORDER — HYDRALAZINE HYDROCHLORIDE 20 MG/ML
10 INJECTION INTRAMUSCULAR; INTRAVENOUS
Status: DISCONTINUED | OUTPATIENT
Start: 2022-12-01 | End: 2022-12-01 | Stop reason: HOSPADM

## 2022-12-01 RX ORDER — SODIUM CHLORIDE 9 MG/ML
INJECTION, SOLUTION INTRAVENOUS CONTINUOUS PRN
Status: DISCONTINUED | OUTPATIENT
Start: 2022-12-01 | End: 2022-12-01 | Stop reason: SDUPTHER

## 2022-12-01 RX ORDER — SODIUM CHLORIDE 0.9 % (FLUSH) 0.9 %
5-40 SYRINGE (ML) INJECTION PRN
Status: DISCONTINUED | OUTPATIENT
Start: 2022-12-01 | End: 2022-12-01 | Stop reason: HOSPADM

## 2022-12-01 RX ORDER — DEXAMETHASONE SODIUM PHOSPHATE 10 MG/ML
INJECTION, EMULSION INTRAMUSCULAR; INTRAVENOUS PRN
Status: DISCONTINUED | OUTPATIENT
Start: 2022-12-01 | End: 2022-12-01 | Stop reason: SDUPTHER

## 2022-12-01 RX ORDER — PROPOFOL 10 MG/ML
INJECTION, EMULSION INTRAVENOUS PRN
Status: DISCONTINUED | OUTPATIENT
Start: 2022-12-01 | End: 2022-12-01 | Stop reason: SDUPTHER

## 2022-12-01 RX ADMIN — FENTANYL CITRATE 50 MCG: 50 INJECTION, SOLUTION INTRAMUSCULAR; INTRAVENOUS at 18:25

## 2022-12-01 RX ADMIN — MOMETASONE FUROATE AND FORMOTEROL FUMARATE DIHYDRATE 2 PUFF: 200; 5 AEROSOL RESPIRATORY (INHALATION) at 05:37

## 2022-12-01 RX ADMIN — FENTANYL CITRATE 50 MCG: 50 INJECTION, SOLUTION INTRAMUSCULAR; INTRAVENOUS at 17:57

## 2022-12-01 RX ADMIN — SODIUM CHLORIDE: 9 INJECTION, SOLUTION INTRAVENOUS at 17:51

## 2022-12-01 RX ADMIN — LIDOCAINE HYDROCHLORIDE 5 ML: 20 INJECTION, SOLUTION EPIDURAL; INFILTRATION; INTRACAUDAL; PERINEURAL at 16:42

## 2022-12-01 RX ADMIN — SODIUM CHLORIDE: 9 INJECTION, SOLUTION INTRAVENOUS at 06:49

## 2022-12-01 RX ADMIN — CYCLOBENZAPRINE 10 MG: 10 TABLET, FILM COATED ORAL at 19:33

## 2022-12-01 RX ADMIN — PROPOFOL 180 MG: 10 INJECTION, EMULSION INTRAVENOUS at 16:42

## 2022-12-01 RX ADMIN — ALBUTEROL SULFATE 2 PUFF: 90 AEROSOL, METERED RESPIRATORY (INHALATION) at 05:37

## 2022-12-01 RX ADMIN — SODIUM CHLORIDE: 9 INJECTION, SOLUTION INTRAVENOUS at 03:13

## 2022-12-01 RX ADMIN — FENTANYL CITRATE 50 MCG: 50 INJECTION, SOLUTION INTRAMUSCULAR; INTRAVENOUS at 16:50

## 2022-12-01 RX ADMIN — SODIUM CHLORIDE: 9 INJECTION, SOLUTION INTRAVENOUS at 10:51

## 2022-12-01 RX ADMIN — ONDANSETRON 4 MG: 2 INJECTION INTRAMUSCULAR; INTRAVENOUS at 17:57

## 2022-12-01 RX ADMIN — HYDROCODONE BITARTRATE AND ACETAMINOPHEN 2 TABLET: 5; 325 TABLET ORAL at 03:06

## 2022-12-01 RX ADMIN — AMPICILLIN SODIUM AND SULBACTAM SODIUM 1500 MG: 1; .5 INJECTION, POWDER, FOR SOLUTION INTRAMUSCULAR; INTRAVENOUS at 14:28

## 2022-12-01 RX ADMIN — LETROZOLE 2.5 MG: 2.5 TABLET ORAL at 08:42

## 2022-12-01 RX ADMIN — DEXAMETHASONE SODIUM PHOSPHATE 8 MG: 10 INJECTION, EMULSION INTRAMUSCULAR; INTRAVENOUS at 16:47

## 2022-12-01 RX ADMIN — HYDROCODONE BITARTRATE AND ACETAMINOPHEN 2 TABLET: 5; 325 TABLET ORAL at 13:21

## 2022-12-01 RX ADMIN — AMPICILLIN SODIUM AND SULBACTAM SODIUM 1500 MG: 1; .5 INJECTION, POWDER, FOR SOLUTION INTRAMUSCULAR; INTRAVENOUS at 09:00

## 2022-12-01 RX ADMIN — HYDROCODONE BITARTRATE AND ACETAMINOPHEN 2 TABLET: 5; 325 TABLET ORAL at 19:33

## 2022-12-01 RX ADMIN — FENTANYL CITRATE 50 MCG: 50 INJECTION, SOLUTION INTRAMUSCULAR; INTRAVENOUS at 16:57

## 2022-12-01 RX ADMIN — FENTANYL CITRATE 50 MCG: 50 INJECTION, SOLUTION INTRAMUSCULAR; INTRAVENOUS at 18:30

## 2022-12-01 RX ADMIN — AMPICILLIN SODIUM AND SULBACTAM SODIUM 1500 MG: 1; .5 INJECTION, POWDER, FOR SOLUTION INTRAMUSCULAR; INTRAVENOUS at 20:36

## 2022-12-01 RX ADMIN — VENLAFAXINE HYDROCHLORIDE 150 MG: 150 CAPSULE, EXTENDED RELEASE ORAL at 09:44

## 2022-12-01 RX ADMIN — AMPICILLIN SODIUM AND SULBACTAM SODIUM 1500 MG: 1; .5 INJECTION, POWDER, FOR SOLUTION INTRAMUSCULAR; INTRAVENOUS at 03:15

## 2022-12-01 RX ADMIN — FENTANYL CITRATE 50 MCG: 50 INJECTION, SOLUTION INTRAMUSCULAR; INTRAVENOUS at 18:19

## 2022-12-01 RX ADMIN — HYDROCODONE BITARTRATE AND ACETAMINOPHEN 2 TABLET: 5; 325 TABLET ORAL at 08:40

## 2022-12-01 RX ADMIN — FENTANYL CITRATE 100 MCG: 50 INJECTION, SOLUTION INTRAMUSCULAR; INTRAVENOUS at 16:42

## 2022-12-01 ASSESSMENT — PAIN DESCRIPTION - ORIENTATION
ORIENTATION: LEFT

## 2022-12-01 ASSESSMENT — PAIN SCALES - GENERAL
PAINLEVEL_OUTOF10: 4
PAINLEVEL_OUTOF10: 8
PAINLEVEL_OUTOF10: 9
PAINLEVEL_OUTOF10: 10
PAINLEVEL_OUTOF10: 6
PAINLEVEL_OUTOF10: 5
PAINLEVEL_OUTOF10: 2
PAINLEVEL_OUTOF10: 4
PAINLEVEL_OUTOF10: 10
PAINLEVEL_OUTOF10: 8

## 2022-12-01 ASSESSMENT — PAIN DESCRIPTION - DESCRIPTORS
DESCRIPTORS: STABBING
DESCRIPTORS: ACHING
DESCRIPTORS: ACHING
DESCRIPTORS: THROBBING
DESCRIPTORS: ACHING

## 2022-12-01 ASSESSMENT — PAIN DESCRIPTION - LOCATION
LOCATION: ARM

## 2022-12-01 ASSESSMENT — PAIN DESCRIPTION - PAIN TYPE: TYPE: SURGICAL PAIN

## 2022-12-01 NOTE — PROGRESS NOTES
Comprehensive Nutrition Assessment    Type and Reason for Visit:  Initial, Positive Nutrition Screen, Wound    Nutrition Recommendations/Plan:   Recommend a Multivitamin daily. Advance diet when medically appropriate. Will send Ensure Enlive TID when diet is advanced. Malnutrition Assessment:  Malnutrition Status: At risk for malnutrition (Comment) (12/01/22 1118)    Context:  Acute Illness     Findings of the 6 clinical characteristics of malnutrition:  Energy Intake:  75% or less of estimated energy requirements for 7 or more days  Weight Loss:  No significant weight loss     Body Fat Loss:  No significant body fat loss     Muscle Mass Loss:  No significant muscle mass loss    Fluid Accumulation:  Mild Extremities   Strength:  Not Performed    Nutrition Assessment: Pt. nutritionally compromised AEB wounds. At risk for further nutrition compromise r/t increased nutrient needs for wound healing, NPO status at present and underlying medical condition (Hx: Anxiety, Asthma, Breast Cancer s/p therapeutic radiation and antineoplastic chemotherapy, COPD, Depression, Emphysema,). Nutrition Related Findings:    Pt. Report/Treatments/Miscellaneous: Admit d/t dog bite on left forearm s/p I&D on 11/29/22. Pt seen- she reports decreased intake of meals x1-2 weeks PTA d/t having a cold. Pt NPO this am for planned repeat I&D this afternoon. Pt reports consuming an average of ~50% of meals yesterday. Pt amenable to try Ensure Enlive TID when diet is advanced. GI Status: Last BM x1 on 11/28 per flowsheet. Pertinent Labs: 11/28/22: Glucose 142.   Pertinent Meds: ATB, IVF   Wound Type: Surgical Incision (s/p left forearm I&D for extensive left upper extremity dog bite on 11/29/22)       Current Nutrition Intake & Therapies:    Average Meal Intake: NPO (pt ate 26-50 and % of meals yesterday per EMR)  Average Supplements Intake: NPO  Diet NPO    Anthropometric Measures:  Height: 5' 8\" (172.7 cm)  Ideal Body Weight (IBW): 140 lbs (64 kg)    Admission Body Weight: 183 lb (83 kg) (12/1; bedscale by this observer; +non-pitting/trace in LUE & LLE)  Current Body Weight: 183 lb (83 kg) (12/1; bedscale by this observer; +non-pitting/trace in LUE & LLE)  Current BMI (kg/m2): 27.8  Usual Body Weight:  (170 lbs per pt report. Per EMR: 177 lb 9.6 oz on 3/15/21;)  BMI Categories: Overweight (BMI 25.0-29. 9)    Estimated Daily Nutrient Needs:  Energy Requirements Based On: Kcal/kg  Weight Used for Energy Requirements: Current  Energy (kcal/day): 4586-6109 kcal/day (20-25 kcal/kg)  Weight Used for Protein Requirements: Current  Protein (g/day): 82-95 g/day (1.3-1.5 g/kg)    Nutrition Diagnosis:   Increased nutrient needs related to increase demand for energy/nutrients as evidenced by wounds    Nutrition Interventions:   Food and/or Nutrient Delivery: Continue NPO (Advance diet when medically feasible. Will send Ensure Enlive TID.)  Nutrition Education/Counseling: Education initiated (Encouraged po intake of meals and ONS at best effort when diet is advanced.)    Goals  Goals: by next RD assessment    Nutrition Monitoring and Evaluation:   Behavioral-Environmental Outcomes: None Identified  Food/Nutrient Intake Outcomes: Diet Advancement/Tolerance, Food and Nutrient Intake, Supplement Intake, Vitamin/Mineral Intake  Physical Signs/Symptoms Outcomes: Biochemical Data, GI Status, Fluid Status or Edema, Nutrition Focused Physical Findings, Skin, Weight, Hemodynamic Status    Discharge Planning:     Too soon to determine     Len Cameron MS, RD, LD  Contact: [phone]

## 2022-12-01 NOTE — PROGRESS NOTES
Orthopaedic Progress Note      SUBJECTIVE   Ms. Marianne Munoz is post op day # 2  S/p L forearm ID , Dr Ricardo Catalan pain, modest control  Sensation remains diminished  NPO    OBJECTIVE      Physical    VITALS:  /65   Pulse (!) 102   Temp 99.9 °F (37.7 °C) (Oral)   Resp 17   Ht 5' 8\" (1.727 m)   Wt 170 lb (77.1 kg)   SpO2 96%   BMI 25.85 kg/m²   INTAKE/OUTPUT:    Intake/Output Summary (Last 24 hours) at 12/1/2022 0735  Last data filed at 11/30/2022 1421  Gross per 24 hour   Intake 600 ml   Output --   Net 600 ml     I/O last 3 completed shifts: In: 700 [P.O.:700]  Out: -     7/10 pain  Gen: alert and oriented  Head: normorcephalic, atraumatic  Resp: unlabored, room air  Pelvis: stable  Left upper extremity has a well-padded dry dressing in place, sling intact.  Wiggles all fingers, no purposeful extension through wrist, no skin breakdown at bandage    Data  CBC:   Lab Results   Component Value Date/Time    WBC 8.8 11/28/2022 09:02 PM    HGB 11.6 11/28/2022 09:02 PM     11/28/2022 09:02 PM     BMP:    Lab Results   Component Value Date/Time     11/28/2022 09:02 PM    K 3.6 11/28/2022 09:02 PM    K 4.1 04/05/2021 07:39 AM     11/28/2022 09:02 PM    CO2 22 11/28/2022 09:02 PM    BUN 13 11/28/2022 09:02 PM    CREATININE 0.9 11/28/2022 09:02 PM    CALCIUM 8.3 11/28/2022 09:02 PM    GLUCOSE 142 11/28/2022 09:02 PM    GLUCOSE 97 07/21/2017 06:20 AM     Uric Acid:  No components found for: URIC  PT/INR:    Lab Results   Component Value Date/Time    INR 0.98 04/05/2021 07:39 AM     Troponin:    Lab Results   Component Value Date/Time    TROPONINI < 0.01 07/21/2017 06:20 AM     Urine Culture:  No components found for: STARLAINE      Current Inpatient Medications    Current Facility-Administered Medications: venlafaxine (EFFEXOR XR) extended release capsule 150 mg, 150 mg, Oral, Daily  albuterol sulfate HFA (PROVENTIL;VENTOLIN;PROAIR) 108 (90 Base) MCG/ACT inhaler 2 puff, 2 puff, Inhalation, BID  cyclobenzaprine (FLEXERIL) tablet 10 mg, 10 mg, Oral, TID PRN  ondansetron (ZOFRAN) injection 4 mg, 4 mg, IntraVENous, Q6H PRN  HYDROcodone-acetaminophen (NORCO) 5-325 MG per tablet 1 tablet, 1 tablet, Oral, Q4H PRN **OR** HYDROcodone-acetaminophen (NORCO) 5-325 MG per tablet 2 tablet, 2 tablet, Oral, Q4H PRN  morphine (PF) injection 2 mg, 2 mg, IntraVENous, Q2H PRN **OR** morphine injection 4 mg, 4 mg, IntraVENous, Q2H PRN  ampicillin-sulbactam (UNASYN) 1,500 mg in sodium chloride 0.9 % 50 mL IVPB (mini-bag), 1,500 mg, IntraVENous, Q6H  albuterol (PROVENTIL) nebulizer solution 2.5 mg, 2.5 mg, Nebulization, Q4H PRN  mometasone-formoterol (DULERA) 200-5 MCG/ACT inhaler 2 puff, 2 puff, Inhalation, BID  letrozole (FEMARA) tablet 2.5 mg, 2.5 mg, Oral, Every Other Day  0.9 % sodium chloride infusion, , IntraVENous, Continuous        PLAN    Ms. Lexie Izquierdo is post op day # 2  S/p L forearm ID , Dr Brewer Folds to OR with Dr Shyann Anne this afternoon for repeated wound exploration and irrigation debridement  Dry dressing LUE  NPO

## 2022-12-01 NOTE — PROGRESS NOTES
1820- pt to pacu, pt responds to voice, resp easy and unlabored, VSS, pt appears in no acute distress  1825- fentanyl given for pain  1830- fentanyl given for pain  1840- pt resting in bed with eyes closed and snoring, VSS, pt appears in no acute distress  1842- Report called to [address] [place] [address] on [address]- pt meets criteria for discharge from pacu, pt awaiting inpatient transport  1859- pt transported to floor by transport staff

## 2022-12-01 NOTE — PROGRESS NOTES
Subjective:   Pain controlled. Numbness and tingling noted to the dorsal hand, paresthesias that have been noted throughout the median ulnar nerve distribution are improved today. . Afebrile. Hb 11.6, WBC 8.8 on 11/28/2022. Objective:   /65   Pulse (!) 102   Temp 99.9 °F (37.7 °C) (Oral)   Resp 17   Ht 5' 8\" (1.727 m)   Wt 170 lb (77.1 kg)   SpO2 96%   BMI 25.85 kg/m²     Recent Labs     11/28/22 2102   WBC 8.8   HGB 11.6*   HCT 35.7*          Current Facility-Administered Medications: venlafaxine (EFFEXOR XR) extended release capsule 150 mg, 150 mg, Oral, Daily  albuterol sulfate HFA (PROVENTIL;VENTOLIN;PROAIR) 108 (90 Base) MCG/ACT inhaler 2 puff, 2 puff, Inhalation, BID  cyclobenzaprine (FLEXERIL) tablet 10 mg, 10 mg, Oral, TID PRN  ondansetron (ZOFRAN) injection 4 mg, 4 mg, IntraVENous, Q6H PRN  HYDROcodone-acetaminophen (NORCO) 5-325 MG per tablet 1 tablet, 1 tablet, Oral, Q4H PRN **OR** HYDROcodone-acetaminophen (NORCO) 5-325 MG per tablet 2 tablet, 2 tablet, Oral, Q4H PRN  morphine (PF) injection 2 mg, 2 mg, IntraVENous, Q2H PRN **OR** morphine injection 4 mg, 4 mg, IntraVENous, Q2H PRN  ampicillin-sulbactam (UNASYN) 1,500 mg in sodium chloride 0.9 % 50 mL IVPB (mini-bag), 1,500 mg, IntraVENous, Q6H  albuterol (PROVENTIL) nebulizer solution 2.5 mg, 2.5 mg, Nebulization, Q4H PRN  mometasone-formoterol (DULERA) 200-5 MCG/ACT inhaler 2 puff, 2 puff, Inhalation, BID  letrozole (FEMARA) tablet 2.5 mg, 2.5 mg, Oral, Every Other Day  0.9 % sodium chloride infusion, , IntraVENous, Continuous      Exam:  Gen: NAD  LUE: Dressing CDI. Limited finger flexion noted, there is no active finger extension or wrist extension on exam today. No definitive EPL function. Paresthesias improved to the volar tips of all fingers in the median and ulnar nerve distribution, there is numbness noted in the dorsal radial sensory nerve distribution. Hand well-perfused. Moderate swelling of the hand.   RUE: There are 2 small wounds noted to the middle finger at the volar surface, patient also notes dog bite to this area as well. These are healing appropriately with no obvious signs of infection at this time. Finger flexion extension is intact, R MU SI LT. Hand well-perfused, 2+ radial pulse. Assessment:  45 y.o. female POD 2 status post left forearm I&D following a dog bite to the left upper forearm with Dr. Alexandra Gilliam. Possible deep structure involvement including muscular damage and possible nerve damage of the radial nerve.     Plan:  Pain control  Activity: NWB AMANDEEP  Plan as discussed with Dr. Essie Barboza  -Patient NPO, marked and consented  -Plans for OR this afternoon for repeat I&D and further wound exploration with possible repair of cut structures  -Answer transfer back to the floor postoperatively for continued IV antibiotics    Electronically signed by Dorcas Quiñones PA-C on 12/1/2022 at 9:08 AM

## 2022-12-01 NOTE — BRIEF OP NOTE
Brief Postoperative Note      Patient: Barb Navarro  YOB: 1970  MRN: [id number]    Date of Procedure: 12/1/2022    Pre-Op Diagnosis: Dog bite of left upper extremity, initial encounter [S41.152A, W54. 0XXA]    Post-Op Diagnosis:  Dog bite of left upper extremity with deep structure involvement       Procedure(s):  Left forearm repeat irrigation debridement with sharp excisional debridement of skin, subcutaneous tissue, muscle, tendon with complex wound closure and wound VAC placement    Surgeon(s):  Azar Gama DO    Assistant:   Deandre Michaels PA-C    Anesthesia: General    Estimated Blood Loss (mL): Minimal    Complications: None    Specimens:   ID Type Source Tests Collected by Time Destination   1 : Arm  Swab Arm CULTURE, ANAEROBIC AND AEROBIC Azar Gama DO 12/1/2022 1702        Implants:  * No implants in log *      Drains: * No LDAs found *    Findings: Postop diagnosis confirmed    Electronically signed by Gilmer Huston PA-C on 12/1/2022 at 6:19 PM

## 2022-12-01 NOTE — PLAN OF CARE
Problem: Respiratory - Adult  Goal: Clear lung sounds  12/1/2022 0541 by Courtney Kim RCP  Outcome: Progressing   Patient mutually agreed on goals.

## 2022-12-01 NOTE — ANESTHESIA PRE PROCEDURE
Department of Anesthesiology  Preprocedure Note       Name:  Joana Marie   Age:  45 y.o.  :  1970                                          MRN:  [id number]         Date:  2022      Surgeon: Waldo Landon):  Ruby Austin DO    Procedure: Procedure(s):  LEFT ARM I & D AND WOUND EXPLORATION    Medications prior to admission:   Prior to Admission medications    Medication Sig Start Date End Date Taking?  Authorizing Provider   letrozole [facility]) 2.5 MG tablet Take 1 tablet by mouth every other day 22   Gayathri Padilla PA-C   diclofenac (VOLTAREN) 75 MG EC tablet Take 75 mg by mouth 2 times daily  Patient not taking: Reported on 2022    Historical Provider, MD   amoxicillin-clavulanate (AUGMENTIN) 875-125 MG per tablet Take 1 tablet by mouth 2 times daily  Patient not taking: Reported on 2022   Historical Provider, MD   [place] 200-5 MCG/ACT inhaler Inhale 2 puffs into the lungs 2 times daily 20   IRON Pink - CNP   albuterol sulfate  (90 Base) MCG/ACT inhaler Inhale 1 puff into the lungs every 6 hours as needed for Wheezing or Shortness of Breath 19   IRON Pink - CNP   NONFORMULARY 2 tablets daily Tumeric    Historical Provider, MD   b complex vitamins capsule Take 1 capsule by mouth daily    Historical Provider, MD   albuterol (PROVENTIL) (2.5 MG/3ML) 0.083% nebulizer solution Inhale 2.5 mg into the lungs every 4 hours as needed  10/17/17   Historical Provider, MD   butalbital-apap-caffeine -40 MG CAPS Take by mouth 10/17/17   Historical Provider, MD   ALPRAZolam [facility] [place]) 0.5 MG tablet Take 0.5 mg by mouth nightly as needed  10/17/17   Historical Provider, MD   cyclobenzaprine (FLEXERIL) 5 MG tablet Take 5 mg by mouth 3 times daily as needed  10/17/17   Historical Provider, MD   venlafaxine 150 MG extended release tablet Take 150 mg by mouth daily 10/17/17   Historical Provider, MD       Current medications:    Current Facility-Administered Medications   Medication Dose Route Frequency Provider Last Rate Last Admin    venlafaxine (EFFEXOR XR) extended release capsule 150 mg  150 mg Oral Daily IRON Rossi CNP   150 mg at 12/01/22 0944    albuterol sulfate HFA (PROVENTIL;VENTOLIN;PROAIR) 108 (90 Base) MCG/ACT inhaler 2 puff  2 puff Inhalation BID IRON Rossi CNP   2 puff at 12/01/22 0537    cyclobenzaprine (FLEXERIL) tablet 10 mg  10 mg Oral TID PRN IRON Ball CNP   10 mg at 11/28/22 2325    ondansetron (ZOFRAN) injection 4 mg  4 mg IntraVENous Q6H PRN IRON Rossi CNP        HYDROcodone-acetaminophen (NORCO) 5-325 MG per tablet 1 tablet  1 tablet Oral Q4H PRN IRON Ball CNP   1 tablet at 11/30/22 2254    Or    HYDROcodone-acetaminophen (NORCO) 5-325 MG per tablet 2 tablet  2 tablet Oral Q4H PRN IRON Ball CNP   2 tablet at 12/01/22 1321    morphine (PF) injection 2 mg  2 mg IntraVENous Q2H PRN IRON Rossi CNP        Or    morphine injection 4 mg  4 mg IntraVENous Q2H PRN IRON Rossi CNP   4 mg at 11/30/22 1553    ampicillin-sulbactam (UNASYN) 1,500 mg in sodium chloride 0.9 % 50 mL IVPB (mini-bag)  1,500 mg IntraVENous Q6H IRON Rossi CNP   Stopped at 12/01/22 1605    albuterol (PROVENTIL) nebulizer solution 2.5 mg  2.5 mg Nebulization Q4H PRN IRON Rossi CNP        mometasone-formoterol (DULERA) 200-5 MCG/ACT inhaler 2 puff  2 puff Inhalation BID IRON Ball CNP   2 puff at 12/01/22 0537    letrozole [facility]) tablet 2.5 mg  2.5 mg Oral Every Other Day IRON Ball CNP   2.5 mg at 12/01/22 0842    0.9 % sodium chloride infusion   IntraVENous Continuous IRON Rossi  mL/hr at 12/01/22 1051 New Bag at 12/01/22 1051       Allergies:  No Known Allergies    Problem List:    Patient Active Problem List   Diagnosis Code    Asthma J45.909    COPD with asthma ([facility] J44.9    Osteoarthritis M19.90    History of chest pain Z87.898    Pulmonary emphysema ([facility] J43.9    Acid reflux K21.9    History of tinnitus Z86.69    Depression F32. A    Anxiety F41.9    Joint pain M25.50    Numbness and tingling R20.0, R20.2    Light headed R42    Breast cancer metastasized to axillary lymph node, left (HCC) C50.912, C77.3    Malignant neoplasm of left female breast ([facility]) C50.912    Chronic obstructive pulmonary disease with acute exacerbation ([facility]) J44.1    Elevated lactic acid level R79.89    Nausea R11.0    Pneumonia of both lungs due to infectious organism J18.9    COPD exacerbation ([facility]) J44.1    Diarrhea of presumed infectious origin R19.7    Sepsis due to pneumonia ([facility] J18.9, A41.9    Breast lump N63.0    Dyspnea R06.00    Epigastric pain R10.13    Gastritis due to Helicobacter species I82.15, B96.81    Headache R51.9    Intestinal infectious disease A09    Low back pain M54.50    Malaise and fatigue R53.81, R53.83    Microscopic hematuria R31.29    Polyuria R35.89    Solitary pulmonary nodule R91.1    Urinary incontinence R32    Dog bite of arm, left, initial encounter S41.152A, W54. Deyanira.Kehr       Past Medical History:        Diagnosis Date    Acid reflux     Anxiety     Asthma     Breast cancer ([facility]     COPD with asthma ([facility]     Depression     Depression     Dizziness - light-headed     HX OF:    Emphysema     History of chest pain     History of therapeutic radiation     History of tinnitus     Hx antineoplastic chemo     Hx of blood clots     Joint pain     Numbness and tingling     Osteoarthritis     PONV (postoperative nausea and vomiting)     SOB (shortness of breath) on exertion        Past Surgical History:        Procedure Laterality Date    ARM SURGERY Left 11/29/2022    LEFT FOREARM INCISION AND DRAINAGE performed by Bria Page MD at [address] [facility]. SURGERY  2014    BREAST BIOPSY  10/06/2017    Piedad José    CARDIAC CATHETERIZATION  2011    NO EVIDENCE OF PULMONARY HTN,NO EVIDENCE OF DD,NORMAL LVF    CARDIOVASCULAR STRESS TEST  2011    EF 60% MILD INFERIOR WALL REVERSIBLE STRESS-INDUCED ISCHEMIA      SECTION  1992    CHOLECYSTECTOMY  2015    CYST REMOVAL Right 2015    rt breast [facility]-benign breast cyst     ESOPHAGUS SURGERY  2014    HYSTERECTOMY (CERVIX STATUS UNKNOWN)  1999    INSERTION / REMOVAL / REPLACEMENT VENOUS ACCESS CATHETER Right 2017    RIGHT SIDE SINGLE LUMEN POWERPORT INSERTION performed by Kirby Yost MD at [address] Left 2017    LEFT MODIFIED RADICAL MASTECTOMY performed by Kirby Yost MD at [address] TUNNELED VENOUS PORT PLACEMENT Right 2017    Single Luman Smart Port Insertion- Right side.         Social History:    Social History     Tobacco Use    Smoking status: Every Day     Packs/day: 1.00     Years: 23.00     Pack years: 23.00     Types: Cigarettes     Start date: 1999    Smokeless tobacco: Never    Tobacco comments:     Just received smoking cessation information from pcp per patient 22   Substance Use Topics    Alcohol use: Yes     Comment: rarely                                Ready to quit: Not Answered  Counseling given: Not Answered  Tobacco comments: Just received smoking cessation information from pcp per patient 22      Vital Signs (Current):   Vitals:    22 0030 22 0300 22 0900 22 1215   BP: (!) 119/59 122/65  (!) 111/57   Pulse: 97 (!) 102 97 93   Resp: 17 17 (!) 8    Temp: 98.6 °F (37 °C) 99.9 °F (37.7 °C) 99.9 °F (37.7 °C) 99.7 °F (37.6 °C)   TempSrc: Oral Oral Oral Oral   SpO2: 95% 96%  99%   Weight:       Height:                                                  BP Readings from Last 3 Encounters:   22 (!) 111/57   22 114/64   22 128/68       NPO Status: BMI:   Wt Readings from Last 3 Encounters:   11/28/22 170 lb (77.1 kg)   09/02/22 173 lb (78.5 kg)   01/27/22 172 lb (78 kg)     Body mass index is 25.85 kg/m². CBC:   Lab Results   Component Value Date/Time    WBC 8.8 11/28/2022 09:02 PM    RBC 3.52 11/28/2022 09:02 PM    HGB 11.6 11/28/2022 09:02 PM    HCT 35.7 11/28/2022 09:02 PM    .4 11/28/2022 09:02 PM    RDW 15.0 04/12/2019 11:35 AM     11/28/2022 09:02 PM       CMP:   Lab Results   Component Value Date/Time     11/28/2022 09:02 PM    K 3.6 11/28/2022 09:02 PM    K 4.1 04/05/2021 07:39 AM     11/28/2022 09:02 PM    CO2 22 11/28/2022 09:02 PM    BUN 13 11/28/2022 09:02 PM    CREATININE 0.9 11/28/2022 09:02 PM    LABGLOM >60 11/28/2022 09:12 PM    GLUCOSE 142 11/28/2022 09:02 PM    GLUCOSE 97 07/21/2017 06:20 AM    PROT 5.9 01/27/2022 05:58 PM    CALCIUM 8.3 11/28/2022 09:02 PM    BILITOT <0.2 01/27/2022 05:58 PM    ALKPHOS 66 01/27/2022 05:58 PM    AST 17 01/27/2022 05:58 PM    ALT 17 01/27/2022 05:58 PM       POC Tests: No results for input(s): POCGLU, POCNA, POCK, POCCL, POCBUN, POCHEMO, POCHCT in the last 72 hours.     Coags:   Lab Results   Component Value Date/Time    INR 0.98 04/05/2021 07:39 AM       HCG (If Applicable):   Lab Results   Component Value Date    PREGSERUM NEGATIVE 03/02/2019        ABGs: No results found for: PHART, PO2ART, ANW1RRC, JHD5IAG, BEART, D7DFRLWV     Type & Screen (If Applicable):  Lab Results   Component Value Date    LABRH POS 11/28/2022       Drug/Infectious Status (If Applicable):  No results found for: HIV, HEPCAB    COVID-19 Screening (If Applicable):   Lab Results   Component Value Date/Time    COVID19 NOT DETECTED 01/27/2022 07:40 PM    COVID19 NOT  DETECTED 01/27/2022 05:15 PM           Anesthesia Evaluation    Airway: Mallampati: II  TM distance: >3 FB   Neck ROM: full  Mouth opening: > = 3 FB   Dental:          Pulmonary: (+) COPD:  decreased breath sounds asthma:                            Cardiovascular:            Rhythm: regular                      Neuro/Psych:   (+) psychiatric history:            GI/Hepatic/Renal:   (+) GERD:,           Endo/Other:    (+) : arthritis: OA., .                 Abdominal:             Vascular: Other Findings:           Anesthesia Plan      general     ASA 3       Induction: intravenous. Anesthetic plan and risks discussed with patient. Plan discussed with CRNA.                     Ashley Quiros MD   12/1/2022

## 2022-12-02 PROCEDURE — 2580000003 HC RX 258: Performed by: PHYSICIAN ASSISTANT

## 2022-12-02 PROCEDURE — 6360000002 HC RX W HCPCS: Performed by: NURSE PRACTITIONER

## 2022-12-02 PROCEDURE — 94640 AIRWAY INHALATION TREATMENT: CPT

## 2022-12-02 PROCEDURE — 6370000000 HC RX 637 (ALT 250 FOR IP): Performed by: NURSE PRACTITIONER

## 2022-12-02 PROCEDURE — G0378 HOSPITAL OBSERVATION PER HR: HCPCS

## 2022-12-02 PROCEDURE — 6360000002 HC RX W HCPCS: Performed by: PHYSICIAN ASSISTANT

## 2022-12-02 PROCEDURE — 6370000000 HC RX 637 (ALT 250 FOR IP): Performed by: PHYSICIAN ASSISTANT

## 2022-12-02 PROCEDURE — 2580000003 HC RX 258: Performed by: NURSE PRACTITIONER

## 2022-12-02 RX ADMIN — HYDROCODONE BITARTRATE AND ACETAMINOPHEN 2 TABLET: 5; 325 TABLET ORAL at 08:25

## 2022-12-02 RX ADMIN — MORPHINE SULFATE 4 MG: 4 INJECTION, SOLUTION INTRAMUSCULAR; INTRAVENOUS at 15:55

## 2022-12-02 RX ADMIN — SODIUM CHLORIDE: 9 INJECTION, SOLUTION INTRAVENOUS at 21:38

## 2022-12-02 RX ADMIN — AMPICILLIN SODIUM AND SULBACTAM SODIUM 1500 MG: 1; .5 INJECTION, POWDER, FOR SOLUTION INTRAMUSCULAR; INTRAVENOUS at 09:19

## 2022-12-02 RX ADMIN — ALBUTEROL SULFATE 2 PUFF: 90 AEROSOL, METERED RESPIRATORY (INHALATION) at 16:22

## 2022-12-02 RX ADMIN — MOMETASONE FUROATE AND FORMOTEROL FUMARATE DIHYDRATE 2 PUFF: 200; 5 AEROSOL RESPIRATORY (INHALATION) at 16:21

## 2022-12-02 RX ADMIN — AMPICILLIN SODIUM AND SULBACTAM SODIUM 1500 MG: 1; .5 INJECTION, POWDER, FOR SOLUTION INTRAMUSCULAR; INTRAVENOUS at 21:39

## 2022-12-02 RX ADMIN — HYDROCODONE BITARTRATE AND ACETAMINOPHEN 2 TABLET: 5; 325 TABLET ORAL at 21:42

## 2022-12-02 RX ADMIN — VENLAFAXINE HYDROCHLORIDE 150 MG: 150 CAPSULE, EXTENDED RELEASE ORAL at 08:26

## 2022-12-02 RX ADMIN — MORPHINE SULFATE 4 MG: 4 INJECTION, SOLUTION INTRAMUSCULAR; INTRAVENOUS at 18:31

## 2022-12-02 RX ADMIN — CYCLOBENZAPRINE 10 MG: 10 TABLET, FILM COATED ORAL at 04:18

## 2022-12-02 RX ADMIN — AMPICILLIN SODIUM AND SULBACTAM SODIUM 1500 MG: 1; .5 INJECTION, POWDER, FOR SOLUTION INTRAMUSCULAR; INTRAVENOUS at 15:51

## 2022-12-02 RX ADMIN — HYDROCODONE BITARTRATE AND ACETAMINOPHEN 2 TABLET: 5; 325 TABLET ORAL at 04:18

## 2022-12-02 RX ADMIN — CYCLOBENZAPRINE 10 MG: 10 TABLET, FILM COATED ORAL at 12:45

## 2022-12-02 RX ADMIN — MORPHINE SULFATE 4 MG: 4 INJECTION, SOLUTION INTRAMUSCULAR; INTRAVENOUS at 11:30

## 2022-12-02 RX ADMIN — HYDROCODONE BITARTRATE AND ACETAMINOPHEN 2 TABLET: 5; 325 TABLET ORAL at 00:01

## 2022-12-02 RX ADMIN — ALBUTEROL SULFATE 2 PUFF: 90 AEROSOL, METERED RESPIRATORY (INHALATION) at 05:28

## 2022-12-02 RX ADMIN — AMPICILLIN SODIUM AND SULBACTAM SODIUM 1500 MG: 1; .5 INJECTION, POWDER, FOR SOLUTION INTRAMUSCULAR; INTRAVENOUS at 02:37

## 2022-12-02 RX ADMIN — HYDROCODONE BITARTRATE AND ACETAMINOPHEN 2 TABLET: 5; 325 TABLET ORAL at 13:38

## 2022-12-02 RX ADMIN — MOMETASONE FUROATE AND FORMOTEROL FUMARATE DIHYDRATE 2 PUFF: 200; 5 AEROSOL RESPIRATORY (INHALATION) at 05:27

## 2022-12-02 ASSESSMENT — PAIN DESCRIPTION - LOCATION
LOCATION: ARM

## 2022-12-02 ASSESSMENT — PAIN DESCRIPTION - DESCRIPTORS
DESCRIPTORS: SHOOTING;STABBING
DESCRIPTORS: THROBBING;TINGLING
DESCRIPTORS: ACHING
DESCRIPTORS: STABBING;SHOOTING;THROBBING
DESCRIPTORS: STABBING

## 2022-12-02 ASSESSMENT — PAIN DESCRIPTION - ONSET
ONSET: ON-GOING
ONSET: ON-GOING

## 2022-12-02 ASSESSMENT — PAIN SCALES - GENERAL
PAINLEVEL_OUTOF10: 10
PAINLEVEL_OUTOF10: 6
PAINLEVEL_OUTOF10: 10
PAINLEVEL_OUTOF10: 8
PAINLEVEL_OUTOF10: 8
PAINLEVEL_OUTOF10: 10
PAINLEVEL_OUTOF10: 5
PAINLEVEL_OUTOF10: 8
PAINLEVEL_OUTOF10: 5
PAINLEVEL_OUTOF10: 10
PAINLEVEL_OUTOF10: 6

## 2022-12-02 ASSESSMENT — PAIN DESCRIPTION - PAIN TYPE
TYPE: ACUTE PAIN;SURGICAL PAIN
TYPE: ACUTE PAIN;SURGICAL PAIN

## 2022-12-02 ASSESSMENT — PAIN DESCRIPTION - ORIENTATION
ORIENTATION: LEFT

## 2022-12-02 ASSESSMENT — PAIN DESCRIPTION - FREQUENCY
FREQUENCY: CONTINUOUS
FREQUENCY: CONTINUOUS

## 2022-12-02 ASSESSMENT — PAIN - FUNCTIONAL ASSESSMENT
PAIN_FUNCTIONAL_ASSESSMENT: ACTIVITIES ARE NOT PREVENTED
PAIN_FUNCTIONAL_ASSESSMENT: ACTIVITIES ARE NOT PREVENTED
PAIN_FUNCTIONAL_ASSESSMENT: PREVENTS OR INTERFERES SOME ACTIVE ACTIVITIES AND ADLS

## 2022-12-02 NOTE — CARE COORDINATION
Potential discharge over the weekend. 12/2/22, 12:18 PM EST    Patient goals/plan/ treatment preferences discussed by  and . Patient goals/plan/ treatment preferences reviewed with patient/ family. Patient/ family verbalize understanding of discharge plan and are in agreement with goal/plan/treatment preferences. Understanding was demonstrated using the teach back method. AVS provided by RN at time of discharge, which includes all necessary medical information pertaining to the patients current course of illness, treatment, post-discharge goals of care, and treatment preferences. Services At/After Discharge: DME and [address] [place] [address] Joselito Holcomb               0805 Spoke with Bridget with [employer] and [employer] about ordering wound vac. She will begin pulling info for order. CM will fax order form once completed    Bridget from [address] will contact CM on call once approved. Plan for [facility]  Ortho surgery will change the wound vac dressing on Sunday. Wound care team to leave supplies in room. House supervisor will be able to accessible [facility] nor[facility]ga from wound team office over the weekend.

## 2022-12-02 NOTE — OP NOTE
[address] [place] [address]                                OPERATIVE REPORT    PATIENT NAME: Saundra Steen                 :        1970  MED REC NO:   [id number]                           ROOM:       0019  ACCOUNT NO:   [de-identified]                           ADMIT DATE: 2022  PROVIDER:     Joi Jurado D.O.    DATE OF PROCEDURE:  2022    PREOPERATIVE DIAGNOSIS:  Left arm dog bite with muscle and nerve damage. POSTOPERATIVE DIAGNOSES:  1. Left arm dog bite with soft tissue degloving, muscle loss of the  brachioradialis, extensor carpi radialis brevis, extensor carpi radialis  longus, and common or extensor digitorum communis muscle bellies in the  proximal forearm. 2.  Severe attenuation and stretch injury to the posterior interosseous  nerve. 3.  Complete transaction with avulsion and nerve block of the  superficial radial nerve. 4.  Initially contaminated wound with likely early developing infection. OPERATION PERFORMED:  1. Irrigation and sharp excisional debridement of skin, subcutaneous  tissue, fascia, and muscle on the left forearm. 2.  Wound exploration with identification of posterior interosseous and  superficial radial nerve injuries. 3.  Partial wound closure and wound VAC application. SURGEON:  Joi Jurado D.O.    ASSISTANT:  LD Sharif, assisted with positioning, retraction,  closure, and dressing application. TYPE OF ANESTHESIA:  General.    BLOOD LOSS:  Minimal.    TOURNIQUET TIME:  34 minutes. SPECIMENS:  Culture swabs were taken. FINDINGS:  Postoperative diagnosis confirmed as well as thin cloudy,  bloody fluid likely consistent with early infection, necrotic fat, and  muscle tissue. INDICATIONS FOR OPERATION:  The patient is a 58-year-old female who  presented to the ER couple of days prior after sustaining a dog bite to  the left forearm.   She underwent an urgent irrigation and debridement by  Dr. Jayson Jain. I was later consulted for further management for concern of  significant soft tissue defect with muscle loss and nerve injury. Based  on physical exam, it was concerning for radial nerve injury although the  this also could have been related to the muscle loss. I recommended  further wound irrigation with a second wound debridement given the  severity of the injury. Risks, benefits, and alternatives were  reviewed. The patient elected to proceed. OPERATIVE SUMMARY:  The patient was met in the preop holding area and  the correct extremity was identified and marked. Informed consent was obtained. The patient was  escorted to the operative suite. General anesthesia was administered. She was prepped and draped in standard surgical fashion. Upon removal  of the postoperative dressing, it was noted that there was a foul odor  and it was cloudy and had gross purulent drainage. This fluid was  encountered as sutures were removed. Culture swabs were taken. It was  obvious that the muscle ends were necrotic as well as skin edges. There  was also a large degloved skin flap. This was sharply debrided with a  combination of scalpel and Tenotomy scissors back to healthy bleeding  tissue. Muscle tissue was sharply debrided back with Tenotomy scissors. The PIN was identified and was  severely attenuated. The superficial radial nerve was identified and  was completely transected with large segmental defect. After a thorough  debridement, the wound was copiously irrigated with Irrisept and 6  liters of normal saline. Tourniquet was let down. All tissue had a  healthy appearance with bleeding edges. The wound was partially closed  along the volar radial aspect. Puncture wounds were also irrigated out  using hemostat to slightly open them. These were all connected with  partial degloving.   There was a large muscular and skin  defect extending all the way to the radius with exposed bone. After partial closure, wound VAC was applied. Splint was applied holding the wrist in place. The  patient was awakened from anesthesia. No complications. She will be  admitted back to the floor for postoperative antibiotics and follow  cultures. Based on the clinical appearance, wounds may require another  repeat I and D versus definitive flap closure if the wound appears  healthy.         Elenita Wiley D.O.    D: 12/01/2022 18:52:31       T: 12/01/2022 23:34:46     [email or web address]  Job#: [id number]     Doc#: [id number]    CC:

## 2022-12-02 NOTE — PROGRESS NOTES
Subjective:   Pain controlled. Numbness and tingling remains to the dorsal hand. Paresthesias improving to the median and ulnar nerve distributions. Afebrile. Objective:   BP (!) 104/50   Pulse 66   Temp 98.1 °F (36.7 °C) (Oral)   Resp 18   Ht 5' 8\" (1.727 m)   Wt 170 lb (77.1 kg)   SpO2 94%   BMI 25.85 kg/m²     No results for input(s): WBC, HGB, HCT, PLT, INR, PTT, CRP in the last 72 hours. Invalid input(s): PT, SED    Current Facility-Administered Medications: venlafaxine (EFFEXOR XR) extended release capsule 150 mg, 150 mg, Oral, Daily  albuterol sulfate HFA (PROVENTIL;VENTOLIN;PROAIR) 108 (90 Base) MCG/ACT inhaler 2 puff, 2 puff, Inhalation, BID  cyclobenzaprine (FLEXERIL) tablet 10 mg, 10 mg, Oral, TID PRN  ondansetron (ZOFRAN) injection 4 mg, 4 mg, IntraVENous, Q6H PRN  HYDROcodone-acetaminophen (NORCO) 5-325 MG per tablet 1 tablet, 1 tablet, Oral, Q4H PRN **OR** HYDROcodone-acetaminophen (NORCO) 5-325 MG per tablet 2 tablet, 2 tablet, Oral, Q4H PRN  morphine (PF) injection 2 mg, 2 mg, IntraVENous, Q2H PRN **OR** morphine injection 4 mg, 4 mg, IntraVENous, Q2H PRN  ampicillin-sulbactam (UNASYN) 1,500 mg in sodium chloride 0.9 % 50 mL IVPB (mini-bag), 1,500 mg, IntraVENous, Q6H  albuterol (PROVENTIL) nebulizer solution 2.5 mg, 2.5 mg, Nebulization, Q4H PRN  mometasone-formoterol (DULERA) 200-5 MCG/ACT inhaler 2 puff, 2 puff, Inhalation, BID  letrozole (FEMARA) tablet 2.5 mg, 2.5 mg, Oral, Every Other Day  0.9 % sodium chloride infusion, , IntraVENous, Continuous      Exam:  Gen: NAD  LUE: Dressing and Vac remain in place, CDI. Slight finger flexion noted, very limited finger extension. Paresthesias continue to improve in the median and ulnar nerve distribution. Numbness continues in the dorsal radial sensory nerve distribution. Hand well perfused. Assessment:  45 y.o. female POD 3 s/p left forearm I&D following a dog bite to the left upper forearm with Dr Neville Mccann.  POD 1 s/p repeat I&D and extensive debridement as well as wound vac placement with Dr Natalie Singletary. Plan:  Pain control  Activity: NWLULÚ BERNSTEIN, no lifting/pushing/pulling   Will continue to monitor culture results with plans to DC home on appropriate oral antibiotics.     Electronically signed by Vera Alcala PA-C on 12/2/2022 at 6:24 AM

## 2022-12-02 NOTE — ANESTHESIA POSTPROCEDURE EVALUATION
Department of Anesthesiology  Postprocedure Note    Patient: Bandar Varner  MRN: [id number]  YOB: 1970  Date of evaluation: 12/1/2022      Procedure Summary     Date: 12/01/22 Room / Location: [address] DANN Burkett    Anesthesia Start: 1637 Anesthesia Stop: 1822    Procedure: LEFT ARM WASHOUT AND DEBRIDEMENT WITH WOUND VAC PLACEMENT (Left: Arm Lower) Diagnosis:       Dog bite of left upper extremity, initial encounter      (Dog bite of left upper extremity, initial encounter [S41.152A, W54. 0XXA])    Surgeons: Royal Se DO Responsible Provider: Gayatri Cash MD    Anesthesia Type: general ASA Status: 3          Anesthesia Type: No value filed.     Malcolm Phase I: Malcolm Score: 9    Malcolm Phase II:        Anesthesia Post Evaluation    Patient location during evaluation: PACU  Patient participation: complete - patient participated  Level of consciousness: awake  Airway patency: patent  Nausea & Vomiting: no vomiting and no nausea  Complications: no  Cardiovascular status: hemodynamically stable  Respiratory status: acceptable and nasal cannula  Hydration status: stable

## 2022-12-03 PROBLEM — S51.852A: Status: ACTIVE | Noted: 2022-12-03
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
## 2022-12-03 PROBLEM — E87.6 HYPOKALEMIA: Status: ACTIVE | Noted: 2022-12-03

## 2022-12-03 LAB
ANION GAP SERPL CALCULATED.3IONS-SCNC: 11 MEQ/L (ref 8–16)
BASOPHILS # BLD: 0.2 %
BASOPHILS ABSOLUTE: 0 THOU/MM3 (ref 0–0.1)
BUN BLDV-MCNC: 6 MG/DL (ref 7–22)
CALCIUM SERPL-MCNC: 8.4 MG/DL (ref 8.5–10.5)
CHLORIDE BLD-SCNC: 103 MEQ/L (ref 98–111)
CO2: 26 MEQ/L (ref 23–33)
CREAT SERPL-MCNC: 0.6 MG/DL (ref 0.4–1.2)
EOSINOPHIL # BLD: 2.6 %
EOSINOPHILS ABSOLUTE: 0.1 THOU/MM3 (ref 0–0.4)
ERYTHROCYTE [DISTWIDTH] IN BLOOD BY AUTOMATED COUNT: 12.9 % (ref 11.5–14.5)
ERYTHROCYTE [DISTWIDTH] IN BLOOD BY AUTOMATED COUNT: 46.3 FL (ref 35–45)
GFR SERPL CREATININE-BSD FRML MDRD: > 60 ML/MIN/1.73M2
GLUCOSE BLD-MCNC: 124 MG/DL (ref 70–108)
HCT VFR BLD CALC: 28.9 % (ref 37–47)
HEMOGLOBIN: 9.4 GM/DL (ref 12–16)
IMMATURE GRANS (ABS): 0.01 THOU/MM3 (ref 0–0.07)
IMMATURE GRANULOCYTES: 0.2 %
LYMPHOCYTES # BLD: 18.9 %
LYMPHOCYTES ABSOLUTE: 0.9 THOU/MM3 (ref 1–4.8)
MCH RBC QN AUTO: 32.3 PG (ref 26–33)
MCHC RBC AUTO-ENTMCNC: 32.5 GM/DL (ref 32.2–35.5)
MCV RBC AUTO: 99.3 FL (ref 81–99)
MONOCYTES # BLD: 10.1 %
MONOCYTES ABSOLUTE: 0.5 THOU/MM3 (ref 0.4–1.3)
NUCLEATED RED BLOOD CELLS: 0 /100 WBC
PLATELET # BLD: 182 THOU/MM3 (ref 130–400)
PMV BLD AUTO: 10.6 FL (ref 9.4–12.4)
POTASSIUM SERPL-SCNC: 3.2 MEQ/L (ref 3.5–5.2)
RBC # BLD: 2.91 MILL/MM3 (ref 4.2–5.4)
SEG NEUTROPHILS: 68 %
SEGMENTED NEUTROPHILS ABSOLUTE COUNT: 3.4 THOU/MM3 (ref 1.8–7.7)
SODIUM BLD-SCNC: 140 MEQ/L (ref 135–145)
WBC # BLD: 5 THOU/MM3 (ref 4.8–10.8)

## 2022-12-03 PROCEDURE — 6370000000 HC RX 637 (ALT 250 FOR IP): Performed by: PHYSICIAN ASSISTANT

## 2022-12-03 PROCEDURE — 1200000003 HC TELEMETRY R&B

## 2022-12-03 PROCEDURE — 85025 COMPLETE CBC W/AUTO DIFF WBC: CPT

## 2022-12-03 PROCEDURE — 2580000003 HC RX 258: Performed by: PHYSICIAN ASSISTANT

## 2022-12-03 PROCEDURE — 6360000002 HC RX W HCPCS: Performed by: PHYSICIAN ASSISTANT

## 2022-12-03 PROCEDURE — 80048 BASIC METABOLIC PNL TOTAL CA: CPT

## 2022-12-03 PROCEDURE — 94640 AIRWAY INHALATION TREATMENT: CPT

## 2022-12-03 PROCEDURE — 36415 COLL VENOUS BLD VENIPUNCTURE: CPT

## 2022-12-03 RX ORDER — MAGNESIUM SULFATE IN WATER 40 MG/ML
2000 INJECTION, SOLUTION INTRAVENOUS PRN
Status: DISCONTINUED | OUTPATIENT
Start: 2022-12-03 | End: 2022-12-06 | Stop reason: HOSPADM

## 2022-12-03 RX ORDER — POTASSIUM CHLORIDE 7.45 MG/ML
10 INJECTION INTRAVENOUS PRN
Status: DISCONTINUED | OUTPATIENT
Start: 2022-12-03 | End: 2022-12-06 | Stop reason: HOSPADM

## 2022-12-03 RX ORDER — POTASSIUM CHLORIDE 20 MEQ/1
40 TABLET, EXTENDED RELEASE ORAL PRN
Status: DISCONTINUED | OUTPATIENT
Start: 2022-12-03 | End: 2022-12-06 | Stop reason: HOSPADM

## 2022-12-03 RX ADMIN — ALBUTEROL SULFATE 2 PUFF: 90 AEROSOL, METERED RESPIRATORY (INHALATION) at 07:50

## 2022-12-03 RX ADMIN — HYDROCODONE BITARTRATE AND ACETAMINOPHEN 2 TABLET: 5; 325 TABLET ORAL at 03:13

## 2022-12-03 RX ADMIN — CEFEPIME 2000 MG: 2 INJECTION, POWDER, FOR SOLUTION INTRAVENOUS at 14:54

## 2022-12-03 RX ADMIN — MOMETASONE FUROATE AND FORMOTEROL FUMARATE DIHYDRATE 2 PUFF: 200; 5 AEROSOL RESPIRATORY (INHALATION) at 16:55

## 2022-12-03 RX ADMIN — CYCLOBENZAPRINE 10 MG: 10 TABLET, FILM COATED ORAL at 05:58

## 2022-12-03 RX ADMIN — CYCLOBENZAPRINE 10 MG: 10 TABLET, FILM COATED ORAL at 14:00

## 2022-12-03 RX ADMIN — ALBUTEROL SULFATE 2 PUFF: 90 AEROSOL, METERED RESPIRATORY (INHALATION) at 16:55

## 2022-12-03 RX ADMIN — HYDROCODONE BITARTRATE AND ACETAMINOPHEN 2 TABLET: 5; 325 TABLET ORAL at 21:02

## 2022-12-03 RX ADMIN — POTASSIUM CHLORIDE 40 MEQ: 1500 TABLET, EXTENDED RELEASE ORAL at 21:02

## 2022-12-03 RX ADMIN — LETROZOLE 2.5 MG: 2.5 TABLET ORAL at 07:56

## 2022-12-03 RX ADMIN — VENLAFAXINE HYDROCHLORIDE 150 MG: 150 CAPSULE, EXTENDED RELEASE ORAL at 07:56

## 2022-12-03 RX ADMIN — HYDROCODONE BITARTRATE AND ACETAMINOPHEN 2 TABLET: 5; 325 TABLET ORAL at 14:00

## 2022-12-03 RX ADMIN — AMPICILLIN SODIUM AND SULBACTAM SODIUM 1500 MG: 1; .5 INJECTION, POWDER, FOR SOLUTION INTRAMUSCULAR; INTRAVENOUS at 08:04

## 2022-12-03 RX ADMIN — ALBUTEROL SULFATE 2 PUFF: 90 AEROSOL, METERED RESPIRATORY (INHALATION) at 22:50

## 2022-12-03 RX ADMIN — MOMETASONE FUROATE AND FORMOTEROL FUMARATE DIHYDRATE 2 PUFF: 200; 5 AEROSOL RESPIRATORY (INHALATION) at 22:50

## 2022-12-03 RX ADMIN — AMPICILLIN SODIUM AND SULBACTAM SODIUM 1500 MG: 1; .5 INJECTION, POWDER, FOR SOLUTION INTRAMUSCULAR; INTRAVENOUS at 03:15

## 2022-12-03 RX ADMIN — MOMETASONE FUROATE AND FORMOTEROL FUMARATE DIHYDRATE 2 PUFF: 200; 5 AEROSOL RESPIRATORY (INHALATION) at 07:50

## 2022-12-03 RX ADMIN — HYDROCODONE BITARTRATE AND ACETAMINOPHEN 2 TABLET: 5; 325 TABLET ORAL at 07:56

## 2022-12-03 ASSESSMENT — PAIN SCALES - GENERAL
PAINLEVEL_OUTOF10: 6
PAINLEVEL_OUTOF10: 9
PAINLEVEL_OUTOF10: 7
PAINLEVEL_OUTOF10: 8
PAINLEVEL_OUTOF10: 7
PAINLEVEL_OUTOF10: 6
PAINLEVEL_OUTOF10: 9
PAINLEVEL_OUTOF10: 8
PAINLEVEL_OUTOF10: 8

## 2022-12-03 ASSESSMENT — PAIN DESCRIPTION - PAIN TYPE
TYPE: SURGICAL PAIN
TYPE: ACUTE PAIN;SURGICAL PAIN

## 2022-12-03 ASSESSMENT — PAIN DESCRIPTION - FREQUENCY
FREQUENCY: CONTINUOUS
FREQUENCY: CONTINUOUS

## 2022-12-03 ASSESSMENT — PAIN DESCRIPTION - LOCATION
LOCATION: ARM

## 2022-12-03 ASSESSMENT — PAIN DESCRIPTION - DESCRIPTORS
DESCRIPTORS: THROBBING;NUMBNESS
DESCRIPTORS: THROBBING
DESCRIPTORS: SPASM
DESCRIPTORS: THROBBING;NUMBNESS
DESCRIPTORS: THROBBING;NUMBNESS

## 2022-12-03 ASSESSMENT — PAIN - FUNCTIONAL ASSESSMENT
PAIN_FUNCTIONAL_ASSESSMENT: PREVENTS OR INTERFERES SOME ACTIVE ACTIVITIES AND ADLS

## 2022-12-03 ASSESSMENT — PAIN DESCRIPTION - ORIENTATION
ORIENTATION: LEFT

## 2022-12-03 ASSESSMENT — PAIN DESCRIPTION - ONSET
ONSET: ON-GOING
ONSET: ON-GOING

## 2022-12-03 ASSESSMENT — PAIN DESCRIPTION - DIRECTION: RADIATING_TOWARDS: DOWN LEFT ARM

## 2022-12-03 NOTE — RT PROTOCOL NOTE
RT Inhaler-Nebulizer Bronchodilator Protocol Note    There is a bronchodilator order in the chart from a provider indicating to follow the RT Bronchodilator Protocol and there is an Initiate RT Inhaler-Nebulizer Bronchodilator Protocol order as well (see protocol at bottom of note). CXR Findings:  No results found. The findings from the last RT Protocol Assessment were as follows:   History Pulmonary Disease: Chronic pulmonary disease  Respiratory Pattern: Regular pattern and RR 12-20 bpm  Breath Sounds: Slightly diminished and/or crackles  Cough: Strong, spontaneous, non-productive  Indication for Bronchodilator Therapy: Decreased or absent breath sounds, On home bronchodilators  Bronchodilator Assessment Score: 4    Aerosolized bronchodilator medication orders have been revised according to the RT Inhaler-Nebulizer Bronchodilator Protocol below. Respiratory Therapist to perform RT Therapy Protocol Assessment initially then follow the protocol. Repeat RT Therapy Protocol Assessment PRN for score 0-3 or on second treatment, BID, and PRN for scores above 3. No Indications - adjust the frequency to every 6 hours PRN wheezing or bronchospasm, if no treatments needed after 48 hours then discontinue using Per Protocol order mode. If indication present, adjust the RT bronchodilator orders based on the Bronchodilator Assessment Score as indicated below. Use Inhaler orders unless patient has one or more of the following: on home nebulizer, not able to hold breath for 10 seconds, is not alert and oriented, cannot activate and use MDI correctly, or respiratory rate 25 breaths per minute or more, then use the equivalent nebulizer order(s) with same Frequency and PRN reasons based on the score. If a patient is on this medication at home then do not decrease Frequency below that used at home.     0-3 - enter or revise RT bronchodilator order(s) to equivalent RT Bronchodilator order with Frequency of every 4 hours PRN for wheezing or increased work of breathing using Per Protocol order mode. 4-6 - enter or revise RT Bronchodilator order(s) to two equivalent RT bronchodilator orders with one order with BID Frequency and one order with Frequency of every 4 hours PRN wheezing or increased work of breathing using Per Protocol order mode. 7-10 - enter or revise RT Bronchodilator order(s) to two equivalent RT bronchodilator orders with one order with TID Frequency and one order with Frequency of every 4 hours PRN wheezing or increased work of breathing using Per Protocol order mode. 11-13 - enter or revise RT Bronchodilator order(s) to one equivalent RT bronchodilator order with QID Frequency and an Albuterol order with Frequency of every 4 hours PRN wheezing or increased work of breathing using Per Protocol order mode. Greater than 13 - enter or revise RT Bronchodilator order(s) to one equivalent RT bronchodilator order with every 4 hours Frequency and an Albuterol order with Frequency of every 2 hours PRN wheezing or increased work of breathing using Per Protocol order mode. RT to enter RT Home Evaluation for COPD & MDI Assessment order using Per Protocol order mode.     Electronically signed by Dillon Plata RCP on 12/3/2022 at 7:53 AM

## 2022-12-03 NOTE — PROGRESS NOTES
Subjective:   Pain controlled. Numbness and tingling remains to the dorsal hand. Paresthesias improving to the median and ulnar nerve distributions with intermittent tingling throughout the arm distal to the elbow. Afebrile. Objective:   BP (!) 111/54   Pulse 76   Temp 98.6 °F (37 °C) (Oral)   Resp 18   Ht 5' 8\" (1.727 m)   Wt 170 lb (77.1 kg)   SpO2 96%   BMI 25.85 kg/m²     No results for input(s): WBC, HGB, HCT, PLT, INR, PTT, CRP in the last 72 hours. Invalid input(s): PT, SED    Current Facility-Administered Medications: cefepime (MAXIPIME) 2000 mg IVPB minibag, 2,000 mg, IntraVENous, Q12H  venlafaxine (EFFEXOR XR) extended release capsule 150 mg, 150 mg, Oral, Daily  albuterol sulfate HFA (PROVENTIL;VENTOLIN;PROAIR) 108 (90 Base) MCG/ACT inhaler 2 puff, 2 puff, Inhalation, BID  cyclobenzaprine (FLEXERIL) tablet 10 mg, 10 mg, Oral, TID PRN  ondansetron (ZOFRAN) injection 4 mg, 4 mg, IntraVENous, Q6H PRN  HYDROcodone-acetaminophen (NORCO) 5-325 MG per tablet 1 tablet, 1 tablet, Oral, Q4H PRN **OR** HYDROcodone-acetaminophen (NORCO) 5-325 MG per tablet 2 tablet, 2 tablet, Oral, Q4H PRN  morphine (PF) injection 2 mg, 2 mg, IntraVENous, Q2H PRN **OR** morphine injection 4 mg, 4 mg, IntraVENous, Q2H PRN  albuterol (PROVENTIL) nebulizer solution 2.5 mg, 2.5 mg, Nebulization, Q4H PRN  mometasone-formoterol (DULERA) 200-5 MCG/ACT inhaler 2 puff, 2 puff, Inhalation, BID  letrozole (FEMARA) tablet 2.5 mg, 2.5 mg, Oral, Every Other Day      Exam:  Gen: NAD  LUE: Wound VAC in place, CDI. Slight finger flexion noted. There is again very limited finger extension. No wrist extension. Paresthesia continue to improve in the median ulnar nerve distribution with intermittent tingling. Numbness continues in the dorsal radial sensory nerve distribution. The skin about the site of the wound VAC is in good repair. There is limited erythema and minimal swelling.   Hand well-perfused, 2+ radial pulse.      Assessment:  45 y.o. female POD 4 s/p left forearm I&D following a dog bite to the left upper forearm with Dr Goyo Medina. POD 2 s/p repeat I&D and extensive debridement as well as wound vac placement with Dr Yari Amezquita.     Plan:  Pain control  Activity: NWL[facility] [place]ERN[place]  Antibiotics changed today from Unasyn to Cefepime after cultures confirmed Aeromonas hydrophila/caviae  Plan as discussed with Dr. Yari Amezquita  -N.p.o. at midnight  -Plans for OR tomorrow, 12/4/2022 at 12 PM for left forearm irrigation and debridement flap closure versus wound VAC exchange  DVT ppx: Ambulation  DC planning: Plans for discharge to home when deemed appropriate    Electronically signed by Dev Hanson PA-C on 12/3/2022 at 12:29 PM

## 2022-12-03 NOTE — PLAN OF CARE
Problem: Discharge Planning  Goal: Discharge to home or other facility with appropriate resources  12/3/2022 1009 by Bob Jeffrey RN  Outcome: Progressing  Flowsheets (Taken 12/3/2022 1009)  Discharge to home or other facility with appropriate resources:   Identify barriers to discharge with patient and caregiver   Identify discharge learning needs (meds, wound care, etc)   Arrange for needed discharge resources and transportation as appropriate     Problem: Pain  Goal: Verbalizes/displays adequate comfort level or baseline comfort level  12/3/2022 1009 by Bob Jeffrey RN  Outcome: Progressing  Flowsheets (Taken 12/3/2022 1009)  Verbalizes/displays adequate comfort level or baseline comfort level:   Encourage patient to monitor pain and request assistance   Assess pain using appropriate pain scale   Administer analgesics based on type and severity of pain and evaluate response   Implement non-pharmacological measures as appropriate and evaluate response     Problem: ABCDS Injury Assessment  Goal: Absence of physical injury  12/3/2022 1009 by Bob Jeffrey RN  Outcome: Progressing  Flowsheets (Taken 12/3/2022 1009)  Absence of Physical Injury: Implement safety measures based on patient assessment     Problem: Cardiovascular - Adult  Goal: Maintains optimal cardiac output and hemodynamic stability  12/3/2022 1009 by Bob Jeffrey RN  Outcome: Progressing  Flowsheets (Taken 12/3/2022 1009)  Maintains optimal cardiac output and hemodynamic stability:   Monitor blood pressure and heart rate   Monitor urine output and notify Licensed Independent Practitioner for values outside of normal range   Assess for signs of decreased cardiac output     Problem: Skin/Tissue Integrity - Adult  Goal: Incisions, wounds, or drain sites healing without S/S of infection  12/3/2022 1009 by Bob Jeffrey RN  Outcome: Progressing  Flowsheets  Taken 12/3/2022 1009 by Bob Jeffrey RN  Incisions, Wounds, or Drain Sites Healing Without Sign and Symptoms of Infection:   TWICE DAILY: Assess and document dressing/incision, wound bed, drain sites and surrounding tissue   Implement wound care per orders  Taken 12/3/2022 0005 by Srinivas Prakash RN  Incisions, Wounds, or Drain Sites Healing Without Sign and Symptoms of Infection: ADMISSION and DAILY: Assess and document risk factors for pressure ulcer development     Problem: Musculoskeletal - Adult  Goal: Return mobility to safest level of function  12/3/2022 1009 by Pam Chi RN  Outcome: Progressing  Flowsheets (Taken 12/3/2022 1009)  Return Mobility to Safest Level of Function:   Assess patient stability and activity tolerance for standing, transferring and ambulating with or without assistive devices   Assist with transfers and ambulation using safe patient handling equipment as needed     Problem: Musculoskeletal - Adult  Goal: Return ADL status to a safe level of function  12/3/2022 1009 by Pam Chi RN  Outcome: Progressing  Flowsheets (Taken 12/3/2022 1009)  Return ADL Status to a Safe Level of Function: Administer medication as ordered     Problem: Infection - Adult  Goal: Absence of infection at discharge  12/3/2022 1009 by Pam Chi RN  Outcome: Progressing  Flowsheets (Taken 12/3/2022 1009)  Absence of infection at discharge:   Assess and monitor for signs and symptoms of infection   Monitor lab/diagnostic results   Monitor all insertion sites i.e., indwelling lines, tubes and drains     Problem: Metabolic/Fluid and Electrolytes - Adult  Goal: Electrolytes maintained within normal limits  12/3/2022 1009 by Pam Chi RN  Outcome: Progressing  Flowsheets (Taken 12/3/2022 1009)  Electrolytes maintained within normal limits: Monitor labs and assess patient for signs and symptoms of electrolyte imbalances     Problem: Respiratory - Adult  Goal: Clear lung sounds  12/3/2022 0753 by Marky Valentin RCP  Outcome: Progressing  Note: Patient agrees to goals     Problem: Respiratory - Adult  Goal: Achieves optimal ventilation and oxygenation  12/3/2022 1009 by Los Moeller RN  Outcome: Progressing  Flowsheets (Taken 12/3/2022 1009)  Achieves optimal ventilation and oxygenation:   Assess for changes in respiratory status   Assess for changes in mentation and behavior     Problem: Gastrointestinal - Adult  Goal: Maintains or returns to baseline bowel function  12/3/2022 1009 by Los Moeller RN  Outcome: Progressing  Flowsheets (Taken 12/3/2022 1009)  Maintains or returns to baseline bowel function:   Assess bowel function   Encourage oral fluids to ensure adequate hydration     Problem: Genitourinary - Adult  Goal: Absence of urinary retention  12/3/2022 1009 by Los Moeller RN  Outcome: Progressing  Flowsheets (Taken 12/3/2022 1009)  Absence of urinary retention:   Assess patients ability to void and empty bladder   Monitor intake/output and perform bladder scan as needed     Problem: Hematologic - Adult  Goal: Maintains hematologic stability  12/3/2022 1009 by Los Moeller RN  Outcome: Progressing  Flowsheets (Taken 12/3/2022 1009)  Maintains hematologic stability:   Assess for signs and symptoms of bleeding or hemorrhage   Monitor labs for bleeding or clotting disorders     Problem: Safety - Adult  Goal: Free from fall injury  Outcome: Progressing  Flowsheets (Taken 12/3/2022 1009)  Free From Fall Injury: Instruct family/caregiver on patient safety     Problem: Nutrition Deficit:  Goal: Optimize nutritional status  Outcome: Progressing  Flowsheets (Taken 12/3/2022 1009)  Nutrient intake appropriate for improving, restoring, or maintaining nutritional needs:   Assess nutritional status and recommend course of action   Monitor oral intake, labs, and treatment plans    Care plan reviewed with patient and family.   Patient and family verbalize understanding of the plan of care and contribute to goal setting.

## 2022-12-03 NOTE — PLAN OF CARE
Problem: Discharge Planning  Goal: Discharge to home or other facility with appropriate resources  12/2/2022 2357 by Damaris Torres RN  Outcome: Progressing  Flowsheets (Taken 12/2/2022 2258)  Discharge to home or other facility with appropriate resources: Identify barriers to discharge with patient and caregiver     Problem: Pain  Goal: Verbalizes/displays adequate comfort level or baseline comfort level  12/2/2022 2357 by Damaris Torres RN  Outcome: Progressing     Problem: ABCDS Injury Assessment  Goal: Absence of physical injury  12/2/2022 2357 by Damaris Torres RN  Outcome: Progressing     Problem: Cardiovascular - Adult  Goal: Maintains optimal cardiac output and hemodynamic stability  12/2/2022 2357 by Damaris Torres RN  Outcome: Progressing     Problem: Skin/Tissue Integrity - Adult  Goal: Skin integrity remains intact  Recent Flowsheet Documentation  Taken 12/2/2022 2258 by Damaris Torres RN  Skin Integrity Remains Intact: Monitor for areas of redness and/or skin breakdown  Taken 12/2/2022 1138 by Lucy Mayers RN  Skin Integrity Remains Intact: Monitor for areas of redness and/or skin breakdown     Problem: Skin/Tissue Integrity - Adult  Goal: Incisions, wounds, or drain sites healing without S/S of infection  12/2/2022 2357 by Damaris Torres RN  Outcome: Progressing     Problem: Musculoskeletal - Adult  Goal: Return mobility to safest level of function  12/2/2022 2357 by Damaris Torres RN  Outcome: Progressing  Flowsheets (Taken 12/2/2022 2258)  Return Mobility to Safest Level of Function: Assess patient stability and activity tolerance for standing, transferring and ambulating with or without assistive devices     Problem: Musculoskeletal - Adult  Goal: Return ADL status to a safe level of function  12/2/2022 2357 by Damaris Torres RN  Outcome: Progressing  Flowsheets (Taken 12/2/2022 2258)  Return ADL Status to a Safe Level of Function: Administer medication as ordered     Problem: Infection - Adult  Goal: Absence of infection at discharge  12/2/2022 2357 by Navdeep Nagy RN  Outcome: Progressing  Flowsheets (Taken 12/2/2022 2258)  Absence of infection at discharge: Assess and monitor for signs and symptoms of infection     Problem: Metabolic/Fluid and Electrolytes - Adult  Goal: Electrolytes maintained within normal limits  12/2/2022 2357 by Navdeep Nagy RN  Outcome: Progressing  Flowsheets (Taken 12/2/2022 2258)  Electrolytes maintained within normal limits: Monitor labs and assess patient for signs and symptoms of electrolyte imbalances     Problem: Respiratory - Adult  Goal: Achieves optimal ventilation and oxygenation  12/2/2022 2357 by Navdeep Nagy RN  Outcome: Progressing     Problem: Gastrointestinal - Adult  Goal: Maintains or returns to baseline bowel function  12/2/2022 2357 by Navdeep Nagy RN  Outcome: Progressing  [address] (Taken 12/2/2022 2134)  Maintains or returns to baseline bowel function:   Assess bowel function   Encourage oral fluids to ensure adequate hydration     Problem: Genitourinary - Adult  Goal: Absence of urinary retention  12/2/2022 2357 by Navdeep Nagy RN  Outcome: Progressing  Flowsheets (Taken 12/2/2022 2134)  Absence of urinary retention: Assess patients ability to void and empty bladder     Problem: Hematologic - Adult  Goal: Maintains hematologic stability  12/2/2022 2357 by Navdeep Nagy RN  Outcome: Progressing

## 2022-12-03 NOTE — PLAN OF CARE
Problem: Respiratory - Adult  Goal: Clear lung sounds  Outcome: Progressing  Note: Patient agrees to goals

## 2022-12-03 NOTE — CARE COORDINATION
Wound vac update:  Received VM this morning from Bridget with [employer] and [employer] that insurance is pushing back and has not yet approved home wound vac. She will continue to work on first thing Monday morning. Should be ready by Monday afternoon, hopefully.  Call back number is [phone]

## 2022-12-03 NOTE — PLAN OF CARE
Problem: Respiratory - Adult  Goal: Clear lung sounds  12/3/2022 1659 by Keturah Davison RCP  Outcome: Progressing

## 2022-12-04 ENCOUNTER — ANESTHESIA EVENT (OUTPATIENT)
Dept: OPERATING ROOM | Age: 52
End: 2022-12-04
Payer: COMMERCIAL

## 2022-12-04 ENCOUNTER — ANESTHESIA (OUTPATIENT)
Dept: OPERATING ROOM | Age: 52
End: 2022-12-04
Payer: COMMERCIAL

## 2022-12-04 LAB
ANION GAP SERPL CALCULATED.3IONS-SCNC: 11 MEQ/L (ref 8–16)
BASOPHILS # BLD: 0.2 %
BASOPHILS ABSOLUTE: 0 THOU/MM3 (ref 0–0.1)
BUN BLDV-MCNC: 9 MG/DL (ref 7–22)
CALCIUM SERPL-MCNC: 8.6 MG/DL (ref 8.5–10.5)
CHLORIDE BLD-SCNC: 105 MEQ/L (ref 98–111)
CO2: 27 MEQ/L (ref 23–33)
CREAT SERPL-MCNC: 0.6 MG/DL (ref 0.4–1.2)
EOSINOPHIL # BLD: 3.4 %
EOSINOPHILS ABSOLUTE: 0.1 THOU/MM3 (ref 0–0.4)
ERYTHROCYTE [DISTWIDTH] IN BLOOD BY AUTOMATED COUNT: 13 % (ref 11.5–14.5)
ERYTHROCYTE [DISTWIDTH] IN BLOOD BY AUTOMATED COUNT: 47.4 FL (ref 35–45)
GFR SERPL CREATININE-BSD FRML MDRD: > 60 ML/MIN/1.73M2
GLUCOSE BLD-MCNC: 88 MG/DL (ref 70–108)
HCT VFR BLD CALC: 27.6 % (ref 37–47)
HEMOGLOBIN: 9 GM/DL (ref 12–16)
IMMATURE GRANS (ABS): 0.01 THOU/MM3 (ref 0–0.07)
IMMATURE GRANULOCYTES: 0.2 %
LYMPHOCYTES # BLD: 18.2 %
LYMPHOCYTES ABSOLUTE: 0.8 THOU/MM3 (ref 1–4.8)
MCH RBC QN AUTO: 32.7 PG (ref 26–33)
MCHC RBC AUTO-ENTMCNC: 32.6 GM/DL (ref 32.2–35.5)
MCV RBC AUTO: 100.4 FL (ref 81–99)
MONOCYTES # BLD: 12.4 %
MONOCYTES ABSOLUTE: 0.5 THOU/MM3 (ref 0.4–1.3)
NUCLEATED RED BLOOD CELLS: 0 /100 WBC
PLATELET # BLD: 192 THOU/MM3 (ref 130–400)
PMV BLD AUTO: 10.3 FL (ref 9.4–12.4)
POTASSIUM REFLEX MAGNESIUM: 3.8 MEQ/L (ref 3.5–5.2)
RBC # BLD: 2.75 MILL/MM3 (ref 4.2–5.4)
SEG NEUTROPHILS: 65.6 %
SEGMENTED NEUTROPHILS ABSOLUTE COUNT: 2.9 THOU/MM3 (ref 1.8–7.7)
SODIUM BLD-SCNC: 143 MEQ/L (ref 135–145)
WBC # BLD: 4.4 THOU/MM3 (ref 4.8–10.8)

## 2022-12-04 PROCEDURE — 6360000002 HC RX W HCPCS: Performed by: ANESTHESIOLOGY

## 2022-12-04 PROCEDURE — 3600000003 HC SURGERY LEVEL 3 BASE: Performed by: ORTHOPAEDIC SURGERY

## 2022-12-04 PROCEDURE — 7100000001 HC PACU RECOVERY - ADDTL 15 MIN: Performed by: ORTHOPAEDIC SURGERY

## 2022-12-04 PROCEDURE — 1200000003 HC TELEMETRY R&B

## 2022-12-04 PROCEDURE — 64415 NJX AA&/STRD BRCH PLXS IMG: CPT | Performed by: ANESTHESIOLOGY

## 2022-12-04 PROCEDURE — 7100000000 HC PACU RECOVERY - FIRST 15 MIN: Performed by: ORTHOPAEDIC SURGERY

## 2022-12-04 PROCEDURE — 0KXT0Z2 TRANSFER LEFT LOWER LEG MUSCLE WITH SKIN AND SUBCUTANEOUS TISSUE, OPEN APPROACH: ICD-10-PCS | Performed by: ORTHOPAEDIC SURGERY

## 2022-12-04 PROCEDURE — 3E0T3BZ INTRODUCTION OF ANESTHETIC AGENT INTO PERIPHERAL NERVES AND PLEXI, PERCUTANEOUS APPROACH: ICD-10-PCS | Performed by: ORTHOPAEDIC SURGERY

## 2022-12-04 PROCEDURE — 80048 BASIC METABOLIC PNL TOTAL CA: CPT

## 2022-12-04 PROCEDURE — 0HREX74 REPLACEMENT OF LEFT LOWER ARM SKIN WITH AUTOLOGOUS TISSUE SUBSTITUTE, PARTIAL THICKNESS, EXTERNAL APPROACH: ICD-10-PCS | Performed by: ORTHOPAEDIC SURGERY

## 2022-12-04 PROCEDURE — 6370000000 HC RX 637 (ALT 250 FOR IP): Performed by: PHYSICIAN ASSISTANT

## 2022-12-04 PROCEDURE — 2580000003 HC RX 258: Performed by: NURSE ANESTHETIST, CERTIFIED REGISTERED

## 2022-12-04 PROCEDURE — 3700000001 HC ADD 15 MINUTES (ANESTHESIA): Performed by: ORTHOPAEDIC SURGERY

## 2022-12-04 PROCEDURE — 3700000000 HC ANESTHESIA ATTENDED CARE: Performed by: ORTHOPAEDIC SURGERY

## 2022-12-04 PROCEDURE — 6360000002 HC RX W HCPCS: Performed by: PHYSICIAN ASSISTANT

## 2022-12-04 PROCEDURE — 0HBJXZZ EXCISION OF LEFT UPPER LEG SKIN, EXTERNAL APPROACH: ICD-10-PCS | Performed by: ORTHOPAEDIC SURGERY

## 2022-12-04 PROCEDURE — 01X60Z5 TRANSFER RADIAL NERVE TO MEDIAN NERVE, OPEN APPROACH: ICD-10-PCS | Performed by: ORTHOPAEDIC SURGERY

## 2022-12-04 PROCEDURE — 2700000000 HC OXYGEN THERAPY PER DAY

## 2022-12-04 PROCEDURE — 0KBB0ZZ EXCISION OF LEFT LOWER ARM AND WRIST MUSCLE, OPEN APPROACH: ICD-10-PCS | Performed by: ORTHOPAEDIC SURGERY

## 2022-12-04 PROCEDURE — 3600000013 HC SURGERY LEVEL 3 ADDTL 15MIN: Performed by: ORTHOPAEDIC SURGERY

## 2022-12-04 PROCEDURE — 6360000002 HC RX W HCPCS: Performed by: NURSE ANESTHETIST, CERTIFIED REGISTERED

## 2022-12-04 PROCEDURE — 94640 AIRWAY INHALATION TREATMENT: CPT

## 2022-12-04 PROCEDURE — 2580000003 HC RX 258: Performed by: PHYSICIAN ASSISTANT

## 2022-12-04 PROCEDURE — 36415 COLL VENOUS BLD VENIPUNCTURE: CPT

## 2022-12-04 PROCEDURE — 85025 COMPLETE CBC W/AUTO DIFF WBC: CPT

## 2022-12-04 PROCEDURE — 2709999900 HC NON-CHARGEABLE SUPPLY: Performed by: ORTHOPAEDIC SURGERY

## 2022-12-04 PROCEDURE — 2500000003 HC RX 250 WO HCPCS: Performed by: NURSE ANESTHETIST, CERTIFIED REGISTERED

## 2022-12-04 RX ORDER — ACETAMINOPHEN 325 MG/1
650 TABLET ORAL EVERY 4 HOURS PRN
Status: DISCONTINUED | OUTPATIENT
Start: 2022-12-04 | End: 2022-12-06 | Stop reason: HOSPADM

## 2022-12-04 RX ORDER — LABETALOL HYDROCHLORIDE 5 MG/ML
10 INJECTION, SOLUTION INTRAVENOUS
Status: DISCONTINUED | OUTPATIENT
Start: 2022-12-04 | End: 2022-12-06 | Stop reason: HOSPADM

## 2022-12-04 RX ORDER — ONDANSETRON 2 MG/ML
4 INJECTION INTRAMUSCULAR; INTRAVENOUS
Status: ACTIVE | OUTPATIENT
Start: 2022-12-04 | End: 2022-12-05

## 2022-12-04 RX ORDER — ONDANSETRON 2 MG/ML
INJECTION INTRAMUSCULAR; INTRAVENOUS PRN
Status: DISCONTINUED | OUTPATIENT
Start: 2022-12-04 | End: 2022-12-04 | Stop reason: SDUPTHER

## 2022-12-04 RX ORDER — PROPOFOL 10 MG/ML
INJECTION, EMULSION INTRAVENOUS PRN
Status: DISCONTINUED | OUTPATIENT
Start: 2022-12-04 | End: 2022-12-04 | Stop reason: SDUPTHER

## 2022-12-04 RX ORDER — MIDAZOLAM HYDROCHLORIDE 1 MG/ML
INJECTION INTRAMUSCULAR; INTRAVENOUS PRN
Status: DISCONTINUED | OUTPATIENT
Start: 2022-12-04 | End: 2022-12-04 | Stop reason: SDUPTHER

## 2022-12-04 RX ORDER — LIDOCAINE HYDROCHLORIDE 20 MG/ML
INJECTION, SOLUTION EPIDURAL; INFILTRATION; INTRACAUDAL; PERINEURAL PRN
Status: DISCONTINUED | OUTPATIENT
Start: 2022-12-04 | End: 2022-12-04 | Stop reason: SDUPTHER

## 2022-12-04 RX ORDER — SODIUM CHLORIDE 9 MG/ML
INJECTION, SOLUTION INTRAVENOUS CONTINUOUS PRN
Status: DISCONTINUED | OUTPATIENT
Start: 2022-12-04 | End: 2022-12-04 | Stop reason: SDUPTHER

## 2022-12-04 RX ORDER — HYDRALAZINE HYDROCHLORIDE 20 MG/ML
10 INJECTION INTRAMUSCULAR; INTRAVENOUS
Status: DISCONTINUED | OUTPATIENT
Start: 2022-12-04 | End: 2022-12-06 | Stop reason: HOSPADM

## 2022-12-04 RX ORDER — DEXAMETHASONE SODIUM PHOSPHATE 10 MG/ML
INJECTION, EMULSION INTRAMUSCULAR; INTRAVENOUS PRN
Status: DISCONTINUED | OUTPATIENT
Start: 2022-12-04 | End: 2022-12-04 | Stop reason: SDUPTHER

## 2022-12-04 RX ORDER — SODIUM CHLORIDE 0.9 % (FLUSH) 0.9 %
5-40 SYRINGE (ML) INJECTION PRN
Status: DISCONTINUED | OUTPATIENT
Start: 2022-12-04 | End: 2022-12-06 | Stop reason: HOSPADM

## 2022-12-04 RX ORDER — ROCURONIUM BROMIDE 10 MG/ML
INJECTION, SOLUTION INTRAVENOUS PRN
Status: DISCONTINUED | OUTPATIENT
Start: 2022-12-04 | End: 2022-12-04 | Stop reason: SDUPTHER

## 2022-12-04 RX ORDER — ROPIVACAINE HYDROCHLORIDE 5 MG/ML
INJECTION, SOLUTION EPIDURAL; INFILTRATION; PERINEURAL
Status: DISCONTINUED | OUTPATIENT
Start: 2022-12-04 | End: 2022-12-04 | Stop reason: SDUPTHER

## 2022-12-04 RX ORDER — SODIUM CHLORIDE 0.9 % (FLUSH) 0.9 %
5-40 SYRINGE (ML) INJECTION EVERY 12 HOURS SCHEDULED
Status: DISCONTINUED | OUTPATIENT
Start: 2022-12-04 | End: 2022-12-06 | Stop reason: HOSPADM

## 2022-12-04 RX ORDER — FENTANYL CITRATE 50 UG/ML
INJECTION, SOLUTION INTRAMUSCULAR; INTRAVENOUS PRN
Status: DISCONTINUED | OUTPATIENT
Start: 2022-12-04 | End: 2022-12-04 | Stop reason: SDUPTHER

## 2022-12-04 RX ORDER — SODIUM CHLORIDE 9 MG/ML
INJECTION, SOLUTION INTRAVENOUS PRN
Status: DISCONTINUED | OUTPATIENT
Start: 2022-12-04 | End: 2022-12-06 | Stop reason: HOSPADM

## 2022-12-04 RX ADMIN — ALBUTEROL SULFATE 2 PUFF: 90 AEROSOL, METERED RESPIRATORY (INHALATION) at 22:15

## 2022-12-04 RX ADMIN — VENLAFAXINE HYDROCHLORIDE 150 MG: 150 CAPSULE, EXTENDED RELEASE ORAL at 08:56

## 2022-12-04 RX ADMIN — FENTANYL CITRATE 50 MCG: 50 INJECTION, SOLUTION INTRAMUSCULAR; INTRAVENOUS at 19:23

## 2022-12-04 RX ADMIN — ONDANSETRON 4 MG: 2 INJECTION INTRAMUSCULAR; INTRAVENOUS at 16:29

## 2022-12-04 RX ADMIN — FENTANYL CITRATE 50 MCG: 50 INJECTION, SOLUTION INTRAMUSCULAR; INTRAVENOUS at 16:25

## 2022-12-04 RX ADMIN — FENTANYL CITRATE 50 MCG: 50 INJECTION, SOLUTION INTRAMUSCULAR; INTRAVENOUS at 18:36

## 2022-12-04 RX ADMIN — CEFEPIME 2000 MG: 2 INJECTION, POWDER, FOR SOLUTION INTRAVENOUS at 01:29

## 2022-12-04 RX ADMIN — ALBUTEROL SULFATE 2 PUFF: 90 AEROSOL, METERED RESPIRATORY (INHALATION) at 07:36

## 2022-12-04 RX ADMIN — HYDROCODONE BITARTRATE AND ACETAMINOPHEN 1 TABLET: 5; 325 TABLET ORAL at 03:46

## 2022-12-04 RX ADMIN — FENTANYL CITRATE 50 MCG: 50 INJECTION, SOLUTION INTRAMUSCULAR; INTRAVENOUS at 17:42

## 2022-12-04 RX ADMIN — FENTANYL CITRATE 50 MCG: 50 INJECTION, SOLUTION INTRAMUSCULAR; INTRAVENOUS at 19:46

## 2022-12-04 RX ADMIN — SUGAMMADEX 200 MG: 100 INJECTION, SOLUTION INTRAVENOUS at 20:48

## 2022-12-04 RX ADMIN — SODIUM CHLORIDE: 9 INJECTION, SOLUTION INTRAVENOUS at 16:21

## 2022-12-04 RX ADMIN — LIDOCAINE HYDROCHLORIDE 100 MG: 20 INJECTION, SOLUTION EPIDURAL; INFILTRATION; INTRACAUDAL; PERINEURAL at 16:25

## 2022-12-04 RX ADMIN — DEXAMETHASONE SODIUM PHOSPHATE 8 MG: 10 INJECTION, EMULSION INTRAMUSCULAR; INTRAVENOUS at 16:29

## 2022-12-04 RX ADMIN — ROPIVACAINE HYDROCHLORIDE 30 ML: 5 INJECTION, SOLUTION EPIDURAL; INFILTRATION; PERINEURAL at 21:12

## 2022-12-04 RX ADMIN — PROPOFOL 150 MG: 10 INJECTION, EMULSION INTRAVENOUS at 16:25

## 2022-12-04 RX ADMIN — ROCURONIUM BROMIDE 10 MG: 10 INJECTION, SOLUTION INTRAVENOUS at 18:11

## 2022-12-04 RX ADMIN — SODIUM CHLORIDE: 9 INJECTION, SOLUTION INTRAVENOUS at 20:26

## 2022-12-04 RX ADMIN — CEFEPIME 2000 MG: 2 INJECTION, POWDER, FOR SOLUTION INTRAVENOUS at 13:26

## 2022-12-04 RX ADMIN — ROCURONIUM BROMIDE 10 MG: 10 INJECTION, SOLUTION INTRAVENOUS at 18:36

## 2022-12-04 RX ADMIN — MOMETASONE FUROATE AND FORMOTEROL FUMARATE DIHYDRATE 2 PUFF: 200; 5 AEROSOL RESPIRATORY (INHALATION) at 07:36

## 2022-12-04 RX ADMIN — HYDROCODONE BITARTRATE AND ACETAMINOPHEN 2 TABLET: 5; 325 TABLET ORAL at 08:54

## 2022-12-04 RX ADMIN — FENTANYL CITRATE 50 MCG: 50 INJECTION, SOLUTION INTRAMUSCULAR; INTRAVENOUS at 17:03

## 2022-12-04 RX ADMIN — MIDAZOLAM 2 MG: 1 INJECTION INTRAMUSCULAR; INTRAVENOUS at 16:22

## 2022-12-04 RX ADMIN — MOMETASONE FUROATE AND FORMOTEROL FUMARATE DIHYDRATE 2 PUFF: 200; 5 AEROSOL RESPIRATORY (INHALATION) at 22:15

## 2022-12-04 RX ADMIN — ROCURONIUM BROMIDE 50 MG: 10 INJECTION, SOLUTION INTRAVENOUS at 16:25

## 2022-12-04 ASSESSMENT — PAIN DESCRIPTION - LOCATION
LOCATION: ARM
LOCATION: ARM

## 2022-12-04 ASSESSMENT — PAIN DESCRIPTION - ORIENTATION
ORIENTATION: LEFT
ORIENTATION: LEFT

## 2022-12-04 ASSESSMENT — PAIN SCALES - GENERAL
PAINLEVEL_OUTOF10: 6
PAINLEVEL_OUTOF10: 6
PAINLEVEL_OUTOF10: 8
PAINLEVEL_OUTOF10: 8

## 2022-12-04 ASSESSMENT — PAIN DESCRIPTION - DESCRIPTORS
DESCRIPTORS: THROBBING
DESCRIPTORS: ACHING

## 2022-12-04 ASSESSMENT — PAIN - FUNCTIONAL ASSESSMENT: PAIN_FUNCTIONAL_ASSESSMENT: ACTIVITIES ARE NOT PREVENTED

## 2022-12-04 NOTE — RT PROTOCOL NOTE
RT Inhaler-Nebulizer Bronchodilator Protocol Note    There is a bronchodilator order in the chart from a provider indicating to follow the RT Bronchodilator Protocol and there is an Initiate RT Inhaler-Nebulizer Bronchodilator Protocol order as well (see protocol at bottom of note). CXR Findings:  No results found. The findings from the last RT Protocol Assessment were as follows:   History Pulmonary Disease: Chronic pulmonary disease  Respiratory Pattern: Regular pattern and RR 12-20 bpm  Breath Sounds: Slightly diminished and/or crackles  Cough: Strong, productive  Indication for Bronchodilator Therapy: Decreased or absent breath sounds, On home bronchodilators  Bronchodilator Assessment Score: 5    Aerosolized bronchodilator medication orders have been revised according to the RT Inhaler-Nebulizer Bronchodilator Protocol below. Respiratory Therapist to perform RT Therapy Protocol Assessment initially then follow the protocol. Repeat RT Therapy Protocol Assessment PRN for score 0-3 or on second treatment, BID, and PRN for scores above 3. No Indications - adjust the frequency to every 6 hours PRN wheezing or bronchospasm, if no treatments needed after 48 hours then discontinue using Per Protocol order mode. If indication present, adjust the RT bronchodilator orders based on the Bronchodilator Assessment Score as indicated below. Use Inhaler orders unless patient has one or more of the following: on home nebulizer, not able to hold breath for 10 seconds, is not alert and oriented, cannot activate and use MDI correctly, or respiratory rate 25 breaths per minute or more, then use the equivalent nebulizer order(s) with same Frequency and PRN reasons based on the score. If a patient is on this medication at home then do not decrease Frequency below that used at home.     0-3 - enter or revise RT bronchodilator order(s) to equivalent RT Bronchodilator order with Frequency of every 4 hours PRN for wheezing or increased work of breathing using Per Protocol order mode. 4-6 - enter or revise RT Bronchodilator order(s) to two equivalent RT bronchodilator orders with one order with BID Frequency and one order with Frequency of every 4 hours PRN wheezing or increased work of breathing using Per Protocol order mode. 7-10 - enter or revise RT Bronchodilator order(s) to two equivalent RT bronchodilator orders with one order with TID Frequency and one order with Frequency of every 4 hours PRN wheezing or increased work of breathing using Per Protocol order mode. 11-13 - enter or revise RT Bronchodilator order(s) to one equivalent RT bronchodilator order with QID Frequency and an Albuterol order with Frequency of every 4 hours PRN wheezing or increased work of breathing using Per Protocol order mode. Greater than 13 - enter or revise RT Bronchodilator order(s) to one equivalent RT bronchodilator order with every 4 hours Frequency and an Albuterol order with Frequency of every 2 hours PRN wheezing or increased work of breathing using Per Protocol order mode. RT to enter RT Home Evaluation for COPD & MDI Assessment order using Per Protocol order mode.     Electronically signed by Candi Roche RCP on 12/4/2022 at 7:40 AM

## 2022-12-04 NOTE — PLAN OF CARE
Problem: Discharge Planning  Goal: Discharge to home or other facility with appropriate resources  12/3/2022 2244 by Sangita Goncalves RN  Outcome: Progressing  Flowsheets  Taken 12/3/2022 2244  Discharge to home or other facility with appropriate resources:   Identify barriers to discharge with patient and caregiver   Arrange for needed discharge resources and transportation as appropriate   Identify discharge learning needs (meds, wound care, etc)  Taken 12/3/2022 2100  Discharge to home or other facility with appropriate resources:   Identify barriers to discharge with patient and caregiver   Arrange for needed discharge resources and transportation as appropriate   Identify discharge learning needs (meds, wound care, etc)     Problem: Pain  Goal: Verbalizes/displays adequate comfort level or baseline comfort level  12/3/2022 2244 by Sangita Goncalves RN  Outcome: Progressing  Flowsheets (Taken 12/3/2022 2244)  Verbalizes/displays adequate comfort level or baseline comfort level:   Assess pain using appropriate pain scale   Encourage patient to monitor pain and request assistance   Administer analgesics based on type and severity of pain and evaluate response   Implement non-pharmacological measures as appropriate and evaluate response   Consider cultural and social influences on pain and pain management   Notify Licensed Independent Practitioner if interventions unsuccessful or patient reports new pain     Problem: ABCDS Injury Assessment  Goal: Absence of physical injury  12/3/2022 2244 by Sangita Goncalves RN  Outcome: Progressing  Flowsheets (Taken 12/3/2022 2244)  Absence of Physical Injury: Implement safety measures based on patient assessment     Problem: Cardiovascular - Adult  Goal: Maintains optimal cardiac output and hemodynamic stability  12/3/2022 2244 by Sangita Goncalves RN  Outcome: Progressing  Flowsheets  Taken 12/3/2022 2244  Maintains optimal cardiac output and hemodynamic stability: Monitor blood pressure and heart rate   Monitor urine output and notify Licensed Independent Practitioner for values outside of normal range  Taken 12/3/2022 2100  Maintains optimal cardiac output and hemodynamic stability:   Monitor blood pressure and heart rate   Monitor urine output and notify Licensed Independent Practitioner for values outside of normal range     Problem: Skin/Tissue Integrity - Adult  Goal: Skin integrity remains intact  Recent Flowsheet Documentation  Taken 12/3/2022 2100 by Kristian Almanzar RN  Skin Integrity Remains Intact:   Monitor for areas of redness and/or skin breakdown   Assess vascular access sites hourly     Problem: Skin/Tissue Integrity - Adult  Goal: Incisions, wounds, or drain sites healing without S/S of infection  12/3/2022 2244 by Kristian Almanzar RN  Outcome: Progressing  Flowsheets  Taken 12/3/2022 2244  Incisions, Wounds, or Drain Sites Healing Without Sign and Symptoms of Infection:   ADMISSION and DAILY: Assess and document risk factors for pressure ulcer development   TWICE DAILY: Assess and document skin integrity   TWICE DAILY: Assess and document dressing/incision, wound bed, drain sites and surrounding tissue  Taken 12/3/2022 2100  Incisions, Wounds, or Drain Sites Healing Without Sign and Symptoms of Infection:   ADMISSION and DAILY: Assess and document risk factors for pressure ulcer development   TWICE DAILY: Assess and document skin integrity   TWICE DAILY: Assess and document dressing/incision, wound bed, drain sites and surrounding tissue     Problem: Musculoskeletal - Adult  Goal: Return mobility to safest level of function  12/3/2022 2244 by Kristian Almanzar RN  Outcome: Progressing  Flowsheets  Taken 12/3/2022 2244  Return Mobility to Safest Level of Function:   Assess patient stability and activity tolerance for standing, transferring and ambulating with or without assistive devices   Assist with transfers and ambulation using safe patient handling equipment as needed   Ensure adequate protection for wounds/incisions during mobilization   Obtain physical therapy/occupational therapy consults as needed  Taken 12/3/2022 2100  Return Mobility to Safest Level of Function:   Assess patient stability and activity tolerance for standing, transferring and ambulating with or without assistive devices   Assist with transfers and ambulation using safe patient handling equipment as needed   Ensure adequate protection for wounds/incisions during mobilization   Obtain physical therapy/occupational therapy consults as needed     Problem: Musculoskeletal - Adult  Goal: Return ADL status to a safe level of function  12/3/2022 2244 by Cady Sung RN  Outcome: Progressing  Flowsheets  Taken 12/3/2022 2244  Return ADL Status to a Safe Level of Function: Administer medication as ordered  Taken 12/3/2022 2100  Return ADL Status to a Safe Level of Function: Administer medication as ordered     Problem: Infection - Adult  Goal: Absence of infection at discharge  12/3/2022 2244 by Cady Sung RN  Outcome: Progressing  Flowsheets  Taken 12/3/2022 2244  Absence of infection at discharge:   Assess and monitor for signs and symptoms of infection   Monitor lab/diagnostic results   Monitor all insertion sites i.e., indwelling lines, tubes and drains  Taken 12/3/2022 2100  Absence of infection at discharge:   Assess and monitor for signs and symptoms of infection   Monitor lab/diagnostic results   Monitor all insertion sites i.e., indwelling lines, tubes and drains     Problem: Metabolic/Fluid and Electrolytes - Adult  Goal: Electrolytes maintained within normal limits  12/3/2022 2244 by Cady Sung RN  Outcome: Progressing  Flowsheets  Taken 12/3/2022 2244  Electrolytes maintained within normal limits: Monitor labs and assess patient for signs and symptoms of electrolyte imbalances  Taken 12/3/2022 2100  Electrolytes maintained within normal limits: Monitor labs and assess patient for signs and symptoms of electrolyte imbalances     Problem: Respiratory - Adult  Goal: Achieves optimal ventilation and oxygenation  12/3/2022 2244 by Hayder Goetz RN  Outcome: Progressing  Flowsheets  Taken 12/3/2022 2244  Achieves optimal ventilation and oxygenation:   Assess for changes in respiratory status   Assess for changes in mentation and behavior  Taken 12/3/2022 2100  Achieves optimal ventilation and oxygenation:   Assess for changes in respiratory status   Assess for changes in mentation and behavior     Problem: Gastrointestinal - Adult  Goal: Maintains or returns to baseline bowel function  12/3/2022 2244 by Hayder Goetz RN  Outcome: Progressing  Flowsheets  Taken 12/3/2022 2244  Maintains or returns to baseline bowel function:   Assess bowel function   Encourage oral fluids to ensure adequate hydration   Administer IV fluids as ordered to ensure adequate hydration   Administer ordered medications as needed   Encourage mobilization and activity   Nutrition consult to assist patient with appropriate food choices  Taken 12/3/2022 2100  Maintains or returns to baseline bowel function:   Assess bowel function   Administer IV fluids as ordered to ensure adequate hydration   Encourage oral fluids to ensure adequate hydration   Administer ordered medications as needed   Encourage mobilization and activity   Nutrition consult to assist patient with appropriate food choices     Problem: Genitourinary - Adult  Goal: Absence of urinary retention  12/3/2022 2244 by Hayder Goetz RN  Outcome: Progressing  Flowsheets  Taken 12/3/2022 2244  Absence of urinary retention:   Assess patients ability to void and empty bladder   Monitor intake/output and perform bladder scan as needed  Taken 12/3/2022 2100  Absence of urinary retention:   Assess patients ability to void and empty bladder   Monitor intake/output and perform bladder scan as needed     Problem: Hematologic - Adult  Goal: Maintains hematologic stability  12/3/2022 2244 by Mildred Closs, RN  Outcome: Progressing  Flowsheets  Taken 12/3/2022 2244  Maintains hematologic stability:   Assess for signs and symptoms of bleeding or hemorrhage   Monitor labs for bleeding or clotting disorders  Taken 12/3/2022 2100  Maintains hematologic stability:   Assess for signs and symptoms of bleeding or hemorrhage   Monitor labs for bleeding or clotting disorders     Problem: Safety - Adult  Goal: Free from fall injury  12/3/2022 2244 by Mildred Closs, RN  Outcome: Progressing  Flowsheets (Taken 12/3/2022 2244)  Free From Fall Injury: Instruct family/caregiver on patient safety     Problem: Nutrition Deficit:  Goal: Optimize nutritional status  12/3/2022 2244 by Mildred Closs, RN  Outcome: Progressing  Flowsheets (Taken 12/3/2022 2244)  Nutrient intake appropriate for improving, restoring, or maintaining nutritional needs:   Assess nutritional status and recommend course of action   Monitor oral intake, labs, and treatment plans   Care plan reviewed with patient. Patient verbalize understanding of the plan of care and contribute to goal setting.

## 2022-12-04 NOTE — PLAN OF CARE
Problem: Respiratory - Adult  Goal: Clear lung sounds  Outcome: Progressing  Note: Maintenance  Patient mutually agreed on goals.     Goal: Achieves optimal ventilation and oxygenation  12/3/2022 2244 by Katie Pacheco RN  Outcome: Progressing  Flowsheets  Taken 12/3/2022 2244  Achieves optimal ventilation and oxygenation:   Assess for changes in respiratory status   Assess for changes in mentation and behavior  Taken 12/3/2022 2100  Achieves optimal ventilation and oxygenation:   Assess for changes in respiratory status   Assess for changes in mentation and behavior

## 2022-12-04 NOTE — CONSULTS
Hospitalist Consult Note        Patient:  Bradley Farfan  YOB: 1970  Date of Service: 12/3/2022  MRN: [id number]   Acct:  [de-identified]   Primary Care Physician: Amador Alex MD    Chief Complaint:  dog bite  Reason for consult  hypokalemia    Date of Service: Pt seen/examined in consultation on 12/3/2022     History Of Present Illness:      Re[age] D[age] y.o. female who we are asked to see/evaluate by Deena Lewis DO for medical management of hypokalemia. Patient has been hospitalized since 11/28/2022 following a dog bite on her left arm. The patient is POD 4 left forearm I&D with plans for return to OR for irrigation and debridement flap closure vs. Wound VAC exchange. The patient was found to have hypokalemia today. Patient is doing well. She has had no ectopy or palpitations. Patient does not taken any diuretics or have any cardiac history.       Assessment and Plan:-  Dog bite left arm:  Primary to manage  Hypokalemia  Potassium replacement protocol  Recheck in BMP with mag in AM  COPD  Continue home inhalers  Continue Albuterol prn - does not appear patient has been using a great deal of albuterol which could contribute to hypokalemia as well      Past Medical History:        Diagnosis Date    Acid reflux     Anxiety     Asthma     Breast cancer (HCC)     COPD with asthma ([facility] [address]     Depression     Depression     Dizziness - light-headed     HX OF:    Emphysema     History of chest pain     History of therapeutic radiation     History of tinnitus     Hx antineoplastic chemo     Hx of blood clots     Joint pain     Numbness and tingling     Osteoarthritis     PONV (postoperative nausea and vomiting)     SOB (shortness of breath) on exertion        Past Surgical History:        Procedure Laterality Date    ARM SURGERY Left 11/29/2022    LEFT FOREARM INCISION AND DRAINAGE performed by Jefe Smith MD at [address] [place] [address] Pkwy Left 12/1/2022    LEFT ARM WASHOUT AND DEBRIDEMENT WITH WOUND VAC PLACEMENT performed by Azar Gama DO at [address]      BREAST BIOPSY  10/06/2017    [address] [facility]  2011    NO EVIDENCE OF PULMONARY HTN,NO EVIDENCE OF DD,NORMAL LVF    CARDIOVASCULAR STRESS TEST  2011    EF 60% MILD INFERIOR WALL REVERSIBLE STRESS-INDUCED ISCHEMIA      SECTION  1992    CHOLECYSTECTOMY  2015    CYST REMOVAL Right 2015    rt breast [facility]-benign breast cyst     ESOPHAGUS SURGERY  2014    HYSTERECTOMY (CERVIX STATUS UNKNOWN)  1999    INSERTION / REMOVAL / REPLACEMENT VENOUS ACCESS CATHETER Right 2017    RIGHT SIDE SINGLE LUMEN POWERPORT INSERTION performed by Kirby Yost MD at [address] [place] Left 2017    LEFT MODIFIED RADICAL MASTECTOMY performed by Kirby Yost MD at [address]    TUNNELED VENOUS PORT PLACEMENT Right 2017    Single [place]uman Smart Port Insertion- Right side. Home Medications:   No current facility-administered medications on file prior to encounter.      Current Outpatient Medications on File Prior to Encounter   Medication Sig Dispense Refill    letrozole (FEMARA) 2.5 MG tablet Take 1 tablet by mouth every other day 30 tablet 3    diclofenac (VOLTAREN) 75 MG EC tablet Take 75 mg by mouth 2 times daily (Patient not taking: Reported on 2022)      amoxicillin-clavulanate (AUGMENTIN) 875-125 MG per tablet Take 1 tablet by mouth 2 times daily (Patient not taking: Reported on 2022)      DULERA 200-5 MCG/ACT inhaler Inhale 2 puffs into the lungs 2 times daily 13 g 5    albuterol sulfate  (90 Base) MCG/ACT inhaler Inhale 1 puff into the lungs every 6 hours as needed for Wheezing or Shortness of Breath 1 Inhaler 5    NONFORMULARY 2 tablets daily Tumeric      b complex vitamins capsule Take 1 capsule by mouth daily      albuterol (PROVENTIL) (2.5 MG/3ML) 0.083% nebulizer solution Inhale 2.5 mg into the lungs every 4 hours as needed       butalbital-apap-caffeine -40 MG CAPS Take by mouth      ALPRAZolam (XANAX) 0.5 MG tablet Take 0.5 mg by mouth nightly as needed       cyclobenzaprine (FLEXERIL) 5 MG tablet Take 5 mg by mouth 3 times daily as needed       venlafaxine 150 MG extended release tablet Take 150 mg by mouth daily         Allergies:    Patient has no known allergies. Social History:    reports that she has been smoking cigarettes. She started smoking about 23 years ago. She has a 23.00 pack-year smoking history. She has never used smokeless tobacco. She reports current alcohol use. She reports that she does not use drugs. Family History:       Problem Relation Age of Onset    Heart Disease Mother     Hypertension Mother     Diabetes Mother     Heart Disease Father     No Known Problems Sister     No Known Problems Brother     Breast Cancer Maternal Aunt     Breast Cancer Paternal Aunt     Endometrial Cancer Paternal Aunt     Lung Cancer Paternal Aunt        Diet:  ADULT DIET; Regular  Diet NPO    Review of systems:   Pertinent positives as noted in the HPI. All other systems reviewed and negative. PHYSICAL EXAM:  BP (!) 116/56   Pulse 88   Temp 99 °F (37.2 °C) (Oral)   Resp 18   Ht 5' 8\" (1.727 m)   Wt 170 lb (77.1 kg)   SpO2 98%   BMI 25.85 kg/m²   General appearance: No apparent distress, appears stated age and cooperative. HEENT: Normal cephalic, atraumatic without obvious deformity. Pupils equal, round, and reactive to light. Extra ocular muscles intact. Conjunctivae/corneas clear. Neck: Supple, with full range of motion. No jugular venous distention. Trachea midline. Respiratory:  Normal respiratory effort. Clear to auscultation, bilaterally without Rales/Wheezes/Rhonchi. Cardiovascular: Regular rate and rhythm with normal S1/S2 without murmurs, rubs or gallops. Abdomen: Soft, non-tender, non-distended with normal bowel sounds.   Musculoskeletal:  No clubbing, cyanosis or edema bilaterally. Skin: Skin color, texture, turgor normal.  No rashes or lesions. Neurologic:  Neurovascularly intact without any focal sensory/motor deficits. Cranial nerves: II-XII intact, grossly non-focal.  Psychiatric: Alert and oriented, thought content appropriate, normal insight  Capillary Refill: Brisk,< 3 seconds   Peripheral Pulses: +2 palpable, equal bilaterally     Labs:   Recent Labs     12/03/22  1446   WBC 5.0   HGB 9.4*   HCT 28.9*        Recent Labs     12/03/22  1446      K 3.2*      CO2 26   BUN 6*   CREATININE 0.6   CALCIUM 8.4*     No results for input(s): AST, ALT, BILIDIR, BILITOT, ALKPHOS in the last 72 hours. No results for input(s): INR in the last 72 hours. No results for input(s): Shanon Earnest in the last 72 hours. Urinalysis:    No results found for: Marisa Divine, BACTERIA, RBCUA, BLOODU, SPECGRAV, [address]    Radiology:   XR HAND RIGHT (MIN 3 VIEWS)   Final Result   No acute osseous injury. No radiopaque foreign body. This document has been electronically signed by: Leonid Watson MD on    11/28/2022 10:22 PM      XR RADIUS ULNA LEFT (2 VIEWS)   Final Result   Significant soft tissue lacerations, irregularity, swelling, and soft    tissue gas. No foreign body. This document has been electronically signed by: Billy Al MD on 11/28/2022 10:11 PM        XR RADIUS ULNA LEFT (2 VIEWS)    Result Date: 11/28/2022  2 view left forearm Comparison: None Findings: No fractures or dislocations. No joint effusion. No significant arthritic change. No radiopaque foreign body. Significant soft tissue lacerations, irregularity, swelling, and soft tissue gas. No foreign body. This document has been electronically signed by: Billy Al MD on 11/28/2022 10:11 PM    XR HAND RIGHT (MIN 3 VIEWS)    Result Date: 11/28/2022  3 view right hand Comparison: None Findings: No acute fracture or dislocation.  Osteoarthritic changes in several interphalangeal joints. No erosions. No radiopaque foreign body. No acute osseous injury. No radiopaque foreign body. This document has been electronically signed by: Michelle Flores MD on 11/28/2022 10:22 PM        EKG:  pending this encounter      Christian Nunez. HOSPITALIST WILL FOLLOW PRN. RECONSULT FOR ANY ACUTE ISSUES.     Electronically signed by Karina Ramirez PA-C on 12/3/2022 at 8:01 PM

## 2022-12-04 NOTE — ANESTHESIA PRE PROCEDURE
Department of Anesthesiology  Preprocedure Note       Name:  Eryn Green   Age:  45 y.o.  :  1970                                          MRN:  [id number]         Date:  2022      Surgeon: Awais Carmen):  Wandy Lopez DO    Procedure: Procedure(s):  LEFT HAND DEBRIDEMENT INCISION AND DRAINAGE flap closure vs wound vac exchange    Medications prior to admission:   Prior to Admission medications    Medication Sig Start Date End Date Taking?  Authorizing Provider   letrozole [facility]) 2.5 MG tablet Take 1 tablet by mouth every other day 22   Gayathri Padilla PA-C   diclofenac (VOLTAREN) 75 MG EC tablet Take 75 mg by mouth 2 times daily  Patient not taking: Reported on 2022    Historical Provider, MD   amoxicillin-clavulanate (AUGMENTIN) 875-125 MG per tablet Take 1 tablet by mouth 2 times daily  Patient not taking: Reported on 2022   Historical Provider, MD Jose Guadalupe Lee 200-5 MCG/ACT inhaler Inhale 2 puffs into the lungs 2 times daily 20   IRON Dorsey CNP   albuterol sulfate  (90 Base) MCG/ACT inhaler Inhale 1 puff into the lungs every 6 hours as needed for Wheezing or Shortness of Breath 19   IRON Dorsey CNP   NONFORMULARY 2 tablets daily Tumeric    Historical Provider, MD   b complex vitamins capsule Take 1 capsule by mouth daily    Historical Provider, MD   albuterol (PROVENTIL) (2.5 MG/3ML) 0.083% nebulizer solution Inhale 2.5 mg into the lungs every 4 hours as needed  10/17/17   Historical Provider, MD   butalbital-apap-caffeine -40 MG CAPS Take by mouth 10/17/17   Historical Provider, MD   ALPRAZolam [facility]) 0.5 MG tablet Take 0.5 mg by mouth nightly as needed  10/17/17   Historical Provider, MD   cyclobenzaprine (FLEXERIL) 5 MG tablet Take 5 mg by mouth 3 times daily as needed  10/17/17   Historical Provider, MD   venlafaxine 150 MG extended release tablet Take 150 mg by mouth daily 10/17/17   Historical Provider, MD Current medications:    No current facility-administered medications for this visit. No current outpatient medications on file.      Facility-Administered Medications Ordered in Other Visits   Medication Dose Route Frequency Provider Last Rate Last Admin    acetaminophen (TYLENOL) tablet 650 mg  650 mg Oral Q4H PRN Eros Snyder PA-C        cefepime (MAXIPIME) 2000 mg IVPB minibag  2,000 mg IntraVENous Q12H MAURISIO Sharif-C   Stopped at 12/04/22 1440    potassium chloride (KLOR-CON M) extended release tablet 40 mEq  40 mEq Oral PRN Magalene Genta, PA-C   40 mEq at 12/03/22 2102    Or    potassium bicarb-citric acid (EFFER-K) effervescent tablet 40 mEq  40 mEq Oral PRN Magalene Claudio PA-C        Or    potassium chloride 10 mEq/100 mL IVPB (Peripheral Line)  10 mEq IntraVENous PRN Magalene Claudio, PA-C        magnesium sulfate 2000 mg in 50 mL IVPB premix  2,000 mg IntraVENous PRN Magalenasir Snyder PA-C        venlafaxine (EFFEXOR XR) extended release capsule 150 mg  150 mg Oral Daily CALIN SharifC   150 mg at 12/04/22 0856    albuterol sulfate HFA (PROVENTIL;VENTOLIN;PROAIR) 108 (90 Base) MCG/ACT inhaler 2 puff  2 puff Inhalation BID MAURISIO Sharif-C   2 puff at 12/04/22 0736    cyclobenzaprine (FLEXERIL) tablet 10 mg  10 mg Oral TID PRN MAURISIO Sharif-C   10 mg at 12/03/22 1400    ondansetron (ZOFRAN) injection 4 mg  4 mg IntraVENous Q6H PRN Deandre Michaels PA-C        HYDROcodone-acetaminophen (NORCO) 5-325 MG per tablet 1 tablet  1 tablet Oral Q4H PRN MAURISIO Sharif-C   1 tablet at 12/04/22 0346    Or    HYDROcodone-acetaminophen (NORCO) 5-325 MG per tablet 2 tablet  2 tablet Oral Q4H PRN Deandre Michaels PA-C   2 tablet at 12/04/22 0854    morphine (PF) injection 2 mg  2 mg IntraVENous Q2H PRN MAURISIO Sharif-C        Or    morphine injection 4 mg  4 mg IntraVENous Q2H PRN Deandre Michaels PA-C   4 mg at 12/02/22 8929    albuterol (PROVENTIL) nebulizer solution 2.5 mg  2.5 mg Nebulization Q4H PRN Deandre Michaels PA-C        mometasone-formoterol (DULERA) 200-5 MCG/ACT inhaler 2 puff  2 puff Inhalation BID Deandre Michaels PA-C   2 puff at 12/04/22 0736    letrozole [facility]) tablet 2.5 mg  2.5 mg Oral Every Other Day Deandre Michaels PA-C   2.5 mg at 12/03/22 0756       Allergies:  No Known Allergies    Problem List:    Patient Active Problem List   Diagnosis Code    Asthma J45.909    COPD with asthma ([facility] J44.9    Osteoarthritis M19.90    History of chest pain Z87.898    Pulmonary emphysema ([facility] J43.9    Acid reflux K21.9    History of tinnitus Z86.69    Depression F32. A    Anxiety F41.9    Joint pain M25.50    Numbness and tingling R20.0, R20.2    Light headed R42    Breast cancer metastasized to axillary lymph node, left (HCC) C50.912, C77.3    Malignant neoplasm of left female breast (HCC) C50.912    Chronic obstructive pulmonary disease with acute exacerbation (HCC) J44.1    Elevated lactic acid level R79.89    Nausea R11.0    Pneumonia of both lungs due to infectious organism J18.9    COPD exacerbation (HCC) J44.1    Diarrhea of presumed infectious origin R19.7    Sepsis due to pneumonia ([facility] J18.9, A41.9    Breast lump N63.0    Dyspnea R06.00    Epigastric pain R10.13    Gastritis due to Helicobacter species R17.03, B96.81    Headache R51.9    Intestinal infectious disease A09    Low back pain M54.50    Malaise and fatigue R53.81, R53.83    Microscopic hematuria R31.29    Polyuria R35.89    Solitary pulmonary nodule R91.1    Urinary incontinence R32    Dog bite of arm, left, initial encounter S41.152A, W54. 0XXA    Dog bite of forearm, left, initial encounter S51.852A, W54. 0XXA    Hypokalemia E87.6       Past Medical History:        Diagnosis Date    Acid reflux     Anxiety     Asthma     Breast cancer ([facility]     COPD with asthma ([address]     Depression     Depression     Dizziness - light-headed     HX OF:    Emphysema  History of chest pain     History of therapeutic radiation     History of tinnitus     Hx antineoplastic chemo     Hx of blood clots     Joint pain     Numbness and tingling     Osteoarthritis     PONV (postoperative nausea and vomiting)     SOB (shortness of breath) on exertion        Past Surgical History:        Procedure Laterality Date    ARM SURGERY Left 2022    LEFT FOREARM INCISION [facility] D[facility] [address] [facility]    LEFT ARM WASHOUT AND DEBRIDEMENT WITH WOUND VAC PLACEMENT performed by Jeovanny Duron DO at [address] [facility]      BREAST BIOPSY  10/06/2017    [address] [place] [address]  2011    NO EVIDENCE OF PULMONARY HTN,NO EVIDENCE OF DD,NORMAL LVF    CARDIOVASCULAR STRESS TEST  2011    EF 60% MILD INFERIOR WALL REVERSIBLE STRESS-INDUCED ISCHEMIA      SECTION      CHOLECYSTECTOMY  2015    CYST REMOVAL Right     rt breast [facility]-benign breast cyst     ESOPHAGUS SURGERY      HYSTERECTOMY (CERVIX STATUS UNKNOWN)      INSERTION / REMOVAL / REPLACEMENT VENOUS ACCESS CATHETER Right 2017    RIGHT SIDE SINGLE LUMEN POWERPORT INSERTION performed by Tani Singletary MD at [address] Left 2017    LEFT MODIFIED RADICAL MASTECTOMY performed by Tani Singletary MD at [address] TUNNELED VENOUS PORT PLACEMENT Right 2017    Single Luman Smart Port Insertion- Right side.         Social History:    Social History     Tobacco Use    Smoking status: Every Day     Packs/day: 1.00     Years: 23.00     Pack years: 23.00     Types: Cigarettes     Start date: 1999    Smokeless tobacco: Never    Tobacco comments:     Just received smoking cessation information from pcp per patient 22   Substance Use Topics    Alcohol use: Yes     Comment: rarely                                Ready to quit: Not Answered  Counseling given: Not Answered  Tobacco comments: Just received smoking cessation information from pcp per patient 9/2/22      Vital Signs (Current): There were no vitals filed for this visit. BP Readings from Last 3 Encounters:   12/04/22 114/62   09/02/22 114/64   05/13/22 128/68       NPO Status:                                                                                 BMI:   Wt Readings from Last 3 Encounters:   11/28/22 170 lb (77.1 kg)   09/02/22 173 lb (78.5 kg)   01/27/22 172 lb (78 kg)     There is no height or weight on file to calculate BMI.    CBC:   Lab Results   Component Value Date/Time    WBC 4.4 12/04/2022 06:30 AM    RBC 2.75 12/04/2022 06:30 AM    HGB 9.0 12/04/2022 06:30 AM    HCT 27.6 12/04/2022 06:30 AM    .4 12/04/2022 06:30 AM    RDW 15.0 04/12/2019 11:35 AM     12/04/2022 06:30 AM       CMP:   Lab Results   Component Value Date/Time     12/04/2022 06:30 AM    K 3.8 12/04/2022 06:30 AM     12/04/2022 06:30 AM    CO2 27 12/04/2022 06:30 AM    BUN 9 12/04/2022 06:30 AM    CREATININE 0.6 12/04/2022 06:30 AM    LABGLOM >60 12/04/2022 06:30 AM    GLUCOSE 88 12/04/2022 06:30 AM    GLUCOSE 97 07/21/2017 06:20 AM    PROT 5.9 01/27/2022 05:58 PM    CALCIUM 8.6 12/04/2022 06:30 AM    BILITOT <0.2 01/27/2022 05:58 PM    ALKPHOS 66 01/27/2022 05:58 PM    AST 17 01/27/2022 05:58 PM    ALT 17 01/27/2022 05:58 PM       POC Tests: No results for input(s): POCGLU, POCNA, POCK, POCCL, POCBUN, POCHEMO, POCHCT in the last 72 hours.     Coags:   Lab Results   Component Value Date/Time    INR 0.98 04/05/2021 07:39 AM       HCG (If Applicable):   Lab Results   Component Value Date    PREGSERUM NEGATIVE 03/02/2019        ABGs: No results found for: PHART, PO2ART, OOZ4ZUQ, ZXI3WDM, BEART, Z8WRTHKN     Type & Screen (If Applicable):  Lab Results   Component Value Date    LABRH POS 11/28/2022       Drug/Infectious Status (If Applicable):  No results found for: HIV, HEPCAB    COVID-19 Screening (If Applicable):   Lab Results   Component Value Date/Time    COVID19 NOT DETECTED 01/27/2022 07:40 PM    COVID19 NOT  DETECTED 01/27/2022 05:15 PM           Anesthesia Evaluation    Airway: Mallampati: II  TM distance: >3 FB   Neck ROM: full  Mouth opening: > = 3 FB   Dental:          Pulmonary:   (+) COPD:  decreased breath sounds asthma:                            Cardiovascular:            Rhythm: regular                      Neuro/Psych:   (+) psychiatric history:            GI/Hepatic/Renal:   (+) GERD:,           Endo/Other:    (+) : arthritis: OA., .                 Abdominal:             Vascular: Other Findings:             Anesthesia Plan      general     ASA 3       Induction: intravenous. Anesthetic plan and risks discussed with patient. Plan discussed with CRNA.                     Iveth Lemon MD   12/4/2022

## 2022-12-04 NOTE — PROGRESS NOTES
Subjective:   Pain controlled. Numbness remains to the dorsal hand. Paresthesias improving to the median and ulnar nerve distributions with intermittent tingling throughout the arm distal to the elbow. Afebrile. Hb 9.0, WBC 4.4. Objective:   /62   Pulse 74   Temp 98.8 °F (37.1 °C) (Oral)   Resp 16   Ht 5' 8\" (1.727 m)   Wt 170 lb (77.1 kg)   SpO2 97%   BMI 25.85 kg/m²     Recent Labs     12/03/22  1446 12/04/22  0630   WBC 5.0 4.4*   HGB 9.4* 9.0*   HCT 28.9* 27.6*    192       Current Facility-Administered Medications: acetaminophen (TYLENOL) tablet 650 mg, 650 mg, Oral, Q4H PRN  cefepime (MAXIPIME) 2000 mg IVPB minibag, 2,000 mg, IntraVENous, Q12H  potassium chloride (KLOR-CON M) extended release tablet 40 mEq, 40 mEq, Oral, PRN **OR** potassium bicarb-citric acid (EFFER-K) effervescent tablet 40 mEq, 40 mEq, Oral, PRN **OR** potassium chloride 10 mEq/100 mL IVPB (Peripheral Line), 10 mEq, IntraVENous, PRN  magnesium sulfate 2000 mg in 50 mL IVPB premix, 2,000 mg, IntraVENous, PRN  venlafaxine (EFFEXOR XR) extended release capsule 150 mg, 150 mg, Oral, Daily  albuterol sulfate HFA (PROVENTIL;VENTOLIN;PROAIR) 108 (90 Base) MCG/ACT inhaler 2 puff, 2 puff, Inhalation, BID  cyclobenzaprine (FLEXERIL) tablet 10 mg, 10 mg, Oral, TID PRN  ondansetron (ZOFRAN) injection 4 mg, 4 mg, IntraVENous, Q6H PRN  HYDROcodone-acetaminophen (NORCO) 5-325 MG per tablet 1 tablet, 1 tablet, Oral, Q4H PRN **OR** HYDROcodone-acetaminophen (NORCO) 5-325 MG per tablet 2 tablet, 2 tablet, Oral, Q4H PRN  morphine (PF) injection 2 mg, 2 mg, IntraVENous, Q2H PRN **OR** morphine injection 4 mg, 4 mg, IntraVENous, Q2H PRN  albuterol (PROVENTIL) nebulizer solution 2.5 mg, 2.5 mg, Nebulization, Q4H PRN  mometasone-formoterol (DULERA) 200-5 MCG/ACT inhaler 2 puff, 2 puff, Inhalation, BID  letrozole (FEMARA) tablet 2.5 mg, 2.5 mg, Oral, Every Other Day      Exam:  Gen: NAD  LUE: Dressing and Wound VAC in place, CDI.   Slight finger flexion noted. There is again very limited finger extension ith no extension at the MP joints. Questionable EPL function intact. .  No active wrist extension. Paresthesias continue to improve in the median and ulnar nerve distribution with intermittent tingling. Numbness continues in the dorsal radial sensory nerve distribution. Hand well-perfused. Assessment:  45 y.o. female POD 5 s/p left forearm I&D following a dog bite to the left upper forearm with Dr Jayson Jain. POD 3 s/p repeat I&D and extensive debridement as well as wound vac placement with Dr Jovita iKng. Plan:  Pain control  Activity: NWB LUE  Antibiotics changed from Unasyn to Cefepime after cultures confirmed Aeromonas hydrophila/caviae  Plan as discussed with Dr. Jovita King  -Patient NPO, marked and consented, RBA's to surgery discussed  -To OR for left forearm irrigation and debridement with possible flap closure versus wound vac exchange and possible nerve transfer, possible skin graft  DVT ppx: Ambulation  DC planning: Plans for discharge to home when deemed appropriate    Electronically signed by Gilmer Huston PA-C on 12/4/2022 at 4:09 PM       Agree with above. Discussed surgery and the flap. If wound healthy will proceed with definitve coverage today. Discussed nerve deficits and nerve recovery. Discussed likelihood of needing further surgery. Possible DC home after 24 hours PO abx.     Electronically signed by Azar Gama DO on 12/4/2022 at 4:31 PM

## 2022-12-05 LAB
ANION GAP SERPL CALCULATED.3IONS-SCNC: 13 MEQ/L (ref 8–16)
BASOPHILS # BLD: 0.1 %
BASOPHILS ABSOLUTE: 0 THOU/MM3 (ref 0–0.1)
BUN BLDV-MCNC: 13 MG/DL (ref 7–22)
CALCIUM SERPL-MCNC: 9 MG/DL (ref 8.5–10.5)
CHLORIDE BLD-SCNC: 102 MEQ/L (ref 98–111)
CO2: 24 MEQ/L (ref 23–33)
CREAT SERPL-MCNC: 0.5 MG/DL (ref 0.4–1.2)
EOSINOPHIL # BLD: 0 %
EOSINOPHILS ABSOLUTE: 0 THOU/MM3 (ref 0–0.4)
ERYTHROCYTE [DISTWIDTH] IN BLOOD BY AUTOMATED COUNT: 12.6 % (ref 11.5–14.5)
ERYTHROCYTE [DISTWIDTH] IN BLOOD BY AUTOMATED COUNT: 49.3 FL (ref 35–45)
GFR SERPL CREATININE-BSD FRML MDRD: > 60 ML/MIN/1.73M2
GLUCOSE BLD-MCNC: 123 MG/DL (ref 70–108)
HCT VFR BLD CALC: 30.9 % (ref 37–47)
HEMOGLOBIN: 9.6 GM/DL (ref 12–16)
IMMATURE GRANS (ABS): 0.05 THOU/MM3 (ref 0–0.07)
IMMATURE GRANULOCYTES: 0.7 %
LYMPHOCYTES # BLD: 8.3 %
LYMPHOCYTES ABSOLUTE: 0.6 THOU/MM3 (ref 1–4.8)
MCH RBC QN AUTO: 33 PG (ref 26–33)
MCHC RBC AUTO-ENTMCNC: 31.1 GM/DL (ref 32.2–35.5)
MCV RBC AUTO: 106.2 FL (ref 81–99)
MONOCYTES # BLD: 7.4 %
MONOCYTES ABSOLUTE: 0.5 THOU/MM3 (ref 0.4–1.3)
NUCLEATED RED BLOOD CELLS: 0 /100 WBC
PLATELET # BLD: 220 THOU/MM3 (ref 130–400)
PMV BLD AUTO: 10.1 FL (ref 9.4–12.4)
POTASSIUM REFLEX MAGNESIUM: 4.6 MEQ/L (ref 3.5–5.2)
RBC # BLD: 2.91 MILL/MM3 (ref 4.2–5.4)
SEG NEUTROPHILS: 83.5 %
SEGMENTED NEUTROPHILS ABSOLUTE COUNT: 5.7 THOU/MM3 (ref 1.8–7.7)
SODIUM BLD-SCNC: 139 MEQ/L (ref 135–145)
WBC # BLD: 6.8 THOU/MM3 (ref 4.8–10.8)

## 2022-12-05 PROCEDURE — 94640 AIRWAY INHALATION TREATMENT: CPT

## 2022-12-05 PROCEDURE — 2580000003 HC RX 258: Performed by: PHYSICIAN ASSISTANT

## 2022-12-05 PROCEDURE — 6370000000 HC RX 637 (ALT 250 FOR IP): Performed by: PHYSICIAN ASSISTANT

## 2022-12-05 PROCEDURE — 6360000002 HC RX W HCPCS: Performed by: PHYSICIAN ASSISTANT

## 2022-12-05 PROCEDURE — 2580000003 HC RX 258: Performed by: ANESTHESIOLOGY

## 2022-12-05 PROCEDURE — 85025 COMPLETE CBC W/AUTO DIFF WBC: CPT

## 2022-12-05 PROCEDURE — 80048 BASIC METABOLIC PNL TOTAL CA: CPT

## 2022-12-05 PROCEDURE — 2700000000 HC OXYGEN THERAPY PER DAY

## 2022-12-05 PROCEDURE — 94760 N-INVAS EAR/PLS OXIMETRY 1: CPT

## 2022-12-05 PROCEDURE — 36415 COLL VENOUS BLD VENIPUNCTURE: CPT

## 2022-12-05 PROCEDURE — 1200000003 HC TELEMETRY R&B

## 2022-12-05 RX ORDER — SENNA PLUS 8.6 MG/1
1 TABLET ORAL 2 TIMES DAILY
Status: DISCONTINUED | OUTPATIENT
Start: 2022-12-05 | End: 2022-12-06 | Stop reason: HOSPADM

## 2022-12-05 RX ORDER — POLYETHYLENE GLYCOL 3350 17 G/17G
17 POWDER, FOR SOLUTION ORAL DAILY
Status: DISCONTINUED | OUTPATIENT
Start: 2022-12-05 | End: 2022-12-06 | Stop reason: HOSPADM

## 2022-12-05 RX ORDER — DOCUSATE SODIUM 100 MG/1
100 CAPSULE, LIQUID FILLED ORAL 2 TIMES DAILY
Status: DISCONTINUED | OUTPATIENT
Start: 2022-12-05 | End: 2022-12-06 | Stop reason: HOSPADM

## 2022-12-05 RX ADMIN — MOMETASONE FUROATE AND FORMOTEROL FUMARATE DIHYDRATE 2 PUFF: 200; 5 AEROSOL RESPIRATORY (INHALATION) at 17:16

## 2022-12-05 RX ADMIN — SODIUM CHLORIDE, PRESERVATIVE FREE 10 ML: 5 INJECTION INTRAVENOUS at 08:39

## 2022-12-05 RX ADMIN — HYDROCODONE BITARTRATE AND ACETAMINOPHEN 2 TABLET: 5; 325 TABLET ORAL at 17:47

## 2022-12-05 RX ADMIN — ALBUTEROL SULFATE 2 PUFF: 90 AEROSOL, METERED RESPIRATORY (INHALATION) at 07:10

## 2022-12-05 RX ADMIN — CEFEPIME 2000 MG: 2 INJECTION, POWDER, FOR SOLUTION INTRAVENOUS at 21:24

## 2022-12-05 RX ADMIN — MORPHINE SULFATE 4 MG: 4 INJECTION, SOLUTION INTRAMUSCULAR; INTRAVENOUS at 21:35

## 2022-12-05 RX ADMIN — SODIUM CHLORIDE, PRESERVATIVE FREE 10 ML: 5 INJECTION INTRAVENOUS at 21:24

## 2022-12-05 RX ADMIN — POLYETHYLENE GLYCOL 3350 17 G: 17 POWDER, FOR SOLUTION ORAL at 18:38

## 2022-12-05 RX ADMIN — CEFEPIME 2000 MG: 2 INJECTION, POWDER, FOR SOLUTION INTRAVENOUS at 01:05

## 2022-12-05 RX ADMIN — HYDROCODONE BITARTRATE AND ACETAMINOPHEN 2 TABLET: 5; 325 TABLET ORAL at 13:19

## 2022-12-05 RX ADMIN — ALBUTEROL SULFATE 2 PUFF: 90 AEROSOL, METERED RESPIRATORY (INHALATION) at 17:16

## 2022-12-05 RX ADMIN — VENLAFAXINE HYDROCHLORIDE 150 MG: 150 CAPSULE, EXTENDED RELEASE ORAL at 08:39

## 2022-12-05 RX ADMIN — CEFEPIME 2000 MG: 2 INJECTION, POWDER, FOR SOLUTION INTRAVENOUS at 13:23

## 2022-12-05 RX ADMIN — HYDROCODONE BITARTRATE AND ACETAMINOPHEN 2 TABLET: 5; 325 TABLET ORAL at 23:29

## 2022-12-05 RX ADMIN — SENNOSIDES 8.6 MG: 8.6 TABLET, FILM COATED ORAL at 21:24

## 2022-12-05 RX ADMIN — DOCUSATE SODIUM 100 MG: 100 CAPSULE, LIQUID FILLED ORAL at 21:24

## 2022-12-05 RX ADMIN — LETROZOLE 2.5 MG: 2.5 TABLET ORAL at 08:38

## 2022-12-05 RX ADMIN — MOMETASONE FUROATE AND FORMOTEROL FUMARATE DIHYDRATE 2 PUFF: 200; 5 AEROSOL RESPIRATORY (INHALATION) at 07:12

## 2022-12-05 ASSESSMENT — PAIN DESCRIPTION - DESCRIPTORS
DESCRIPTORS: ACHING

## 2022-12-05 ASSESSMENT — PAIN DESCRIPTION - LOCATION
LOCATION: ARM

## 2022-12-05 ASSESSMENT — PAIN DESCRIPTION - ORIENTATION
ORIENTATION: LEFT

## 2022-12-05 ASSESSMENT — PAIN SCALES - GENERAL
PAINLEVEL_OUTOF10: 8
PAINLEVEL_OUTOF10: 9

## 2022-12-05 ASSESSMENT — PAIN - FUNCTIONAL ASSESSMENT
PAIN_FUNCTIONAL_ASSESSMENT: PREVENTS OR INTERFERES SOME ACTIVE ACTIVITIES AND ADLS
PAIN_FUNCTIONAL_ASSESSMENT: PREVENTS OR INTERFERES SOME ACTIVE ACTIVITIES AND ADLS
PAIN_FUNCTIONAL_ASSESSMENT: ACTIVITIES ARE NOT PREVENTED
PAIN_FUNCTIONAL_ASSESSMENT: ACTIVITIES ARE NOT PREVENTED

## 2022-12-05 ASSESSMENT — PAIN DESCRIPTION - PAIN TYPE: TYPE: ACUTE PAIN;SURGICAL PAIN

## 2022-12-05 NOTE — PLAN OF CARE
Problem: Respiratory - Adult  Goal: Clear lung sounds  12/5/2022 1717 by Vazquez Arana, GIOVANNY  Outcome: Progressing     Problem: Respiratory - Adult  Goal: Achieves optimal ventilation and oxygenation  12/5/2022 1717 by Vazquez Arana, GIOVANNY  Outcome: Progressing

## 2022-12-05 NOTE — OP NOTE
operative suite and general anesthesia was  administered. She was prepped and draped in standard surgical fashion. Timeout was taken. Correct patient, procedure and operative site agreed  all who were present. I initially started with the tourniquet down and  performed a sharp excisional debridement of skin, subcutaneous tissue,  muscle and fascia down to healthy bleeding tissue. There was minimal  necrotic muscle in distal aspect of the wound. I noted that two areas  of necrotic fat at the skin edges. This was all debrided to healthy  tissue and then the wound appeared to be receptive to flap coverage at  this time. I dissected out the distal and the superficial radial nerve. There was a very close proximity to the lateral antebrachial cutaneous  nerve. I sharply debrided the end of the nerve back to healthy  fascicles and made an epineurial window in the lateral antebrachial  cutaneous nerve. I sutured to this in the size performing a transfer  using an 8-0 nylon suture. The most volar aspect of the traumatic  laceration was closed with 2-0 Prolene suture. The tourniquet was let  down prior to the nerve repair ____. I then designed the lateral arm  flap. An elliptical incision was made with its base going into the  traumatic wound. This was centered over the lateral and the  intermuscular septum. The apex was near the deltoid insertion. Bovie  dissection was carried down to the fascia. The intermuscular septum was  identified and carried distally. The radial nerve and the lateral  vessels were identified. I then made an anterior portion of the  incision, carried it down to the fascia over the brachioradialis and  brachialis. Intermuscular septum was identified on this side. The  neurovascular pedicle was ligated. It was done with vessel clips and  the nerve portion was sharply transected. A few other smaller branching  vessels were clipped as well.   The intermuscular septum was raised off  the bone in a subperiosteal fashion proximally 2 cm from the lateral  epicondyle. The flap was then rotated into the defect. It was _____  with a few peripheral sutures. The donor site was then closed  primarily. After the majority of the donor site was closed, tourniquet  was let down at 103 minutes. There was great perfusion to the flap. The flap was then secured into position using 2-0 Prolene as well. A  drain was placed beneath the flap in the deepest portion of the wound to  prevent hematoma formation. There was still approximately 7.5 x 4.5 cm  defect over the muscle and fascia, most dorsally in the traumatic wound. A split in the skin graft was then harvested from the left lateral thigh  and secured into position with 4-0 chromic suture. A bolster dressing  was applied to the skin graft. Sterile dressing was applied. The  remainder of the arm held by posterior long-arm splint. The patient was  awakened from anesthesia. There were no complications. She will be  admitted back to the floor for postoperative IV antibiotics and to  monitor drain output.         Kendra Curry D.O.    D: 12/04/2022 21:25:52       T: 12/04/2022 21:32:41     [email or web address]  Job#: [id number]     Doc#: [id number]    CC:

## 2022-12-05 NOTE — ANESTHESIA POSTPROCEDURE EVALUATION
Department of Anesthesiology  Postprocedure Note    Patient: Dc Villagran  MRN: [id number]  YOB: 1970  Date of evaluation: 12/4/2022      Procedure Summary     Date: 12/04/22 Room / Location: [address] D[facility]    Anesthesia Start: 1621 Anesthesia Stop: 2059    Procedure: Left forearm repeat irrigation and sharp excisional debridement of skin, subcutaneous tissue, fascia, muscle as well as superficial radial to lateral antebrachial cutaneous nerve transfer, lateral arm flap and split thickness skin graft from the anterior lateral thigh (Left: Hand) Diagnosis:       Dog bite, initial encounter      (Dog bite, initial encounter [L14. 0XXA])    Surgeons: Charlie Garcia DO Responsible Provider: Brandy Cisse MD    Anesthesia Type: general ASA Status: 3          Anesthesia Type: No value filed.     Malcolm Phase I: Malcolm Score: 9    Malcolm Phase II:        Anesthesia Post Evaluation    Patient location during evaluation: PACU  Patient participation: complete - patient participated  Level of consciousness: awake and alert  Airway patency: patent  Nausea & Vomiting: no nausea  Complications: no  Cardiovascular status: blood pressure returned to baseline and hemodynamically stable  Respiratory status: acceptable and spontaneous ventilation  Hydration status: euvolemic

## 2022-12-05 NOTE — PLAN OF CARE
Problem: Respiratory - Adult  Goal: Clear lung sounds  12/5/2022 0717 by Cayla Arana RCP  Outcome: Progressing     Problem: Respiratory - Adult  Goal: Achieves optimal ventilation and oxygenation  12/5/2022 0717 by Cayla Arana RCP  Outcome: Progressing

## 2022-12-05 NOTE — BRIEF OP NOTE
Brief Postoperative Note      Patient: Joana Marie  YOB: 1970  MRN: [id number]    Date of Procedure: 12/4/2022    Pre-Op Diagnosis: Left forearm dog bite with deep structure involvement    Post-Op Diagnosis: Same       Procedure(s):  Left forearm repeat irrigation and sharp excisional debridement of skin, subcutaneous tissue, fascia, muscle as well as superficial radial to lateral antebrachial cutaneous nerve transfer, lateral arm flap and split thickness skin graft from the anterior lateral thigh    Surgeon(s):  Ruby Austin DO    Assistant: Deandre Michaels PA-C    Anesthesia: General    Estimated Blood Loss (mL): less than 50     Complications: None    Specimens:   * No specimens in log *    Implants:  * No implants in log *      Drains:   Closed/Suction Drain Inferior; Left Other (Comment) Accordion (Active)       [REMOVED] Negative Pressure Wound Therapy Arm Anterior;Left;Lower;Proximal (Removed)   Wound Type Acute/Traumatic 12/03/22 0800   Dressing Type Other (Comment) 12/03/22 0800   Cycle Continuous 12/03/22 0800   Target Pressure (mmHg) 125 12/03/22 0800   Canister changed?  No 12/03/22 0800   Dressing Status Clean, dry & intact 12/03/22 0800   Output (ml) 0 ml 12/03/22 2100   Wound Assessment Other (Comment) 12/03/22 0800       Findings: Postop diagnosis confirmed    Electronically signed by Ivone Danielle PA-C on 12/4/2022 at 8:58 PM

## 2022-12-05 NOTE — PROGRESS NOTES
12/05/22 0715   RT Protocol   History Pulmonary Disease 2   Respiratory pattern 0   Breath sounds 2   Cough 0   Bronchodilator Assessment Score 4

## 2022-12-05 NOTE — PROGRESS NOTES
Subjective:   Pain controlled. Numbness remains throughout the hand, remaining from nerve block. Afebrile. Hb 9.6, WBC 6.8.     Objective:   BP (!) 97/53   Pulse 84   Temp 98.2 °F (36.8 °C) (Oral)   Resp 16   Ht 5' 8\" (1.727 m)   Wt 170 lb (77.1 kg)   SpO2 95%   BMI 25.85 kg/m²     Recent Labs     12/03/22  1446 12/04/22  0630 12/05/22  0612   WBC 5.0 4.4* 6.8   HGB 9.4* 9.0* 9.6*   HCT 28.9* 27.6* 30.9*    192 220       Current Facility-Administered Medications: acetaminophen (TYLENOL) tablet 650 mg, 650 mg, Oral, Q4H PRN  sodium chloride flush 0.9 % injection 5-40 mL, 5-40 mL, IntraVENous, 2 times per day  sodium chloride flush 0.9 % injection 5-40 mL, 5-40 mL, IntraVENous, PRN  0.9 % sodium chloride infusion, , IntraVENous, PRN  HYDROmorphone (DILAUDID) injection 0.25 mg, 0.25 mg, IntraVENous, Q5 Min PRN  HYDROmorphone (DILAUDID) injection 0.5 mg, 0.5 mg, IntraVENous, Q5 Min PRN  ondansetron (ZOFRAN) injection 4 mg, 4 mg, IntraVENous, Once PRN  labetalol (NORMODYNE;TRANDATE) injection 10 mg, 10 mg, IntraVENous, Q15 Min PRN **OR** hydrALAZINE (APRESOLINE) injection 10 mg, 10 mg, IntraVENous, Q15 Min PRN  cefepime (MAXIPIME) 2000 mg IVPB minibag, 2,000 mg, IntraVENous, Q12H  potassium chloride (KLOR-CON M) extended release tablet 40 mEq, 40 mEq, Oral, PRN **OR** potassium bicarb-citric acid (EFFER-K) effervescent tablet 40 mEq, 40 mEq, Oral, PRN **OR** potassium chloride 10 mEq/100 mL IVPB (Peripheral Line), 10 mEq, IntraVENous, PRN  magnesium sulfate 2000 mg in 50 mL IVPB premix, 2,000 mg, IntraVENous, PRN  venlafaxine (EFFEXOR XR) extended release capsule 150 mg, 150 mg, Oral, Daily  albuterol sulfate HFA (PROVENTIL;VENTOLIN;PROAIR) 108 (90 Base) MCG/ACT inhaler 2 puff, 2 puff, Inhalation, BID  cyclobenzaprine (FLEXERIL) tablet 10 mg, 10 mg, Oral, TID PRN  ondansetron (ZOFRAN) injection 4 mg, 4 mg, IntraVENous, Q6H PRN  HYDROcodone-acetaminophen (NORCO) 5-325 MG per tablet 1 tablet, 1 tablet, Oral, Q4H PRN **OR** HYDROcodone-acetaminophen (NORCO) 5-325 MG per tablet 2 tablet, 2 tablet, Oral, Q4H PRN  morphine (PF) injection 2 mg, 2 mg, IntraVENous, Q2H PRN **OR** morphine injection 4 mg, 4 mg, IntraVENous, Q2H PRN  albuterol (PROVENTIL) nebulizer solution 2.5 mg, 2.5 mg, Nebulization, Q4H PRN  mometasone-formoterol (DULERA) 200-5 MCG/ACT inhaler 2 puff, 2 puff, Inhalation, BID  letrozole (FEMARA) tablet 2.5 mg, 2.5 mg, Oral, Every Other Day      Exam:  Gen: NAD  LUE: Dressing CDI. Drain in place. Slight finger flexion noted. There is again very limited finger extension with no extension at the MP joints. Questionable EPL function intact. .  No active wrist extension. Paresthesias remain from nerve block. Hand well-perfused. Assessment:  45 y.o. female POD 6 s/p left forearm I&D following a dog bite to the left upper forearm with Dr Manuel Lozano. POD 4 s/p repeat I&D and extensive debridement as well as wound vac placement. POD 1 s/p left forearm repeat I&D with nerve transfer, flap closure, skin graft with Dr Amadou Miles. Plan:  Pain control  Activity: [facility]B [place]  Antibiotics changed from Unasyn to Cefepime after cultures confirmed Aeromonas hydrophila/caviae  Very minimal drain output overnight  DVT ppx: Ambulation  DC planning: Plans for discharge to home when deemed appropriate, after 48 hours of postop abx. Late 12/6 versus 12/7.     Electronically signed by Angelo Homans, PA-C on 12/5/2022 at 11:13 AM

## 2022-12-05 NOTE — CARE COORDINATION
12/5/22, 8:51 AM EST    DISCHARGE ON GOING EVALUATION    Robinson Hernandez day: 2  Location: -19/019-A Reason for admit: Dog bite, initial encounter [W54. 0XXA]  Dog bite of arm, left, initial encounter [S41.152A, W54. 0XXA]  Dog bite of forearm, left, initial encounter [O45.093S, W54. 0XXA]   Procedure:   11/29 LEFT FOREARM INCISION AND DRAINAGE simple closure of wound 25 cm by Dr Mary Almanza  12/04 Left forearm irrigation and sharp excisional debridement skin,subcutaneous tissue, fascia and muscle. Reverse lateral arm flap coverage. End-to-side transfer of the distal end of the superficial radial  nerve into the side of the lateral antebrachial cutaneous nerve. Split thickness skin grafting from the thigh to the left forearm. Barriers to Discharge: POD 1, NWB LUE, IV Cefepime for atleast 48 hrs. post op     Patient Goals/Plan/Treatment Preferences: planning home with [facility] when [facility] approval.     [id number] re faxed the wound VAC order from to Zayda De Leon at [facility] as requested. [phone] spoke with Deandre with Dr Rob Elder office; he shared we do not need wound VAC, and no need for [facility]. Called and updated Stanley Eden from [facility]. Will notify Bridget from [facility] no need for wound VAC.

## 2022-12-05 NOTE — RT PROTOCOL NOTE
RT Inhaler-Nebulizer Bronchodilator Protocol Note    There is a bronchodilator order in the chart from a provider indicating to follow the RT Bronchodilator Protocol and there is an Initiate RT Inhaler-Nebulizer Bronchodilator Protocol order as well (see protocol at bottom of note). CXR Findings:  No results found. The findings from the last RT Protocol Assessment were as follows:   History Pulmonary Disease: Chronic pulmonary disease  Respiratory Pattern: Regular pattern and RR 12-20 bpm  Breath Sounds: Slightly diminished and/or crackles  Cough: Strong, spontaneous, non-productive  Indication for Bronchodilator Therapy: Decreased or absent breath sounds, On home bronchodilators  Bronchodilator Assessment Score: 4    Aerosolized bronchodilator medication orders have been revised according to the RT Inhaler-Nebulizer Bronchodilator Protocol below. Respiratory Therapist to perform RT Therapy Protocol Assessment initially then follow the protocol. Repeat RT Therapy Protocol Assessment PRN for score 0-3 or on second treatment, BID, and PRN for scores above 3. No Indications - adjust the frequency to every 6 hours PRN wheezing or bronchospasm, if no treatments needed after 48 hours then discontinue using Per Protocol order mode. If indication present, adjust the RT bronchodilator orders based on the Bronchodilator Assessment Score as indicated below. Use Inhaler orders unless patient has one or more of the following: on home nebulizer, not able to hold breath for 10 seconds, is not alert and oriented, cannot activate and use MDI correctly, or respiratory rate 25 breaths per minute or more, then use the equivalent nebulizer order(s) with same Frequency and PRN reasons based on the score. If a patient is on this medication at home then do not decrease Frequency below that used at home.     0-3 - enter or revise RT bronchodilator order(s) to equivalent RT Bronchodilator order with Frequency of every 4 hours PRN for wheezing or increased work of breathing using Per Protocol order mode. 4-6 - enter or revise RT Bronchodilator order(s) to two equivalent RT bronchodilator orders with one order with BID Frequency and one order with Frequency of every 4 hours PRN wheezing or increased work of breathing using Per Protocol order mode. 7-10 - enter or revise RT Bronchodilator order(s) to two equivalent RT bronchodilator orders with one order with TID Frequency and one order with Frequency of every 4 hours PRN wheezing or increased work of breathing using Per Protocol order mode. 11-13 - enter or revise RT Bronchodilator order(s) to one equivalent RT bronchodilator order with QID Frequency and an Albuterol order with Frequency of every 4 hours PRN wheezing or increased work of breathing using Per Protocol order mode. Greater than 13 - enter or revise RT Bronchodilator order(s) to one equivalent RT bronchodilator order with every 4 hours Frequency and an Albuterol order with Frequency of every 2 hours PRN wheezing or increased work of breathing using Per Protocol order mode. RT to enter RT Home Evaluation for COPD & MDI Assessment order using Per Protocol order mode.     Electronically signed by Jena Stephens RCP on 12/5/2022 at 7:16 AM

## 2022-12-05 NOTE — ANESTHESIA PROCEDURE NOTES
Peripheral Block    Patient location during procedure: PACU  Reason for block: post-op pain management and at surgeon's request  Start time: 12/4/2022 9:08 PM  End time: 12/4/2022 9:12 PM  Staffing  Performed: anesthesiologist   Anesthesiologist: Dusty Ruano MD  Preanesthetic Checklist  Completed: patient identified, IV checked, site marked, risks and benefits discussed, surgical/procedural consents, equipment checked, pre-op evaluation, timeout performed, anesthesia consent given, oxygen available, monitors applied/VS acknowledged, fire risk safety assessment completed and verbalized and blood product R/B/A discussed and consented  Peripheral Block   Patient position: sitting  Prep: ChloraPrep  Provider prep: sterile gloves and mask  Patient monitoring: cardiac monitor, continuous pulse ox, continuous capnometry, frequent blood pressure checks, oxygen, responsive to questions and IV access  Block type: Brachial plexus  Infraclavicular  Laterality: left  Injection technique: single-shot  Guidance: ultrasound guided    Needle   Needle type: short-bevel   Needle gauge: 22 G  Needle localization: ultrasound guidance  Needle length: 5 cm  Assessment   Injection assessment: negative aspiration for heme, no paresthesia on injection, local visualized surrounding nerve on ultrasound and no intravascular symptoms  Paresthesia pain: none  Slow fractionated injection: yes  Hemodynamics: stableno  Outcomes: uncomplicated    Medications Administered  ropivacaine (NAROPIN) injection 0.5% - Perineural   30 mL - 12/4/2022 9:12:00 PM

## 2022-12-05 NOTE — PLAN OF CARE
Problem: Respiratory - Adult  Goal: Clear lung sounds  Outcome: Progressing     Problem: Respiratory - Adult  Goal: Achieves optimal ventilation and oxygenation  12/4/2022 2221 by Maru Vences RCP  Outcome: Progressing     Inhalers to improve aeration and for lung maintenance. Wean oxygen per protocol. Patient mutually agreed on goals.

## 2022-12-05 NOTE — PLAN OF CARE
Problem: Discharge Planning  Goal: Discharge to home or other facility with appropriate resources  12/4/2022 2331 by Divine Valdes RN  Outcome: Progressing  Flowsheets (Taken 12/4/2022 2145)  Discharge to home or other facility with appropriate resources:   Identify barriers to discharge with patient and caregiver   Arrange for needed discharge resources and transportation as appropriate     Problem: Pain  Goal: Verbalizes/displays adequate comfort level or baseline comfort level  12/4/2022 2331 by Divine Valdes RN  Outcome: Progressing  Flowsheets (Taken 12/3/2022 2244)  Verbalizes/displays adequate comfort level or baseline comfort level:   Assess pain using appropriate pain scale   Encourage patient to monitor pain and request assistance   Administer analgesics based on type and severity of pain and evaluate response   Implement non-pharmacological measures as appropriate and evaluate response   Consider cultural and social influences on pain and pain management   Notify Licensed Independent Practitioner if interventions unsuccessful or patient reports new pain     Problem: ABCDS Injury Assessment  Goal: Absence of physical injury  12/4/2022 2331 by Divine Valdes RN  Outcome: Progressing  Flowsheets (Taken 12/4/2022 2331)  Absence of Physical Injury: Implement safety measures based on patient assessment     Problem: Cardiovascular - Adult  Goal: Maintains optimal cardiac output and hemodynamic stability  12/4/2022 2331 by Divine Valdes RN  Outcome: Progressing  Flowsheets  Taken 12/4/2022 2331  Maintains optimal cardiac output and hemodynamic stability:   Monitor blood pressure and heart rate   Monitor urine output and notify Licensed Independent Practitioner for values outside of normal range   Assess for signs of decreased cardiac output  Taken 12/4/2022 2145  Maintains optimal cardiac output and hemodynamic stability:   Monitor blood pressure and heart rate   Monitor urine output and notify Licensed Independent Practitioner for values outside of normal range   Assess for signs of decreased cardiac output     Problem: Skin/Tissue Integrity - Adult  Goal: Skin integrity remains intact  Recent Flowsheet Documentation  Taken 12/4/2022 2145 by Jesus Pena RN  Skin Integrity Remains Intact:   Monitor for areas of redness and/or skin breakdown   Assess vascular access sites hourly     Problem: Skin/Tissue Integrity - Adult  Goal: Incisions, wounds, or drain sites healing without S/S of infection  12/4/2022 2331 by Jesus Pena RN  Outcome: Progressing  Flowsheets  Taken 12/4/2022 2331  Incisions, Wounds, or Drain Sites Healing Without Sign and Symptoms of Infection:   ADMISSION and DAILY: Assess and document risk factors for pressure ulcer development   TWICE DAILY: Assess and document skin integrity   TWICE DAILY: Assess and document dressing/incision, wound bed, drain sites and surrounding tissue   Implement wound care per orders  Taken 12/4/2022 2145  Incisions, Wounds, or Drain Sites Healing Without Sign and Symptoms of Infection:   ADMISSION and DAILY: Assess and document risk factors for pressure ulcer development   TWICE DAILY: Assess and document skin integrity   TWICE DAILY: Assess and document dressing/incision, wound bed, drain sites and surrounding tissue     Problem: Musculoskeletal - Adult  Goal: Return mobility to safest level of function  12/4/2022 2331 by Jesus Pena RN  Outcome: Progressing  Flowsheets  Taken 12/4/2022 2331  Return Mobility to Safest Level of Function:   Assess patient stability and activity tolerance for standing, transferring and ambulating with or without assistive devices   Assist with transfers and ambulation using safe patient handling equipment as needed   Ensure adequate protection for wounds/incisions during mobilization   Obtain physical therapy/occupational therapy consults as needed   Apply continuous passive motion per provider or physical therapy orders to increase flexion toward goal   Instruct patient/family in ordered activity level  Taken 12/4/2022 2145  Return Mobility to Safest Level of Function:   Assess patient stability and activity tolerance for standing, transferring and ambulating with or without assistive devices   Assist with transfers and ambulation using safe patient handling equipment as needed   Ensure adequate protection for wounds/incisions during mobilization   Obtain physical therapy/occupational therapy consults as needed     Problem: Musculoskeletal - Adult  Goal: Return ADL status to a safe level of function  12/4/2022 2331 by Fred Cervantes RN  Outcome: Progressing  Flowsheets  Taken 12/4/2022 2331  Return ADL Status to a Safe Level of Function:   Administer medication as ordered   Assess activities of daily living deficits and provide assistive devices as needed   Obtain physical therapy/occupational therapy consults as needed   Assist and instruct patient to increase activity and self care as tolerated  Taken 12/4/2022 2145  Return ADL Status to a Safe Level of Function: Administer medication as ordered     Problem: Infection - Adult  Goal: Absence of infection at discharge  12/4/2022 2331 by Fred Cervantes RN  Outcome: Progressing  Flowsheets  Taken 12/4/2022 2331  Absence of infection at discharge:   Assess and monitor for signs and symptoms of infection   Monitor lab/diagnostic results   Monitor all insertion sites i.e., indwelling lines, tubes and drains   Administer medications as ordered   Instruct and encourage patient and family to use good hand hygiene technique  Taken 12/4/2022 2145  Absence of infection at discharge:   Assess and monitor for signs and symptoms of infection   Monitor lab/diagnostic results   Monitor all insertion sites i.e., indwelling lines, tubes and drains     Problem: Metabolic/Fluid and Electrolytes - Adult  Goal: Electrolytes maintained within normal limits  12/4/2022 2331 by Kandice Campo Marcus Bhagat RN  Outcome: Progressing  Flowsheets  Taken 12/4/2022 2331  Electrolytes maintained within normal limits:   Monitor labs and assess patient for signs and symptoms of electrolyte imbalances   Administer electrolyte replacement as ordered   Monitor response to electrolyte replacements, including repeat lab results as appropriate  Taken 12/4/2022 2145  Electrolytes maintained within normal limits:   Monitor labs and assess patient for signs and symptoms of electrolyte imbalances   Administer electrolyte replacement as ordered       Problem: Respiratory - Adult  Goal: Achieves optimal ventilation and oxygenation  12/4/2022 2331 by Justus Gardner RN  Outcome: Progressing  Flowsheets (Taken 12/4/2022 2331)  Achieves optimal ventilation and oxygenation:   Assess for changes in respiratory status   Assess for changes in mentation and behavior   Position to facilitate oxygenation and minimize respiratory effort     Problem: Gastrointestinal - Adult  Goal: Maintains or returns to baseline bowel function  12/4/2022 2331 by Justus Gardner RN  Outcome: Progressing  Flowsheets  Taken 12/4/2022 2331  Maintains or returns to baseline bowel function:   Assess bowel function   Encourage oral fluids to ensure adequate hydration   Administer IV fluids as ordered to ensure adequate hydration   Administer ordered medications as needed   Nutrition consult to assist patient with appropriate food choices   Encourage mobilization and activity  Taken 12/4/2022 2145  Maintains or returns to baseline bowel function:   Administer IV fluids as ordered to ensure adequate hydration   Assess bowel function   Encourage oral fluids to ensure adequate hydration   Administer ordered medications as needed   Encourage mobilization and activity     Problem: Genitourinary - Adult  Goal: Absence of urinary retention  12/4/2022 2331 by Justus Gardner RN  Outcome: Progressing  Flowsheets  Taken 12/4/2022 2331  Absence of urinary retention:   Assess patients ability to void and empty bladder   Monitor intake/output and perform bladder scan as needed  Taken 12/4/2022 2145  Absence of urinary retention:   Assess patients ability to void and empty bladder   Monitor intake/output and perform bladder scan as needed     Problem: Hematologic - Adult  Goal: Maintains hematologic stability  12/4/2022 2331 by Magalys Dodd RN  Outcome: Progressing  Flowsheets  Taken 12/4/2022 2331  Maintains hematologic stability:   Assess for signs and symptoms of bleeding or hemorrhage   Monitor labs for bleeding or clotting disorders  Taken 12/4/2022 2145  Maintains hematologic stability:   Assess for signs and symptoms of bleeding or hemorrhage   Monitor labs for bleeding or clotting disorders   Administer blood products/factors as ordered     Problem: Safety - Adult  Goal: Free from fall injury  12/4/2022 2331 by Magalys Dodd RN  Outcome: Progressing  Flowsheets (Taken 12/4/2022 2331)  Free From Fall Injury: Instruct family/caregiver on patient safety     Problem: Nutrition Deficit:  Goal: Optimize nutritional status  12/4/2022 2331 by Magalys Dodd RN  Outcome: Progressing  Flowsheets (Taken 12/4/2022 2331)  Nutrient intake appropriate for improving, restoring, or maintaining nutritional needs:   Assess nutritional status and recommend course of action   Monitor oral intake, labs, and treatment plans   Care plan reviewed with patient. Patient verbalize understanding of the plan of care and contribute to goal setting.

## 2022-12-05 NOTE — PROGRESS NOTES
2056- Pt to PACU. Splint. Hemovacm thigh eufemia C/D/I. Denies pain an dnausea. 2105- Dr Alegria at bedside for block. 2110- Resting with eyes closed, VSS     2120- Denies pain and nausea.  VSS     2130- Pt met PACU discharge criteria, called report to [facility]

## 2022-12-05 NOTE — PROGRESS NOTES
Pharmacy Note - Renal Dosing    Cefepime 2000mg Q12h for treatment of  Dog bite wound infection . Per [facility] Renal Dose Adjustment Policy, cefepime will be changed to  2 grams q8h from 2 grams q12h        Please call with any questions.     Thank you,    Calhoun Spurling, [address] [facility]

## 2022-12-06 VITALS
BODY MASS INDEX: 25.76 KG/M2 | HEART RATE: 77 BPM | HEIGHT: 68 IN | RESPIRATION RATE: 18 BRPM | OXYGEN SATURATION: 92 % | DIASTOLIC BLOOD PRESSURE: 55 MMHG | TEMPERATURE: 98.4 F | WEIGHT: 170 LBS | SYSTOLIC BLOOD PRESSURE: 102 MMHG

## 2022-12-06 LAB
AEROBIC CULTURE: ABNORMAL
ANAEROBIC CULTURE: ABNORMAL
ANION GAP SERPL CALCULATED.3IONS-SCNC: 10 MEQ/L (ref 8–16)
BASOPHILS # BLD: 0.5 %
BASOPHILS ABSOLUTE: 0 THOU/MM3 (ref 0–0.1)
BUN BLDV-MCNC: 13 MG/DL (ref 7–22)
CALCIUM SERPL-MCNC: 8.6 MG/DL (ref 8.5–10.5)
CHLORIDE BLD-SCNC: 104 MEQ/L (ref 98–111)
CO2: 28 MEQ/L (ref 23–33)
CREAT SERPL-MCNC: 0.6 MG/DL (ref 0.4–1.2)
EOSINOPHIL # BLD: 1.8 %
EOSINOPHILS ABSOLUTE: 0.1 THOU/MM3 (ref 0–0.4)
ERYTHROCYTE [DISTWIDTH] IN BLOOD BY AUTOMATED COUNT: 13 % (ref 11.5–14.5)
ERYTHROCYTE [DISTWIDTH] IN BLOOD BY AUTOMATED COUNT: 48.6 FL (ref 35–45)
GFR SERPL CREATININE-BSD FRML MDRD: > 60 ML/MIN/1.73M2
GLUCOSE BLD-MCNC: 98 MG/DL (ref 70–108)
GRAM STAIN RESULT: ABNORMAL
HCT VFR BLD CALC: 28.2 % (ref 37–47)
HEMOGLOBIN: 9 GM/DL (ref 12–16)
IMMATURE GRANS (ABS): 0.03 THOU/MM3 (ref 0–0.07)
IMMATURE GRANULOCYTES: 0.7 %
LYMPHOCYTES # BLD: 28.1 %
LYMPHOCYTES ABSOLUTE: 1.2 THOU/MM3 (ref 1–4.8)
MCH RBC QN AUTO: 32.5 PG (ref 26–33)
MCHC RBC AUTO-ENTMCNC: 31.9 GM/DL (ref 32.2–35.5)
MCV RBC AUTO: 101.8 FL (ref 81–99)
MONOCYTES # BLD: 11.6 %
MONOCYTES ABSOLUTE: 0.5 THOU/MM3 (ref 0.4–1.3)
NUCLEATED RED BLOOD CELLS: 0 /100 WBC
ORGANISM: ABNORMAL
PLATELET # BLD: 244 THOU/MM3 (ref 130–400)
PMV BLD AUTO: 9.8 FL (ref 9.4–12.4)
POTASSIUM REFLEX MAGNESIUM: 4.1 MEQ/L (ref 3.5–5.2)
RBC # BLD: 2.77 MILL/MM3 (ref 4.2–5.4)
SEG NEUTROPHILS: 57.3 %
SEGMENTED NEUTROPHILS ABSOLUTE COUNT: 2.5 THOU/MM3 (ref 1.8–7.7)
SODIUM BLD-SCNC: 142 MEQ/L (ref 135–145)
WBC # BLD: 4.4 THOU/MM3 (ref 4.8–10.8)

## 2022-12-06 PROCEDURE — 6370000000 HC RX 637 (ALT 250 FOR IP): Performed by: PHYSICIAN ASSISTANT

## 2022-12-06 PROCEDURE — 2580000003 HC RX 258: Performed by: ANESTHESIOLOGY

## 2022-12-06 PROCEDURE — 2580000003 HC RX 258: Performed by: PHYSICIAN ASSISTANT

## 2022-12-06 PROCEDURE — 80048 BASIC METABOLIC PNL TOTAL CA: CPT

## 2022-12-06 PROCEDURE — 94640 AIRWAY INHALATION TREATMENT: CPT

## 2022-12-06 PROCEDURE — 6360000002 HC RX W HCPCS: Performed by: PHYSICIAN ASSISTANT

## 2022-12-06 PROCEDURE — 85025 COMPLETE CBC W/AUTO DIFF WBC: CPT

## 2022-12-06 PROCEDURE — 36415 COLL VENOUS BLD VENIPUNCTURE: CPT

## 2022-12-06 RX ORDER — HYDROCODONE BITARTRATE AND ACETAMINOPHEN 5; 325 MG/1; MG/1
1 TABLET ORAL EVERY 4 HOURS PRN
Qty: 20 TABLET | Refills: 0 | Status: SHIPPED | OUTPATIENT
Start: 2022-12-06 | End: 2022-12-13

## 2022-12-06 RX ORDER — SULFAMETHOXAZOLE AND TRIMETHOPRIM 800; 160 MG/1; MG/1
1 TABLET ORAL 2 TIMES DAILY
Qty: 28 TABLET | Refills: 0 | Status: SHIPPED | OUTPATIENT
Start: 2022-12-06 | End: 2022-12-20

## 2022-12-06 RX ADMIN — ALBUTEROL SULFATE 2 PUFF: 90 AEROSOL, METERED RESPIRATORY (INHALATION) at 05:32

## 2022-12-06 RX ADMIN — MORPHINE SULFATE 4 MG: 4 INJECTION, SOLUTION INTRAMUSCULAR; INTRAVENOUS at 01:50

## 2022-12-06 RX ADMIN — POLYETHYLENE GLYCOL 3350 17 G: 17 POWDER, FOR SOLUTION ORAL at 08:20

## 2022-12-06 RX ADMIN — CYCLOBENZAPRINE 10 MG: 10 TABLET, FILM COATED ORAL at 04:12

## 2022-12-06 RX ADMIN — VENLAFAXINE HYDROCHLORIDE 150 MG: 150 CAPSULE, EXTENDED RELEASE ORAL at 08:20

## 2022-12-06 RX ADMIN — MORPHINE SULFATE 4 MG: 4 INJECTION, SOLUTION INTRAMUSCULAR; INTRAVENOUS at 04:11

## 2022-12-06 RX ADMIN — HYDROCODONE BITARTRATE AND ACETAMINOPHEN 1 TABLET: 5; 325 TABLET ORAL at 08:27

## 2022-12-06 RX ADMIN — DOCUSATE SODIUM 100 MG: 100 CAPSULE, LIQUID FILLED ORAL at 08:20

## 2022-12-06 RX ADMIN — SODIUM CHLORIDE, PRESERVATIVE FREE 10 ML: 5 INJECTION INTRAVENOUS at 08:21

## 2022-12-06 RX ADMIN — CEFEPIME 2000 MG: 2 INJECTION, POWDER, FOR SOLUTION INTRAVENOUS at 04:20

## 2022-12-06 RX ADMIN — SENNOSIDES 8.6 MG: 8.6 TABLET, FILM COATED ORAL at 08:20

## 2022-12-06 RX ADMIN — MOMETASONE FUROATE AND FORMOTEROL FUMARATE DIHYDRATE 2 PUFF: 200; 5 AEROSOL RESPIRATORY (INHALATION) at 05:33

## 2022-12-06 ASSESSMENT — PAIN SCALES - GENERAL
PAINLEVEL_OUTOF10: 7
PAINLEVEL_OUTOF10: 6
PAINLEVEL_OUTOF10: 8
PAINLEVEL_OUTOF10: 8
PAINLEVEL_OUTOF10: 7

## 2022-12-06 ASSESSMENT — PAIN - FUNCTIONAL ASSESSMENT
PAIN_FUNCTIONAL_ASSESSMENT: PREVENTS OR INTERFERES SOME ACTIVE ACTIVITIES AND ADLS
PAIN_FUNCTIONAL_ASSESSMENT: PREVENTS OR INTERFERES SOME ACTIVE ACTIVITIES AND ADLS

## 2022-12-06 ASSESSMENT — PAIN DESCRIPTION - FREQUENCY: FREQUENCY: INTERMITTENT

## 2022-12-06 ASSESSMENT — PAIN DESCRIPTION - PAIN TYPE
TYPE: SURGICAL PAIN;ACUTE PAIN
TYPE: ACUTE PAIN;SURGICAL PAIN

## 2022-12-06 ASSESSMENT — PAIN DESCRIPTION - ORIENTATION
ORIENTATION: LEFT

## 2022-12-06 ASSESSMENT — PAIN DESCRIPTION - DESCRIPTORS
DESCRIPTORS: ACHING

## 2022-12-06 ASSESSMENT — PAIN DESCRIPTION - LOCATION
LOCATION: ARM

## 2022-12-06 ASSESSMENT — PAIN DESCRIPTION - ONSET: ONSET: ON-GOING

## 2022-12-06 NOTE — CARE COORDINATION

## 2022-12-06 NOTE — DISCHARGE SUMMARY
Orthopaedic Discharge Summary     Patient ID:  Devendra Devine  [id number]  45 y.o.  1970    Admit date: 11/28/2022    Discharge date and time: 12/6/2022, evening    Admitting Physician: Silvestre Angeles DO     Discharge Physician: same    Admission Diagnoses: Left forearm dog bite with deep structure involvement    Discharge Diagnoses: Same    Admission Condition: good    Discharged Condition: good    Indication for Admission: Observation, pain control and antibiotic management status post left forearm dog bite    Surgical procedures:   Left forearm I&D. Left forearm repeat I&D with extensive debridement and wound VAC placement. Left forearm repeat I&D with nerve transfer, flap closure, skin graft    Consults: none    Significant Diagnostic Studies: microbiology: wound culture: positive for Aeromonas hydrophila/caviae    Hospital Course: Patient admitted on 11/28/2022 status post left forearm dog bite. She underwent left forearm I&D with Dr. Sergey Akbar on 11/29/2022. She was taken for repeat left forearm I&D with extensive debridement and wound VAC placement with Dr. Ahsan Puentes on 12/1/2022. She again underwent repeat left forearm I&D on 12/4/2022 with Dr. Ahsan Puentes, this also included nerve transfer of the lateral antebrachial cutaneous nerve to the superficial radial nerve, lateral arm flap closure and split-thickness skin grafting from the anterior lateral thigh. Cultures taken and surgery on 12/1/2022 Aeromonas hydrophila/caviae, at this time the patient's antibiotics were changed from Unasyn to cefepime. A drain was placed in surgery on 12/4/2022. There was very minimal drain was placed and surgery on 12/4/2022. There is very minimal to no drain output. The drain was removed today, 12/6/2022. The patient will be discharged home on a 2-week course of Bactrim, she will go home this evening following her antibiotic dosages for today. HPI: Pain controlled.  Numbness remains throughout the hand, remaining from nerve block. Afebrile. Hb 9.0, WBC 4.4. Exam:  Vitals:  Vitals:    12/06/22 0400   BP: 107/66   Pulse: 79   Resp: 16   Temp: 98.1 °F (36.7 °C)   SpO2: 94%     Gen: NAD  LUE: Dressing CDI. Slight finger flexion noted. There is again very limited finger extension with no extension at the MP joints. Questionable EPL function intact. .  No active wrist extension. Paresthesias improving from nerve block. Hand well-perfused. Drain with very minimal to no output, serosanguineous fluid noted only within the drain tubing, no drainage to the reservoir. Drain was removed today. Disposition: home    Patient Instructions:      Medication List        START taking these medications      HYDROcodone-acetaminophen 5-325 MG per tablet  Commonly known as: Norco  Take 1 tablet by mouth every 4 hours as needed for Pain for up to 7 days. Intended supply: 3 days.  Take lowest dose possible to manage pain     sulfamethoxazole-trimethoprim 800-160 MG per tablet  Commonly known as: BACTRIM DS;SEPTRA DS  Take 1 tablet by mouth 2 times daily for 14 days            CONTINUE taking these medications      * albuterol (2.5 MG/3ML) 0.083% nebulizer solution  Commonly known as: PROVENTIL     * albuterol sulfate  (90 Base) MCG/ACT inhaler  Commonly known as: PROVENTIL;VENTOLIN;PROAIR  Inhale 1 puff into the lungs every 6 hours as needed for Wheezing or Shortness of Breath     ALPRAZolam 0.5 MG tablet  Commonly known as: XANAX     b complex vitamins capsule     butalbital-apap-caffeine -40 MG Caps     cyclobenzaprine 5 MG tablet  Commonly known as: FLEXERIL     Dulera 200-5 MCG/ACT inhaler  Generic drug: mometasone-formoterol  Inhale 2 puffs into the lungs 2 times daily     letrozole 2.5 MG tablet  Commonly known as: Femara  Take 1 tablet by mouth every other day     NONFORMULARY     venlafaxine 150 MG extended release tablet           * This list has 2 medication(s) that are the same as other medications prescribed for you. Read the directions carefully, and ask your doctor or other care provider to review them with you.                 ASK your doctor about these medications      amoxicillin-clavulanate 875-125 MG per tablet  Commonly known as: AUGMENTIN     diclofenac 75 MG EC tablet  Commonly known as: VOLTAREN               Where to Get Your Medications        These medications were sent to [facility] [address]  [address], [address] [facility]      Phone: [phone]   HYDROcodone-acetaminophen 5-325 MG per tablet  sulfamethoxazole-trimethoprim 800-160 MG per tablet         Activity: See DCI  Wound Care: See DCI  Diet: Resume home diet      Follow-up with Marli Meredith DO on 12/12/2022    Signed:  Electronically signed by Cezar Chapin PA-C on 12/6/2022 at 6:34 AM

## 2022-12-06 NOTE — PROGRESS NOTES
Patient discharged to home with family. Patient left with all belongings, AVS, prescriptions to  from her pharmacy. Patient sent home with [facility] soap for infection prevention. Patient instructed on importance of taking antibiotics. All questions answered at this time. Patient has follow up appointments scheduled with PCP and Dr Ahsan Puentes.

## 2022-12-06 NOTE — RT PROTOCOL NOTE
RT Inhaler-Nebulizer Bronchodilator Protocol Note    There is a bronchodilator order in the chart from a provider indicating to follow the RT Bronchodilator Protocol and there is an Initiate RT Inhaler-Nebulizer Bronchodilator Protocol order as well (see protocol at bottom of note). CXR Findings:  No results found. The findings from the last RT Protocol Assessment were as follows:   History Pulmonary Disease: Chronic pulmonary disease  Respiratory Pattern: Regular pattern and RR 12-20 bpm  Breath Sounds: Slightly diminished and/or crackles  Cough: Strong, spontaneous, non-productive  Indication for Bronchodilator Therapy: Decreased or absent breath sounds, On home bronchodilators  Bronchodilator Assessment Score: 4    Aerosolized bronchodilator medication orders have been revised according to the RT Inhaler-Nebulizer Bronchodilator Protocol below. Respiratory Therapist to perform RT Therapy Protocol Assessment initially then follow the protocol. Repeat RT Therapy Protocol Assessment PRN for score 0-3 or on second treatment, BID, and PRN for scores above 3. No Indications - adjust the frequency to every 6 hours PRN wheezing or bronchospasm, if no treatments needed after 48 hours then discontinue using Per Protocol order mode. If indication present, adjust the RT bronchodilator orders based on the Bronchodilator Assessment Score as indicated below. Use Inhaler orders unless patient has one or more of the following: on home nebulizer, not able to hold breath for 10 seconds, is not alert and oriented, cannot activate and use MDI correctly, or respiratory rate 25 breaths per minute or more, then use the equivalent nebulizer order(s) with same Frequency and PRN reasons based on the score. If a patient is on this medication at home then do not decrease Frequency below that used at home.     0-3 - enter or revise RT bronchodilator order(s) to equivalent RT Bronchodilator order with Frequency of every 4 hours PRN for wheezing or increased work of breathing using Per Protocol order mode. 4-6 - enter or revise RT Bronchodilator order(s) to two equivalent RT bronchodilator orders with one order with BID Frequency and one order with Frequency of every 4 hours PRN wheezing or increased work of breathing using Per Protocol order mode. 7-10 - enter or revise RT Bronchodilator order(s) to two equivalent RT bronchodilator orders with one order with TID Frequency and one order with Frequency of every 4 hours PRN wheezing or increased work of breathing using Per Protocol order mode. 11-13 - enter or revise RT Bronchodilator order(s) to one equivalent RT bronchodilator order with QID Frequency and an Albuterol order with Frequency of every 4 hours PRN wheezing or increased work of breathing using Per Protocol order mode. Greater than 13 - enter or revise RT Bronchodilator order(s) to one equivalent RT bronchodilator order with every 4 hours Frequency and an Albuterol order with Frequency of every 2 hours PRN wheezing or increased work of breathing using Per Protocol order mode. RT to enter RT Home Evaluation for COPD & MDI Assessment order using Per Protocol order mode.     Electronically signed by Emelia Abarca RCP on 12/6/2022 at 5:31 AM

## 2022-12-06 NOTE — DISCHARGE INSTRUCTIONS
Orthopedic Discharge Instructions:  Weight bearing status: No lifting, pushing or pulling for the left arm. Keep dressing clean and dry. Do not get dressing wet. Do not remove dressings or splint    Ice (20 minutes on and off 1 hour) and elevate as needed to reduce swelling and throbbing pain. Drink plenty of fluids. Call the office or come to Emergency Room if signs of infection appear (hot, swollen, red, draining pus, fever)  Take medications as prescribed. Wean off narcotics (norco) as soon as possible. Do not take tylenol if still taking narcotics. Follow up with Dr. Amaduo Miles in his office in 7-10 days after surgery. Call [phone] ext [phone] to schedule/confirm. Skin Grafts: What to Expect at Home  Your Recovery  Skin grafts are thin sheets of healthy skin removed from one part of the body (donor site) and put on another part. Grafts can be used to treat skin damaged by burns, infection, or other injury. If possible, the doctor takes healthy skin from areas that are usually covered by clothes or are not easily seen. You will have a bandage over the skin graft. The area may be sore for 1 to 2 weeks. Keep the area of the skin graft dry while it heals, unless your doctor gives you other instructions. If possible, prop up the area of your body that has the skin graft. Keeping it raised will reduce swelling and fluid buildup that can cause problems with the graft. You also will have a bandage on the donor site. Try to avoid getting sunlight on the skin graft for several months. This helps to prevent a permanent change of color in the grafted skin. And for at least 3 weeks after surgery, avoid exercise that stretches the skin graft, unless your doctor gives you other instructions. If the graft was placed on your legs, arms, hands, or feet, you may need physical therapy to prevent scar tissue from limiting your movement. This therapy is very important.  It may involve wearing splints and doing stretches and range-of-motion exercises. These may be painful, but they help you to heal properly. It may take months for you to regain some feeling in the grafted area. The feeling will be different than it was before your injury. You may not have sweat glands in the skin graft area. If the grafted area is large, this may make it hard for the area to cool off when you are hot. The grafted area may not have oil glands. This can make the skin graft dry and flaky. After your graft heals, you may need to use lotion to keep the skin moist. The skin graft may not grow hair. Sometimes skin grafts do not \"take\" or survive after being transferred. If the skin graft doesn't work, you may need another graft. This care sheet gives you a general idea about how long it will take for you to recover. But each person recovers at a different rate, and certain areas of the body take longer to heal than others. Follow the steps below to feel better as quickly as possible. How can you care for yourself at home? Activity    Rest when you feel tired. Getting enough sleep will help you recover. Try to walk each day, unless the grafted area is on your foot or leg. Start by walking a little more than you did the day before. Bit by bit, increase the amount you walk. Walking boosts blood flow to the skin grafts. Ask your doctor when you can drive again. Your doctor will tell you when you can return to work. It depends on the size of the skin graft, what part of your body was grafted, the type of work you do, and how you feel. Your doctor will tell you when you can take a shower. Do not take a bath for the first 2 weeks, or until your doctor tells you it is okay. Avoid strenuous activities, such as bicycle riding, jogging, weight lifting, or aerobic exercise, until your doctor says it is okay. Diet    You can eat your normal diet.  If your stomach is upset, try bland, low-fat foods like plain rice, broiled chicken, toast, and yogurt. Drink plenty of fluids (unless your doctor tells you not to). You may notice that your bowel movements are not regular right after your surgery. This is common. Try to avoid constipation and straining with bowel movements. You may want to take a fiber supplement every day. If you have not had a bowel movement after a couple of days, ask your doctor about taking a mild laxative. Medicines    Your doctor will tell you if and when you can restart your medicines. He or she will also give you instructions about taking any new medicines. If you stopped taking aspirin or some other blood thinner, your doctor will tell you when to start taking it again. Be safe with medicines. Take pain medicines exactly as directed. If the doctor gave you a prescription medicine for pain, take it as prescribed. If you are not taking a prescription pain medicine, ask your doctor if you can take an over-the-counter medicine. If your doctor prescribed antibiotics, take them as directed. Do not stop taking them just because you feel better. You need to take the full course of antibiotics. If you think your pain medicine is making you sick to your stomach: Take your medicine after meals (unless your doctor has told you not to). Ask your doctor for a different pain medicine. Skin graft and donor site care    Leave the bandages on the skin graft and donor site until your doctor says you can take them off. You probably will receive instructions on how to change the bandages. Follow these instructions closely. Keep the area clean and dry, unless your doctor tells you differently. Do not rub the skin graft for 3 to 4 weeks. Follow-up care is a key part of your treatment and safety. Be sure to make and go to all appointments, and call your doctor if you are having problems. It's also a good idea to know your test results and keep a list of the medicines you take.   When should you call for help?   Call 911 anytime you think you may need emergency care. For example, call if:    You passed out (lost consciousness). You have severe trouble breathing. You have sudden chest pain and shortness of breath, or you cough up blood. Call your doctor now or seek immediate medical care if:    You have pain that does not get better after you take pain medicine. You have loose stitches, or your skin graft comes loose. You have bleeding from the skin graft. You have symptoms of a blood clot in your leg (called a deep vein thrombosis), such as:  Pain in the calf, back of the knee, thigh, or groin. Redness and swelling in your leg or groin. You have signs of infection, such as: Increased pain, swelling, warmth, or redness. Red streaks leading from the incision. Pus draining from the incision. A fever. You are sick to your stomach or cannot keep fluids down. Watch closely for changes in your health, and be sure to contact your doctor if:    You do not get better as expected. Current as of: April 13, 2022               Content Version: 13.4  © 2006-2022 Healthwise, Incorporated. Care instructions adapted under license by [facility] ([facility]). If you have questions about a medical condition or this instruction, always ask your healthcare professional. [address] any warranty or liability for your use of this information. Animal Bites: Care Instructions  Overview  After an animal bite, the biggest concern is infection. The chance of infection depends on the type of animal that bit you, where on your body you were bitten, and your general health. Many animal bites are not closed with stitches, because this can increase the chance of infection. Your bite may take as little as 7 days or as long as several months to heal, depending on how bad it is. Taking good care of your wound at home will help it heal and reduce your chance of infection.   The doctor has checked you carefully, but problems can develop later. If you notice any problems or new symptoms, get medical treatment right away. Follow-up care is a key part of your treatment and safety. Be sure to make and go to all appointments, and call your doctor if you are having problems. It's also a good idea to know your test results and keep a list of the medicines you take. How can you care for yourself at home? If your doctor told you how to care for your wound, follow your doctor's instructions. If you did not get instructions, follow this general advice:  After 24 to 48 hours, gently wash the wound with clean water 2 times a day. Do not scrub or soak the wound. Don't use hydrogen peroxide or alcohol, which can slow healing. You may cover the wound with a thin layer of petroleum jelly, such as Vaseline, and a nonstick bandage. Apply more petroleum jelly and replace the bandage as needed. After you shower, gently dry the wound with a clean towel. If your doctor has closed the wound, cover the bandage with a plastic bag before you take a shower. A small amount of skin redness and swelling around the wound edges and the stitches or staples is normal. Your wound may itch or feel irritated. Do not scratch or rub the wound. Ask your doctor if you can take an over-the-counter pain medicine, such as acetaminophen (Tylenol), ibuprofen (Advil, Motrin), or naproxen (Aleve). Read and follow all instructions on the label. Do not take two or more pain medicines at the same time unless the doctor told you to. Many pain medicines have acetaminophen, which is Tylenol. Too much acetaminophen (Tylenol) can be harmful. If your bite puts you at risk for rabies, you will get a series of shots over the next few weeks to prevent rabies. Your doctor will tell you when to get the shots. It is very important that you get the full cycle of shots. Follow your doctor's instructions exactly.   You may need a tetanus shot if you have not received one in the last 5 years. If your doctor prescribed antibiotics, take them as directed. Do not stop taking them just because you feel better. You need to take the full course of antibiotics. When should you call for help? Call your doctor now or seek immediate medical care if:    The skin near the bite turns cold or pale or it changes color. You lose feeling in the area near the bite, or it feels numb or tingly. You have trouble moving a limb near the bite. You have symptoms of infection, such as: Increased pain, swelling, warmth, or redness near the wound. Red streaks leading from the wound. Pus draining from the wound. A fever. Blood soaks through the bandage. Oozing small amounts of blood is normal.     Your pain is getting worse. Watch closely for changes in your health, and be sure to contact your doctor if you are not getting better as expected. Where can you learn more? Go to https://[employer]. org and sign in to your [employer] account. Enter X786 in the [employer] box to learn more about \"Animal Bites: Care Instructions. \"     If you do not have an account, please click on the \"Sign Up Now\" link. Current as of: March 9, 2022               Content Version: 13.4  © 2006-2022 Healthwise, Incorporated. Care instructions adapted under license by [facility] ([facility]). If you have questions about a medical condition or this instruction, always ask your healthcare professional. [place] [address] any warranty or liability for your use of this information.

## 2022-12-06 NOTE — PLAN OF CARE
Problem: Respiratory - Adult  Goal: Clear lung sounds  12/6/2022 0536 by Dave Stanton RCP  Outcome: Progressing  Note: Mdi to maintain open airways. Patient mutually agreed on goals.        Problem: Pain  Goal: Verbalizes/displays adequate comfort level or baseline comfort level  12/6/2022 0240 by Sonia Angelucci, RN  Outcome: Not Progressing

## 2022-12-28 NOTE — DISCHARGE SUMMARY
[facility] DISCHARGE NOTE  OUTPATIENT  Specialized Therapy Services    Patient Name: Estrellita Jeff        CSN: [id number]   YOB: 1970  Gender: female  Julianne Cheek, [address]*,    Oncology ,      Patient is discharged from Physical Therapy services at this time. See last note for details related to results of therapy and goal achievement. Reason for discharge:  Patient was last seen in therapy on 10/26/22. She then no-showed. Over the course of therapy she had 2 cancels and 1 no-show. She is being discharged due to attendance. Please see previous notes for further details . Rosemary Zhong  [address], [address] [facility]

## 2023-01-24 ENCOUNTER — HOSPITAL ENCOUNTER (OUTPATIENT)
Dept: OCCUPATIONAL THERAPY | Age: 53
Setting detail: THERAPIES SERIES
Discharge: HOME | End: 2023-01-24
Payer: COMMERCIAL

## 2023-01-24 PROCEDURE — 97167 OT EVAL HIGH COMPLEX 60 MIN: CPT

## 2023-01-24 NOTE — PROGRESS NOTES
Occupational Therapy  ** PLEASE SIGN, DATE AND TIME CERTIFICATION BELOW AND RETURN TO [facility] OUTPATIENT REHABILITATION (FAX #: [phone]). ATTEST/CO-SIGN IF ACCESSING VIA [employer]. THANK YOU.**    I certify that I have examined the patient below and determined that Physical Medicine and Rehabilitation service is necessary and that I approve the established plan of care for up to 90 days or as specifically noted. Attestation, signature or co-signature of physician indicates approval of certification requirements.    ________________________ ____________ __________  Physician Signature   Date   Time   [address]  [x] EVALUATION  [] DAILY NOTE (LAND) [] DAILY NOTE (AQUATIC ) [] PROGRESS NOTE [] DISCHARGE NOTE    [] [facility]   [] [facility] 90    [] [address]   [x] Bartolo Shearing    Date: 2023  Patient Name:  Nancy Trinh  : 1970  MRN: [id number]  CSN: [id number]    Referring Practitioner Zayda Fitzgerald DO   Diagnosis Open bite of left forearm, subsequent encounter [S51.852D]  Injury of other nerves at forearm level, left arm, subsequent encounter [S54.8X2D]    Treatment Diagnosis Pain in L hand, wrist, and forearm, Stiffness in L hand, wrist, and forearm, weakness in L hand, wrist, and forearm   Date of Evaluation 23      Functional Outcome Measure Used UEFI   Functional Outcome Score 880 (23)       Insurance: Primary: Payor: Quang Wilcox /  /  / ,   Secondary:    Authorization Information: PRECERTIFICATION REQUIRED:  Pre-cert required after initial evaluation through [address] [place] [address]. If [employer] is secondary insurance no pre-cert is needed. INSURANCE THERAPY BENEFIT: Unlimited visits for anyone under the age of 21. For those 21 and above, 30 visits for OT, PT and ST each per calendar year are approved. Benefit will not cover maintenance or preventative treatment.   E is a covered benefit. AQUATIC THERAPY COVERED:   Yes  MODALITIES COVERED:  Yes. Hot/Cold Packs are not covered   Visit # 1, 1/10 for progress note   Visits Allowed: 1, requires pre-cert   Recertification Date: April 23, 2023   Physician Follow-Up: February 27th, 2023   Physician Orders: Isaias Gear and Tx all Full elbow wrist ROM, PROM and no AROM   Pertinent History: Pt is 44 y/o F who sustained a dog bite to her L forearm on 11/28/22. Pt reports she went directly to the ER where she was admitted to the hospital for ~1 week and underwent multiple surgeries(3 per pt report) and contracted infections. Orthopedics were consulted and Dr. Ofelia Hawkins performed two surgeries. On 12/1/22, pt underwent L forearm I&D, wound exploration with identification of posterior interosseous and superficial radial nerve injureis and partial wound closure with wound VAC application. Pt found to have muscles loss of brachioradialis, ECRB, ECRL, and EDC muscle bellies, \"severe attenuation and stretch injury\" to posterior interosseous nerve, and complete transation with avulsion and nerve block of the superficial radial nerve. Dr. Ofelia Hawkins also performed sx on 12/4/22 including L forearm I&D, reverse lateral arm flap coverage, end-to-side transfer of the distal end of the superficial radial nerve into the side of the lateral antebrachial cutaneous nerve, and splint thickness skin grafting from the thigh to L forearm. Pt has since been discharged home and wearing a prefab forearm based thumb spica. 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 Pt reports she most recently saw Dr. Ofelia Hawkins 1/16/23 and he ordered occupational therapy for eval and treat and PROM.  Pt reports he plans to complete further surgery in the future but pt needs to improve her ROM first.      Pt has a past medical history of Acid reflux, Anxiety, Asthma, Breast cancer ([address] COPD with asthma ([facility] [address] Depression, Depression, Dizziness - light-headed, Emphysema, History of chest pain, History of therapeutic radiation, History of tinnitus, Hx antineoplastic chemo, Hx of blood clots, Joint pain, Numbness and tingling, Osteoarthritis, PONV (postoperative nausea and vomiting), and SOB (shortness of breath) on exertion. SUBJECTIVE: Pt is pleasant and cooperative. Pt reports she has been having difficulty paying some of her bills and her personal phone may be shut off. Pt reports she would like therapy to call her boyfriends phone if there is any difficulty reaching her phone. Social/Functional History:  Medications and Allergies have been reviewed and are listed on the Medical History [address]en lives with family   Task Prior Level of Function  (current level of function addressed below)   ADLs  Independent   Ambulation Independent   Transfers Independent   Hobbies Crafts, cares for her 5 dogs   Driving Active    Work [employer]. Occupation: [employer], lifting  ~30 lbs or more     OBJECTIVE:  Hand Dominance right handed   Palpation    Observation Mostly healed skin graft present at proximal L forearm, pink scarring throughout L forearm and distal arm with min scabbing remaining at medial and lateral forearm/graft site, 3 visible dissolvable sutures present, no drainage   Posture    Edema Min edema in L forearm at skin graft site   Special Tests        ADL's Extreme difficulty with all ADL, IADL and work tasks due to nerve injury. Off work currently.  Pt reports she tries to use her L arm as much as possible but she is very limited   Bed Mobility     Transfers    Balance        Sensation L hand, forearm, numbness along proximal/mid forearn, also reports her entire hand feels like it is \"on fire\" both volar and dorsal surfaces   Coordination Impaired: extremely limited active use of L hand due to radial nerve injury            RANGE OF MOTION   AROM(PROM) Right Left COMMENTS         Shoulder Flexion     Shoulder Extension      Shoulder Abduction     Shoulder Adduction      Shoulder External Rotation TBA 65    Shoulder Internal Rotation TBA Thumb to L hip    Shoulder Range of Motion is [facility]  []      Elbow Flexion 150 132    Elbow Extension 0 37    Forearm Pronation 72 73    Forearm Supination 69 43(49) Pain with L supination A/PROM   Elbow Range of Motion is [facility]  []      Wrist Flexion 57 66    Wrist Extension 63(70 comp) -66(41) Pain with wrist ext PROM   Wrist Radial Deviation 2 -7 Prior injury to R wrist reported   Wrist Ulnar Deviation 35 20    Wrist Range of Motion is [facility]  []   If no measurement is recorded, no formal assessment was completed for that motion.      HAND STRENGTH AND COORDINATION     Right Left    Strength Setting:  Not tested as no orders for strengthening at this time TBA in future   Pinch Strength Tip Pinch:      Lateral Pinch      3 Point Pinch        Coordination 9 Hole Peg Test              HAND RANGE OF MOTION   Extenstion AROM (Passive) AROM Left COMMENTS   Thump MP TBA (0)    Thumb IP TBA (0)    Thumb Radial Abduction/Adduction TBA 19(52)    Thumb Palmar Abduction/Adduction TBA 45    Thumb Opposition TBA Radial tip of SF       Index Finger MP 0+ 50(0)    Index Finger PIP 0+ 25    Index Finger DIP 0+ 20    Distal Mcclelland Crease (comp flexion) 2.5 3       Long Finger MP 0 50(0)    Long Finger PIP 0 21    Long Finger DIP 0 15    Distal Mcclelland Crease (comp flexion) 0 2       Ring Finger MP 0 64(0)    Ring Finger PIP 0 15    Ring Finger DIP 0 5    Distal Mcclelland Crease (comp flexion) 0 3       Little Finger MP 0 55(0)    Little Finger PIP 0 5    Little Finger DIP 0 5    Distal Mcclelland Crease (comp flexion) 0 3    If no measurement is recorded, no formal assessment was completed for that motion       TREATMENT   Precautions: L radial nerve injury, wrist drop, wrist splint on at all times other than exercise/hygiene   Pain:  5/10 L wrist    X in shaded column indicates Activity Completed Today   Modalities Parameters/  Location  Notes/Comments                     Manual Therapy Time/  Technique  Notes/Comments                     Exercises   Sets/  Sec Reps  Notes/Comments   HEP issued   x See below                                             Activities Time    Notes/Comments                       Specific Interventions Next Treatment: fabricate outrigger, emphasize PROM for composite wrist/digit ext and supination    Activity/Treatment Tolerance:  []  Patient tolerated treatment well  []  Patient limited by fatigue  [x]  Patient limited by pain   [x]  Patient limited by other medical complications: wrist drop  []  Other:     Assessment: Pt referred to occupational therapy due to L forearm injury s/p multiple surgeries with resulting weakness, stiffness, and pain which limit pt's ability to complete all ADLs, IADLs, and work tasks. Pt could benefit from therapy for increasing ROM and flexibility as pt has developed significant tightness secondary to radial nerve injury. Pt could also benefit from fabrication of an outrigger to assist with digit extension and improve functional use of L hand while awaiting nerve healing and training in use of the outrigger for functional activity performance. Pt would benefit from skilled therapy services to address her stiffness, pain and weakness and the above listed impairments for improved ADL/IADL function and return to work. Areas for Improvement: impaired coordination, impaired motor control, impaired ROM, impaired sensation, impaired skin integrity, impaired strength, and pain  Prognosis: fair    GOALS:  Patient Goal: to improve use of L hand, to get back to work, to get ready for upcoming surgeries    Short Term Goals:  Time Frame: 4 weeks  *Patient will be fabricated and issued an outrigger splint to increase her ability to use her L hand for ADL tasks while awaiting nerve healing.   *  Patient will improve PROM of composite wrist/digit extension to >50 and forearm supination to >55 for increased flexibility to prepare for upcoming surgeries and for improved hand function while awaiting nerve healing. * Patient will improve AROM of L elbow extension to <25, L shoulder flexion to >120, L shoulder internal rotation thumb tip to 1\" below waistline, L shoulder abduction to >100 and external rotation to >70 for increased ease of completing upper body dressing  *Patient will improve distal allen crease of index, middle, ring, and small fingers to <1 for increased ability to hold her medication in her hand. Long Term Goals:  Time Frame: 8 weeks  *  Patient will improve UEFS to at least 25  *  Patient will report pain with activity no greater than 3/10 to increase ease and ability to perform bathing routine. *  Patient will demonstrate I knowledge of HEP for improved flexibility and reduced pain. Patient Education:   [x]  HEP/Education Completed: Plan of Care, Goals, HEP  [address] Access Code for HEP: Access Code: [email or web address]  Forearm AAROM Supination and Pronation with Board - 1 x daily - 7 x weekly - 3 sets - 10 reps  Seated Elbow Flexion and Extension AROM - 1 x daily - 7 x weekly - 3 sets - 10 reps  Wrist Prayer Stretch with board - 1 x daily - 7 x weekly - 3 sets - 10 reps    []  No new Education completed  []  Reviewed Prior HEP      [x]  Patient verbalized and/or demonstrated understanding of education provided. []  Patient unable to verbalize and/or demonstrate understanding of education provided. Will continue education.   [x]  Barriers to learning: none    PLAN:  Treatment Recommendations: Strengthening, Range of Motion, Neuromuscular Re-education, Manual Therapy - Soft Tissue Mobilization, Manual Therapy - Joint Manipulation, Pain Management, Home Exercise Program, Patient Education, Self-Care Education and Training, Positioning, Modalities, Wound Care, and Splint Fabrications/Modifications    [x]  Plan of care initiated. Plan to see patient 2 times per week for 8 weeks to address the treatment planned outlined above.   []  Continue with current plan of care  []  Modify plan of care as follows:    []  Hold pending physician visit  []  Discharge    Time In 1307   Time Out 1415   Timed Code Minutes: 0 min   Total Treatment Time: 76 min       [facility], [address] [place] [address], OTR/L [id number]

## 2023-01-27 ENCOUNTER — HOSPITAL ENCOUNTER (OUTPATIENT)
Dept: OCCUPATIONAL THERAPY | Age: 53
Setting detail: THERAPIES SERIES
Discharge: HOME | End: 2023-01-27
Payer: COMMERCIAL

## 2023-01-27 PROCEDURE — 97110 THERAPEUTIC EXERCISES: CPT

## 2023-01-27 PROCEDURE — 97140 MANUAL THERAPY 1/> REGIONS: CPT

## 2023-01-27 PROCEDURE — 97530 THERAPEUTIC ACTIVITIES: CPT

## 2023-01-27 NOTE — PROGRESS NOTES
[address]  [] EVALUATION  [x] DAILY NOTE (LAND) [] DAILY NOTE (AQUATIC ) [] PROGRESS NOTE [] DISCHARGE NOTE    [] [facility]   [] [place]    [] [address] [facility]   [x] Severiano Stapler    Date: 2023  Patient Name:  Cathy Serrato  : 1970  MRN: [id number]  CSN: [id number]    Referring Practitioner No ref. provider found   Diagnosis No admission diagnoses are documented for this encounter. Treatment Diagnosis Pain in L hand, wrist, and forearm, Stiffness in L hand, wrist, and forearm, weakness in L hand, wrist, and forearm   Date of Evaluation 23      Functional Outcome Measure Used UEFI   Functional Outcome Score  (23)       Insurance: Primary: Payor: Gilford Sievert /  /  / ,   Secondary:    Authorization Information: PRECERTIFICATION REQUIRED:  Pre-cert required after initial evaluation through [address] [place] [address]. If [facility] is secondary insurance no pre-cert is needed. INSURANCE THERAPY BENEFIT: Unlimited visits for anyone under the age of 21. For those 21 and above, 30 visits for OT, PT and ST each per calendar year are approved. Benefit will not cover maintenance or preventative treatment. [employer] is a covered benefit. AQUATIC THERAPY COVERED:   Yes  MODALITIES COVERED:  Yes. Hot/Cold Packs are not covered    RECEIVED AUTH FOR 80 UNITS OF CPT CODES:  [id number], [id number], [id number], [id number], [id number], [id number]  FROM 23-23   Visit # 2, 2/10 for progress note   Visits Allowed: 80 units each CPT code (20 visits of 4 units each)   Recertification Date: 2023   Physician Follow-Up: 2023   Physician Orders: Houston Pace and Tx all Full elbow wrist ROM, PROM and no AROM   Pertinent History: Pt is 44 y/o F who sustained a dog bite to her L forearm on 22.  Pt reports she went directly to the ER where she was admitted to the hospital for ~1 week and underwent multiple surgeries(3 per pt report) and contracted infections. Orthopedics were consulted and Dr. Colin Espinoza performed two surgeries. On 12/1/22, pt underwent L forearm I&D, wound exploration with identification of posterior interosseous and superficial radial nerve injureis and partial wound closure with wound VAC application. Pt found to have muscles loss of brachioradialis, ECRB, ECRL, and EDC muscle bellies, \"severe attenuation and stretch injury\" to posterior interosseous nerve, and complete transation with avulsion and nerve block of the superficial radial nerve. Dr. Colin Espinoza also performed sx on 12/4/22 including L forearm I&D, reverse lateral arm flap coverage, end-to-side transfer of the distal end of the superficial radial nerve into the side of the lateral antebrachial cutaneous nerve, and splint thickness skin grafting from the thigh to L forearm. Pt has since been discharged home and wearing a prefab forearm based thumb spica. 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 Pt reports she most recently saw Dr. Colin Espinoza 1/16/23 and he ordered occupational therapy for eval and treat and PROM. Pt reports he plans to complete further surgery in the future but pt needs to improve her ROM first.      Pt has a past medical history of Acid reflux, Anxiety, Asthma, Breast cancer ([address] COPD with asthma ([address] Depression, Depression, Dizziness - light-headed, Emphysema, History of chest pain, History of therapeutic radiation, History of tinnitus, Hx antineoplastic chemo, Hx of blood clots, Joint pain, Numbness and tingling, Osteoarthritis, PONV (postoperative nausea and vomiting), and SOB (shortness of breath) on exertion. SUBJECTIVE: Pt reports compliance with her exercises and reports \"this is the best my arm has felt in a while\".          RANGE OF MOTION   AROM(PROM) Right Left COMMENTS         Shoulder Flexion TBA NT; was 110    Shoulder Extension Shoulder Abduction TBA NT; was 105    Shoulder Adduction      Shoulder External Rotation TBA NT; was 65    Shoulder Internal Rotation TBA NT; was Thumb to L hip    Shoulder Range of Motion is [facility]  []      Elbow Flexion 150 NT; was 132    Elbow Extension 0 NT; was 37    Forearm Pronation 72 NT; was 73    Forearm Supination 69 NT; was 43(49) Pain with L supination A/PROM   Elbow Range of Motion is [facility]  []      Wrist Flexion 57 NT; was 66    Wrist Extension 63(70 comp) NT; was -66(41) Pain with wrist ext PROM   Wrist Radial Deviation 2 NT; was -7 Prior injury to R wrist reported   Wrist Ulnar Deviation 35 NT; was 20    Wrist Range of Motion is [facility]  []   If no measurement is recorded, no formal assessment was completed for that motion.         HAND RANGE OF MOTION   Extenstion AROM (Passive) AROM Left COMMENTS   Thump MP TBA NT; was (0)    Thumb IP TBA NT; was (0)    Thumb Radial Abduction/Adduction TBA NT; was 19(52)    Thumb Palmar Abduction/Adduction TBA NT; was 45    Thumb Opposition TBA NT; was Radial tip of SF       Index Finger MP 0+ NT; was 50(0)    Index Finger PIP 0+ NT; was 25    Index Finger DIP 0+ NT; was 20    Distal Mcclelland Crease (comp flexion) 2.5 NT; was 3       Long Finger MP 0 NT; was 50(0)    Long Finger PIP 0 NT; was 21    Long Finger DIP 0 NT; was 15    Distal Mcclelland Crease (comp flexion) 0 NT; was 2       Ring Finger MP 0 NT; was 64(0)    Ring Finger PIP 0 NT; was 15    Ring Finger DIP 0 NT; was 5    Distal Mcclelland Crease (comp flexion) 0 NT; was 3       Little Finger MP 0 NT; was 55(0)    Little Finger PIP 0 NT; was 5    Little Finger DIP 0 NT; was 5    Distal Mcclelland Crease (comp flexion) 0 NT; was 3    If no measurement is recorded, no formal assessment was completed for that motion       TREATMENT   Precautions: L radial nerve injury, wrist drop, wrist splint on at all times other than exercise/hygiene   Pain:  Did not rate but pt reports \"this has been the best my wrist has felt in a while\" X in shaded column indicates Activity Completed Today   Modalities Parameters/  Location  Notes/Comments   MHP with ther ex, see below                  Manual Therapy Time/  Technique  Notes/Comments   Desensitization rubs with various textures 4 min x Hook velcro and david-foam used this date, to add vibration for desensitization to dorsal hand   Manual comp wrist/digit extension stretching  x    Could benefit from L forearm STM and scar mobilizations in future sessions      Exercises   Sets/  Sec Reps  Notes/Comments   Wrist/digit ext and supination stretch within MHP 10 min 1 x Used prayer stretch board to assist with comp wrist/digit ext   Prayer stretch 10 sec In-and-out of stretch for 5 min x    Elbow flex/ext AAROM with dowel 1 10 x Min stretching noted in extension   Gravity assisted elbow ext stretch over dowel 1 min 2 x Good stretch reported   Seated overhead pulleys for elbow ext and shoulder flexion stretch 3 min 1 x                  Activities Time    Notes/Comments   Clothing fastener training with outrigger in place  x Increased ability to complete buttons and snaps with outrigger in place for ext assist    Cone stacking activity with outrigger in place  x Increased ease with outrigger in place (6 cones stacked)   Outrigger fabrication due to radial nerve injury (completed during ther ex this date)  x      Specific Interventions Next Treatment: fabricate outrigger, emphasize PROM for composite wrist/digit ext and supination, STM to L forearm    Activity/Treatment Tolerance:  [x]  Patient tolerated treatment well  []  Patient limited by fatigue  []  Patient limited by pain   [x]  Patient limited by other medical complications: wrist drop  []  Other:     Assessment: Pt tolerated first session post evaluation well and reported compliance with HEP since eval. Outrigger fabricated and issued to patient this date and added to patients previously issued prefab orthosis.  Improved function reported after donning outrigger. Areas for Improvement: impaired coordination, impaired motor control, impaired ROM, impaired sensation, impaired skin integrity, impaired strength, and pain  Prognosis: fair    GOALS:  Patient Goal: to improve use of L hand, to get back to work, to get ready for upcoming surgeries    Short Term Goals:  Time Frame: 4 weeks  *Patient will be fabricated and issued an outrigger splint to increase her ability to use her L hand for ADL tasks while awaiting nerve healing. *  Patient will improve PROM of composite wrist/digit extension to >50 and forearm supination to >55 for increased flexibility to prepare for upcoming surgeries and for improved hand function while awaiting nerve healing. * Patient will improve AROM of L elbow extension to <25, L shoulder flexion to >120, L shoulder internal rotation thumb tip to 1\" below waistline, L shoulder abduction to >100 and external rotation to >70 for increased ease of completing upper body dressing  *Patient will improve distal allen crease of index, middle, ring, and small fingers to <1 for increased ability to hold her medication in her hand. Long Term Goals:  Time Frame: 8 weeks  *  Patient will improve UEFS to at least 25  *  Patient will report pain with activity no greater than 3/10 to increase ease and ability to perform bathing routine. *  Patient will demonstrate I knowledge of HEP for improved flexibility and reduced pain. *NEW GOAL ADDED- Pt will participate in desensitization program due decrease reported hypersensitivity in L hand to reduce discomfort and improve ability to tolerate various textures touching her hand during ADLs.     Patient Education:   [x]  HEP/Education Completed: Plan of Care, Goals, HEP  [address] Access Code for HEP: Access Code: [email or web address]  Forearm AAROM Supination and Pronation with Board - 1 x daily - 7 x weekly - 3 sets - 10 reps  Seated Elbow Flexion and Extension AROM - 1 x daily - 7 x weekly - 3 sets - 10 reps  Wrist Prayer Stretch with board - 1 x daily - 7 x weekly - 3 sets - 10 reps  Elbow ext stretch over dowel adrian or over upside down pot x3-5 min (gravity assisted elbow ext stretch)  Desensitization rubs with various textures to dorsal hand where pt reports hypersensitive    []  No new Education completed  []  Reviewed Prior HEP      [x]  Patient verbalized and/or demonstrated understanding of education provided. []  Patient unable to verbalize and/or demonstrate understanding of education provided. Will continue education. [x]  Barriers to learning: none    PLAN:  Treatment Recommendations: Strengthening, Range of Motion, Neuromuscular Re-education, Manual Therapy - Soft Tissue Mobilization, Manual Therapy - Joint Manipulation, Pain Management, Home Exercise Program, Patient Education, Self-Care Education and Training, Positioning, Modalities, Wound Care, and Splint Fabrications/Modifications    []  Plan of care initiated. Plan to see patient 2 times per week for 8 weeks to address the treatment planned outlined above.   [x]  Continue with current plan of care  []  Modify plan of care as follows:    []  Hold pending physician visit  []  Discharge    Time In 1340   Time Out 1433   Timed Code Minutes:  53 min   Total Treatment Time: 48 min       [facility], [address] [place] [address], OTR/L [id number]

## 2023-01-30 ENCOUNTER — HOSPITAL ENCOUNTER (OUTPATIENT)
Dept: OCCUPATIONAL THERAPY | Age: 53
Setting detail: THERAPIES SERIES
Discharge: HOME | End: 2023-01-30
Payer: COMMERCIAL

## 2023-01-30 PROCEDURE — 97140 MANUAL THERAPY 1/> REGIONS: CPT

## 2023-01-30 PROCEDURE — 97110 THERAPEUTIC EXERCISES: CPT

## 2023-01-30 NOTE — PROGRESS NOTES
[address]  [] EVALUATION  [x] DAILY NOTE (LAND) [] DAILY NOTE (AQUATIC ) [] PROGRESS NOTE [] DISCHARGE NOTE    [] [facility]   [] [address]    [] [address] [facility]   [x] Diana Armstrong    Date: 2023  Patient Name:  Jerrod Cool  : 1970  MRN: [id number]  CSN: [id number]    Referring Practitioner No ref. provider found   Diagnosis No admission diagnoses are documented for this encounter. Treatment Diagnosis Pain in L hand, wrist, and forearm, Stiffness in L hand, wrist, and forearm, weakness in L hand, wrist, and forearm   Date of Evaluation 23      Functional Outcome Measure Used UEFI   Functional Outcome Score 8 (23)       Insurance: Primary: Payor: Yulissa Parikh /  /  / ,   Secondary:    Authorization Information: PRECERTIFICATION REQUIRED:  Pre-cert required after initial evaluation through [address] [place] [id number]. If [facility] secondary insurance no pre-cert is needed. INSURANCE THERAPY BENEFIT: Unlimited visits for anyone under the age of 21. For those 21 and above, 30 visits for OT, PT and ST each per calendar year are approved. Benefit will not cover maintenance or preventative treatment. FCE is a covered benefit. AQUATIC THERAPY COVERED:   Yes  MODALITIES COVERED:  Yes. Hot/Cold Packs are not covered    RECEIVED AUTH FOR 80 UNITS OF CPT CODES:  [id number], [id number], [id number] [facility], [id number], [id number], [id number]  FROM 23-23   Visit # 3, 3/10 for progress note   Visits Allowed: 80 units each CPT code (20 visits of 4 units each)   Recertification Date: 2023   Physician Follow-Up: 2023   Physician Orders: Alessandra Sagastume and Tx all Full elbow wrist ROM, PROM and no AROM   Pertinent History: Pt is 44 y/o F who sustained a dog bite to her L forearm on 22.  Pt reports she went directly to the ER where she was admitted to the hospital for ~1 week and underwent multiple surgeries(3 per pt report) and contracted infections. Orthopedics were consulted and Dr. Shirin Garcia performed two surgeries. On 12/1/22, pt underwent L forearm I&D, wound exploration with identification of posterior interosseous and superficial radial nerve injureis and partial wound closure with wound VAC application. Pt found to have muscles loss of brachioradialis, ECRB, ECRL, and EDC muscle bellies, \"severe attenuation and stretch injury\" to posterior interosseous nerve, and complete transation with avulsion and nerve block of the superficial radial nerve. Dr. Shirin Garcia also performed sx on 12/4/22 including L forearm I&D, reverse lateral arm flap coverage, end-to-side transfer of the distal end of the superficial radial nerve into the side of the lateral antebrachial cutaneous nerve, and splint thickness skin grafting from the thigh to L forearm. Pt has since been discharged home and wearing a prefab forearm based thumb spica. 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 Pt reports she most recently saw Dr. Shirin Garcia 1/16/23 and he ordered occupational therapy for eval and treat and PROM. Pt reports he plans to complete further surgery in the future but pt needs to improve her ROM first.      Pt has a past medical history of Acid reflux, Anxiety, Asthma, Breast cancer ([address] COPD with asthma ([address] Depression, Depression, Dizziness - light-headed, Emphysema, History of chest pain, History of therapeutic radiation, History of tinnitus, Hx antineoplastic chemo, Hx of blood clots, Joint pain, Numbness and tingling, Osteoarthritis, PONV (postoperative nausea and vomiting), and SOB (shortness of breath) on exertion. SUBJECTIVE: Patient reports after she left Friday, her hand was \"amazing\". Over the weekend pain begin, worst pain last evening. Pain in elbow and her hand with specific reference to digits.  Patient likes the new outrigger and relief is noted with digits. RANGE OF MOTION   AROM(PROM) Right Left COMMENTS         Shoulder Flexion TBA NT; was 110    Shoulder Extension      Shoulder Abduction TBA NT; was 105    Shoulder Adduction      Shoulder External Rotation TBA NT; was 65    Shoulder Internal Rotation TBA NT; was Thumb to L hip    Shoulder Range of Motion is [facility]  []      Elbow Flexion 150 NT; was 132    Elbow Extension 0 NT; was 37    Forearm Pronation 72 NT; was 73    Forearm Supination 69 NT; was 43(49) Pain with L supination A/PROM   Elbow Range of Motion is [facility]  []      Wrist Flexion 57 NT; was 66    Wrist Extension 63(70 comp) NT; was -66(41) Pain with wrist ext PROM   Wrist Radial Deviation 2 NT; was -7 Prior injury to R wrist reported   Wrist Ulnar Deviation 35 NT; was 20    Wrist Range of Motion is [facility]  []   If no measurement is recorded, no formal assessment was completed for that motion.         HAND RANGE OF MOTION   Extenstion AROM (Passive) AROM Left COMMENTS   Thump MP TBA NT; was (0)    Thumb IP TBA NT; was (0)    Thumb Radial Abduction/Adduction TBA NT; was 19(52)    Thumb Palmar Abduction/Adduction TBA NT; was 45    Thumb Opposition TBA NT; was Radial tip of SF       Index Finger MP 0+ NT; was 50(0)    Index Finger PIP 0+ NT; was 25    Index Finger DIP 0+ NT; was 20    Distal Mcclelland Crease (comp flexion) 2.5 NT; was 3       Long Finger MP 0 NT; was 50(0)    Long Finger PIP 0 NT; was 21    Long Finger DIP 0 NT; was 15    Distal Mcclelland Crease (comp flexion) 0 NT; was 2       Ring Finger MP 0 NT; was 64(0)    Ring Finger PIP 0 NT; was 15    Ring Finger DIP 0 NT; was 5    Distal Mcclelland Crease (comp flexion) 0 NT; was 3       Little Finger MP 0 NT; was 55(0)    Little Finger PIP 0 NT; was 5    Little Finger DIP 0 NT; was 5    Distal Mcclelland Crease (comp flexion) 0 NT; was 3    If no measurement is recorded, no formal assessment was completed for that motion       TREATMENT   Precautions: L radial nerve injury, wrist drop, wrist splint on at all times other than exercise/hygiene   Pain: 3/10- digits and shooting pains distal to proximal      X in shaded column indicates Activity Completed Today   Modalities Parameters/  Location  Notes/Comments   [facility] with ther ex, see below  x 10 min start of session prior to manual therapy                Manual Therapy Time/  Technique  Notes/Comments   Desensitization rubs with various textures 1 min each texture  x Hook velcro and david-foam, vibration with mini massager    Manual comp wrist/digit extension stretching  x    L forearm STM and scar mobilizations in future sessions  x Trial to initiate this date. Fair tolerance. Avoided lateral elbow incision this time.  Completed distally as well throughout digits and dorsal wrist    Exercises   Sets/  Sec Reps  Notes/Comments   Wrist/digit ext and supination stretch within P 10 min 1 x Used prayer stretch board to assist with comp wrist/digit ext   Prayer stretch 10 sec In-and-out of stretch for 5 min x Patient reports improvement noted after manual massage and ROM   Elbow flex/ext AAROM with dowel 1 10 x Tolerable but pain noted in extension stretching    Standing Gravity assisted elbow ext stretch over dowel 1 min 2 x    Seated overhead pulleys for elbow ext and shoulder flexion stretch 3 min 1 x Cues to hold at end range versus quick movement                  Activities Time    Notes/Comments   Additional size C Medigrip provided for edema management and compression for pain management relief   x    Clothing fastener training with outrigger in place   Increased ability to complete buttons and snaps with outrigger in place for ext assist    Cone stacking activity with outrigger in place    Increased ease with outrigger in place (6 cones stacked)   Outrigger fabrication due to radial nerve injury (completed during ther ex this date)        Specific Interventions Next Treatment: fabricate outrigger, emphasize PROM for composite wrist/digit ext and supination, STM to L forearm    Activity/Treatment Tolerance:  [x]  Patient tolerated treatment well  []  Patient limited by fatigue  []  Patient limited by pain   [x]  Patient limited by other medical complications: wrist drop  []  Other:     Assessment: Patient progressing towards goals. Overall good tolerance from gentle STM and retrograde massage distal to proximal forearm where appropriate and gentle strengthening. Patient reported relief and improved tolerance throughout AAROM stretching. Areas for Improvement: impaired coordination, impaired motor control, impaired ROM, impaired sensation, impaired skin integrity, impaired strength, and pain  Prognosis: fair    GOALS:  Patient Goal: to improve use of L hand, to get back to work, to get ready for upcoming surgeries    Short Term Goals:  Time Frame: 4 weeks  *Patient will be fabricated and issued an outrigger splint to increase her ability to use her L hand for ADL tasks while awaiting nerve healing. *  Patient will improve PROM of composite wrist/digit extension to >50 and forearm supination to >55 for increased flexibility to prepare for upcoming surgeries and for improved hand function while awaiting nerve healing. * Patient will improve AROM of L elbow extension to <25, L shoulder flexion to >120, L shoulder internal rotation thumb tip to 1\" below waistline, L shoulder abduction to >100 and external rotation to >70 for increased ease of completing upper body dressing  *Patient will improve distal allen crease of index, middle, ring, and small fingers to <1 for increased ability to hold her medication in her hand. Long Term Goals:  Time Frame: 8 weeks  *  Patient will improve UEFS to at least 25  *  Patient will report pain with activity no greater than 3/10 to increase ease and ability to perform bathing routine. *  Patient will demonstrate I knowledge of HEP for improved flexibility and reduced pain.   *NEW GOAL ADDED- Pt will participate in desensitization program due decrease reported hypersensitivity in L hand to reduce discomfort and improve ability to tolerate various textures touching her hand during ADLs. Patient Education:   []  HEP/Education Completed: Plan of Care, Goals, HEP  [address] Access Code for HEP: Access Code: [email or web address]  Forearm AAROM Supination and Pronation with Board - 1 x daily - 7 x weekly - 3 sets - 10 reps  Seated Elbow Flexion and Extension AROM - 1 x daily - 7 x weekly - 3 sets - 10 reps  Wrist Prayer Stretch with board - 1 x daily - 7 x weekly - 3 sets - 10 reps  Elbow ext stretch over dowel adrian or over upside down pot x3-5 min (gravity assisted elbow ext stretch)  Desensitization rubs with various textures to dorsal hand where pt reports hypersensitive    []  No new Education completed  [x]  Reviewed Prior HEP      [x]  Patient verbalized and/or demonstrated understanding of education provided. []  Patient unable to verbalize and/or demonstrate understanding of education provided. Will continue education. [x]  Barriers to learning: none    PLAN:  Treatment Recommendations: Strengthening, Range of Motion, Neuromuscular Re-education, Manual Therapy - Soft Tissue Mobilization, Manual Therapy - Joint Manipulation, Pain Management, Home Exercise Program, Patient Education, Self-Care Education and Training, Positioning, Modalities, Wound Care, and Splint Fabrications/Modifications    []  Plan of care initiated. Plan to see patient 2 times per week for 8 weeks to address the treatment planned outlined above.   [x]  Continue with current plan of care  []  Modify plan of care as follows:    []  Hold pending physician visit  []  Discharge    Time In 1530   Time Out 1620   Timed Code Minutes:  40 min   Total Treatment Time: 50 min       Wendy SHINE #[id number]

## 2023-02-01 ENCOUNTER — HOSPITAL ENCOUNTER (OUTPATIENT)
Dept: OCCUPATIONAL THERAPY | Age: 53
Setting detail: THERAPIES SERIES
Discharge: HOME | End: 2023-02-01
Payer: COMMERCIAL

## 2023-02-01 PROCEDURE — 97110 THERAPEUTIC EXERCISES: CPT

## 2023-02-01 PROCEDURE — 97140 MANUAL THERAPY 1/> REGIONS: CPT

## 2023-02-01 NOTE — PROGRESS NOTES
[address]  [] EVALUATION  [x] DAILY NOTE (LAND) [] DAILY NOTE (AQUATIC ) [] PROGRESS NOTE [] DISCHARGE NOTE    [] [address] [facility]   [] [address]    [] [address] [facility]   [x] Cristela Alan    Date: 2023  Patient Name:  Courtney Muñoz  : 1970  MRN: [id number]  CSN: [id number]    Referring Practitioner No ref. provider found   Diagnosis No admission diagnoses are documented for this encounter. Treatment Diagnosis Pain in L hand, wrist, and forearm, Stiffness in L hand, wrist, and forearm, weakness in L hand, wrist, and forearm   Date of Evaluation 23      Functional Outcome Measure Used UEFI   Functional Outcome Score 8 (23)       Insurance: Primary: Payor: Eduardo Person /  /  / ,   Secondary:    Authorization Information: PRECERTIFICATION REQUIRED:  Pre-cert required after initial evaluation through [address] [place] [address]. If [facility] is secondary insurance no pre-cert is needed. INSURANCE THERAPY BENEFIT: Unlimited visits for anyone under the age of 21. For those 21 and above, 30 visits for OT, PT and ST each per calendar year are approved. Benefit will not cover maintenance or preventative treatment. FCE is a covered benefit. AQUATIC THERAPY COVERED:   Yes  MODALITIES COVERED:  Yes. Hot/Cold Packs are not covered    RECEIVED AUTH FOR 80 UNITS OF CPT CODES:  [id number], [id number], [id number] [facility], [id number], [id number], [address]  FROM 23-23   Visit # 4, /10 for progress note   Visits Allowed: 80 units each CPT code (20 visits of 4 units each)   Recertification Date: 2023   Physician Follow-Up: 2023   Physician Orders: Erla Roes and Tx all Full elbow wrist ROM, PROM and no AROM   Pertinent History: Pt is 46 y/o F who sustained a dog bite to her L forearm on 22.  Pt reports she went directly to the ER where she was admitted to the hospital for ~1 week and underwent multiple surgeries(3 per pt report) and contracted infections. Orthopedics were consulted and Dr. Shantel Kauffman performed two surgeries. On 12/1/22, pt underwent L forearm I&D, wound exploration with identification of posterior interosseous and superficial radial nerve injureis and partial wound closure with wound VAC application. Pt found to have muscles loss of brachioradialis, ECRB, ECRL, and EDC muscle bellies, \"severe attenuation and stretch injury\" to posterior interosseous nerve, and complete transation with avulsion and nerve block of the superficial radial nerve. Dr. Shantel Kauffman also performed sx on 12/4/22 including L forearm I&D, reverse lateral arm flap coverage, end-to-side transfer of the distal end of the superficial radial nerve into the side of the lateral antebrachial cutaneous nerve, and splint thickness skin grafting from the thigh to L forearm. Pt has since been discharged home and wearing a prefab forearm based thumb spica. 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 Pt reports she most recently saw Dr. Shantel Kauffman 1/16/23 and he ordered occupational therapy for eval and treat and PROM. Pt reports he plans to complete further surgery in the future but pt needs to improve her ROM first.      Pt has a past medical history of Acid reflux, Anxiety, Asthma, Breast cancer ([address] COPD with asthma ([address] Depression, Depression, Dizziness - light-headed, Emphysema, History of chest pain, History of therapeutic radiation, History of tinnitus, Hx antineoplastic chemo, Hx of blood clots, Joint pain, Numbness and tingling, Osteoarthritis, PONV (postoperative nausea and vomiting), and SOB (shortness of breath) on exertion. SUBJECTIVE: Patient states she and her grandson were trying to carry a case of water up her stairs and the bottom step gave out and they fell and she hit her hand on the wall. That caused increased pain.  Pt reports pain is better today than yesterday. Notes improvements in functional use and hypersensitivity       RANGE OF MOTION   AROM(PROM) Right Left COMMENTS         Shoulder Flexion TBA NT; was 110    Shoulder Extension      Shoulder Abduction TBA NT; was 105    Shoulder Adduction      Shoulder External Rotation TBA NT; was 65    Shoulder Internal Rotation TBA NT; was Thumb to L hip    Shoulder Range of Motion is [facility]  []      Elbow Flexion 150 NT; was 132    Elbow Extension 0 NT; was 37    Forearm Pronation 72 NT; was 73    Forearm Supination 69 59 ; was NT; was 43(49) Pain with L supination A/PROM   Elbow Range of Motion is [facility]  []      Wrist Flexion 57 NT; was 66    Wrist Extension 63(70 deg) (50); was -66(41) Comp wrist/digit ext PROM 41   Wrist Radial Deviation 2 NT; was -7 Prior injury to R wrist reported   Wrist Ulnar Deviation 35 NT; was 20    Wrist Range of Motion is [facility]  []   If no measurement is recorded, no formal assessment was completed for that motion.         HAND RANGE OF MOTION   Extenstion AROM (Passive) AROM Left COMMENTS   Thump MP TBA NT; was (0)    Thumb IP TBA NT; was (0)    Thumb Radial Abduction/Adduction TBA NT; was 19(52)    Thumb Palmar Abduction/Adduction TBA NT; was 45    Thumb Opposition TBA NT; was Radial tip of SF       Index Finger MP 0+ NT; was 50(0)    Index Finger PIP 0+ NT; was 25    Index Finger DIP 0+ NT; was 20    Distal Mcclelland Crease (comp flexion) 2.5 NT; was 3       Long Finger MP 0 NT; was 50(0)    Long Finger PIP 0 NT; was 21    Long Finger DIP 0 NT; was 15    Distal Mcclelland Crease (comp flexion) 0 NT; was 2       Ring Finger MP 0 NT; was 64(0)    Ring Finger PIP 0 NT; was 15    Ring Finger DIP 0 NT; was 5    Distal Mcclelland Crease (comp flexion) 0 NT; was 3       Little Finger MP 0 NT; was 55(0)    Little Finger PIP 0 NT; was 5    Little Finger DIP 0 NT; was 5    Distal Mcclelland Crease (comp flexion) 0 NT; was 3    If no measurement is recorded, no formal assessment was completed for that motion TREATMENT   Precautions: L radial nerve injury, wrist drop, wrist splint on at all times other than exercise/hygiene   Pain: 2/10     X in shaded column indicates Activity Completed Today   Modalities Parameters/  Location  Notes/Comments   [facility] with ther ex, see below  x Stretching throughout, see ther ex                 Manual Therapy Time/  Technique  Notes/Comments   Desensitization rubs with various textures 1 min each texture  x Hook velcro and david-foam, vibration with mini massager; pt reports she notices reduced burning sensation in her hand since beginning desensitization   Manual comp wrist/digit extension stretching  x    L forearm STM and scar mobilizations in future sessions  x Fair tolerance. Avoided lateral elbow incision this time.  Completed distally as well throughout digits and dorsal wrist    Exercises   Sets/  Sec Reps  Notes/Comments   Wrist/digit ext and supination stretch within P 10 min 1 x Used prayer stretch board to assist with comp wrist/digit ext, added 1# weight today in stockinette sleeve to add wrist/digit ext stretch (propped slightly to make stretch tolerable); focused on wrist/digit ext today as supination is improving   Prayer stretch 10 sec In-and-out of stretch for 5 min x Patient reports improvement noted after manual massage and ROM   Elbow flex/ext AAROM with dowel 1 10 x Tolerable but pain noted in extension stretching    Standing Gravity assisted elbow ext stretch over dowel 1 min 2 x    Seated overhead pulleys for elbow ext and shoulder flexion stretch 3 min 1 x Cues to hold at end range versus quick movement    Supination AAROM with dowel 2 min  10 sec hold in and out of stretch x           Activities Time    Notes/Comments   Additional size C Medigrip provided for edema management and compression for pain management relief       Clothing fastener training with outrigger in place   Increased ability to complete buttons and snaps with outrigger in place for ext assist    Cone stacking activity with outrigger in place    Increased ease with outrigger in place (6 cones stacked)   Outrigger fabrication due to radial nerve injury (completed during ther ex this date)      Wrist/digit ext stretch/extremely light WB on table while reaching across table with R hand to retrieve  objects  x      Specific Interventions Next Treatment: fabricate outrigger, emphasize PROM for composite wrist/digit ext and supination, STM to L forearm    Activity/Treatment Tolerance:  [x]  Patient tolerated treatment well  []  Patient limited by fatigue  []  Patient limited by pain   [x]  Patient limited by other medical complications: wrist drop  []  Other:     Assessment: Patient progressing towards goals. Improved wrist ext PROM and measured composite wrist/digit ext PROM today for future tracking. Also improved supination AROM noted today. Tolerated session well, some pain noted at times throughout session. Pt reports her hypersensitivity is improving too. Continue per POC    Areas for Improvement: impaired coordination, impaired motor control, impaired ROM, impaired sensation, impaired skin integrity, impaired strength, and pain  Prognosis: fair    GOALS:  Patient Goal: to improve use of L hand, to get back to work, to get ready for upcoming surgeries    Short Term Goals:  Time Frame: 4 weeks  *Patient will be fabricated and issued an outrigger splint to increase her ability to use her L hand for ADL tasks while awaiting nerve healing. *  Patient will improve PROM of composite wrist/digit extension to >50 and forearm supination to >55 for increased flexibility to prepare for upcoming surgeries and for improved hand function while awaiting nerve healing.   * Patient will improve AROM of L elbow extension to <25, L shoulder flexion to >120, L shoulder internal rotation thumb tip to 1\" below waistline, L shoulder abduction to >100 and external rotation to >70 for increased ease of completing upper body dressing  *Patient will improve distal allen crease of index, middle, ring, and small fingers to <1 for increased ability to hold her medication in her hand. Long Term Goals:  Time Frame: 8 weeks  *  Patient will improve UEFS to at least 25  *  Patient will report pain with activity no greater than 3/10 to increase ease and ability to perform bathing routine. *  Patient will demonstrate I knowledge of HEP for improved flexibility and reduced pain. *NEW GOAL ADDED- Pt will participate in desensitization program due decrease reported hypersensitivity in L hand to reduce discomfort and improve ability to tolerate various textures touching her hand during ADLs. Patient Education:   [x]  HEP/Education Completed: Plan of Care, Goals, HEP  [address] Access Code for HEP: Access Code: [email or web address]  Forearm AAROM Supination and Pronation with Board - 1 x daily - 7 x weekly - 3 sets - 10 reps  Seated Elbow Flexion and Extension AROM - 1 x daily - 7 x weekly - 3 sets - 10 reps  Wrist Prayer Stretch with board - 1 x daily - 7 x weekly - 3 sets - 10 reps  Elbow ext stretch over dowel adrian or over upside down pot x3-5 min (gravity assisted elbow ext stretch)  Desensitization rubs with various textures to dorsal hand where pt reports hypersensitive  Vibration for desensitization at home, WB  []  No new Education completed  [x]  Reviewed Prior HEP      [x]  Patient verbalized and/or demonstrated understanding of education provided. []  Patient unable to verbalize and/or demonstrate understanding of education provided. Will continue education.   [x]  Barriers to learning: none    PLAN:  Treatment Recommendations: Strengthening, Range of Motion, Neuromuscular Re-education, Manual Therapy - Soft Tissue Mobilization, Manual Therapy - Joint Manipulation, Pain Management, Home Exercise Program, Patient Education, Self-Care Education and Training, Positioning, Modalities, Wound Care, and Splint Fabrications/Modifications    [] Plan of care initiated. Plan to see patient 2 times per week for 8 weeks to address the treatment planned outlined above.   [x]  Continue with current plan of care  []  Modify plan of care as follows:    []  Hold pending physician visit  []  Discharge    Time In 1249   Time Out 1349   Timed Code Minutes:  60 min   Total Treatment Time: 60 min       [facility], [address] [place] [address], OTR/L [id number]

## 2023-02-06 ENCOUNTER — HOSPITAL ENCOUNTER (OUTPATIENT)
Dept: OCCUPATIONAL THERAPY | Age: 53
Setting detail: THERAPIES SERIES
Discharge: HOME | End: 2023-02-06
Payer: COMMERCIAL

## 2023-02-06 PROCEDURE — 97110 THERAPEUTIC EXERCISES: CPT

## 2023-02-06 PROCEDURE — 97140 MANUAL THERAPY 1/> REGIONS: CPT

## 2023-02-06 NOTE — PROGRESS NOTES
[address]  [] EVALUATION  [x] DAILY NOTE (LAND) [] DAILY NOTE (AQUATIC ) [] PROGRESS NOTE [] DISCHARGE NOTE    [] [facility]   [] [address]    [] [address] [facility]   [x] Saundra Anne    Date: 2023  Patient Name:  Clarita Gautam  : 1970  MRN: [id number]  CSN: [id number]    Referring Practitioner Melisa Harper DO   Diagnosis Open bite of left forearm, subsequent encounter [S51.852D]  Injury of other nerves at forearm level, left arm, subsequent encounter [S54.8X2D]    Treatment Diagnosis Pain in L hand, wrist, and forearm, Stiffness in L hand, wrist, and forearm, weakness in L hand, wrist, and forearm   Date of Evaluation 23      Functional Outcome Measure Used UEFI   Functional Outcome Score 880 (23)       Insurance: Primary: Payor: Keren Galarza /  /  / ,   Secondary:    Authorization Information: PRECERTIFICATION REQUIRED:  Pre-cert required after initial evaluation through [address] [place] [address]. If [facility] is secondary insurance no pre-cert is needed. INSURANCE THERAPY BENEFIT: Unlimited visits for anyone under the age of 21. For those 21 and above, 30 visits for OT, PT and ST each per calendar year are approved. Benefit will not cover maintenance or preventative treatment. FCE is a covered benefit. AQUATIC THERAPY COVERED:   Yes  MODALITIES COVERED:  Yes. Hot/Cold Packs are not covered    RECEIVED AUTH FOR 80 UNITS OF CPT CODES:  [id number], [id number], [id number], [id number], [id number], [id number]  FROM 23-23   Visit # 5, 5/10 for progress note   Visits Allowed: 80 units each CPT code (20 visits of 4 units each)   Recertification Date: 2023   Physician Follow-Up: 2023   Physician Orders: Yanci Lock and Tx all Full elbow wrist ROM, PROM and no AROM   Pertinent History: Pt is 46 y/o F who sustained a dog bite to her L forearm on 22.  Pt reports she went directly to the ER where she was admitted to the hospital for ~1 week and underwent multiple surgeries(3 per pt report) and contracted infections. Orthopedics were consulted and Dr. Dayami Waters performed two surgeries. On 12/1/22, pt underwent L forearm I&D, wound exploration with identification of posterior interosseous and superficial radial nerve injureis and partial wound closure with wound VAC application. Pt found to have muscles loss of brachioradialis, ECRB, ECRL, and EDC muscle bellies, \"severe attenuation and stretch injury\" to posterior interosseous nerve, and complete transation with avulsion and nerve block of the superficial radial nerve. Dr. Dayami Waters also performed sx on 12/4/22 including L forearm I&D, reverse lateral arm flap coverage, end-to-side transfer of the distal end of the superficial radial nerve into the side of the lateral antebrachial cutaneous nerve, and splint thickness skin grafting from the thigh to L forearm. Pt has since been discharged home and wearing a prefab forearm based thumb spica. 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 Pt reports she most recently saw Dr. Dayami Waters 1/16/23 and he ordered occupational therapy for eval and treat and PROM. Pt reports he plans to complete further surgery in the future but pt needs to improve her ROM first.      Pt has a past medical history of Acid reflux, Anxiety, Asthma, Breast cancer ([address] COPD with asthma ([address] Depression, Depression, Dizziness - light-headed, Emphysema, History of chest pain, History of therapeutic radiation, History of tinnitus, Hx antineoplastic chemo, Hx of blood clots, Joint pain, Numbness and tingling, Osteoarthritis, PONV (postoperative nausea and vomiting), and SOB (shortness of breath) on exertion. SUBJECTIVE: Patient reports over the weekend she was in a lot of pain, with reference she did not complete any new activity.  Patient reports today is better, but still not the greatest. Pain rating 8/10 over the weekend, with forearm \"achy\" discomfort, and then L hand hurting pain noted. Patient reports soreness noted with newly added stretch on heat. RANGE OF MOTION   AROM(PROM) Right Left COMMENTS         Shoulder Flexion TBA NT; was 110    Shoulder Extension      Shoulder Abduction TBA NT; was 105    Shoulder Adduction      Shoulder External Rotation TBA NT; was 65    Shoulder Internal Rotation TBA NT; was Thumb to L hip    Shoulder Range of Motion is [facility]  []      Elbow Flexion 150 NT; was 132    Elbow Extension 0 NT; was 37    Forearm Pronation 72 NT; was 73    Forearm Supination 69 61: 59 ; was NT; was 43(49) Pain with L supination A/PROM  2/6/23- 61 degrees    Elbow Range of Motion is [facility]  []      Wrist Flexion 57 NT; was 66    Wrist Extension 63(70 deg) (50); was -66(41) Comp wrist/digit ext PROM 41   Wrist Radial Deviation 2 NT; was -7 Prior injury to R wrist reported   Wrist Ulnar Deviation 35 NT; was 20    Wrist Range of Motion is [facility]  []   If no measurement is recorded, no formal assessment was completed for that motion.         HAND RANGE OF MOTION   Extenstion AROM (Passive) AROM Left COMMENTS   Thump MP TBA NT; was (0)    Thumb IP TBA NT; was (0)    Thumb Radial Abduction/Adduction TBA NT; was 19(52)    Thumb Palmar Abduction/Adduction TBA NT; was 45    Thumb Opposition TBA NT; was Radial tip of SF       Index Finger MP 0+ NT; was 50(0)    Index Finger PIP 0+ NT; was 25    Index Finger DIP 0+ NT; was 20    Distal Mcclelland Crease (comp flexion) 2.5 NT; was 3       Long Finger MP 0 NT; was 50(0)    Long Finger PIP 0 NT; was 21    Long Finger DIP 0 NT; was 15    Distal Mcclelland Crease (comp flexion) 0 NT; was 2       Ring Finger MP 0 NT; was 64(0)    Ring Finger PIP 0 NT; was 15    Ring Finger DIP 0 NT; was 5    Distal Mcclelland Crease (comp flexion) 0 NT; was 3       Little Finger MP 0 NT; was 55(0)    Little Finger PIP 0 NT; was 5    Little Finger DIP 0 NT; was 5    Distal Mcclelland Crease (comp flexion) 0 NT; was 3    If no measurement is recorded, no formal assessment was completed for that motion       TREATMENT   Precautions: L radial nerve injury, wrist drop, wrist splint on at all times other than exercise/hygiene   Pain: 5/10     X in shaded column indicates Activity Completed Today   Modalities Parameters/  Location  Notes/Comments   MHP with ther ex, see below  x Stretching throughout, see ther ex                 Manual Therapy Time/  Technique  Notes/Comments   Desensitization rubs with various textures 1 min each texture   Hook velcro and david-foam, vibration with mini massager; pt reports she notices reduced burning sensation in her hand since beginning desensitization   Manual comp wrist/digit extension stretching  x    L forearm STM and scar mobilizations in future sessions  x Fair tolerance. Avoided lateral elbow incision this time. Completed distally as well throughout digits and dorsal wrist   2/6/23- lateral elbow light pink fluid drainage after completion of manual wrist/forearm stretching. Patient reports this is where the stitch was that was removed. No raised appearance, after light pressure minimal drainage stopped.  Will continue to monitor    Exercises   Sets/  Sec Reps  Notes/Comments   Wrist/digit ext and supination stretch within MHP 10 min 1 x Used prayer stretch board to assist with comp wrist/digit ext, added 1# weight today in stockinette sleeve to add wrist/digit ext stretch (propped slightly to make stretch tolerable); focused on wrist/digit ext today as supination is improving   Prayer stretch 10 sec In-and-out of stretch for 5 min x Patient reports improvement noted after manual massage and ROM   Elbow flex/ext AAROM with dowel 1 10 x Tolerable but pain noted in extension stretching    Standing Gravity assisted elbow ext stretch over dowel 1 min 2 x    Seated overhead pulleys for elbow ext and shoulder flexion stretch 3 min 1 x Cues to hold at end range versus quick movement    Supination AAROM with dowel 2 min  10 sec hold in and out of stretch x No drainage noted in lateral incision           Activities Time    Notes/Comments   Additional size C Medigrip provided for edema management and compression for pain management relief       Clothing fastener training with outrigger in place   Increased ability to complete buttons and snaps with outrigger in place for ext assist    Cone stacking activity with outrigger in place    Increased ease with outrigger in place (6 cones stacked)   Outrigger fabrication due to radial nerve injury (completed during ther ex this date)      Wrist/digit ext stretch/extremely light WB on table while reaching across table with R hand to retrieve  objects        Specific Interventions Next Treatment: fabricate outrigger, emphasize PROM for composite wrist/digit ext and supination, STM to L forearm    Activity/Treatment Tolerance:  [x]  Patient tolerated treatment well  []  Patient limited by fatigue  []  Patient limited by pain   [x]  Patient limited by other medical complications: wrist drop  []  Other:     Assessment: Patient progressing towards goals. Overall improvement noted with STM and wrist PROM this date, with continued discomfort at end range. Patient with improved tolerance with hold at AAROM/AROM. Minimal drainage noted this date lateral elbow- documentation noted above and will continue to monitor. Patient reports overall \"bad\" weekend with pain and sensitivity issues. Improvement in L forearm supination AROM by 2 degrees this date.      Areas for Improvement: impaired coordination, impaired motor control, impaired ROM, impaired sensation, impaired skin integrity, impaired strength, and pain  Prognosis: fair    GOALS:  Patient Goal: to improve use of L hand, to get back to work, to get ready for upcoming surgeries    Short Term Goals:  Time Frame: 4 weeks  *Patient will be fabricated and issued an outrigger splint to increase her ability to use her L hand for ADL tasks while awaiting nerve healing. *  Patient will improve PROM of composite wrist/digit extension to >50 and forearm supination to >55 for increased flexibility to prepare for upcoming surgeries and for improved hand function while awaiting nerve healing. * Patient will improve AROM of L elbow extension to <25, L shoulder flexion to >120, L shoulder internal rotation thumb tip to 1\" below waistline, L shoulder abduction to >100 and external rotation to >70 for increased ease of completing upper body dressing  *Patient will improve distal allen crease of index, middle, ring, and small fingers to <1 for increased ability to hold her medication in her hand. Long Term Goals:  Time Frame: 8 weeks  *  Patient will improve UEFS to at least 25  *  Patient will report pain with activity no greater than 3/10 to increase ease and ability to perform bathing routine. *  Patient will demonstrate I knowledge of HEP for improved flexibility and reduced pain. *NEW GOAL ADDED- Pt will participate in desensitization program due decrease reported hypersensitivity in L hand to reduce discomfort and improve ability to tolerate various textures touching her hand during ADLs.     Patient Education:   [x]  HEP/Education Completed: Plan of Care, Goals, HEP  [address] Access Code for HEP: Access Code: [email or web address]  Forearm AAROM Supination and Pronation with Board - 1 x daily - 7 x weekly - 3 sets - 10 reps  Seated Elbow Flexion and Extension AROM - 1 x daily - 7 x weekly - 3 sets - 10 reps  Wrist Prayer Stretch with board - 1 x daily - 7 x weekly - 3 sets - 10 reps  Elbow ext stretch over dowel adrian or over upside down pot x3-5 min (gravity assisted elbow ext stretch)  Desensitization rubs with various textures to dorsal hand where pt reports hypersensitive  Vibration for desensitization at home, WB  []  No new Education completed  [x]  Reviewed Prior HEP      [x] Patient verbalized and/or demonstrated understanding of education provided. []  Patient unable to verbalize and/or demonstrate understanding of education provided. Will continue education. [x]  Barriers to learning: none    PLAN:  Treatment Recommendations: Strengthening, Range of Motion, Neuromuscular Re-education, Manual Therapy - Soft Tissue Mobilization, Manual Therapy - Joint Manipulation, Pain Management, Home Exercise Program, Patient Education, Self-Care Education and Training, Positioning, Modalities, Wound Care, and Splint Fabrications/Modifications    []  Plan of care initiated. Plan to see patient 2 times per week for 8 weeks to address the treatment planned outlined above.   [x]  Continue with current plan of care  []  Modify plan of care as follows:    []  Hold pending physician visit  []  Discharge    Time In 1300   Time Out 1348   Timed Code Minutes:  48 min   Total Treatment Time: 48 min       Joe SHINE #[id number]

## 2023-02-08 ENCOUNTER — HOSPITAL ENCOUNTER (OUTPATIENT)
Dept: OCCUPATIONAL THERAPY | Age: 53
Setting detail: THERAPIES SERIES
End: 2023-02-08
Payer: COMMERCIAL

## 2023-02-13 ENCOUNTER — HOSPITAL ENCOUNTER (OUTPATIENT)
Dept: OCCUPATIONAL THERAPY | Age: 53
Setting detail: THERAPIES SERIES
Discharge: HOME | End: 2023-02-13
Payer: COMMERCIAL

## 2023-02-13 PROCEDURE — 97110 THERAPEUTIC EXERCISES: CPT

## 2023-02-13 PROCEDURE — 97140 MANUAL THERAPY 1/> REGIONS: CPT

## 2023-02-13 NOTE — PROGRESS NOTES
[address]  [] EVALUATION  [x] DAILY NOTE (LAND) [] DAILY NOTE (AQUATIC ) [] PROGRESS NOTE [] DISCHARGE NOTE    [] [address] [facility]   [] [address]    [] [address] [facility]   [x] Margaret Galeazzi    Date: 2023  Patient Name:  Aleja Anderson  : 1970  MRN: [id number]  CSN: [id number]    Referring Practitioner Fareed Tony DO   Diagnosis Open bite of left forearm, subsequent encounter [S51.852D]  Injury of other nerves at forearm level, left arm, subsequent encounter [S54.8X2D]    Treatment Diagnosis Pain in L hand, wrist, and forearm, Stiffness in L hand, wrist, and forearm, weakness in L hand, wrist, and forearm   Date of Evaluation 23      Functional Outcome Measure Used UEFI   Functional Outcome Score 880 (23)       Insurance: Primary: Payor: Doris Hairston /  /  / ,   Secondary:    Authorization Information: PRECERTIFICATION REQUIRED:  Pre-cert required after initial evaluation through [address] [place] [address]. If [facility] is secondary insurance no pre-cert is needed. INSURANCE THERAPY BENEFIT: Unlimited visits for anyone under the age of 21. For those 21 and above, 30 visits for OT, PT and ST each per calendar year are approved. Benefit will not cover maintenance or preventative treatment. FCE is a covered benefit. AQUATIC THERAPY COVERED:   Yes  MODALITIES COVERED:  Yes. Hot/Cold Packs are not covered    RECEIVED AUTH FOR 80 UNITS OF CPT CODES:  [id number], [id number], [id number], [id number], [id number], [id number]  FROM 23-23   Visit # 6, 6/10 for progress note   Visits Allowed: 80 units each CPT code (20 visits of 4 units each)   Recertification Date: 2023   Physician Follow-Up: 2023   Physician Orders: Henny Grippe and Tx all Full elbow wrist ROM, PROM and no AROM   Pertinent History: Pt is 46 y/o F who sustained a dog bite to her L forearm on 22.  Pt reports she went directly to the ER where she was admitted to the hospital for ~1 week and underwent multiple surgeries(3 per pt report) and contracted infections. Orthopedics were consulted and Dr. Alfonso Carmen performed two surgeries. On 12/1/22, pt underwent L forearm I&D, wound exploration with identification of posterior interosseous and superficial radial nerve injureis and partial wound closure with wound VAC application. Pt found to have muscles loss of brachioradialis, ECRB, ECRL, and EDC muscle bellies, \"severe attenuation and stretch injury\" to posterior interosseous nerve, and complete transation with avulsion and nerve block of the superficial radial nerve. Dr. Alfonso Carmen also performed sx on 12/4/22 including L forearm I&D, reverse lateral arm flap coverage, end-to-side transfer of the distal end of the superficial radial nerve into the side of the lateral antebrachial cutaneous nerve, and splint thickness skin grafting from the thigh to L forearm. Pt has since been discharged home and wearing a prefab forearm based thumb spica. 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 Pt reports she most recently saw Dr. Alfonso Carmen 1/16/23 and he ordered occupational therapy for eval and treat and PROM. Pt reports he plans to complete further surgery in the future but pt needs to improve her ROM first.      Pt has a past medical history of Acid reflux, Anxiety, Asthma, Breast cancer ([address] COPD with asthma ([address] Depression, Depression, Dizziness - light-headed, Emphysema, History of chest pain, History of therapeutic radiation, History of tinnitus, Hx antineoplastic chemo, Hx of blood clots, Joint pain, Numbness and tingling, Osteoarthritis, PONV (postoperative nausea and vomiting), and SOB (shortness of breath) on exertion.        SUBJECTIVE: Patient reports her pain has overall decreased, not taking consistent medication for pain, and overall is feeling better and feels like she is progressing with her stretches because of his. Patient reports she is frustrated she can still not use her her L hand as \"normal\" with picking and grasping objects. Patient is still unable to put her hair in a ponytail independently. Patient reports her pre-ruben orthosis is causing irritation on B sides where custom orthosis was placed for finger loops. Patient reports over the weekend, drainage was present again at proximal forearm. RANGE OF MOTION   AROM(PROM) Right Left COMMENTS         Shoulder Flexion TBA NT; was 110    Shoulder Extension      Shoulder Abduction TBA NT; was 105    Shoulder Adduction      Shoulder External Rotation TBA NT; was 65    Shoulder Internal Rotation TBA NT; was Thumb to L hip    Shoulder Range of Motion is [facility]  []      Elbow Flexion 150 NT; was 132    Elbow Extension 0 NT; was 37 2/13/23- 11 degrees from    Forearm Pronation 72 NT; was 73    Forearm Supination 69 61: 59 ; was NT; was 43(49) Pain with L supination A/PROM  2/6/23- 61 degrees   2/13/23- 74 degrees    Elbow Range of Motion is [facility]  []      Wrist Flexion 57 NT; was 66    Wrist Extension 63(70 deg) (50); was -66(41) Comp wrist/digit ext PROM 41   Wrist Radial Deviation 2 NT; was -7 Prior injury to R wrist reported   Wrist Ulnar Deviation 35 NT; was 20    Wrist Range of Motion is [facility]  []   If no measurement is recorded, no formal assessment was completed for that motion.         HAND RANGE OF MOTION   Extenstion AROM (Passive) AROM Left COMMENTS   Thump MP TBA NT; was (0)    Thumb IP TBA NT; was (0)    Thumb Radial Abduction/Adduction TBA NT; was 19(52)    Thumb Palmar Abduction/Adduction TBA NT; was 45    Thumb Opposition TBA NT; was Radial tip of SF       Index Finger MP 0+ NT; was 50(0)    Index Finger PIP 0+ NT; was 25    Index Finger DIP 0+ NT; was 20    Distal Mcclelland Crease (comp flexion) 2.5 NT; was 3       Long Finger MP 0 NT; was 50(0)    Long Finger PIP 0 NT; was 21    Long Finger DIP 0 NT; was 15    Distal Mcclelland Crease (comp flexion) 0 NT; was 2       Ring Finger MP 0 NT; was 64(0)    Ring Finger PIP 0 NT; was 15    Ring Finger DIP 0 NT; was 5    Distal Mcclelland Crease (comp flexion) 0 NT; was 3       Little Finger MP 0 NT; was 55(0)    Little Finger PIP 0 NT; was 5    Little Finger DIP 0 NT; was 5    Distal Mcclelland Crease (comp flexion) 0 NT; was 3    If no measurement is recorded, no formal assessment was completed for that motion       TREATMENT   Precautions: L radial nerve injury, wrist drop, wrist splint on at all times other than exercise/hygiene   Pain: 2/10     X in shaded column indicates Activity Completed Today   Modalities Parameters/  Location  Notes/Comments   MHP with ther ex, see below  x Stretching throughout, see ther ex                 Manual Therapy Time/  Technique  Notes/Comments   Desensitization rubs with various textures 1 min each texture   Hook velcro and david-foam, vibration with mini massager; pt reports she notices reduced burning sensation in her hand since beginning desensitization   Manual comp wrist/digit extension stretching  x    L forearm STM and scar mobilizations in future sessions  x Fair tolerance. Avoided lateral elbow incision this time. Completed distally as well throughout digits and dorsal wrist   2/6/23- lateral elbow light pink fluid drainage after completion of manual wrist/forearm stretching. Patient reports this is where the stitch was that was removed. No raised appearance, after light pressure minimal drainage stopped.  Will continue to monitor   2/13- no drainage present throughout session this date, but patient reports over the weekend drainage did occur    Exercises   Sets/  Sec Reps  Notes/Comments   Wrist/digit ext and supination stretch within MHP 10 min 1 x Used [employer] stretch board to assist with comp wrist/digit ext, added 1# weight today in stockinette sleeve to add wrist/digit ext stretch (propped slightly to make stretch tolerable); focused on wrist/digit ext today as supination is improving   Prayer stretch 10 sec In-and-out of stretch for 5 min x    Elbow flex/ext AAROM with dowel 1 10 x Improvement noted in extension stretch    Standing Gravity assisted elbow ext stretch over dowel 1 min 2 x    Seated overhead pulleys for elbow ext and shoulder flexion stretch 3 min 1 x Cues to hold at end range versus quick movement    Supination AAROM with dowel 2 min  10 sec hold in and out of stretch x           Activities Time    Notes/Comments   Additional size C Medigrip provided for edema management and compression for pain management relief       Clothing fastener training with outrigger in place   Increased ability to complete buttons and snaps with outrigger in place for ext assist    Cone stacking activity with outrigger in place    Increased ease with outrigger in place (6 cones stacked)   Outrigger fabrication due to radial nerve injury (completed during ther ex this date)  x Increased resistance of outrigger with tightening the rubber bands each digit. Provided moleskin on radial and ulnar side of outrigger for comfort. Completed while patient was on MHP throughout forearm supination stretch    Wrist/digit ext stretch/extremely light WB on table while reaching across table with R hand to retrieve  objects        Specific Interventions Next Treatment: fabricate outrigger, emphasize PROM for composite wrist/digit ext and supination, STM to L forearm    Activity/Treatment Tolerance:  [x]  Patient tolerated treatment well  []  Patient limited by fatigue  []  Patient limited by pain   [x]  Patient limited by other medical complications: wrist drop  []  Other:     Assessment: Patient progressing towards goals. No drainage present throughout session this date. Continued tightness and pain noted at end range, but overall improved tolerance throughout throughout PROM/AAROM stretches.  Elbow extension and forearm supination ROM taken above with large improvement noted from evaluation date. Patient would like all measurements sent to MD next session prior to her upcoming MD appointment on Monday. Areas for Improvement: impaired coordination, impaired motor control, impaired ROM, impaired sensation, impaired skin integrity, impaired strength, and pain  Prognosis: fair    GOALS:  Patient Goal: to improve use of L hand, to get back to work, to get ready for upcoming surgeries    Short Term Goals:  Time Frame: 4 weeks  *Patient will be fabricated and issued an outrigger splint to increase her ability to use her L hand for ADL tasks while awaiting nerve healing. *  Patient will improve PROM of composite wrist/digit extension to >50 and forearm supination to >55 for increased flexibility to prepare for upcoming surgeries and for improved hand function while awaiting nerve healing. * Patient will improve AROM of L elbow extension to <25, L shoulder flexion to >120, L shoulder internal rotation thumb tip to 1\" below waistline, L shoulder abduction to >100 and external rotation to >70 for increased ease of completing upper body dressing  *Patient will improve distal allen crease of index, middle, ring, and small fingers to <1 for increased ability to hold her medication in her hand. Long Term Goals:  Time Frame: 8 weeks  *  Patient will improve UEFS to at least 25  *  Patient will report pain with activity no greater than 3/10 to increase ease and ability to perform bathing routine. *  Patient will demonstrate I knowledge of HEP for improved flexibility and reduced pain. *NEW GOAL ADDED- Pt will participate in desensitization program due decrease reported hypersensitivity in L hand to reduce discomfort and improve ability to tolerate various textures touching her hand during ADLs.     Patient Education:   []  HEP/Education Completed: Plan of Care, Goals, HEP  Kids [employer]iz[employer] Access Code for HEP: Access Code: [email or web address]  Forearm AAROM Supination and Pronation with Board - 1 x daily - 7 x weekly - 3 sets - 10 reps  Seated Elbow Flexion and Extension AROM - 1 x daily - 7 x weekly - 3 sets - 10 reps  Wrist Prayer Stretch with board - 1 x daily - 7 x weekly - 3 sets - 10 reps  Elbow ext stretch over dowel adrian or over upside down pot x3-5 min (gravity assisted elbow ext stretch)  Desensitization rubs with various textures to dorsal hand where pt reports hypersensitive  Vibration for desensitization at home, WB  []  No new Education completed  [x]  Reviewed Prior HEP      [x]  Patient verbalized and/or demonstrated understanding of education provided.  []  Patient unable to verbalize and/or demonstrate understanding of education provided.  Will continue education.  [x]  Barriers to learning: none    PLAN:  Treatment Recommendations: Strengthening, Range of Motion, Neuromuscular Re-education, Manual Therapy - Soft Tissue Mobilization, Manual Therapy - Joint Manipulation, Pain Management, Home Exercise Program, Patient Education, Self-Care Education and Training, Positioning, Modalities, Wound Care, and Splint Fabrications/Modifications    []  Plan of care initiated.  Plan to see patient 2 times per week for 8 weeks to address the treatment planned outlined above.  [x]  Continue with current plan of care  []  Modify plan of care as follows:    []  Hold pending physician visit  []  Discharge    Time In 1347   Time Out 1435   Timed Code Minutes:  48 min   Total Treatment Time: 48 min       Rebeca SHINE #[id number]

## 2023-02-15 ENCOUNTER — HOSPITAL ENCOUNTER (OUTPATIENT)
Dept: OCCUPATIONAL THERAPY | Age: 53
Setting detail: THERAPIES SERIES
Discharge: HOME | End: 2023-02-15
Payer: COMMERCIAL

## 2023-02-15 PROCEDURE — 97110 THERAPEUTIC EXERCISES: CPT

## 2023-02-15 PROCEDURE — 97140 MANUAL THERAPY 1/> REGIONS: CPT

## 2023-02-15 NOTE — PROGRESS NOTES
[address]ERAP[address]  [] EVALUATION  [] DAILY NOTE (LAND) [] DAILY NOTE (AQUATIC ) [x] PROGRESS NOTE [] DISCHARGE NOTE    [] OUTPATIENT REHABILITATION CENTER [facility]   [] Nain Estrella    [] [address] NIGHAT   [x] Gary Mems    Date: 2/15/2023  Patient Name:  Diane Joseph  : 1970  MRN: [id number]  CSN: [id number]    Referring Practitioner Serafin Mendoza DO   Diagnosis Open bite of left forearm, subsequent encounter [S51.702D]  Injury of other nerves at forearm level, left arm, subsequent encounter [S54.8X2D]    Treatment Diagnosis Pain in L hand, wrist, and forearm, Stiffness in L hand, wrist, and forearm, weakness in L hand, wrist, and forearm   Date of Evaluation 23      Functional Outcome Measure Used UEFI   Functional Outcome Score  (23)       Insurance: Primary: Payor: [facility] /  /  / ,   Secondary:    Authorization Information: PRECERTIFICATION REQUIRED:  Pre-cert required after initial evaluation through [address] [place] [address]. If [facility] is secondary insurance no pre-cert is needed. INSURANCE THERAPY BENEFIT: Unlimited visits for anyone under the age of 21. For those 21 and above, 30 visits for OT, PT and ST each per calendar year are approved. Benefit will not cover maintenance or preventative treatment. FCE is a covered benefit. AQUATIC THERAPY COVERED:   Yes  MODALITIES COVERED:  Yes. Hot/Cold Packs are not covered    RECEIVED AUTH FOR 80 UNITS OF CPT CODES:  [id number], [address], [id number], [id number], [id number], [id number]  FROM 23-23   Visit # 6, PN completed 2/15/23   Visits Allowed: 80 units each CPT code (20 visits of 4 units each)   Recertification Date: 2023   Physician Follow-Up: 2023   Physician Orders: Otilia Ormond and Tx all Full elbow wrist ROM, PROM and no AROM   Pertinent History: Pt is 44 y/o F who sustained a dog bite to her L forearm on 22.  Pt reports she went directly to the ER where she was admitted to the hospital for ~1 week and underwent multiple surgeries(3 per pt report) and contracted infections. Orthopedics were consulted and Dr. Migdalia Lund performed two surgeries. On 12/1/22, pt underwent L forearm I&D, wound exploration with identification of posterior interosseous and superficial radial nerve injureis and partial wound closure with wound VAC application. Pt found to have muscles loss of brachioradialis, ECRB, ECRL, and EDC muscle bellies, \"severe attenuation and stretch injury\" to posterior interosseous nerve, and complete transation with avulsion and nerve block of the superficial radial nerve. Dr. Migdalia Lund also performed sx on 12/4/22 including L forearm I&D, reverse lateral arm flap coverage, end-to-side transfer of the distal end of the superficial radial nerve into the side of the lateral antebrachial cutaneous nerve, and splint thickness skin grafting from the thigh to L forearm. Pt has since been discharged home and wearing a prefab forearm based thumb spica. 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 Pt reports she most recently saw Dr. Migdalia Lund 1/16/23 and he ordered occupational therapy for eval and treat and PROM. Pt reports he plans to complete further surgery in the future but pt needs to improve her ROM first.      Pt has a past medical history of Acid reflux, Anxiety, Asthma, Breast cancer ([address] COPD with asthma ([address] Depression, Depression, Dizziness - light-headed, Emphysema, History of chest pain, History of therapeutic radiation, History of tinnitus, Hx antineoplastic chemo, Hx of blood clots, Joint pain, Numbness and tingling, Osteoarthritis, PONV (postoperative nausea and vomiting), and SOB (shortness of breath) on exertion.        SUBJECTIVE: Patient reports continued compliance with HEP, reports the outrigger has been rubbing her hand a bit, and that she sees her physician next week. PN completed this date and will send note to physician    **ALL MEASUREMENTS TAKEN POST HEAT/STRETCH THIS DATE   RANGE OF MOTION   AROM(PROM) Right Left COMMENTS         Shoulder Flexion ; was NT; was 110    Shoulder Extension      Shoulder Abduction ; was NT; was 105    Shoulder Adduction      Shoulder External Rotation TBA NT; was 65    Shoulder Internal Rotation TBA Thumb tip to mid back; was NT; was Thumb to L hip    Shoulder Range of Motion is [facility]  []      Elbow Flexion 150 150; was NT; was 132    Elbow Extension 0 12; was NT; was 37    Forearm Pronation 72 80; was NT; was 73    Forearm Supination 69 70; was 61    Elbow Range of Motion is [facility]  []      Wrist Flexion 57 69; was NT; was 66    Wrist Extension 63(70 deg) (64); was (50) Comp wrist/digit ext PROM (56) was (41)   Wrist Radial Deviation 2 0; was NT; was -7 Prior injury to R wrist reported   Wrist Ulnar Deviation 35 20; was NT; was 20    Wrist Range of Motion is [facility]  []   If no measurement is recorded, no formal assessment was completed for that motion.         HAND RANGE OF MOTION   Extenstion AROM (Passive) Right Left Flexion   Thump MP Ext TBA (0); was NT; was (0) MP Flexion-35   Thumb IP Ext TBA (0); was NT; was (0) IP Flexion-19   Thumb Radial Abduction/Adduction TBA (50); was NT; was 19(52)    Thumb Palmar Abduction/Adduction TBA  42; was NT; was 45   Thumb Opposition TBA  Radial P2 of SF; was NT; was Radial tip of SF      Index Finger MP 0+ (+25); was NT; was 50(0)    Index Finger PIP 0+ (+20); was NT; was 25    Index Finger DIP 0+ (+15); was NT; was 20    Distal Mcclelland Crease (comp flexion) 2.5  2.5; was NT; was 3      Long Finger MP 0 (+20); was NT; was 50(0)    Long Finger PIP 0 (0); was NT; was 21    Long Finger DIP 0 (0); was NT; was 15    Distal Mcclelland Crease (comp flexion) 0  2; was NT; was 2      Ring Finger MP 0 (+15); was NT; was 64(0)    Ring Finger PIP 0 (0); was NT; was 15    Ring Finger DIP 0 (0); was NT; was 5 Distal Mcclelland Crease (comp flexion) 0  0; was NT; was 3      Little Finger MP 0 (+35); was NT; was 55(0)    Little Finger PIP 0 (0); was NT; was 5    Little Finger DIP 0 (0); was NT; was 5    Distal Mcclelland Crease (comp flexion) 0  0; was NT; was 3   If no measurement is recorded, no formal assessment was completed for that motion       TREATMENT   Precautions: L radial nerve injury, wrist drop, wrist splint on at all times other than exercise/hygiene   Pain: 2/10     X in shaded column indicates Activity Completed Today   Modalities Parameters/  Location  Notes/Comments   P with ther ex, see below  x Stretching throughout, see ther ex                 Manual Therapy Time/  Technique  Notes/Comments   Desensitization rubs with various textures 1 min each texture   Hook velcro and david-foam, vibration with mini massager; pt reports she notices reduced burning sensation in her hand since beginning desensitization   Manual comp wrist/digit extension stretching  x    L forearm STM and scar mobilizations  x Extremely tight in volar forearm   Exercises   Sets/  Sec Reps  Notes/Comments   Wrist/digit ext and supination stretch within MHP 10 min 1 x Used prayer stretch board to assist with comp wrist/digit ext   Prayer stretch 10 sec In-and-out of stretch for 5 min x    Elbow flex/ext AAROM with dowel 1 10 x Improvement noted in extension stretch    Standing Gravity assisted elbow ext stretch over dowel 1 min 2 x    Seated overhead pulleys for elbow ext and shoulder flexion stretch 3 min 1  Cues to hold at end range versus quick movement    Supination AAROM with dowel 2 min  10 sec hold in and out of stretch x    Wrist/digit comp ext stretch on table 15 sec 4 x           Activities Time    Notes/Comments   Additional size C Medigrip provided for edema management and compression for pain management relief       Clothing fastener training with outrigger in place   Increased ability to complete buttons and snaps with outrigger in place for ext assist    Cone stacking activity with outrigger in place    Increased ease with outrigger in place (6 cones stacked)   Outrigger fabrication due to radial nerve injury (completed during ther ex this date)  x Modification to outrigger this date to use suede loops to decrease bulk of finger loops for improved comfort, also adjusted frame of outrigger to reduce rubbing near SF MCPJ   Wrist/digit ext stretch/light WB on table while reaching across table with R hand to retrieve  objects      Goal Assessment for progress note  x      Specific Interventions Next Treatment: fabricate outrigger, emphasize PROM for composite wrist/digit ext and supination, STM to L forearm    Activity/Treatment Tolerance:  [x]  Patient tolerated treatment well  []  Patient limited by fatigue  []  Patient limited by pain   [x]  Patient limited by other medical complications: wrist drop  []  Other:     Assessment: Patient has participated in 6 OT sessions over the last ~3 weeks due to ROM limitations and decreased functional use of L hand. Worked heavily on progressing available ROM, especially increasing active supination and passive wrist and digit extension in preparation for possible future surgery and preventing contracture as well as reducing pt's pain. Pt has consistently reported decreased pain compared to prior to beginning OT and reports compliance with her HEP to progress her ROM. Pt has made progress in all goal areas and noted improvements in her ROM after just three weeks of therapy. Pt would benefit from continued skilled therapy services to further progress ROM and functional use of her L UE.     Areas for Improvement: impaired coordination, impaired motor control, impaired ROM, impaired sensation, impaired skin integrity, impaired strength, and pain  Prognosis: fair    GOALS:  Patient Goal: to improve use of L hand, to get back to work, to get ready for upcoming surgeries    Short Term Goals:  Time Frame: 4 weeks  *Patient will be fabricated and issued an outrigger splint to increase her ability to use her L hand for ADL tasks while awaiting nerve healing. GOAL MET pt has outrigger and notes increased ability to use her L hand with outrigger in place, has required some modification throughout and will adjust at her visit next week with new material to maximize comfort and reduce rubbing CONTINUE GOAL    *  Patient will improve PROM of composite wrist/digit extension to >50 and forearm supination to >55 for increased flexibility to prepare for upcoming surgeries and for improved hand function while awaiting nerve healing. GOAL MET pt has 56 deg comp wrist/digit extension stretching and 70 degrees ACTIVE supination which demonstrates significant improvement. REVISE GOAL Pt will improve PROM of composite wrist/digit extension to >62 for increased flexibility to prepare for upcoming surgeries, for improved hand function while awaiting nerve healing, and for decreased likelihood of developing contractures  * Patient will improve AROM of L elbow extension to <25, L shoulder flexion to >120, L shoulder internal rotation thumb tip to 1\" below waistline, L shoulder abduction to >100 and external rotation to >70 for increased ease of completing upper body dressing GOAL MET elbow ext=12 deg, shoulder flexion and dofybtome=514, IR to mid back and ER visibly >70 this date, pt reports getting dressed has gotten easier, she mostly has difficulty completing fasteners due to limited use of her L hand. REVISE GOAL Pt will increase elbow ext to <9 degrees and thumb opposition to base of SF for increased ability to reach out and  change with her L hand. *Patient will improve distal allen crease of index, middle, ring, and small fingers to <1 for increased ability to hold her medication in her hand.  GOAL PARTIALLY MET for SF and RF, deficits remain in IF and MF but pt reports she attributes some of this to arthritis CONTINUE GOAL    Long Term Goals:  Time Frame: 8 weeks  *  Patient will improve UEFS to at least 25 DID NOT TEST TODAY, will assess at next progress note CONTINUE GOAL  *  Patient will report pain with activity no greater than 3/10 to increase ease and ability to perform bathing routine. GOAL MET pt reports she has not been experiencing pain during daily activities. REVISE GOAL Pt will report pain with stretching HEP <3/10 for increased flexibility for improved use of L hand, prevention of contracture, and in prep for future surgery if indicated. *  Patient will demonstrate I knowledge of HEP for improved flexibility and reduced pain. GOAL MET will continue goal as pt is issued new HEP exercises throughout course of tx CONTINUE GOAL  *NEW GOAL ADDED- Pt will participate in desensitization program due decrease reported hypersensitivity in L hand to reduce discomfort and improve ability to tolerate various textures touching her hand during ADLs. GOAL MET pt reports reduced pain/electrical shock feelings in dorsal hand since beginning desensitization CONTINUE GOAL for further desensitization as needed    Patient Education:   [x]  HEP/Education Completed: Plan of Care, Goals, HEP  [address] Access Code for HEP: Access Code: [email or web address]  Forearm AAROM Supination and Pronation with Board - 1 x daily - 7 x weekly - 3 sets - 10 reps  Seated Elbow Flexion and Extension AROM - 1 x daily - 7 x weekly - 3 sets - 10 reps  Wrist Prayer Stretch with board - 1 x daily - 7 x weekly - 3 sets - 10 reps  Elbow ext stretch over dowel adrian or over upside down pot x3-5 min (gravity assisted elbow ext stretch)  Desensitization rubs with various textures to dorsal hand where pt reports hypersensitive  Vibration for desensitization at home, WB  Standing wrist/digit ext stretch at table  []  No new Education completed  [x]  Reviewed Prior HEP      [x]  Patient verbalized and/or demonstrated understanding of education provided.   []  Patient unable to verbalize and/or demonstrate understanding of education provided. Will continue education. [x]  Barriers to learning: none    PLAN:  Treatment Recommendations: Strengthening, Range of Motion, Neuromuscular Re-education, Manual Therapy - Soft Tissue Mobilization, Manual Therapy - Joint Manipulation, Pain Management, Home Exercise Program, Patient Education, Self-Care Education and Training, Positioning, Modalities, Wound Care, and Splint Fabrications/Modifications    []  Plan of care initiated. Plan to see patient 2 times per week for 8 weeks to address the treatment planned outlined above.   [x]  Continue with current plan of care  []  Modify plan of care as follows:    []  Hold pending physician visit  []  Discharge    Time In 1346   Time Out 1446   Timed Code Minutes:  60 min   Total Treatment Time: 60 min       [facility], [address] [place] [address], OTR/L [id number]

## 2023-02-16 ENCOUNTER — OFFICE VISIT (OUTPATIENT)
Dept: ONCOLOGY | Age: 53
End: 2023-02-16

## 2023-02-16 ENCOUNTER — HOSPITAL ENCOUNTER (OUTPATIENT)
Dept: INFUSION THERAPY | Age: 53
Discharge: HOME | End: 2023-02-16
Payer: COMMERCIAL

## 2023-02-16 VITALS
HEIGHT: 68 IN | OXYGEN SATURATION: 97 % | SYSTOLIC BLOOD PRESSURE: 129 MMHG | WEIGHT: 166.6 LBS | HEART RATE: 111 BPM | DIASTOLIC BLOOD PRESSURE: 74 MMHG | RESPIRATION RATE: 20 BRPM | TEMPERATURE: 97.8 F | BODY MASS INDEX: 25.25 KG/M2

## 2023-02-16 DIAGNOSIS — C50.912 BREAST CANCER METASTASIZED TO AXILLARY LYMPH NODE, LEFT (HCC): Primary | ICD-10-CM

## 2023-02-16 DIAGNOSIS — C77.3 BREAST CANCER METASTASIZED TO AXILLARY LYMPH NODE, LEFT (HCC): Primary | ICD-10-CM

## 2023-02-16 PROCEDURE — 99211 OFF/OP EST MAY X REQ PHY/QHP: CPT

## 2023-02-16 RX ORDER — LETROZOLE 2.5 MG/1
2.5 TABLET, FILM COATED ORAL EVERY OTHER DAY
Qty: 30 TABLET | Refills: 5 | Status: SHIPPED | OUTPATIENT
Start: 2023-02-16

## 2023-02-16 NOTE — PATIENT INSTRUCTIONS
Reviewed labs and recent medical history  Discussed her Dexa scan from September 2022. Encouraged her to take Caltrate-D (generic version) 2 times a day. Discussed smoking cessation and that there are tools available. Due for a mammogram in May, 2023. Order placed. Reviewed her Femara and asked her to continue her medication for 7 years. Ask her to take daily if she can tolerate it.   Return to see MD in 6 months

## 2023-02-16 NOTE — PROGRESS NOTES
[address] CANCER [address] [place] [address]  Dept: [phone]  Loc: Aric Perkins [address]61  1970   No ref. provider found   Baldomero Toribio MD       Jerrod Cool is a 45 y.o.  female with a history of breast cancer diagnosed in 2017. CHIEF COMPLAINT  Chief Complaint   Patient presents with    Follow-up     Breast cancer metastasized to axillary lymph node, left ([address]          HISTORY OF PRESENT ILLNESS    9/28/2017. Mammogram at [facility] AT THE [facility] showed bilateral abnormalities. She had multiple cysts seen on the right side and a spiculated malignant appearing mass on the left. This measured 3.8 x 3.4 x 2.7 cm. Further studies showed that there was an abnormal lymph node in the left axilla. 10/6/2017. Biopsies of these masses were performed showing invasive ductal carcinoma, ER +95%, DC +95%, HER2 was over amplified and 2+ by IHC. FISH test was equivocal.  HER2/CEP 17 ratio was 1.7, the average HER2 copy number was 4. The patient had a strong family history of breast cancer. She was diagnosed with breast cancer before the age of 46. Genetic testing showed variants of uncertain uncertain significance. She sought second opinion at the [facility] with Dr. Kingsley harding. She decided to have care closer to home. Metastatic work-up including CT scan of the chest abdomen and bone scan were negative for evidence of metastatic disease. 11/7/2017. Left breast mastectomy with left axillary lymph node dissection by Dr. Home Villarreal showed invasive ductal carcinoma with lobular features involving the nipple in all 4 quadrants of the breast.  Maximum tumor dimension was estimated to be 6.8 cm. It was the same pT3 with negative margins. 3 out of 9 axillary lymph nodes were positive for metastatic adenocarcinoma, PN1A.   Level 2 axillary lymph nodes were also dissected and 5 out of 5 were negative. 12/13/2017. Started adjuvant chemotherapy with AC.  3/7/2018 weekly Taxol was started which was completed on 5/30/2018. The patient underwent hysterectomy without oophorectomy. She had estradiol FSH levels checked on 3 separate occasions. The [facility] level was in the postmenopausal range and the estradiol level was before low 5. August 23, 2018. Completed postmastectomy radiation therapy. 9/15/2018. Started adjuvant hormonal therapy with Femara. She was enrolled in a clinical study combining aromatase inhibitor with early medicine versus placebo. Due to hospitalization for pneumonia possibly pneumonitis she was taken off study. 9/26/2018. Bone mineral density test was normal.    3/2/2019 through 3/4/2019. Hospitalized for pneumonia treated with IV antibiotics and steroids. 6/2019. CT scan of the chest showed markedly improved nodule and groundglass opacities throughout bilateral lower lobes but she had persistent mediastinal lymphadenopathy. 10/2019. 3-month follow-up CT scan showed further decrease in the nodularity and groundglass opacities in both lung bases. 9/2020. History of pulmonary emboli. The patient completed 6 months of anticoagulation. 12/2021. Repeat CT scan of the chest showed no suspicious findings. 5/2022. Follow-up with Dr. Danya Levy. She was instructed to take a 2-month break from her Femara and reported improvement in her arthralgias. 5/9/2022. Mammogram showed no evidence of malignancy. 9/2/2022. Seen in the office by Jacobo Mooney at which time the patient says that she had no new concerns related to her breast or mastectomy site. She did have intermittent left chest wall pain with movement. He was instructed to resume Femara every other day and stay on this medication for at least 7 to 10 years.   The patient was noted to have decreased range of motion of the left upper extremity and lymphedema involving the inferior portion of the left mastectomy scar for which referral was placed to physical therapy. 9/21/2022. DEXA scan qualified as osteopenia. INTERVAL NOTE    9/2/2022. She was seen in the office by Sigrid Ritter and follow-up. She was encouraged to continue on her letrozole she had had a quite large tumor at 6.8 cm and 3 out of 9 nodes were positive for metastatic adenocarcinoma. He is tolerating her medication well. She has had a bone mineral density test which was performed on 9/21/2022 which showed osteopenia. We encouraged her to continue on calcium and vitamin D twice a day and do weightbearing exercise. She has been encouraged to stay on her letrozole for 7 to 10 years. 11/28 through 12/6/2022. Admitted to [facility] for an extensive dog bite of her left arm. She has had multiple surgeries and has had reconstructive efforts to save the function of her left arm.    2/16/2023. She is here today in follow-up. She is in good spirits despite the fact that she has had extensive, multiple surgeries and is facing even more rehabilitative efforts. She thought that she had gained some weight because of inactivity, but actually had loss 7 pounds since September. She denies fevers, chills, sweats, bleeding, nausea and vomiting, constipation diarrhea and she says that her appetite is fair. She is not aware of any new pain and she does not have any masses lumps or bumps. Today we reviewed the fact that she needs to quit smoking. She says that this is not a good time for her to do that because of her increased anxiety and her aunt forced lack of activity because of her injury. She said that she will call us if she needs help with smoking cessation. MONITORING PARAMETERS    Physical examinations, ultrasound and mammography, imaging studies if indicated by symptomatology.     PAST MEDICAL HISTORY  Past Medical History:   Diagnosis Date    Acid reflux     Anxiety     Asthma     Breast cancer ([facility] [address] COPD with asthma ([address]     Depression     Depression     Dizziness - light-headed     HX OF:    Emphysema     History of chest pain     History of therapeutic radiation     History of tinnitus     Hx antineoplastic chemo     Hx of blood clots     Joint pain     Numbness and tingling     Osteoarthritis     PONV (postoperative nausea and vomiting)     SOB (shortness of breath) on exertion         REVIEW OF SYSTEMS  Review of Systems   Constitutional:  Positive for fatigue. Negative for appetite change, diaphoresis and fever. HENT:  Negative for dental problem, rhinorrhea, sinus pressure, sore throat and trouble swallowing. Eyes:  Negative for visual disturbance. Respiratory:  Positive for cough (occasional). Negative for shortness of breath. Cardiovascular:  Negative for leg swelling. Gastrointestinal:  Negative for abdominal pain, constipation, diarrhea, nausea, rectal pain and vomiting. Endocrine: Negative for polyuria. Genitourinary:  Negative for hematuria, urgency and vaginal discharge. Musculoskeletal:  Positive for arthralgias (still has some pain in her left arm from previous dog bite) and myalgias. Skin:  Negative for pallor. Neurological:  Negative for weakness, light-headedness and headaches. Hematological:  Does not bruise/bleed easily. Psychiatric/Behavioral:  Negative for self-injury, sleep disturbance and suicidal ideas. The patient is not nervous/anxious.        FAMILY HISTORY  Family History   Problem Relation Age of Onset    Heart Disease Mother     Hypertension Mother     Diabetes Mother     Heart Disease Father     No Known Problems Sister     No Known Problems Brother     Breast Cancer Maternal Aunt     Breast Cancer Paternal Aunt     Endometrial Cancer Paternal Aunt     Lung Cancer Paternal Aunt         SOCIAL HISTORY  Social History     Socioeconomic History    Marital status:      Spouse name: Not on file    Number of children: 4    Years of education: Not on file    Highest education level: Not on file   Occupational History    Occupation:    Tobacco Use    Smoking status: Every Day     Packs/day: 1.00     Years: 23.00     Pack years: 23.00     Types: Cigarettes     Start date: 9/1/1999    Smokeless tobacco: Never    Tobacco comments:     Just received smoking cessation information from pcp per patient 9/2/22   Vaping Use    Vaping Use: Never used   Substance and Sexual Activity    Alcohol use: Yes     Comment: rarely    Drug use: No    Sexual activity: Not on file   Other Topics Concern    Not on file   Social History Narrative    Not on file     Social Determinants of Health     Financial Resource Strain: Not on file   Food Insecurity: Not on file   Transportation Needs: Not on file   Physical Activity: Not on file   Stress: Not on file   Social Connections: Not on file   Intimate Partner Violence: Not on file   Housing Stability: Not on file        CURRENT MEDICATIONS  Current Outpatient Medications   Medication Sig Dispense Refill    letrozole ([address] [facility]) 2.5 MG tablet Take 1 tablet by mouth every other day 30 tablet 3    DULERA 200-5 MCG/ACT inhaler Inhale 2 puffs into the lungs 2 times daily 13 g 5    albuterol sulfate  (90 Base) MCG/ACT inhaler Inhale 1 puff into the lungs every 6 hours as needed for Wheezing or Shortness of Breath 1 Inhaler 5    NONFORMULARY 2 tablets daily Tumeric      b complex vitamins capsule Take 1 capsule by mouth daily      albuterol (PROVENTIL) (2.5 MG/3ML) 0.083% nebulizer solution Inhale 2.5 mg into the lungs every 4 hours as needed       butalbital-apap-caffeine -40 MG CAPS Take by mouth      ALPRAZolam (XANAX) 0.5 MG tablet Take 0.5 mg by mouth nightly as needed       cyclobenzaprine (FLEXERIL) 5 MG tablet Take 5 mg by mouth 3 times daily as needed       venlafaxine 150 MG extended release tablet Take 150 mg by mouth daily      diclofenac (VOLTAREN) 75 MG EC tablet Take 75 mg by mouth 2 times daily (Patient not taking: Reported on 11/28/2022)      amoxicillin-clavulanate (AUGMENTIN) 875-125 MG per tablet Take 1 tablet by mouth 2 times daily (Patient not taking: No sig reported)       No current facility-administered medications for this visit. OARRS    PDMP Monitoring:    Last PDMP Fernandolindagary Lambertre as Reviewed [facility]):  Review User Review Instant Review Result          Last Controlled Substance Monitoring Documentation      [address] Office Visit from 5/16/2018 in [address] The Prescription Monitoring Report for this patient was reviewed today. filed at 05/16/2018 1046   Periodic Controlled Substance Monitoring Possible medication side effects, risk of tolerance/dependence & alternative treatments discussed., No signs of potential drug abuse or diversion identified. filed at 05/16/2018 1046   Chronic Pain > 80 MEDD Treatment objectives documented - patient is progressing appropriately. filed at 05/16/2018 1046          Urine Drug Screenings (1 yr)       Urine Drug Screen  Collected: 3/2/2019 11:45 PM (Final result)                  Medication Contract and Consent for Opioid Use Documents Filed        No documents found                     PHYSICAL EXAM    Physical Exam  Vitals (Pulse rate today is 111.) and nursing note reviewed. Exam conducted with a chaperone present. Constitutional:       General: She is not in acute distress. Appearance: She is not ill-appearing, toxic-appearing or diaphoretic. Comments: Mrs. Conchis Sanchez is a well-developed well-nourished  female who is in no acute distress. She has a lot of blonde long hair. She does not appear to be acutely or chronically ill. She has an obviously compromised left upper extremity injury. She has had reconstruction. A lot of her arm is covered in [place]. HENT:      Head: Normocephalic and atraumatic.       Nose: Nose normal.      Mouth/Throat:      Mouth: Mucous membranes are moist.      Pharynx: Oropharynx is clear. No oropharyngeal exudate. Eyes:      General: No scleral icterus. Extraocular Movements: Extraocular movements intact. Conjunctiva/sclera: Conjunctivae normal.      Pupils: Pupils are equal, round, and reactive to light. Cardiovascular:      Rate and Rhythm: Regular rhythm. Tachycardia present. Heart sounds: Normal heart sounds. No murmur heard. No friction rub. No gallop. Pulmonary:      Breath sounds: Normal breath sounds. No wheezing or rales. Abdominal:      General: Abdomen is flat. Palpations: Abdomen is soft. Tenderness: There is no abdominal tenderness. There is no guarding. Hernia: No hernia is present. Musculoskeletal:         General: Signs of injury (left arm) present. Normal range of motion. Cervical back: Normal range of motion and neck supple. Right lower leg: No edema. Left lower leg: No edema. Skin:     General: Skin is warm and dry. Coloration: Skin is not jaundiced. Neurological:      General: No focal deficit present. Mental Status: She is alert and oriented to person, place, and time. Mental status is at baseline. Motor: No weakness. Gait: Gait normal.   Psychiatric:         Mood and Affect: Mood normal.         Behavior: Behavior normal.         Thought Content: Thought content normal.         Judgment: Judgment normal.   She is status post left mastectomy with no evidence of local recurrence. Right breast shows no masses nipple change or discharge. There is no significant lymphadenopathy.     ECOG STATUS    1:  Restricted in physically strenuous activity but ambulatory and able to carry out work of a light or sedentary nature, e.g., light house work, office work    LABS/IMAGING    No further laboratory data was requested          PATHOLOGY/GENETICS    Breast cancer, ER positive, RI positive, HER2 equivocal, node positive        ASSESSMENT and PLAN    1.  Stage pT3, PN 1, M0 ER positive left breast cancer. She will continue on letrozole for total of 7 to 10 years. We have asked her to supplement her diet with calcium and vitamin D and continue with weightbearing exercise. 2.  Continued smoking. She was cautioned to stop this habit. She does not think that she can at this time but will except smoking cessation assistance when she is ready to quit. 3.  Multiple comorbidities. She will continue follow-up with her usual providers and continue her usual medications for her GERD, anxiety, COPD, depression, history of blood clots and osteoarthritis. The patient will return to see us in a month's time. She knows to call if she has any change in her status, questions or concerns.         Jazmin Angulo MD 2/16/2023 3:19 PM

## 2023-02-21 ENCOUNTER — HOSPITAL ENCOUNTER (OUTPATIENT)
Dept: OCCUPATIONAL THERAPY | Age: 53
Setting detail: THERAPIES SERIES
Discharge: HOME | End: 2023-02-21
Payer: COMMERCIAL

## 2023-02-21 PROCEDURE — 97140 MANUAL THERAPY 1/> REGIONS: CPT

## 2023-02-21 PROCEDURE — 97110 THERAPEUTIC EXERCISES: CPT

## 2023-02-21 NOTE — PROGRESS NOTES
[address]  [] EVALUATION  [x] DAILY NOTE (LAND) [] DAILY NOTE (AQUATIC ) [] PROGRESS NOTE [] DISCHARGE NOTE    [] [facility]   [] [address]    [] [address] [employer]   [x] Ankur Valentin    Date: 2023  Patient Name:  Brittany Lima  : 1970  MRN: [id number]  CSN: [id number]    Referring Practitioner Neil Gruber DO   Diagnosis Open bite of left forearm, subsequent encounter [S51.522D]  Injury of other nerves at forearm level, left arm, subsequent encounter [S54.8X2D]    Treatment Diagnosis Pain in L hand, wrist, and forearm, Stiffness in L hand, wrist, and forearm, weakness in L hand, wrist, and forearm   Date of Evaluation 23      Functional Outcome Measure Used UEFI   Functional Outcome Score 880 (23)       Insurance: Primary: Payor: Sydni Crevantes /  /  / ,   Secondary:    Authorization Information: PRECERTIFICATION REQUIRED:  Pre-cert required after initial evaluation through [facility]. If [facility] is secondary insurance no pre-cert is needed. INSURANCE THERAPY BENEFIT: Unlimited visits for anyone under the age of 21. For those 21 and above, 30 visits for OT, PT and ST each per calendar year are approved. Benefit will not cover maintenance or preventative treatment. FCE is a covered benefit. AQUATIC THERAPY COVERED:   Yes  MODALITIES COVERED:  Yes. Hot/Cold Packs are not covered    RECEIVED AUTH FOR 80 UNITS OF CPT CODES:  [id number], [phone], [id number] [facility], [id number], [id number], [id number]  FROM 23-23   Visit # 8, 1/10 for PN, PN completed 2/15/23   Visits Allowed: 80 units each CPT code (20 visits of 4 units each)   Recertification Date: 2023   Physician Follow-Up: 2023   Physician Orders: Andrey Buckner and Tx all Full elbow wrist ROM, PROM and no AROM   Pertinent History: Pt is 44 y/o F who sustained a dog bite to her L forearm on 22.  Pt reports she went directly to the ER where she was admitted to the hospital for ~1 week and underwent multiple surgeries(3 per pt report) and contracted infections. Orthopedics were consulted and Dr. Marcelo Stewart performed two surgeries. On 12/1/22, pt underwent L forearm I&D, wound exploration with identification of posterior interosseous and superficial radial nerve injureis and partial wound closure with wound VAC application. Pt found to have muscles loss of brachioradialis, ECRB, ECRL, and EDC muscle bellies, \"severe attenuation and stretch injury\" to posterior interosseous nerve, and complete transation with avulsion and nerve block of the superficial radial nerve. Dr. Marcelo Stewart also performed sx on 12/4/22 including L forearm I&D, reverse lateral arm flap coverage, end-to-side transfer of the distal end of the superficial radial nerve into the side of the lateral antebrachial cutaneous nerve, and splint thickness skin grafting from the thigh to L forearm. Pt has since been discharged home and wearing a prefab forearm based thumb spica. 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 Pt reports she most recently saw Dr. Marcelo Stewart 1/16/23 and he ordered occupational therapy for eval and treat and PROM. Pt reports he plans to complete further surgery in the future but pt needs to improve her ROM first.      Pt has a past medical history of Acid reflux, Anxiety, Asthma, Breast cancer ([address] COPD with asthma ([address] Depression, Depression, Dizziness - light-headed, Emphysema, History of chest pain, History of therapeutic radiation, History of tinnitus, Hx antineoplastic chemo, Hx of blood clots, Joint pain, Numbness and tingling, Osteoarthritis, PONV (postoperative nausea and vomiting), and SOB (shortness of breath) on exertion.        SUBJECTIVE: Patient reports she saw her physician yesterday who gave pt script for continued therapy and a custom orthosis and will see pt again in 1 month. Pt reports disappointment as she was hoping to be ready for her future surgeries to improve function of her L hand.  OTR discussed with pt that soft tissue change takes time and that she has made great progress but there is still progress to be made      TREATMENT   Precautions: L radial nerve injury, wrist drop, wrist splint on at all times other than exercise/hygiene   Pain: 2/10     X in shaded column indicates Activity Completed Today   Modalities Parameters/  Location  Notes/Comments   MHP with ther ex, see below  x Stretching throughout, see ther ex                 Manual Therapy Time/  Technique  Notes/Comments   Desensitization rubs with various textures 1 min each texture   Hook velcro and david-foam, vibration with mini massager; pt reports she notices reduced burning sensation in her hand since beginning desensitization   Edema control via [address] extended to cover entire forearm proximal to elbow as pt reports mid forearm sleeves roll and pressure area noted  x    Manual comp wrist/digit extension stretching  x    L forearm manual STM and scar mobilizations; scraping and tuning fork utilized for scar mobilization  x Extremely tight in volar forearm, also completed scraping to volar and dorsal wrist as tightness reported in these areas with stretching   Exercises   Sets/  Sec Reps  Notes/Comments   Wrist/digit ext and supination stretch within MHP 12 min 1 x Used prayer stretch board and 1 lb weight for increased stretch over side of table, increased tolerance, only 1 break today throughout 12 min stretch   Prayer stretch 10 sec In-and-out of stretch for 5 min x    Elbow flex/ext AAROM with dowel 1 10 x Improvement noted in extension stretch    Standing Gravity assisted elbow ext stretch over dowel 1 min 2     Seated overhead pulleys for elbow ext and shoulder flexion stretch 3 min 1  Cues to hold at end range versus quick movement    Supination AAROM with dowel 2 min  10 sec hold in and out of stretch     Wrist/digit comp ext stretch on table 15 sec 4 x Completed on wall with wall wash today, increased stretch noted    Thumb flexion/opposition target touches, comp fist target touches 1 10 ea x Therapist supporting wrist in extension           Activities Time    Notes/Comments   Additional size C Medigrip provided for edema management and compression for pain management relief       Clothing fastener training with outrigger in place   Increased ability to complete buttons and snaps with outrigger in place for ext assist    Cone stacking activity with outrigger in place    Increased ease with outrigger in place (6 cones stacked)   Outrigger fabrication due to radial nerve injury (completed during ther ex this date)   Modification to outrigger this date to use suede loops to decrease bulk of finger loops for improved comfort, also adjusted frame of outrigger to reduce rubbing near SF MCPJ   Wrist/digit ext stretch/light WB on table while reaching across table with R hand to retrieve  objects      Goal Assessment for progress note        Specific Interventions Next Treatment: fabricate outrigger, emphasize PROM for composite wrist/digit ext and supination, STM to L forearm    Activity/Treatment Tolerance:  [x]  Patient tolerated treatment well  []  Patient limited by fatigue  []  Patient limited by pain   [x]  Patient limited by other medical complications: wrist drop  []  Other:     Assessment: Patient is progressing toward goals. Tightness remains but pt continues to improve and report compliance with HEP. OTR encouraged pt that it takes extended time for soft tissue change to occur and that we will keep progressing over the coming weeks.  Pt's physician ordered a custom orthosis and pt would greatly benefit from a custom orthosis with an outrigger to better support her wrist in increased extension for edema control, improved grasp via tenodesis, and to provide extension assist to her digits as pt is still unable to extend all digits due to her nerve injury. Areas for Improvement: impaired coordination, impaired motor control, impaired ROM, impaired sensation, impaired skin integrity, impaired strength, and pain  Prognosis: fair    GOALS:  Patient Goal: to improve use of L hand, to get back to work, to get ready for upcoming surgeries    Short Term Goals:  Time Frame: 4 weeks  *Patient will be fabricated and issued an outrigger splint to increase her ability to use her L hand for ADL tasks while awaiting nerve healing. GOAL MET pt has outrigger and notes increased ability to use her L hand with outrigger in place, has required some modification throughout and will adjust at her visit next week with new material to maximize comfort and reduce rubbing CONTINUE GOAL    *  Patient will improve PROM of composite wrist/digit extension to >50 and forearm supination to >55 for increased flexibility to prepare for upcoming surgeries and for improved hand function while awaiting nerve healing. GOAL MET pt has 56 deg comp wrist/digit extension stretching and 70 degrees ACTIVE supination which demonstrates significant improvement. REVISE GOAL Pt will improve PROM of composite wrist/digit extension to >62 for increased flexibility to prepare for upcoming surgeries, for improved hand function while awaiting nerve healing, and for decreased likelihood of developing contractures  * Patient will improve AROM of L elbow extension to <25, L shoulder flexion to >120, L shoulder internal rotation thumb tip to 1\" below waistline, L shoulder abduction to >100 and external rotation to >70 for increased ease of completing upper body dressing GOAL MET elbow ext=12 deg, shoulder flexion and [id number], IR to mid back and ER visibly >70 this date, pt reports getting dressed has gotten easier, she mostly has difficulty completing fasteners due to limited use of her L hand.  REVISE GOAL Pt will increase elbow ext to <9 degrees and thumb opposition to base of SF for increased ability to reach out and  change with her L hand. *Patient will improve distal allen crease of index, middle, ring, and small fingers to <1 for increased ability to hold her medication in her hand. GOAL PARTIALLY MET for SF and RF, deficits remain in IF and MF but pt reports she attributes some of this to arthritis CONTINUE GOAL    Long Term Goals:  Time Frame: 8 weeks  *  Patient will improve UEFS to at least 25 DID NOT TEST TODAY, will assess at next progress note CONTINUE GOAL  *  Patient will report pain with activity no greater than 3/10 to increase ease and ability to perform bathing routine. GOAL MET pt reports she has not been experiencing pain during daily activities. REVISE GOAL Pt will report pain with stretching HEP <3/10 for increased flexibility for improved use of L hand, prevention of contracture, and in prep for future surgery if indicated. *  Patient will demonstrate I knowledge of HEP for improved flexibility and reduced pain. GOAL MET will continue goal as pt is issued new HEP exercises throughout course of tx CONTINUE GOAL  *NEW GOAL ADDED- Pt will participate in desensitization program due decrease reported hypersensitivity in L hand to reduce discomfort and improve ability to tolerate various textures touching her hand during ADLs.  GOAL MET pt reports reduced pain/electrical shock feelings in dorsal hand since beginning desensitization CONTINUE GOAL for further desensitization as needed    Patient Education:   [x]  HEP/Education Completed: Plan of Care, Goals, HEP  [employer] Access Code for HEP: Access Code: [email or web address]  Forearm AAROM Supination and Pronation with Board - 1 x daily - 7 x weekly - 3 sets - 10 reps  Seated Elbow Flexion and Extension AROM - 1 x daily - 7 x weekly - 3 sets - 10 reps  Wrist Prayer Stretch with board - 1 x daily - 7 x weekly - 3 sets - 10 reps  Elbow ext stretch over dowel adrian or over upside down pot x3-5 min (gravity assisted elbow ext stretch)  Desensitization rubs with various textures to dorsal hand where pt reports hypersensitive  Vibration for desensitization at home, WB  Standing wrist/digit ext stretch at table: or on wall  []  No new Education completed  []  Reviewed Prior HEP      [x]  Patient verbalized and/or demonstrated understanding of education provided. []  Patient unable to verbalize and/or demonstrate understanding of education provided. Will continue education. [x]  Barriers to learning: none    PLAN:  Treatment Recommendations: Strengthening, Range of Motion, Neuromuscular Re-education, Manual Therapy - Soft Tissue Mobilization, Manual Therapy - Joint Manipulation, Pain Management, Home Exercise Program, Patient Education, Self-Care Education and Training, Positioning, Modalities, Wound Care, and Splint Fabrications/Modifications    []  Plan of care initiated. Plan to see patient 2 times per week for 8 weeks to address the treatment planned outlined above.   [x]  Continue with current plan of care  []  Modify plan of care as follows:    []  Hold pending physician visit  []  Discharge    Time In 1228   Time Out 1326   Timed Code Minutes:  58 min   Total Treatment Time: 62 min       [facility], [address] [place] [address], OTR/L [id number]

## 2023-02-23 ENCOUNTER — HOSPITAL ENCOUNTER (OUTPATIENT)
Dept: OCCUPATIONAL THERAPY | Age: 53
Setting detail: THERAPIES SERIES
End: 2023-02-23
Payer: COMMERCIAL

## 2023-02-24 ENCOUNTER — HOSPITAL ENCOUNTER (OUTPATIENT)
Dept: OCCUPATIONAL THERAPY | Age: 53
Setting detail: THERAPIES SERIES
Discharge: HOME | End: 2023-02-24
Payer: COMMERCIAL

## 2023-02-24 PROCEDURE — 97140 MANUAL THERAPY 1/> REGIONS: CPT

## 2023-02-24 PROCEDURE — L3808 WHFO, RIGID W/O JOINTS: HCPCS

## 2023-02-24 PROCEDURE — 97110 THERAPEUTIC EXERCISES: CPT

## 2023-02-24 NOTE — PROGRESS NOTES
[address]  [] EVALUATION  [x] DAILY NOTE (LAND) [] DAILY NOTE (AQUATIC ) [] PROGRESS NOTE [] DISCHARGE NOTE    [] [facility]   [] [address]    [] [address] [facility]   [x] Ricki     Date: 2023  Patient Name:  Pipo Corona  : 1970  MRN: [id number]  CSN: [id number]    Referring Practitioner Renaldo Braun DO   Diagnosis Open bite of left forearm, subsequent encounter [S51.012D]  Injury of other nerves at forearm level, left arm, subsequent encounter [S54.8X2D]    Treatment Diagnosis Pain in L hand, wrist, and forearm, Stiffness in L hand, wrist, and forearm, weakness in L hand, wrist, and forearm   Date of Evaluation 23      Functional Outcome Measure Used UEFI   Functional Outcome Score 880 (23)       Insurance: Primary: Payor: Tamie Stone /  /  / ,   Secondary:    Authorization Information: PRECERTIFICATION REQUIRED:  Pre-cert required after initial evaluation through [address] [place] [address]. If [facility] is secondary insurance no pre-cert is needed. INSURANCE THERAPY BENEFIT: Unlimited visits for anyone under the age of 21. For those 21 and above, 30 visits for OT, PT and ST each per calendar year are approved. Benefit will not cover maintenance or preventative treatment. FCE is a covered benefit. AQUATIC THERAPY COVERED:   Yes  MODALITIES COVERED:  Yes. Hot/Cold Packs are not covered    RECEIVED AUTH FOR 80 UNITS OF CPT CODES:  [id number], [id number], [id number], [id number], [id number], [id number]  FROM 23-23   Visit # 9, 2/10 for PN, PN completed 2/15/23   Visits Allowed: 80 units each CPT code (20 visits of 4 units each)   Recertification Date: 2023   Physician Follow-Up: 2023   Physician Orders: Mary Lackey and Tx all Full elbow wrist ROM, PROM and no AROM   Pertinent History: Pt is 46 y/o F who sustained a dog bite to her L forearm on 22.  Pt reports she went directly to the ER where she was admitted to the hospital for ~1 week and underwent multiple surgeries(3 per pt report) and contracted infections. Orthopedics were consulted and Dr. Amadou Miles performed two surgeries. On 12/1/22, pt underwent L forearm I&D, wound exploration with identification of posterior interosseous and superficial radial nerve injureis and partial wound closure with wound VAC application. Pt found to have muscles loss of brachioradialis, ECRB, ECRL, and EDC muscle bellies, \"severe attenuation and stretch injury\" to posterior interosseous nerve, and complete transation with avulsion and nerve block of the superficial radial nerve. Dr. Amadou Miles also performed sx on 12/4/22 including L forearm I&D, reverse lateral arm flap coverage, end-to-side transfer of the distal end of the superficial radial nerve into the side of the lateral antebrachial cutaneous nerve, and splint thickness skin grafting from the thigh to L forearm. Pt has since been discharged home and wearing a prefab forearm based thumb spica. 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 Pt reports she most recently saw Dr. Amadou Miles 1/16/23 and he ordered occupational therapy for eval and treat and PROM. Pt reports he plans to complete further surgery in the future but pt needs to improve her ROM first.      Pt has a past medical history of Acid reflux, Anxiety, Asthma, Breast cancer ([address] COPD with asthma ([address] Depression, Depression, Dizziness - light-headed, Emphysema, History of chest pain, History of therapeutic radiation, History of tinnitus, Hx antineoplastic chemo, Hx of blood clots, Joint pain, Numbness and tingling, Osteoarthritis, PONV (postoperative nausea and vomiting), and SOB (shortness of breath) on exertion.        SUBJECTIVE: Patient reports she organized her panty a few days ago and used her L arm to help and it swelled up afterward secondary to the increased use. Overall pt reports decreased pain however.       TREATMENT   Precautions: L radial nerve injury, wrist drop, wrist splint on at all times other than exercise/hygiene   Pain: 2/10     X in shaded column indicates Activity Completed Today   Modalities Parameters/  Location  Notes/Comments   P with ther ex, see below  x Stretching throughout, see ther ex                 Manual Therapy Time/  Technique  Notes/Comments   Desensitization rubs with various textures 1 min each texture   Hook velcro and david-foam, vibration with mini massager; pt reports she notices reduced burning sensation in her hand since beginning desensitization   Edema control via 3 [facility] extended to cover entire forearm proximal to elbow as pt reports mid forearm sleeves roll and pressure area noted      Manual comp wrist/digit extension stretching  x    L forearm manual STM and scar mobilizations; scraping and tuning fork utilized for scar mobilization  x Extremely tight in volar forearm, also completed scraping to volar and dorsal wrist as tightness reported in these areas with stretching, no tuning fork this date   Exercises   Sets/  Sec Reps  Notes/Comments   Wrist/digit ext and supination stretch within P 12 min 1 x Used prayer stretch board and 1 lb weight for increased stretch over side of table, increased tolerance, only 1 break today throughout 12 min stretch   Prayer stretch 10 sec In-and-out of stretch for 5 min x    Elbow flex/ext AAROM with dowel 1 10 x Improvement noted in extension stretch    Standing Gravity assisted elbow ext stretch over dowel 1 min 2     Seated overhead pulleys for elbow ext and shoulder flexion stretch 3 min 1  Cues to hold at end range versus quick movement    Supination AAROM with dowel 2 min  10 sec hold in and out of stretch     Wrist/digit comp ext stretch on table 15 sec 4 x Completed on wall with wall wash today, increased stretch noted    Thumb flexion/opposition target touches, comp fist target touches 1 10 ea  Therapist supporting wrist in extension           Activities Time    Notes/Comments   Additional size C Medigrip provided for edema management and compression for pain management relief       Clothing fastener training with outrigger in place   Increased ability to complete buttons and snaps with outrigger in place for ext assist    Cone stacking activity with outrigger in place    Increased ease with outrigger in place (6 cones stacked)   Outrigger fabrication due to radial nerve injury (completed during ther ex this date)   Modification to outrigger this date to use suede loops to decrease bulk of finger loops for improved comfort, also adjusted frame of outrigger to reduce rubbing near SF MCPJ   Wrist/digit ext stretch/light WB on table while reaching across table with R hand to retrieve  objects      Custom L wrist orthosis with outrigger fabricated and issued as ordered per physician  x Outrigger will require further modification at next visit as time ran out during therapy visit. Will adjust at next visit     Specific Interventions Next Treatment: fabricate outrigger, emphasize PROM for composite wrist/digit ext and supination, STM to L forearm    SPLINT/SUPPORT FABRICATED  Left UPPER EXTREMITY  8513 - WHO, includes one or more nontorsion joints, elastic bands, turnbuckles, may include soft interface material, straps, custom fabricated, includes fitting and adjustment    SPLINT/BRACE FIT PROPERLY:  Yes but outrigger requires additional modification, ran out of time this date    PATIENT INSTRUCTED REGARDING:  Wearing schedule, splint/brace precautions, care and cleaning of splint/brace, purpose of splint/brace and donning/doffing of splint/brace.     PATIENT/CAREGIVER DEMONSTRATED GOOD UNDERSTANDING OF ABOVE INSTRUCTIONS:   Yes     RECHECK SCHEDULED:  Yes next visit      Activity/Treatment Tolerance:  [x]  Patient tolerated treatment well  []  Patient limited by fatigue  []  Patient limited by pain   [x]  Patient limited by other medical complications: wrist drop  []  Other:     Assessment: Patient is progressing toward goals. Pt fabricated custom L wrist extension orthosis and outrigger this date and already improved wrist positioning and fit noted with custom orthosis. Pt tolerated stretching well this date.    Areas for Improvement: impaired coordination, impaired motor control, impaired ROM, impaired sensation, impaired skin integrity, impaired strength, and pain  Prognosis: fair    GOALS:  Patient Goal: to improve use of L hand, to get back to work, to get ready for upcoming surgeries    Short Term Goals:  Time Frame: 4 weeks  *Patient will be fabricated and issued an outrigger splint to increase her ability to use her L hand for ADL tasks while awaiting nerve healing. GOAL MET pt has outrigger and notes increased ability to use her L hand with outrigger in place, has required some modification throughout and will adjust at her visit next week with new material to maximize comfort and reduce rubbing CONTINUE GOAL    *  Patient will improve PROM of composite wrist/digit extension to >50 and forearm supination to >55 for increased flexibility to prepare for upcoming surgeries and for improved hand function while awaiting nerve healing. GOAL MET pt has 56 deg comp wrist/digit extension stretching and 70 degrees ACTIVE supination which demonstrates significant improvement. REVISE GOAL Pt will improve PROM of composite wrist/digit extension to >62 for increased flexibility to prepare for upcoming surgeries, for improved hand function while awaiting nerve healing, and for decreased likelihood of developing contractures  * Patient will improve AROM of L elbow extension to <25, L shoulder flexion to >120, L shoulder internal rotation thumb tip to 1\" below waistline, L shoulder abduction to >100 and external rotation to >70 for increased ease of completing upper body dressing GOAL MET elbow ext=12  deg, shoulder flexion and [id number], IR to mid back and ER visibly >70 this date, pt reports getting dressed has gotten easier, she mostly has difficulty completing fasteners due to limited use of her L hand. REVISE GOAL Pt will increase elbow ext to <9 degrees and thumb opposition to base of SF for increased ability to reach out and  change with her L hand. *Patient will improve distal allen crease of index, middle, ring, and small fingers to <1 for increased ability to hold her medication in her hand. GOAL PARTIALLY MET for SF and RF, deficits remain in IF and MF but pt reports she attributes some of this to arthritis CONTINUE GOAL    Long Term Goals:  Time Frame: 8 weeks  *  Patient will improve UEFS to at least 25 DID NOT TEST TODAY, will assess at next progress note CONTINUE GOAL  *  Patient will report pain with activity no greater than 3/10 to increase ease and ability to perform bathing routine. GOAL MET pt reports she has not been experiencing pain during daily activities. REVISE GOAL Pt will report pain with stretching HEP <3/10 for increased flexibility for improved use of L hand, prevention of contracture, and in prep for future surgery if indicated. *  Patient will demonstrate I knowledge of HEP for improved flexibility and reduced pain. GOAL MET will continue goal as pt is issued new HEP exercises throughout course of tx CONTINUE GOAL  *NEW GOAL ADDED- Pt will participate in desensitization program due decrease reported hypersensitivity in L hand to reduce discomfort and improve ability to tolerate various textures touching her hand during ADLs.  GOAL MET pt reports reduced pain/electrical shock feelings in dorsal hand since beginning desensitization CONTINUE GOAL for further desensitization as needed    Patient Education:   [x]  HEP/Education Completed: Plan of Care, Goals, HEP  [employer] Access Code for HEP: Access Code: [email or web address]  Forearm AAROM Supination and Pronation with Board - 1 x daily - 7 x weekly - 3 sets - 10 reps  Seated Elbow Flexion and Extension AROM - 1 x daily - 7 x weekly - 3 sets - 10 reps  Wrist Prayer Stretch with board - 1 x daily - 7 x weekly - 3 sets - 10 reps  Elbow ext stretch over dowel adrian or over upside down pot x3-5 min (gravity assisted elbow ext stretch)  Desensitization rubs with various textures to dorsal hand where pt reports hypersensitive  Vibration for desensitization at home, WB  Standing wrist/digit ext stretch at table: or on wall  Instruction on orthosis use, donning/doffing,   []  No new Education completed  [x]  Reviewed Prior HEP      [x]  Patient verbalized and/or demonstrated understanding of education provided. []  Patient unable to verbalize and/or demonstrate understanding of education provided. Will continue education. [x]  Barriers to learning: none    PLAN:  Treatment Recommendations: Strengthening, Range of Motion, Neuromuscular Re-education, Manual Therapy - Soft Tissue Mobilization, Manual Therapy - Joint Manipulation, Pain Management, Home Exercise Program, Patient Education, Self-Care Education and Training, Positioning, Modalities, Wound Care, and Splint Fabrications/Modifications    []  Plan of care initiated. Plan to see patient 2 times per week for 8 weeks to address the treatment planned outlined above.   [x]  Continue with current plan of care  []  Modify plan of care as follows:    []  Hold pending physician visit  []  Discharge    Time In 1147   Time Out 1258   Timed Code Minutes:  59 min   Total Treatment Time: 70 min       [facility], [address] [place] [address], OTR/L [id number]

## 2023-02-26 ASSESSMENT — ENCOUNTER SYMPTOMS
SORE THROAT: 0
SINUS PRESSURE: 0
NAUSEA: 0
ABDOMINAL PAIN: 0
VOMITING: 0
TROUBLE SWALLOWING: 0
RHINORRHEA: 0
CONSTIPATION: 0
COUGH: 1
DIARRHEA: 0
SHORTNESS OF BREATH: 0
RECTAL PAIN: 0

## 2023-03-01 ENCOUNTER — HOSPITAL ENCOUNTER (OUTPATIENT)
Dept: OCCUPATIONAL THERAPY | Age: 53
Setting detail: THERAPIES SERIES
Discharge: HOME | End: 2023-03-01
Payer: COMMERCIAL

## 2023-03-01 PROCEDURE — 97760 ORTHOTIC MGMT&TRAING 1ST ENC: CPT

## 2023-03-01 PROCEDURE — 97110 THERAPEUTIC EXERCISES: CPT

## 2023-03-01 PROCEDURE — 97140 MANUAL THERAPY 1/> REGIONS: CPT

## 2023-03-01 NOTE — PROGRESS NOTES
[address]  [] EVALUATION  [x] DAILY NOTE (LAND) [] DAILY NOTE (AQUATIC ) [] PROGRESS NOTE [] DISCHARGE NOTE    [] [address] [facility]   [] [address]    [] [address] [facility]   [x] Ernesto Colby    Date: 3/1/2023  Patient Name:  Karely Camargo  : 1970  MRN: [id number]  CSN: [id number]    Referring Practitioner Mark Leo DO   Diagnosis Open bite of left forearm, subsequent encounter [S51.912D]  Injury of other nerves at forearm level, left arm, subsequent encounter [S54.8X2D]    Treatment Diagnosis Pain in L hand, wrist, and forearm, Stiffness in L hand, wrist, and forearm, weakness in L hand, wrist, and forearm   Date of Evaluation 23      Functional Outcome Measure Used UEFI   Functional Outcome Score 880 (23)       Insurance: Primary: Payor: Shalini Horvath /  /  / ,   Secondary:    Authorization Information: PRECERTIFICATION REQUIRED:  Pre-cert required after initial evaluation through [address] [place] [address]. If [facility] is secondary insurance no pre-cert is needed. INSURANCE THERAPY BENEFIT: Unlimited visits for anyone under the age of 21. For those 21 and above, 30 visits for OT, PT and ST each per calendar year are approved. Benefit will not cover maintenance or preventative treatment. FCE is a covered benefit. AQUATIC THERAPY COVERED:   Yes  MODALITIES COVERED:  Yes. Hot/Cold Packs are not covered    RECEIVED AUTH FOR 80 UNITS OF CPT CODES:  [id number], [id number], [id number], [id number], , [id number]  FROM 23-23   Visit # 10, 3/10 for PN, PN completed 2/15/23   Visits Allowed: 80 units each CPT code (20 visits of 4 units each)   Recertification Date: 2023   Physician Follow-Up: 2023   Physician Orders: Haydee Money and Tx all Full elbow wrist ROM, PROM and no AROM   Pertinent History: Pt is 46 y/o F who sustained a dog bite to her L forearm on 22.  Pt reports she went directly to the ER where she was admitted to the hospital for ~1 week and underwent multiple surgeries(3 per pt report) and contracted infections. Orthopedics were consulted and Dr. Sunitha Wells performed two surgeries. On 12/1/22, pt underwent L forearm I&D, wound exploration with identification of posterior interosseous and superficial radial nerve injureis and partial wound closure with wound VAC application. Pt found to have muscles loss of brachioradialis, ECRB, ECRL, and EDC muscle bellies, \"severe attenuation and stretch injury\" to posterior interosseous nerve, and complete transation with avulsion and nerve block of the superficial radial nerve. Dr. Sunitha Wells also performed sx on 12/4/22 including L forearm I&D, reverse lateral arm flap coverage, end-to-side transfer of the distal end of the superficial radial nerve into the side of the lateral antebrachial cutaneous nerve, and splint thickness skin grafting from the thigh to L forearm. Pt has since been discharged home and wearing a prefab forearm based thumb spica. 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 Pt reports she most recently saw Dr. Sunitha Wells 1/16/23 and he ordered occupational therapy for eval and treat and PROM. Pt reports he plans to complete further surgery in the future but pt needs to improve her ROM first.      Pt has a past medical history of Acid reflux, Anxiety, Asthma, Breast cancer ([address] COPD with asthma ([address] Depression, Depression, Dizziness - light-headed, Emphysema, History of chest pain, History of therapeutic radiation, History of tinnitus, Hx antineoplastic chemo, Hx of blood clots, Joint pain, Numbness and tingling, Osteoarthritis, PONV (postoperative nausea and vomiting), and SOB (shortness of breath) on exertion. SUBJECTIVE: Patient reports noticing some places in her orthosis that need adjusted.       TREATMENT   Precautions: L radial nerve injury, wrist drop, wrist splint on at all times other than exercise/hygiene   Pain: increased pain in L wrist today, did not rate     X in shaded column indicates Activity Completed Today   Modalities Parameters/  Location  Notes/Comments   P with ther ex, see below  x Stretching throughout, see ther ex                 Manual Therapy Time/  Technique  Notes/Comments   Desensitization rubs with various textures 1 min each texture   Hook velcro and david-foam, vibration with mini massager; pt reports she notices reduced burning sensation in her hand since beginning desensitization   Edema control via [address] [place] [address] extended to cover entire forearm proximal to elbow as pt reports mid forearm sleeves roll and pressure area noted      Manual comp wrist/digit extension stretching  x    L forearm manual STM and scar mobilizations; scraping and tuning fork utilized for scar mobilization  x Extremely tight in volar forearm, also completed scraping to volar and dorsal wrist as tightness reported in these areas with stretching, no tuning fork this date   Exercises   Sets/  Sec Reps  Notes/Comments   Wrist/digit ext and supination stretch within P 15 min 1 x Used prayer stretch board and 1 lb weight for increased stretch over side of table, increased tolerance, only 1 break today throughout 15 min stretch   Prayer stretch 10 sec In-and-out of stretch for 5 min x    Elbow flex/ext AAROM with dowel 1 10 x Improvement noted in extension stretch    Standing Gravity assisted elbow ext stretch over dowel 1 min 2     Seated overhead pulleys for elbow ext and shoulder flexion stretch 3 min 1  Cues to hold at end range versus quick movement    Supination AAROM with dowel 2 min  10 sec hold in and out of stretch     Wrist/digit comp ext wall wash/stretch 15 sec 8 x    Thumb flexion/opposition target touches, comp fist target touches 1 10 ea  Therapist supporting wrist in extension           Activities Time    Notes/Comments   Additional size C Kvng José provided for edema management and compression for pain management relief       Clothing fastener training with outrigger in place   Increased ability to complete buttons and snaps with outrigger in place for ext assist    Cone stacking activity with outrigger in place    Increased ease with outrigger in place (6 cones stacked)   Outrigger fabrication due to radial nerve injury (completed during ther ex this date)   Modification to outrigger this date to use suede loops to decrease bulk of finger loops for improved comfort, also adjusted frame of outrigger to reduce rubbing near SF MCPJ   Wrist/digit ext stretch/light WB on table while reaching across table with R hand to retrieve  objects      Custom L wrist orthosis with outrigger fabricated and issued as ordered per physician  x Modification to orthosis to reduce rubbing, improve fit/comfort and to improve digit ext within outrigger component     Specific Interventions Next Treatment: fabricate outrigger, emphasize PROM for composite wrist/digit ext and supination, STM to L forearm      Activity/Treatment Tolerance:  [x]  Patient tolerated treatment well  []  Patient limited by fatigue  []  Patient limited by pain   [x]  Patient limited by other medical complications: wrist drop  []  Other:     Assessment: Patient is progressing toward goals.  Revisions to orthosis with improved fit post tx today, tolerated stretching well this date, needs continued comp wrist/digit ext stretching to reduce tightness    Areas for Improvement: impaired coordination, impaired motor control, impaired ROM, impaired sensation, impaired skin integrity, impaired strength, and pain  Prognosis: fair    GOALS:  Patient Goal: to improve use of L hand, to get back to work, to get ready for upcoming surgeries    Short Term Goals:  Time Frame: 4 weeks  *Patient will be fabricated and issued an outrigger splint to increase her ability to use her L hand for ADL tasks while awaiting nerve healing. GOAL MET pt has outrigger and notes increased ability to use her L hand with outrigger in place, has required some modification throughout and will adjust at her visit next week with new material to maximize comfort and reduce rubbing CONTINUE GOAL    *  Patient will improve PROM of composite wrist/digit extension to >50 and forearm supination to >55 for increased flexibility to prepare for upcoming surgeries and for improved hand function while awaiting nerve healing. GOAL MET pt has 56 deg comp wrist/digit extension stretching and 70 degrees ACTIVE supination which demonstrates significant improvement. REVISE GOAL Pt will improve PROM of composite wrist/digit extension to >62 for increased flexibility to prepare for upcoming surgeries, for improved hand function while awaiting nerve healing, and for decreased likelihood of developing contractures  * Patient will improve AROM of L elbow extension to <25, L shoulder flexion to >120, L shoulder internal rotation thumb tip to 1\" below waistline, L shoulder abduction to >100 and external rotation to >70 for increased ease of completing upper body dressing GOAL MET elbow ext=12 deg, shoulder flexion and [id number], IR to mid back and ER visibly >70 this date, pt reports getting dressed has gotten easier, she mostly has difficulty completing fasteners due to limited use of her L hand. REVISE GOAL Pt will increase elbow ext to <9 degrees and thumb opposition to base of SF for increased ability to reach out and  change with her L hand. *Patient will improve distal allen crease of index, middle, ring, and small fingers to <1 for increased ability to hold her medication in her hand.  GOAL PARTIALLY MET for SF and RF, deficits remain in IF and MF but pt reports she attributes some of this to arthritis CONTINUE GOAL    Long Term Goals:  Time Frame: 8 weeks  *  Patient will improve UEFS to at least 25 DID NOT TEST TODAY, will assess at next progress note CONTINUE GOAL  *  Patient will report pain with activity no greater than 3/10 to increase ease and ability to perform bathing routine. GOAL MET pt reports she has not been experiencing pain during daily activities. REVISE GOAL Pt will report pain with stretching HEP <3/10 for increased flexibility for improved use of L hand, prevention of contracture, and in prep for future surgery if indicated. *  Patient will demonstrate I knowledge of HEP for improved flexibility and reduced pain. GOAL MET will continue goal as pt is issued new HEP exercises throughout course of tx CONTINUE GOAL  *NEW GOAL ADDED- Pt will participate in desensitization program due decrease reported hypersensitivity in L hand to reduce discomfort and improve ability to tolerate various textures touching her hand during ADLs. GOAL MET pt reports reduced pain/electrical shock feelings in dorsal hand since beginning desensitization CONTINUE GOAL for further desensitization as needed    Patient Education:   [x]  HEP/Education Completed: Plan of Care, Goals, HEP  [address] Access Code for HEP: Access Code: [email or web address]  Forearm AAROM Supination and Pronation with Board - 1 x daily - 7 x weekly - 3 sets - 10 reps  Seated Elbow Flexion and Extension AROM - 1 x daily - 7 x weekly - 3 sets - 10 reps  Wrist Prayer Stretch with board - 1 x daily - 7 x weekly - 3 sets - 10 reps  Elbow ext stretch over dowel adrian or over upside down pot x3-5 min (gravity assisted elbow ext stretch)  Desensitization rubs with various textures to dorsal hand where pt reports hypersensitive  Vibration for desensitization at home, WB  Standing wrist/digit ext stretch at table: or on wall  Instruction on orthosis use, donning/doffing,   []  No new Education completed  [x]  Reviewed Prior HEP      [x]  Patient verbalized and/or demonstrated understanding of education provided. []  Patient unable to verbalize and/or demonstrate understanding of education provided.   Will continue education. [x]  Barriers to learning: none    PLAN:  Treatment Recommendations: Strengthening, Range of Motion, Neuromuscular Re-education, Manual Therapy - Soft Tissue Mobilization, Manual Therapy - Joint Manipulation, Pain Management, Home Exercise Program, Patient Education, Self-Care Education and Training, Positioning, Modalities, Wound Care, and Splint Fabrications/Modifications    []  Plan of care initiated. Plan to see patient 2 times per week for 8 weeks to address the treatment planned outlined above.   [x]  Continue with current plan of care  []  Modify plan of care as follows:    []  Hold pending physician visit  []  Discharge    Time In 1255   Time Out 1350   Timed Code Minutes:  55 min   Total Treatment Time: 55 min       [facility], [address] [facility], OTR/L [id number]

## 2023-03-03 ENCOUNTER — APPOINTMENT (OUTPATIENT)
Dept: OCCUPATIONAL THERAPY | Age: 53
End: 2023-03-03
Payer: COMMERCIAL

## 2023-03-07 ENCOUNTER — HOSPITAL ENCOUNTER (OUTPATIENT)
Dept: OCCUPATIONAL THERAPY | Age: 53
Setting detail: THERAPIES SERIES
End: 2023-03-07
Payer: COMMERCIAL

## 2023-03-10 ENCOUNTER — APPOINTMENT (OUTPATIENT)
Dept: OCCUPATIONAL THERAPY | Age: 53
End: 2023-03-10
Payer: COMMERCIAL

## 2023-03-14 ENCOUNTER — HOSPITAL ENCOUNTER (OUTPATIENT)
Dept: OCCUPATIONAL THERAPY | Age: 53
Setting detail: THERAPIES SERIES
Discharge: HOME | End: 2023-03-14
Payer: COMMERCIAL

## 2023-03-14 PROCEDURE — 97110 THERAPEUTIC EXERCISES: CPT

## 2023-03-14 PROCEDURE — 97760 ORTHOTIC MGMT&TRAING 1ST ENC: CPT

## 2023-03-14 NOTE — PROGRESS NOTES
[address]  [] EVALUATION  [x] DAILY NOTE (LAND) [] DAILY NOTE (AQUATIC ) [] PROGRESS NOTE [] DISCHARGE NOTE    [] [address] [facility]   [] [address]    [] [address] [facility]   [x] Johnathan Torres    Date: 3/14/2023  Patient Name:  Aileen Sanchez  : 1970  MRN: [id number]  CSN: [id number]    Referring Practitioner Melanie Turner DO   Diagnosis Open bite of left forearm, subsequent encounter [S51.712D]  Injury of other nerves at forearm level, left arm, subsequent encounter [S54.8X2D]    Treatment Diagnosis Pain in L hand, wrist, and forearm, Stiffness in L hand, wrist, and forearm, weakness in L hand, wrist, and forearm   Date of Evaluation 23      Functional Outcome Measure Used UEFI   Functional Outcome Score 880 (23)       Insurance: Primary: Payor: Jamie Camacho /  /  / ,   Secondary:    Authorization Information: PRECERTIFICATION REQUIRED:  Pre-cert required after initial evaluation through [address] [place] [address]. If [facility] is secondary insurance no pre-cert is needed. INSURANCE THERAPY BENEFIT: Unlimited visits for anyone under the age of 21. For those 21 and above, 30 visits for OT, PT and ST each per calendar year are approved. Benefit will not cover maintenance or preventative treatment. FCE is a covered benefit. AQUATIC THERAPY COVERED:   Yes  MODALITIES COVERED:  Yes. Hot/Cold Packs are not covered    RECEIVED AUTH FOR 80 UNITS OF CPT CODES:  [id number], [id number], [id number], [id number], [id number], [id number]  FROM 23-23   Visit # 11, 4/10 for PN, PN completed 2/15/23   Visits Allowed: 80 units each CPT code (20 visits of 4 units each)   Recertification Date: 2023   Physician Follow-Up: 2023   Physician Orders: Vipul Sanders and Tx all Full elbow wrist ROM, PROM and no AROM   Pertinent History: Pt is 44 y/o F who sustained a dog bite to her L forearm on 22.  Pt reports she went directly to the ER where she was admitted to the hospital for ~1 week and underwent multiple surgeries(3 per pt report) and contracted infections. Orthopedics were consulted and Dr. Yuli Moody performed two surgeries. On 12/1/22, pt underwent L forearm I&D, wound exploration with identification of posterior interosseous and superficial radial nerve injureis and partial wound closure with wound VAC application. Pt found to have muscles loss of brachioradialis, ECRB, ECRL, and EDC muscle bellies, \"severe attenuation and stretch injury\" to posterior interosseous nerve, and complete transation with avulsion and nerve block of the superficial radial nerve. Dr. Yuli Moody also performed sx on 12/4/22 including L forearm I&D, reverse lateral arm flap coverage, end-to-side transfer of the distal end of the superficial radial nerve into the side of the lateral antebrachial cutaneous nerve, and splint thickness skin grafting from the thigh to L forearm. Pt has since been discharged home and wearing a prefab forearm based thumb spica. 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 Pt reports she most recently saw Dr. Yuli Moody 1/16/23 and he ordered occupational therapy for eval and treat and PROM. Pt reports he plans to complete further surgery in the future but pt needs to improve her ROM first.      Pt has a past medical history of Acid reflux, Anxiety, Asthma, Breast cancer ([address] COPD with asthma ([address] Depression, Depression, Dizziness - light-headed, Emphysema, History of chest pain, History of therapeutic radiation, History of tinnitus, Hx antineoplastic chemo, Hx of blood clots, Joint pain, Numbness and tingling, Osteoarthritis, PONV (postoperative nausea and vomiting), and SOB (shortness of breath) on exertion. SUBJECTIVE: Pt presents back to therapy after missing the last three appointments due to illness.  Pt reports she has been working on her stretches at home, has been having difficulty with the outrigger remaining in place and fitting appropriately      TREATMENT   Precautions: L radial nerve injury, wrist drop, wrist splint on at all times other than exercise/hygiene   Pain: increased pain in L wrist today, did not rate     X in shaded column indicates Activity Completed Today   Modalities Parameters/  Location  Notes/Comments   MHP with ther ex, see below  x Stretching throughout, see ther ex                 Manual Therapy Time/  Technique  Notes/Comments   Desensitization rubs with various textures 1 min each texture   Hook velcro and david-foam, vibration with mini massager; pt reports she notices reduced burning sensation in her hand since beginning desensitization   Edema control via 3 [facility] "[employer] extended to cover entire forearm proximal to elbow as pt reports mid forearm sleeves roll and pressure area noted      Manual comp wrist/digit extension stretching  x    L forearm manual STM and scar mobilizations; scraping and tuning fork utilized for scar mobilization   Extremely tight in volar forearm, also completed scraping to volar and dorsal wrist as tightness reported in these areas with stretching, no tuning fork this date   Exercises   Sets/  Sec Reps  Notes/Comments   Wrist/digit ext and supination stretch within P 15 min 1 x Used prayer stretch board and 1 lb weight for increased stretch over side of table, increased tolerance, only 1 break today throughout 15 min stretch   Prayer stretch 10 sec In-and-out of stretch for 5 min x    Elbow flex/ext AAROM with dowel 1 10 x Improvement noted in extension stretch    Standing Gravity assisted elbow ext stretch over dowel 1 min 2     Seated overhead pulleys for elbow ext and shoulder flexion stretch 3 min 1  Cues to hold at end range versus quick movement    Supination AAROM with dowel 2 min  10 sec hold in and out of stretch     Wrist/digit comp ext wall wash/stretch 15 sec 8 x    Thumb flexion/opposition target touches, comp fist target touches 1 10 ea  Therapist supporting wrist in extension           Activities Time    Notes/Comments   Additional size C Medigrip provided for edema management and compression for pain management relief       Clothing fastener training with outrigger in place   Increased ability to complete buttons and snaps with outrigger in place for ext assist    Cone stacking activity with outrigger in place    Increased ease with outrigger in place (6 cones stacked)   Outrigger fabrication due to radial nerve injury (completed during ther ex this date)   Modification to outrigger this date to use suede loops to decrease bulk of finger loops for improved comfort, also adjusted frame of outrigger to reduce rubbing near SF MCPJ   Wrist/digit ext stretch/light WB on table while reaching across table with R hand to retrieve  objects      Custom L wrist orthosis with outrigger fabricated and issued as ordered per physician  x Complete remold of previously issued custom wrist ext/outrigger orthosis with adjustment made to make orthosis dorsal based and use of pajama elastic to hold Mps in extension     Specific Interventions Next Treatment: fabricate outrigger, emphasize PROM for composite wrist/digit ext and supination, STM to L forearm      Activity/Treatment Tolerance:  [x]  Patient tolerated treatment well  []  Patient limited by fatigue  []  Patient limited by pain   [x]  Patient limited by other medical complications: wrist drop  []  Other:     Assessment: Patient is progressing toward goals. Remold of orthosis with improved fit noted following treatment, instructed pt to inform pt of any needed adjustments or pressure points at next visit. Will resume STM at next visit with likely less orthotic work needed.    Areas for Improvement: impaired coordination, impaired motor control, impaired ROM, impaired sensation, impaired skin integrity, impaired strength, and pain  Prognosis:  fair    GOALS:  Patient Goal: to improve use of L hand, to get back to work, to get ready for upcoming surgeries    Short Term Goals:  Time Frame: 4 weeks  *Patient will be fabricated and issued an outrigger splint to increase her ability to use her L hand for ADL tasks while awaiting nerve healing. GOAL MET pt has outrigger and notes increased ability to use her L hand with outrigger in place, has required some modification throughout and will adjust at her visit next week with new material to maximize comfort and reduce rubbing CONTINUE GOAL    *  Patient will improve PROM of composite wrist/digit extension to >50 and forearm supination to >55 for increased flexibility to prepare for upcoming surgeries and for improved hand function while awaiting nerve healing. GOAL MET pt has 56 deg comp wrist/digit extension stretching and 70 degrees ACTIVE supination which demonstrates significant improvement. REVISE GOAL Pt will improve PROM of composite wrist/digit extension to >62 for increased flexibility to prepare for upcoming surgeries, for improved hand function while awaiting nerve healing, and for decreased likelihood of developing contractures  * Patient will improve AROM of L elbow extension to <25, L shoulder flexion to >120, L shoulder internal rotation thumb tip to 1\" below waistline, L shoulder abduction to >100 and external rotation to >70 for increased ease of completing upper body dressing GOAL MET elbow ext=12 deg, shoulder flexion and [id number], IR to mid back and ER visibly >70 this date, pt reports getting dressed has gotten easier, she mostly has difficulty completing fasteners due to limited use of her L hand. REVISE GOAL Pt will increase elbow ext to <9 degrees and thumb opposition to base of SF for increased ability to reach out and  change with her L hand.   *Patient will improve distal allen crease of index, middle, ring, and small fingers to <1 for increased ability to hold her medication in her hand. GOAL PARTIALLY MET for SF and RF, deficits remain in IF and MF but pt reports she attributes some of this to arthritis CONTINUE GOAL    Long Term Goals:  Time Frame: 8 weeks  *  Patient will improve UEFS to at least 25 DID NOT TEST TODAY, will assess at next progress note CONTINUE GOAL  *  Patient will report pain with activity no greater than 3/10 to increase ease and ability to perform bathing routine. GOAL MET pt reports she has not been experiencing pain during daily activities. REVISE GOAL Pt will report pain with stretching HEP <3/10 for increased flexibility for improved use of L hand, prevention of contracture, and in prep for future surgery if indicated. *  Patient will demonstrate I knowledge of HEP for improved flexibility and reduced pain. GOAL MET will continue goal as pt is issued new HEP exercises throughout course of tx CONTINUE GOAL  *NEW GOAL ADDED- Pt will participate in desensitization program due decrease reported hypersensitivity in L hand to reduce discomfort and improve ability to tolerate various textures touching her hand during ADLs.  GOAL MET pt reports reduced pain/electrical shock feelings in dorsal hand since beginning desensitization CONTINUE GOAL for further desensitization as needed    Patient Education:   [x]  HEP/Education Completed: Plan of Care, Goals, HEP  [address] Access Code for HEP: Access Code: [email or web address]  Forearm AAROM Supination and Pronation with Board - 1 x daily - 7 x weekly - 3 sets - 10 reps  Seated Elbow Flexion and Extension AROM - 1 x daily - 7 x weekly - 3 sets - 10 reps  Wrist Prayer Stretch with board - 1 x daily - 7 x weekly - 3 sets - 10 reps  Elbow ext stretch over dowel adrian or over upside down pot x3-5 min (gravity assisted elbow ext stretch)  Desensitization rubs with various textures to dorsal hand where pt reports hypersensitive  Vibration for desensitization at home, WB  Standing wrist/digit ext stretch at table: or on wall  Instruction on orthosis use, donning/doffing,   []  No new Education completed  [x]  Reviewed Prior HEP      [x]  Patient verbalized and/or demonstrated understanding of education provided. []  Patient unable to verbalize and/or demonstrate understanding of education provided. Will continue education. [x]  Barriers to learning: none    PLAN:  Treatment Recommendations: Strengthening, Range of Motion, Neuromuscular Re-education, Manual Therapy - Soft Tissue Mobilization, Manual Therapy - Joint Manipulation, Pain Management, Home Exercise Program, Patient Education, Self-Care Education and Training, Positioning, Modalities, Wound Care, and Splint Fabrications/Modifications    []  Plan of care initiated. Plan to see patient 2 times per week for 8 weeks to address the treatment planned outlined above.   [x]  Continue with current plan of care  []  Modify plan of care as follows:    []  Hold pending physician visit  []  Discharge    Time In 1341   Time Out 1451   Timed Code Minutes:  70 min   Total Treatment Time: 70 min       [facility], [address] [place] [address], OTR/L [id number]

## 2023-03-17 ENCOUNTER — HOSPITAL ENCOUNTER (OUTPATIENT)
Dept: OCCUPATIONAL THERAPY | Age: 53
Setting detail: THERAPIES SERIES
Discharge: HOME | End: 2023-03-17
Payer: COMMERCIAL

## 2023-03-17 PROCEDURE — 97110 THERAPEUTIC EXERCISES: CPT

## 2023-03-17 PROCEDURE — 97140 MANUAL THERAPY 1/> REGIONS: CPT

## 2023-03-17 NOTE — PROGRESS NOTES
[address]  [] EVALUATION  [x] DAILY NOTE (LAND) [] DAILY NOTE (AQUATIC ) [] PROGRESS NOTE [] DISCHARGE NOTE    [] [address] [facility]   [] [address]    [] [address] [facility]   [x] Srinivasan Cruz    Date: 3/17/2023  Patient Name:  Dc Villagran  : 1970  MRN: [id number]  CSN: [id number]    Referring Practitioner Ashley Go DO   Diagnosis Open bite of left forearm, subsequent encounter [S51.002D]  Injury of other nerves at forearm level, left arm, subsequent encounter [S54.8X2D]    Treatment Diagnosis Pain in L hand, wrist, and forearm, Stiffness in L hand, wrist, and forearm, weakness in L hand, wrist, and forearm   Date of Evaluation 23      Functional Outcome Measure Used UEFI   Functional Outcome Score 880 (23)       Insurance: Primary: Payor: Shanika Nascimento /  /  / ,   Secondary:    Authorization Information: PRECERTIFICATION REQUIRED:  Pre-cert required after initial evaluation through [address] [place] [address]. If [facility] is secondary insurance no pre-cert is needed. INSURANCE THERAPY BENEFIT: Unlimited visits for anyone under the age of 21. For those 21 and above, 30 visits for OT, PT and ST each per calendar year are approved. Benefit will not cover maintenance or preventative treatment. FCE is a covered benefit. AQUATIC THERAPY COVERED:   Yes  MODALITIES COVERED:  Yes. Hot/Cold Packs are not covered    RECEIVED AUTH FOR 80 UNITS OF CPT CODES:  [id number], [id number], [id number] [facility], [id number], [id number], [id number]  FROM 23-23   Visit # 12, 5/10 for PN, PN completed 2/15/23   Visits Allowed: 80 units each CPT code (20 visits of 4 units each)   Recertification Date: 2023   Physician Follow-Up: 2023   Physician Orders: [facility] and Tx all Full elbow wrist ROM, PROM and no AROM   Pertinent History: Pt is 46 y/o F who sustained a dog bite to her L forearm on 22.  Pt reports she went directly to the ER where she was admitted to the hospital for ~1 week and underwent multiple surgeries(3 per pt report) and contracted infections. Orthopedics were consulted and Dr. Pipo Bagley performed two surgeries. On 12/1/22, pt underwent L forearm I&D, wound exploration with identification of posterior interosseous and superficial radial nerve injureis and partial wound closure with wound VAC application. Pt found to have muscles loss of brachioradialis, ECRB, ECRL, and EDC muscle bellies, \"severe attenuation and stretch injury\" to posterior interosseous nerve, and complete transation with avulsion and nerve block of the superficial radial nerve. Dr. Pipo Bagley also performed sx on 12/4/22 including L forearm I&D, reverse lateral arm flap coverage, end-to-side transfer of the distal end of the superficial radial nerve into the side of the lateral antebrachial cutaneous nerve, and splint thickness skin grafting from the thigh to L forearm. Pt has since been discharged home and wearing a prefab forearm based thumb spica. 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 Pt reports she most recently saw Dr. Pipo Bagley 1/16/23 and he ordered occupational therapy for eval and treat and PROM. Pt reports he plans to complete further surgery in the future but pt needs to improve her ROM first.      Pt has a past medical history of Acid reflux, Anxiety, Asthma, Breast cancer ([address] COPD with asthma ([address] Depression, Depression, Dizziness - light-headed, Emphysema, History of chest pain, History of therapeutic radiation, History of tinnitus, Hx antineoplastic chemo, Hx of blood clots, Joint pain, Numbness and tingling, Osteoarthritis, PONV (postoperative nausea and vomiting), and SOB (shortness of breath) on exertion.        SUBJECTIVE: Pt reports she goes to see her surgeon again on Monday, will discuss progress/prep for sx      TREATMENT   Precautions: L radial nerve injury, wrist drop, wrist splint on at all times other than exercise/hygiene   Pain: increased pain in L wrist today, did not rate     X in shaded column indicates Activity Completed Today   Modalities Parameters/  Location  Notes/Comments   MHP with ther ex, see below  x Stretching throughout, see ther ex                 Manual Therapy Time/  Technique  Notes/Comments   Desensitization rubs with various textures 1 min each texture   Hook velcro and david-foam, vibration with mini massager; pt reports she notices reduced burning sensation in her hand since beginning desensitization   Edema control via [address] [facility] [address] extended to cover entire forearm proximal to elbow as pt reports mid forearm sleeves roll and pressure area noted      Manual comp wrist/digit extension stretching  x    L forearm manual STM and scar mobilizations  x Extended soft tissue mobs to forearm with wrist/digit ext stretching, tightness in volar forearm improving   Exercises   Sets/  Sec Reps  Notes/Comments   Wrist/digit ext and supination stretch within P 15 min 1 x Used prayer stretch board and 1.5 lb weight for increased stretch over side of table, added 1/2 lb today for increased stretch   Prayer stretch 10 sec In-and-out of stretch for 5 min x    Elbow flex/ext AAROM with dowel 1 10 x Improvement noted in extension stretch    Standing Gravity assisted elbow ext stretch over dowel 1 min 2     Seated overhead pulleys for elbow ext and shoulder flexion stretch 3 min 1  Cues to hold at end range versus quick movement    Supination AAROM with dowel 2 min  10 sec hold in and out of stretch     Wrist/digit comp ext wall wash/stretch 15 sec 8 x    Thumb flexion/opposition target touches, comp fist target touches 1 10 ea  Therapist supporting wrist in extension           Activities Time    Notes/Comments   Additional size C Medigrip provided for edema management and compression for pain management relief       Clothing fastener training with outrigger in place   Increased ability to complete buttons and snaps with outrigger in place for ext assist    Cone stacking activity with outrigger in place    Increased ease with outrigger in place (6 cones stacked)   Outrigger fabrication due to radial nerve injury (completed during ther ex this date)   Modification to outrigger this date to use suede loops to decrease bulk of finger loops for improved comfort, also adjusted frame of outrigger to reduce rubbing near SF MCPJ   Wrist/digit ext stretch/light WB on table while reaching across table with R hand to retrieve  objects      Custom L wrist orthosis with outrigger fabricated and issued as ordered per physician   Complete remold of previously issued custom wrist ext/outrigger orthosis with adjustment made to make orthosis dorsal based and use of pajama elastic to hold Mps in extension     Specific Interventions Next Treatment: fabricate outrigger, emphasize PROM for composite wrist/digit ext and supination, STM to L forearm      Activity/Treatment Tolerance:  [x]  Patient tolerated treatment well  []  Patient limited by fatigue  []  Patient limited by pain   [x]  Patient limited by other medical complications: wrist drop  []  Other:     Assessment: Patient is progressing toward goals. Tight end ranges remain but pt continues to make small progress each week. Sees surgeon on Monday. Pt did miss three therapy visits in the last two weeks due to illness which limited progress in therapy.  Was able to stretch pt to 70 in comp wrist/digit ext today which notes improvement    Areas for Improvement: impaired coordination, impaired motor control, impaired ROM, impaired sensation, impaired skin integrity, impaired strength, and pain  Prognosis: fair    GOALS:  Patient Goal: to improve use of L hand, to get back to work, to get ready for upcoming surgeries    Short Term Goals:  Time Frame: 4 weeks  *Patient will be fabricated and issued an outrigger splint to increase her ability to use her L hand for ADL tasks while awaiting nerve healing. GOAL MET pt has outrigger and notes increased ability to use her L hand with outrigger in place, has required some modification throughout and will adjust at her visit next week with new material to maximize comfort and reduce rubbing CONTINUE GOAL    *  Patient will improve PROM of composite wrist/digit extension to >50 and forearm supination to >55 for increased flexibility to prepare for upcoming surgeries and for improved hand function while awaiting nerve healing. GOAL MET pt has 56 deg comp wrist/digit extension stretching and 70 degrees ACTIVE supination which demonstrates significant improvement. REVISE GOAL Pt will improve PROM of composite wrist/digit extension to >62 for increased flexibility to prepare for upcoming surgeries, for improved hand function while awaiting nerve healing, and for decreased likelihood of developing contractures  * Patient will improve AROM of L elbow extension to <25, L shoulder flexion to >120, L shoulder internal rotation thumb tip to 1\" below waistline, L shoulder abduction to >100 and external rotation to >70 for increased ease of completing upper body dressing GOAL MET elbow ext=12 deg, shoulder flexion and qdpykejko=720, IR to mid back and ER visibly >70 this date, pt reports getting dressed has gotten easier, she mostly has difficulty completing fasteners due to limited use of her L hand. REVISE GOAL Pt will increase elbow ext to <9 degrees and thumb opposition to base of SF for increased ability to reach out and  change with her L hand. *Patient will improve distal allen crease of index, middle, ring, and small fingers to <1 for increased ability to hold her medication in her hand.  GOAL PARTIALLY MET for SF and RF, deficits remain in IF and MF but pt reports she attributes some of this to arthritis CONTINUE GOAL    Long Term Goals:  Time Frame: 8 weeks  *  Patient will improve UEFS to at least 25 DID NOT TEST TODAY, will assess at next progress note CONTINUE GOAL  *  Patient will report pain with activity no greater than 3/10 to increase ease and ability to perform bathing routine. GOAL MET pt reports she has not been experiencing pain during daily activities. REVISE GOAL Pt will report pain with stretching HEP <3/10 for increased flexibility for improved use of L hand, prevention of contracture, and in prep for future surgery if indicated. *  Patient will demonstrate I knowledge of HEP for improved flexibility and reduced pain. GOAL MET will continue goal as pt is issued new HEP exercises throughout course of tx CONTINUE GOAL  *NEW GOAL ADDED- Pt will participate in desensitization program due decrease reported hypersensitivity in L hand to reduce discomfort and improve ability to tolerate various textures touching her hand during ADLs. GOAL MET pt reports reduced pain/electrical shock feelings in dorsal hand since beginning desensitization CONTINUE GOAL for further desensitization as needed    Patient Education:   []  HEP/Education Completed: Plan of Care, Goals, HEP  [address] Access Code for HEP: Access Code: [email or web address]  Forearm AAROM Supination and Pronation with Board - 1 x daily - 7 x weekly - 3 sets - 10 reps  Seated Elbow Flexion and Extension AROM - 1 x daily - 7 x weekly - 3 sets - 10 reps  Wrist Prayer Stretch with board - 1 x daily - 7 x weekly - 3 sets - 10 reps  Elbow ext stretch over dowel adrian or over upside down pot x3-5 min (gravity assisted elbow ext stretch)  Desensitization rubs with various textures to dorsal hand where pt reports hypersensitive  Vibration for desensitization at home, WB  Standing wrist/digit ext stretch at table: or on wall  Instruction on orthosis use, donning/doffing,   []  No new Education completed  [x]  Reviewed Prior HEP      [x]  Patient verbalized and/or demonstrated understanding of education provided.   []  Patient unable to verbalize and/or demonstrate understanding of education provided. Will continue education. [x]  Barriers to learning: none    PLAN:  Treatment Recommendations: Strengthening, Range of Motion, Neuromuscular Re-education, Manual Therapy - Soft Tissue Mobilization, Manual Therapy - Joint Manipulation, Pain Management, Home Exercise Program, Patient Education, Self-Care Education and Training, Positioning, Modalities, Wound Care, and Splint Fabrications/Modifications    []  Plan of care initiated. Plan to see patient 2 times per week for 8 weeks to address the treatment planned outlined above.   [x]  Continue with current plan of care  []  Modify plan of care as follows:    []  Hold pending physician visit  []  Discharge    Time In 1340   Time Out 1428   Timed Code Minutes:  48 min   Total Treatment Time: 48 min       [facility], [address] [place] [address], OTR/L [id number]

## 2023-03-21 ENCOUNTER — HOSPITAL ENCOUNTER (OUTPATIENT)
Dept: OCCUPATIONAL THERAPY | Age: 53
Setting detail: THERAPIES SERIES
Discharge: HOME | End: 2023-03-21
Payer: COMMERCIAL

## 2023-03-21 PROCEDURE — 97140 MANUAL THERAPY 1/> REGIONS: CPT

## 2023-03-21 PROCEDURE — 97110 THERAPEUTIC EXERCISES: CPT

## 2023-03-21 NOTE — PROGRESS NOTES
work, to get ready for upcoming surgeries    Short Term Goals:  Time Frame: 4 weeks  *Patient will be fabricated and issued an outrigger splint to increase her ability to use her L hand for ADL tasks while awaiting nerve healing. GOAL MET- DISCHARGE GOAL    *  Patient will improve PROM of composite wrist/digit extension to >50 and forearm supination to >55 for increased flexibility to prepare for upcoming surgeries and for improved hand function while awaiting nerve healing. GOAL MET pt has 56 deg comp wrist/digit extension stretching and 70 degrees ACTIVE supination which demonstrates significant improvement. REVISE GOAL Pt will improve PROM of composite wrist/digit extension to >62 for increased flexibility to prepare for upcoming surgeries, for improved hand function while awaiting nerve healing, and for decreased likelihood of developing contractures GOAL MET comp wrist digit/ext 66 degrees ext today DISCHARGE GOAL  * Patient will improve AROM of L elbow extension to <25, L shoulder flexion to >120, L shoulder internal rotation thumb tip to 1\" below waistline, L shoulder abduction to >100 and external rotation to >70 for increased ease of completing upper body dressing GOAL MET elbow ext=12 deg, shoulder flexion and nfacipkxy=152, IR to mid back and ER visibly >70 this date, pt reports getting dressed has gotten easier, she mostly has difficulty completing fasteners due to limited use of her L hand. REVISE GOAL Pt will increase elbow ext to <9 degrees and thumb opposition to base of SF for increased ability to reach out and  change with her L hand. GOAL MET elbow ext 8 degrees this date, thumb opposition P1 of SF, instruction to continue with thumb ex's at home [address]  *Patient will improve distal allen crease of index, middle, ring, and small fingers to <1 for increased ability to hold her medication in her hand.  GOAL PARTIALLY MET for SF and RF, deficits remain in IF and MF but pt reports she

## 2023-03-24 ENCOUNTER — APPOINTMENT (OUTPATIENT)
Dept: OCCUPATIONAL THERAPY | Age: 53
End: 2023-03-24
Payer: COMMERCIAL

## 2023-03-28 ENCOUNTER — APPOINTMENT (OUTPATIENT)
Dept: OCCUPATIONAL THERAPY | Age: 53
End: 2023-03-28
Payer: COMMERCIAL

## 2023-03-31 ENCOUNTER — APPOINTMENT (OUTPATIENT)
Dept: OCCUPATIONAL THERAPY | Age: 53
End: 2023-03-31
Payer: COMMERCIAL

## 2023-04-24 ENCOUNTER — HOSPITAL ENCOUNTER (OUTPATIENT)
Dept: GENERAL RADIOLOGY | Age: 53
Discharge: HOME | End: 2023-04-24
Payer: COMMERCIAL

## 2023-04-24 ENCOUNTER — HOSPITAL ENCOUNTER (OUTPATIENT)
Age: 53
Discharge: HOME | End: 2023-04-24
Payer: COMMERCIAL

## 2023-04-24 DIAGNOSIS — Z02.71 DISABILITY EXAMINATION: ICD-10-CM

## 2023-04-24 DIAGNOSIS — G89.29 CHRONIC PAIN OF RIGHT KNEE: ICD-10-CM

## 2023-04-24 DIAGNOSIS — M25.561 CHRONIC PAIN OF RIGHT KNEE: ICD-10-CM

## 2023-04-24 PROCEDURE — 73560 X-RAY EXAM OF KNEE 1 OR 2: CPT

## 2023-05-17 ENCOUNTER — HOSPITAL ENCOUNTER (OUTPATIENT)
Dept: OCCUPATIONAL THERAPY | Age: 53
Setting detail: THERAPIES SERIES
Discharge: HOME | End: 2023-05-17
Payer: COMMERCIAL

## 2023-05-17 PROCEDURE — 97166 OT EVAL MOD COMPLEX 45 MIN: CPT

## 2023-05-17 NOTE — PROGRESS NOTES
ability to complete her daily activities with increased ease and independence and decreased pain. Areas for Improvement: edema, impaired coordination, impaired motor control, impaired strength, pain, and restricted weight bearing status, impaired ROM  Prognosis: good    GOALS:  Patient Goal: to regain her motion and use of her hand; to do the dishes, wash in the shower with L hand, opening doors with L hand    Short Term Goals:  Time Frame: 6 weeks  *  Pt. will demo independence with HEP for L hand/wrist ROM for improved motion and decreased pain with ADL performance. *  Pt will report ability to dress and bathe herself with assistance of her L hand with minimal difficulty and <3/10 pain. *Pt will increase MCP digit ext AROM of IF, MF, RF, SF to <25 degrees for increased ability to use a washcloth with her L hand. *Pt will increase L thumb AROM to opposition to tip of SF, radial abduction to >35, and MP/IP ext to <10 degrees for increased ability to fasten buttons and her bra. *Pt will increase wrist ext to >20 deg and wrist flexion to >55 degrees for increased ability to do the dishes. Long Term Goals:  Time Frame: 12 weeks  *  Patient will improve PRWE to <50  *  Pt will demonstrate LOJA of all digits to >180 degrees for increased ability to open a door/turn a doorknob with her L hand  *Pt will demonstrate >15 lbs  strength and >4 lbs lateral and three point pinch for increased ability to open jars and containers      Patient Education:   [x]  HEP/Education Completed: Plan of Care, Goals, HEP, precautions, pre-cert required after eval  Medbridge Access Code for HEP:   IP AROM within orthosis, removal of distal strap for full digit ext AROM (will add additional ex's in future sessions)  []  No new Education completed  []  Reviewed Prior HEP      [x]  Patient verbalized and/or demonstrated understanding of education provided.   []  Patient unable to verbalize and/or demonstrate understanding of education

## 2023-05-22 ENCOUNTER — HOSPITAL ENCOUNTER (OUTPATIENT)
Dept: MAMMOGRAPHY | Age: 53
Discharge: HOME | End: 2023-05-22
Payer: COMMERCIAL

## 2023-05-22 DIAGNOSIS — Z12.31 VISIT FOR SCREENING MAMMOGRAM: ICD-10-CM

## 2023-05-22 PROCEDURE — 77063 BREAST TOMOSYNTHESIS BI: CPT

## 2023-05-23 ENCOUNTER — HOSPITAL ENCOUNTER (OUTPATIENT)
Dept: OCCUPATIONAL THERAPY | Age: 53
Setting detail: THERAPIES SERIES
Discharge: HOME | End: 2023-05-23
Payer: COMMERCIAL

## 2023-05-23 PROCEDURE — 97110 THERAPEUTIC EXERCISES: CPT

## 2023-05-23 NOTE — PROGRESS NOTES
** PLEASE SIGN, DATE AND TIME CERTIFICATION BELOW AND RETURN TO [facility] OUTPATIENT REHABILITATION (FAX #: [phone]). ATTEST/CO-SIGN IF ACCESSING VIA INMEDSEEK. THANK YOU.**    I certify that I have examined the patient below and determined that Physical Medicine and Rehabilitation service is necessary and that I approve the established plan of care for up to 90 days or as specifically noted. Attestation, signature or co-signature of physician indicates approval of certification requirements.    ________________________ ____________ __________  Physician Signature   Date   Time   [address]  [x] EVALUATION  [] DAILY NOTE (LAND) [] DAILY NOTE (AQUATIC ) [] PROGRESS NOTE [] DISCHARGE NOTE    [] [facility]   [] [facility]    [] [address] [facility]   [x] Kelsy Expose    Date: 2023  Patient Name:  Taye Garcia  : 1970  MRN: [id number]  CSN: [id number]    Referring Practitioner No ref. provider found   Diagnosis No admission diagnoses are documented for this encounter. Treatment Diagnosis L hand stiffness, edema, pain, and weakness s/p L hand tendon transfers (PL to EPL, FCR to EDC, PT to ECRB)   Date of Evaluation 23      Functional Outcome Measure Used [facility]   Functional Outcome Score 86.5 (23)       Insurance: Primary: Payor: Louise Hinojosa /  /  / ,   Secondary:    Authorization Information: PRECERTIFICATION REQUIRED:  Pre-cert required after initial evaluation through [address] [place] [address]. [facility] no pre-cert is needed. INSURANCE THERAPY BENEFIT: Unlimited visits for anyone under the age of 21. For those 21 and above, 30 visits for OT, PT and ST each per calendar year are approved. Benefit will not cover maintenance or preventative treatment. FCE is a covered benefit. AQUATIC THERAPY COVERED:   Yes  MODALITIES COVERED:  Yes. Hot/Cold Packs are not covered.

## 2023-05-26 ENCOUNTER — HOSPITAL ENCOUNTER (OUTPATIENT)
Dept: OCCUPATIONAL THERAPY | Age: 53
Setting detail: THERAPIES SERIES
Discharge: HOME | End: 2023-05-26
Payer: COMMERCIAL

## 2023-05-26 PROCEDURE — 97110 THERAPEUTIC EXERCISES: CPT

## 2023-05-26 PROCEDURE — 97140 MANUAL THERAPY 1/> REGIONS: CPT

## 2023-05-26 PROCEDURE — 97530 THERAPEUTIC ACTIVITIES: CPT

## 2023-05-26 NOTE — PROGRESS NOTES
FBTS/P1 blocking orthosis in the clinic yesterday. Pt referred to OT for eval and treat. Pt's therapist that fabricated her orthosis spoke with Dr. Byron Obrien regarding pt's protocol and her notes state:  No specific protocol,   1. Wrist AROM in clinic  2. Digit ext AROM  3. No MP Flexion PROM  4. PIP ROM as tolerated if \"not too aggressive\"    Pt  has a past medical history of Acid reflux, Anxiety, Asthma, Breast cancer ([address] COPD with asthma ([address] Depression, Depression, Dizziness - light-headed, Emphysema, History of chest pain, History of therapeutic radiation, History of tinnitus, Hx antineoplastic chemo, Hx of blood clots, Joint pain, Numbness and tingling, Osteoarthritis, PONV (postoperative nausea and vomiting), and SOB (shortness of breath) on exertion.        SUBJECTIVE: reports she left her splint off last night due to discomfort      OBJECTIVE:  TREATMENT   Precautions: No passive MCP FLEXION, no resisted use L hand, orthosis at all times other than therapy and hygiene, care not to stretch transfers, see tentative protocol below  AVOID SUPINATION AND FLEXION   Pain:  \"Not too bad today\" high levels of pain last night    X in shaded column indicates Activity Completed Today   Modalities Parameters/  Location  Notes/Comments   Attempted NMES for wrist ext to PT to ECRB transfer, unable to set up successfully this date but will attempt again in future sessions  x                Manual Therapy Time/  Technique  Notes/Comments   Scar STM  x Done to decrease tightness and increase ROM                Exercises   Sets/  Sec Reps  Notes/Comments   IP AROM flex and ext 1 10 x    Full digit ext AROM 1 10  HEP (remove distal strap to complete at home, then replace after HEP)   Wrist flex/ext AROM outside orthosis forearm pronated, forearm neutral  1 10 x Arm supported on mat table with pillow under arm; educated pt on palpation of FCR/EDC for body awareness of muscle contraction and activation of tendon transfer;

## 2023-05-30 ENCOUNTER — HOSPITAL ENCOUNTER (OUTPATIENT)
Dept: OCCUPATIONAL THERAPY | Age: 53
Setting detail: THERAPIES SERIES
Discharge: HOME | End: 2023-05-30
Payer: COMMERCIAL

## 2023-05-30 PROCEDURE — 97140 MANUAL THERAPY 1/> REGIONS: CPT

## 2023-05-30 PROCEDURE — 97110 THERAPEUTIC EXERCISES: CPT

## 2023-05-30 NOTE — PROGRESS NOTES
FLEXION     6 weeks post op (5/30/23): Full AROM wrist and forearm, combined AROM wrist/thumb flex, EDC glide/peg roll  7 weeks post op (6/6/23): Thumb PROM flex   8 weeks post op (6/13/23): Wrist PROM flex     No specific protocol per notes from [address] [place] [address] therapist.  1. Wrist AROM in clinic  2. Digit ext AROM  3. No MP Flexion PROM  4. PIP ROM as tolerated if \"not too aggressive\"    Activity/Treatment Tolerance:  [x]  Patient tolerated treatment well  []  Patient limited by fatigue  []  Patient limited by pain   []  Patient limited by other medical complications  []  Other:     Assessment: Pt progressing toward goals. Attempted NMES but unsuccessful today. Added additional distal AROM ex's and shoulder pulleys due to upper arm soreness which pt reported this felt good. Continue per POC  Areas for Improvement: edema, impaired coordination, impaired motor control, impaired strength, pain, and restricted weight bearing status, impaired ROM  Prognosis: good    GOALS:  Patient Goal: to regain her motion and use of her hand; to do the dishes, wash in the shower with L hand, opening doors with L hand    Short Term Goals:  Time Frame: 6 weeks  *  Pt. will demo independence with HEP for L hand/wrist ROM for improved motion and decreased pain with ADL performance. *  Pt will report ability to dress and bathe herself with assistance of her L hand with minimal difficulty and <3/10 pain. *Pt will increase MCP digit ext AROM of IF, MF, RF, SF to <25 degrees for increased ability to use a washcloth with her L hand. *Pt will increase L thumb AROM to opposition to tip of SF, radial abduction to >35, and MP/IP ext to <10 degrees for increased ability to fasten buttons and her bra. *Pt will increase wrist ext to >20 deg and wrist flexion to >55 degrees for increased ability to do the dishes.     Long Term Goals:  Time Frame: 12 weeks  *  Patient will improve PRWE to <50  *  Pt will demonstrate LOJA of all digits to >180 degrees for

## 2023-06-02 ENCOUNTER — HOSPITAL ENCOUNTER (OUTPATIENT)
Dept: OCCUPATIONAL THERAPY | Age: 53
Setting detail: THERAPIES SERIES
Discharge: HOME | End: 2023-06-02
Payer: COMMERCIAL

## 2023-06-02 PROCEDURE — 97140 MANUAL THERAPY 1/> REGIONS: CPT

## 2023-06-02 PROCEDURE — 97110 THERAPEUTIC EXERCISES: CPT

## 2023-06-02 NOTE — PROGRESS NOTES
was in her surgical dressing x2 weeks, then in a cast x2 weeks, and her cast was removed yesterday and pt fabricated a custom FBTS/P1 blocking orthosis in the clinic yesterday. Pt referred to OT for eval and treat. Pt's therapist that fabricated her orthosis spoke with Dr. Benjamín Vega regarding pt's protocol and her notes state:  No specific protocol,   1. Wrist AROM in clinic  2. Digit ext AROM  3. No MP Flexion PROM  4. PIP ROM as tolerated if \"not too aggressive\"    Pt  has a past medical history of Acid reflux, Anxiety, Asthma, Breast cancer ([address] COPD with asthma ([address] Depression, Depression, Dizziness - light-headed, Emphysema, History of chest pain, History of therapeutic radiation, History of tinnitus, Hx antineoplastic chemo, Hx of blood clots, Joint pain, Numbness and tingling, Osteoarthritis, PONV (postoperative nausea and vomiting), and SOB (shortness of breath) on exertion. SUBJECTIVE: Pt presents to clinic in her orthosis.       OBJECTIVE:  TREATMENT   Precautions: No passive MCP FLEXION, no resisted use L hand, orthosis at all times other than therapy and hygiene, care not to stretch transfers, see tentative protocol below  AVOID SUPINATION AND FLEXION   Pain:  4/10 in dorsal hand    X in shaded column indicates Activity Completed Today   Modalities Parameters/  Location  Notes/Comments   Attempted NMES for wrist ext to PT to ECRB transfer, unable to set up successfully this date but will attempt again in future sessions   Tried PT to ECRB and FCR to EDC today, unsuccessful, completed therex throughout attempts to complete with stim   Fluidotherapy x15 with therapist directed therex throughout, see below  x          Manual Therapy Time/  Technique  Notes/Comments   Scar STM  x Done to decrease tightness and increase ROM                Exercises   Sets/  Sec Reps  Notes/Comments   IP AROM flex and ext 1 10     Therex within fluidotherapy  Wrist flex/ext  Pronation with wrist ext to activate

## 2023-06-09 ENCOUNTER — HOSPITAL ENCOUNTER (OUTPATIENT)
Dept: OCCUPATIONAL THERAPY | Age: 53
Setting detail: THERAPIES SERIES
Discharge: HOME | End: 2023-06-09
Payer: COMMERCIAL

## 2023-06-09 PROCEDURE — 97530 THERAPEUTIC ACTIVITIES: CPT

## 2023-06-09 PROCEDURE — 97110 THERAPEUTIC EXERCISES: CPT

## 2023-06-09 NOTE — PROGRESS NOTES
[address] THERAPY  [] EVALUATION  [x] DAILY NOTE (LAND) [] DAILY NOTE (AQUATIC ) [] PROGRESS NOTE [] DISCHARGE NOTE    [] [address] [facility]   [] [address]    [] [address] [facility]   [x] Danilo Show    Date: 2023  Patient Name:  Eryn Green  : 1970  MRN: [id number]  CSN: [id number]    Referring Practitioner Maggy Cantor DO   Diagnosis S/p L tendon transfer   Treatment Diagnosis L hand stiffness, edema, pain, and weakness s/p L hand tendon transfers (PL to EPL, FCR to EDC, PT to ECRB)   Date of Evaluation 23      Functional Outcome Measure Used [facility]   Functional Outcome Score 86.5 (23)       Insurance: Primary: Payor: Carmenza Vargas /  /  / ,   Secondary:    Authorization Information: PRECERTIFICATION REQUIRED:  Pre-cert required after initial evaluation through [address] [place] [address]. If [facility] is secondary insurance no pre-cert is needed. INSURANCE THERAPY BENEFIT: Unlimited visits for anyone under the age of 21. For those 21 and above, 30 visits for OT, PT and ST each per calendar year are approved. Benefit will not cover maintenance or preventative treatment. FCE is a covered benefit. AQUATIC THERAPY COVERED:   Yes  MODALITIES COVERED:  Yes. Hot/Cold Packs are not covered. Visit # 5, 5/10 for progress note   Visits Allowed: RECEIVED AUTH FOR 80 UNITS(26 sessions at 3 units each) OF OT FROM 23-23 FOR CPT CODES 86947, 66850, [address] [facility], 32652, 35121, [id number], 87472, 10503   Recertification Date: 2023   Physician Follow-Up: 23   Physician Orders: Jose A Rose and treat   Pertinent History: Pt is 51 y/o F known to this clinic who sustained a dog bite and underwent prior therapy here to improve ROM in prep for tendon transfer surgery. Pt underwent sx on 23 for tendon transfers of pronator teres to ECRB, PL to EPL, and FCR to EDC and also underwent debulking of L elbow graft.  Pt

## 2023-06-20 ENCOUNTER — HOSPITAL ENCOUNTER (OUTPATIENT)
Dept: OCCUPATIONAL THERAPY | Age: 53
Setting detail: THERAPIES SERIES
Discharge: HOME | End: 2023-06-20
Payer: COMMERCIAL

## 2023-06-20 PROCEDURE — 97112 NEUROMUSCULAR REEDUCATION: CPT

## 2023-06-20 PROCEDURE — 97018 PARAFFIN BATH THERAPY: CPT

## 2023-06-20 PROCEDURE — 97110 THERAPEUTIC EXERCISES: CPT

## 2023-06-20 NOTE — PROGRESS NOTES
was in her surgical dressing x2 weeks, then in a cast x2 weeks, and her cast was removed yesterday and pt fabricated a custom FBTS/P1 blocking orthosis in the clinic yesterday. Pt referred to OT for eval and treat. Pt's therapist that fabricated her orthosis spoke with Dr. Alfredo Huber regarding pt's protocol and her notes state:  No specific protocol,   1. Wrist AROM in clinic  2. Digit ext AROM  3. No MP Flexion PROM  4. PIP ROM as tolerated if \"not too aggressive\"    Pt  has a past medical history of Acid reflux, Anxiety, Asthma, Breast cancer ([address] COPD with asthma ([address] Depression, Depression, Dizziness - light-headed, Emphysema, History of chest pain, History of therapeutic radiation, History of tinnitus, Hx antineoplastic chemo, Hx of blood clots, Joint pain, Numbness and tingling, Osteoarthritis, PONV (postoperative nausea and vomiting), and SOB (shortness of breath) on exertion. SUBJECTIVE: Pt reports she continues to have L forearm muscle spasms, occasionally. Notes pain in L volar wrist with a hard knot felt by pt. OBJECTIVE:  TREATMENT   Precautions: No passive MCP FLEXION, no resisted use L hand, orthosis at all times other than therapy and hygiene, care not to stretch transfers, see tentative protocol below  AVOID SUPINATION AND FLEXION   Pain:  2/10 in dorsal hand    X in shaded column indicates Activity Completed Today   Modalities Parameters/  Location  Notes/Comments   Attempted NMES for wrist ext to PT to ECRB transfer, unable to set up successfully this date but will attempt again in future sessions   Tried PT to ECRB and FCR to EDC today, unsuccessful, completed therex throughout attempts to complete with stim     Fluidotherapy x15 with therapist directed therex throughout, see below      Paraffin/moist heat pack  10 min L hand/forearm  x Started at beginning of tx for scar management due to fluido unit not warming up.      Manual Therapy Time/  Technique  Notes/Comments   Scar STM

## 2023-06-23 ENCOUNTER — HOSPITAL ENCOUNTER (OUTPATIENT)
Dept: OCCUPATIONAL THERAPY | Age: 53
Setting detail: THERAPIES SERIES
Discharge: HOME | End: 2023-06-23
Payer: COMMERCIAL

## 2023-06-23 PROCEDURE — 97530 THERAPEUTIC ACTIVITIES: CPT

## 2023-06-23 PROCEDURE — 97110 THERAPEUTIC EXERCISES: CPT

## 2023-06-23 NOTE — PROGRESS NOTES
Treatment: 5/23/23: Pt is 5 weeks post op today. Per [facility] protocol:   OK for NMES   Focus: midrange sup/pro, wrist ext and supination   Full AROM wrist and thumb, Full AROM wrist with digits relaxed, Full AROM digits with wrist neutral to 30 deg ext  AVOID COMBINED SUPINATION AND FLEXION     6 weeks post op (5/30/23): Full AROM wrist and forearm, combined AROM wrist/thumb flex, EDC glide/peg roll  7 weeks post op (6/6/23): Thumb PROM flex   8 weeks post op (6/13/23): Wrist PROM flex     No specific protocol per notes from [facility] therapist.  1. Wrist AROM in clinic  2. Digit ext AROM  3. No MP Flexion PROM  4. PIP ROM as tolerated if \"not too aggressive\"    Activity/Treatment Tolerance:  [x]  Patient tolerated treatment well  []  Patient limited by fatigue  []  Patient limited by pain   []  Patient limited by other medical complications  []  Other:     Assessment: Pt persists with limitations but is making small progressions and improving release. Improved wrist ext in gravity eliminated plane today. Issued peach putty for x-soft strengthening, PN to be completed next session and pt sees her physician Monday. GOALS:  Patient Goal: to regain her motion and use of her hand; to do the dishes, wash in the shower with L hand, opening doors with L hand    Short Term Goals:  Time Frame: 6 weeks  *  Pt. will demo independence with HEP for L hand/wrist ROM for improved motion and decreased pain with ADL performance. *  Pt will report ability to dress and bathe herself with assistance of her L hand with minimal difficulty and <3/10 pain. *Pt will increase MCP digit ext AROM of IF, MF, RF, SF to <25 degrees for increased ability to use a washcloth with her L hand. *Pt will increase L thumb AROM to opposition to tip of SF, radial abduction to >35, and MP/IP ext to <10 degrees for increased ability to fasten buttons and her bra.   *Pt will increase wrist ext to >20 deg and wrist flexion to >55 degrees

## 2023-06-30 ENCOUNTER — HOSPITAL ENCOUNTER (OUTPATIENT)
Dept: OCCUPATIONAL THERAPY | Age: 53
Setting detail: THERAPIES SERIES
Discharge: HOME | End: 2023-06-30
Payer: COMMERCIAL

## 2023-06-30 PROCEDURE — 97110 THERAPEUTIC EXERCISES: CPT

## 2023-06-30 PROCEDURE — 97022 WHIRLPOOL THERAPY: CPT

## 2023-07-07 ENCOUNTER — HOSPITAL ENCOUNTER (OUTPATIENT)
Dept: OCCUPATIONAL THERAPY | Age: 53
Setting detail: THERAPIES SERIES
Discharge: HOME | End: 2023-07-07
Payer: COMMERCIAL

## 2023-07-07 PROCEDURE — 97110 THERAPEUTIC EXERCISES: CPT

## 2023-07-07 PROCEDURE — 97530 THERAPEUTIC ACTIVITIES: CPT

## 2023-07-07 PROCEDURE — 97140 MANUAL THERAPY 1/> REGIONS: CPT

## 2023-07-07 NOTE — PROGRESS NOTES
this.  Then trialed index/middle fingers, and combined with wrist ext, pt able to move ball. Cone presses and twists in orange theraputty for strengthening to L wrist 2 min ea x    Goals assessed for PN with L shoulder ROM assessed WFL but pain in bicep with IR, elbow flex/ext WFL, wrist/hand/digits: see below      Discussion on use of wrist splint when she returns to work to maintain wrist in more functional position while trying to use it  x      Specific Interventions Next Treatment: 5/23/23: Pt is 5 weeks post op today. Per [facility] protocol:   OK for NMES   Focus: midrange sup/pro, wrist ext and supination   Full AROM wrist and thumb, Full AROM wrist with digits relaxed, Full AROM digits with wrist neutral to 30 deg ext  AVOID COMBINED SUPINATION AND FLEXION     6 weeks post op (5/30/23): Full AROM wrist and forearm, combined AROM wrist/thumb flex, EDC glide/peg roll  7 weeks post op (6/6/23): Thumb PROM flex   8 weeks post op (6/13/23): Wrist PROM flex     No specific protocol per notes from [facility] therapist.  1. Wrist AROM in clinic  2. Digit ext AROM  3. No MP Flexion PROM  4. PIP ROM as tolerated if \"not too aggressive\"    Activity/Treatment Tolerance:  [x]  Patient tolerated treatment well  []  Patient limited by fatigue  []  Patient limited by pain   []  Patient limited by other medical complications  []  Other:     Assessment: Pt is progressing toward goals and reports functional use at home is improving. Able to lift 10 lb crate today (both arms). Emphasized work on preventing wrist dropping into flexion as must as possible throughout her day to build strength. GOALS:  Patient Goal: to regain her motion and use of her hand; to do the dishes, wash in the shower with L hand, opening doors with L hand    Short Term Goals:  Time Frame: 6 weeks  *  Pt. will demo independence with HEP for L hand/wrist ROM for improved motion and decreased pain with ADL performance.  GOAL MET-continue

## 2023-07-11 ENCOUNTER — APPOINTMENT (OUTPATIENT)
Dept: OCCUPATIONAL THERAPY | Age: 53
End: 2023-07-11
Payer: COMMERCIAL

## 2023-07-14 ENCOUNTER — HOSPITAL ENCOUNTER (OUTPATIENT)
Dept: OCCUPATIONAL THERAPY | Age: 53
Setting detail: THERAPIES SERIES
Discharge: HOME | End: 2023-07-14
Payer: COMMERCIAL

## 2023-07-14 PROCEDURE — 97110 THERAPEUTIC EXERCISES: CPT

## 2023-07-14 PROCEDURE — 97530 THERAPEUTIC ACTIVITIES: CPT

## 2023-07-14 NOTE — PROGRESS NOTES
If no measurement is recorded, no formal assessment was completed for that motion    Forearm supination AROM 50, PROM 70; pronation WFL   Tightness throughout L arm/incision area with shoulder ROM. *Pt will demonstrate >15 lbs  strength and >4 lbs lateral and three point pinch for increased ability to open jars and containers NOT TESTED       Patient Education:   []  HEP/Education Completed: Plan of Care, Goals, HEP, precautions, pre-cert required after eval  Medbridge Access Code for HEP:   IP AROM within orthosis, removal of distal strap for full digit ext AROM (will add additional ex's in future sessions)  Pronation/wrist ext, wrist flex/digit and thumb ext AROM  [place] putty grasp  6/30/23: L UE gentle strengthening with 1# weight  []  No new Education completed  [x]  Reviewed Prior HEP; encouraged pt to try to maintain wrist as close to neutral as possible when completing functional activities. [x]  Patient verbalized and/or demonstrated understanding of education provided. []  Patient unable to verbalize and/or demonstrate understanding of education provided. Will continue education. [x]  Barriers to learning: none    PLAN:  Treatment Recommendations: Strengthening, Range of Motion, Neuromuscular Re-education, Manual Therapy - Soft Tissue Mobilization, Manual Therapy - Joint Manipulation, Pain Management, Home Exercise Program, Patient Education, Safety Education and Training, Self-Care Education and Training, Positioning, Modalities, Wound Care, and Splint Fabrications/Modifications    []  Plan of care initiated. Plan to see patient 2 times per week for 12 weeks to address the treatment planned outlined above.   [x]  Continue with current plan of care  []  Modify plan of care as follows:    []  Hold pending physician visit  []  Discharge    Time In 1252   Time Out 1335   Timed Code Minutes: 43 min   Total Treatment Time: 37 min     [address] [facility], [facility], OTR/L [id number]

## 2023-07-18 ENCOUNTER — HOSPITAL ENCOUNTER (OUTPATIENT)
Dept: OCCUPATIONAL THERAPY | Age: 53
Setting detail: THERAPIES SERIES
Discharge: HOME | End: 2023-07-18
Payer: COMMERCIAL

## 2023-07-18 PROCEDURE — 97110 THERAPEUTIC EXERCISES: CPT

## 2023-07-18 PROCEDURE — 97530 THERAPEUTIC ACTIVITIES: CPT

## 2023-07-18 PROCEDURE — 97140 MANUAL THERAPY 1/> REGIONS: CPT

## 2023-07-21 ENCOUNTER — HOSPITAL ENCOUNTER (OUTPATIENT)
Dept: OCCUPATIONAL THERAPY | Age: 53
Setting detail: THERAPIES SERIES
Discharge: HOME | End: 2023-07-21
Payer: COMMERCIAL

## 2023-07-21 PROCEDURE — 97110 THERAPEUTIC EXERCISES: CPT

## 2023-07-21 PROCEDURE — 97140 MANUAL THERAPY 1/> REGIONS: CPT

## 2023-07-21 NOTE — PROGRESS NOTES
for that motion    Forearm supination AROM 50, PROM 70; pronation WFL   Tightness throughout L arm/incision area with shoulder ROM. *Pt will demonstrate >15 lbs  strength and >4 lbs lateral and three point pinch for increased ability to open jars and containers NOT TESTED       Patient Education:   []  HEP/Education Completed: Plan of Care, Goals, HEP, precautions, pre-cert required after eval  Medbridge Access Code for HEP:   IP AROM within orthosis, removal of distal strap for full digit ext AROM (will add additional ex's in future sessions)  Pronation/wrist ext, wrist flex/digit and thumb ext AROM  [place] putty grasp  6/30/23: L UE gentle strengthening with 1# weight  []  No new Education completed  [x]  Reviewed Prior HEP; encouraged pt to try to maintain wrist as close to neutral as possible when completing functional activities. [x]  Patient verbalized and/or demonstrated understanding of education provided. []  Patient unable to verbalize and/or demonstrate understanding of education provided. Will continue education. [x]  Barriers to learning: none    PLAN:  Treatment Recommendations: Strengthening, Range of Motion, Neuromuscular Re-education, Manual Therapy - Soft Tissue Mobilization, Manual Therapy - Joint Manipulation, Pain Management, Home Exercise Program, Patient Education, Safety Education and Training, Self-Care Education and Training, Positioning, Modalities, Wound Care, and Splint Fabrications/Modifications    []  Plan of care initiated. Plan to see patient 2 times per week for 12 weeks to address the treatment planned outlined above.   [x]  Continue with current plan of care  []  Modify plan of care as follows:    []  Hold pending physician visit  []  Discharge    Time In 1245   Time Out 1328   Timed Code Minutes: 43 min   Total Treatment Time: [address]     [address] [facility], [address] [place] [address], OTR/L [id number]

## 2023-07-25 ENCOUNTER — HOSPITAL ENCOUNTER (OUTPATIENT)
Dept: OCCUPATIONAL THERAPY | Age: 53
Setting detail: THERAPIES SERIES
Discharge: HOME | End: 2023-07-25
Payer: COMMERCIAL

## 2023-07-25 PROCEDURE — 97530 THERAPEUTIC ACTIVITIES: CPT

## 2023-07-25 PROCEDURE — 97140 MANUAL THERAPY 1/> REGIONS: CPT

## 2023-07-25 PROCEDURE — 97110 THERAPEUTIC EXERCISES: CPT

## 2023-07-25 NOTE — PROGRESS NOTES
[address] THERAPY  [] EVALUATION  [x] DAILY NOTE (LAND) [] DAILY NOTE (AQUATIC ) [] PROGRESS NOTE [] DISCHARGE NOTE    [] [address] [facility] [address] [facility] [place]   [] [facility]    [] [address] [facility]   [x] Joey Moritz    Date: 2023  Patient Name:  Diamond Sampson  : 1970  MRN: [id number]  CSN: [id number]    Referring Practitioner Mick Pulido DO   Diagnosis S/p L tendon transfer   Treatment Diagnosis L hand stiffness, edema, pain, and weakness s/p L hand tendon transfers (PL to EPL, FCR to [address], PT to ECRB)   Date of Evaluation 23      Functional Outcome Measure Used [facility]   Functional Outcome Score 86.5 (23)       Insurance: Primary: Payor: Veronica Doran /  /  / ,   Secondary:    Authorization Information: PRECERTIFICATION REQUIRED:  Pre-cert required after initial evaluation through [facility]. If [facility] is secondary insurance no pre-cert is needed. INSURANCE THERAPY BENEFIT: Unlimited visits for anyone under the age of 21. For those 21 and above, 30 visits for OT, PT and ST each per calendar year are approved. Benefit will not cover maintenance or preventative treatment. FCE is a covered benefit. AQUATIC THERAPY COVERED:   Yes  MODALITIES COVERED:  Yes. Hot/Cold Packs are not covered. Visit # 16, 5/10 for progress note   Visits Allowed: RECEIVED AUTH FOR 80 UNITS(26 sessions at 3 units each) OF OT FROM 23-23 FOR CPT CODES 16697, 69227, [id number], 56010, 42649, [id number], 13407, 08189   Recertification Date: 2023   Physician Follow-Up: 2023   Physician Orders: Vipul Single and treat   Pertinent History: Pt is 51 y/o F known to this clinic who sustained a dog bite and underwent prior therapy here to improve ROM in prep for tendon transfer surgery.  Pt underwent sx on 23 for tendon transfers of pronator teres to ECRB, PL to EPL, and FCR to EDC and also underwent debulking of L elbow

## 2023-07-28 ENCOUNTER — HOSPITAL ENCOUNTER (OUTPATIENT)
Dept: OCCUPATIONAL THERAPY | Age: 53
Setting detail: THERAPIES SERIES
Discharge: HOME | End: 2023-07-28
Payer: COMMERCIAL

## 2023-07-28 PROCEDURE — 97530 THERAPEUTIC ACTIVITIES: CPT

## 2023-07-28 PROCEDURE — 97140 MANUAL THERAPY 1/> REGIONS: CPT

## 2023-07-28 PROCEDURE — 97110 THERAPEUTIC EXERCISES: CPT

## 2023-07-28 NOTE — PROGRESS NOTES
[address] THERAPY  [] EVALUATION  [x] DAILY NOTE (LAND) [] DAILY NOTE (AQUATIC ) [] PROGRESS NOTE [] DISCHARGE NOTE    [] [address] [facility] [facility]   [] [address] [facility]    [] [address] [facility]   [x] Bryan Worthington    Date: 2023  Patient Name:  Teresa Rachel  : 1970  MRN: [id number]  CSN: [id number]    Referring Practitioner Yarelis Colby,    Diagnosis S/p L tendon transfer   Treatment Diagnosis L hand stiffness, edema, pain, and weakness s/p L hand tendon transfers (PL to EPL, FCR to EDC, PT to ECRB)   Date of Evaluation 23      Functional Outcome Measure Used [facility]   Functional Outcome Score 86.5 (23)       Insurance: Primary: Payor: China Humphrey /  /  / ,   Secondary:    Authorization Information: PRECERTIFICATION REQUIRED:  Pre-cert required after initial evaluation through  [facility]. If [facility] is secondary insurance no pre-cert is needed. INSURANCE THERAPY BENEFIT: Unlimited visits for anyone under the age of 21. For those 21 and above, 30 visits for OT, PT and ST each per calendar year are approved. Benefit will not cover maintenance or preventative treatment. FCE is a covered benefit. AQUATIC THERAPY COVERED:   Yes  MODALITIES COVERED:  Yes. Hot/Cold Packs are not covered. Visit # 17, 6/10 for progress note   Visits Allowed: RECEIVED AUTH FOR 80 UNITS(26 sessions at 3 units each) OF OT FROM 23-23 FOR CPT CODES 72973, 47175, [address] [place], [address]859, 02945, [id number], 42835, 86098   Recertification Date: 2023   Physician Follow-Up: 2023   Physician Orders: Mikal Walton and treat   Pertinent History: Pt is 51 y/o F known to this clinic who sustained a dog bite and underwent prior therapy here to improve ROM in prep for tendon transfer surgery.  Pt underwent sx on 23 for tendon transfers of pronator teres to ECRB, PL to EPL, and FCR to EDC and also underwent debulking of L elbow

## 2023-08-01 ENCOUNTER — APPOINTMENT (OUTPATIENT)
Dept: OCCUPATIONAL THERAPY | Age: 53
End: 2023-08-01
Payer: COMMERCIAL

## 2023-08-04 ENCOUNTER — HOSPITAL ENCOUNTER (OUTPATIENT)
Dept: OCCUPATIONAL THERAPY | Age: 53
Setting detail: THERAPIES SERIES
Discharge: HOME | End: 2023-08-04
Payer: COMMERCIAL

## 2023-08-04 PROCEDURE — 97110 THERAPEUTIC EXERCISES: CPT

## 2023-08-04 PROCEDURE — 97530 THERAPEUTIC ACTIVITIES: CPT

## 2023-08-04 PROCEDURE — 97140 MANUAL THERAPY 1/> REGIONS: CPT

## 2023-08-04 NOTE — PROGRESS NOTES
[address] THERAPY  [] EVALUATION  [x] DAILY NOTE (LAND) [] DAILY NOTE (AQUATIC ) [] PROGRESS NOTE [] DISCHARGE NOTE    [] [address] [facility] [address] [facility] [place]   [] 44 [facility]    [] [address] [employer]   [x] Opal Greene    Date: 2023  Patient Name:  Alba Rainey  : 1970  MRN: [id number]  CSN: [id number]    Referring Practitioner Clarisa Muse DO   Diagnosis S/p L tendon transfer   Treatment Diagnosis L hand stiffness, edema, pain, and weakness s/p L hand tendon transfers (PL to EPL, FCR to EDC, PT to ECRB)   Date of Evaluation 23      Functional Outcome Measure Used [facility]   Functional Outcome Score 86.5 (23)       Insurance: Primary: Payor: Chad Cockayne /  /  / ,   Secondary:    Authorization Information: PRECERTIFICATION REQUIRED:  Pre-cert required after initial evaluation through  [facility]. If [facility] is secondary insurance no pre-cert is needed. INSURANCE THERAPY BENEFIT: Unlimited visits for anyone under the age of 21. For those 21 and above, 30 visits for OT, PT and ST each per calendar year are approved. Benefit will not cover maintenance or preventative treatment. FCE is a covered benefit. AQUATIC THERAPY COVERED:   Yes  MODALITIES COVERED:  Yes. Hot/Cold Packs are not covered. Visit # 18, 7/10 for progress note   Visits Allowed: RECEIVED AUTH FOR 80 UNITS(26 sessions at 3 units each) OF OT FROM 23-23 FOR CPT CODES 78804, 61792, [address] [place], [address]24, 53789, [id number], 98768, 02222   Recertification Date: 2023   Physician Follow-Up: 2023   Physician Orders: Jeff Thao and treat   Pertinent History: Pt is 51 y/o F known to this clinic who sustained a dog bite and underwent prior therapy here to improve ROM in prep for tendon transfer surgery.  Pt underwent sx on 23 for tendon transfers of pronator teres to ECRB, PL to EPL, and FCR to EDC and also underwent debulking of L elbow

## 2023-08-09 ENCOUNTER — HOSPITAL ENCOUNTER (OUTPATIENT)
Dept: OCCUPATIONAL THERAPY | Age: 53
Setting detail: THERAPIES SERIES
Discharge: HOME | End: 2023-08-09
Payer: COMMERCIAL

## 2023-08-09 PROCEDURE — 97140 MANUAL THERAPY 1/> REGIONS: CPT

## 2023-08-09 PROCEDURE — 97110 THERAPEUTIC EXERCISES: CPT

## 2023-08-09 PROCEDURE — 97530 THERAPEUTIC ACTIVITIES: CPT

## 2023-08-09 NOTE — PROGRESS NOTES
Pt. will demo independence with HEP for L hand/wrist ROM for improved motion and decreased pain with ADL performance. GOAL MET-continue with updates. *  Pt will report ability to dress and bathe herself with assistance of her L hand with minimal difficulty and <3/10 pain. GOAL MET  *Pt will increase MCP digit ext AROM of IF, MF, RF, SF to <25 degrees for increased ability to use a washcloth with her L hand. GOAL NOT MET   6/30/23: IF -40 degrees, MF -50 degrees, RF -40 degrees, SF -44 degrees   *Pt will increase L thumb AROM to opposition to tip of SF, radial abduction to >35, and MP/IP ext to <10 degrees for increased ability to fasten buttons and her bra. GOAL PART MET, CONT   6/30/23: L thumb able to oppose to SF, radial abduction to 28 degrees, MP ext at 10 degrees, IP ext at 5 degrees   *Pt will increase wrist ext to >20 deg and wrist flexion to >55 degrees for increased ability to do the dishes.  GOAL NOT MET   6/30/23: L wrist ext -20 degrees from neutral L wrist flexion -20 to 50 degrees flexion (pt reports cramping at times where wrist won't extend and she has to wait for cramp to subside.)    Long Term Goals:  Time Frame: 12 weeks  *  Patient will improve PRWE to <50: GOAL MET: score of 47   *  Pt will demonstrate LOJA of all digits to >180 degrees for increased ability to open a door/turn a doorknob with her L hand GOAL NOT MET    LEFT UPPER EXTREMITY  HAND RANGE OF MOTION    AROM PROM COMMENTS   Thump MP      Thumb IP      Thumb Radial Abduction/Adduction      Thumb Palmar Abduction/Adduction      Thumb Opposition         Index Finger MP 40-70 0-90    Index Finger PIP 15-75 5-80 [place]    Index Finger DIP 20-65 0-80    Index Finger Total Motion 135        Long Finger MP 50-70 0-85    Long Finger PIP 5-80 0-80    Long Finger DIP 10-75 0-80    Long Finger Total Motion 160        Ring Finger MP 40-70 0-85    Ring Finger PIP 20-90 5-90    Ring Finger DIP 5-80 0-75    Ring Finger Total Motion 175

## 2023-08-11 ENCOUNTER — HOSPITAL ENCOUNTER (OUTPATIENT)
Dept: OCCUPATIONAL THERAPY | Age: 53
Setting detail: THERAPIES SERIES
Discharge: HOME | End: 2023-08-11
Payer: COMMERCIAL

## 2023-08-11 PROCEDURE — 97140 MANUAL THERAPY 1/> REGIONS: CPT

## 2023-08-11 PROCEDURE — 97530 THERAPEUTIC ACTIVITIES: CPT

## 2023-08-11 PROCEDURE — 97110 THERAPEUTIC EXERCISES: CPT

## 2023-08-11 NOTE — PROGRESS NOTES
[address] [place] [address],[address] [facility], 9 [facility], OTR/L [id number]

## 2023-08-15 ENCOUNTER — HOSPITAL ENCOUNTER (OUTPATIENT)
Dept: OCCUPATIONAL THERAPY | Age: 53
Setting detail: THERAPIES SERIES
Discharge: HOME | End: 2023-08-15
Payer: COMMERCIAL

## 2023-08-15 PROCEDURE — 97110 THERAPEUTIC EXERCISES: CPT

## 2023-08-15 PROCEDURE — 97140 MANUAL THERAPY 1/> REGIONS: CPT

## 2023-08-15 PROCEDURE — 97530 THERAPEUTIC ACTIVITIES: CPT

## 2023-08-16 ENCOUNTER — HOSPITAL ENCOUNTER (OUTPATIENT)
Dept: INFUSION THERAPY | Age: 53
Discharge: HOME | End: 2023-08-16
Payer: COMMERCIAL

## 2023-08-16 ENCOUNTER — OFFICE VISIT (OUTPATIENT)
Dept: ONCOLOGY | Age: 53
End: 2023-08-16

## 2023-08-16 VITALS
SYSTOLIC BLOOD PRESSURE: 131 MMHG | WEIGHT: 173 LBS | DIASTOLIC BLOOD PRESSURE: 64 MMHG | HEART RATE: 74 BPM | BODY MASS INDEX: 26.3 KG/M2 | TEMPERATURE: 98.1 F

## 2023-08-16 DIAGNOSIS — Z17.0 MALIGNANT NEOPLASM OF LEFT BREAST IN FEMALE, ESTROGEN RECEPTOR POSITIVE, UNSPECIFIED SITE OF BREAST (HCC): Primary | ICD-10-CM

## 2023-08-16 DIAGNOSIS — C50.912 MALIGNANT NEOPLASM OF LEFT BREAST IN FEMALE, ESTROGEN RECEPTOR POSITIVE, UNSPECIFIED SITE OF BREAST (HCC): Primary | ICD-10-CM

## 2023-08-16 DIAGNOSIS — Z12.11 ENCOUNTER FOR SCREENING COLONOSCOPY: ICD-10-CM

## 2023-08-16 PROCEDURE — 99211 OFF/OP EST MAY X REQ PHY/QHP: CPT

## 2023-08-16 RX ORDER — LETROZOLE 2.5 MG/1
2.5 TABLET, FILM COATED ORAL DAILY
Qty: 90 TABLET | Refills: 5 | Status: SHIPPED | OUTPATIENT
Start: 2023-08-16

## 2023-08-16 ASSESSMENT — ENCOUNTER SYMPTOMS
DIARRHEA: 0
SORE THROAT: 0
ABDOMINAL PAIN: 0
RHINORRHEA: 0
SHORTNESS OF BREATH: 0
TROUBLE SWALLOWING: 0
COUGH: 0
NAUSEA: 0
VOMITING: 0
RECTAL PAIN: 0
SINUS PRESSURE: 0
CONSTIPATION: 0

## 2023-08-16 NOTE — PROGRESS NOTES
positive        ASSESSMENT and PLAN    1.  Stage pT3, PN 1, M0 ER positive left breast cancer. She will continue on letrozole for total of 7 to 10 years. Provided refills for Letrozole 2.5 mg daily, 90 day supply, 5 refills. UTD with [facility]( May 2023): CARLEY. DEXA scan (2022) ; Osteopenia. Calcium and vitamin D supplementation daily basis. Referral to GI: Age-appropriate colonoscopy screening. Life style modification-Daily exercise and smoking cessation. An opportunity to ask questions was given and all questions were answered to the patient's satisfaction. 2.  Multiple comorbidities. She will continue follow-up with her usual providers and continue her usual medications for her GERD, anxiety, COPD, depression, history of blood clots and osteoarthritis. Follow-up in 6 months for MD visit/CBC CMP.         Aurelio Mcgregor MD 8/16/2023 3:29 PM

## 2023-08-18 ENCOUNTER — HOSPITAL ENCOUNTER (OUTPATIENT)
Dept: OCCUPATIONAL THERAPY | Age: 53
Setting detail: THERAPIES SERIES
Discharge: HOME | End: 2023-08-18
Payer: COMMERCIAL

## 2023-08-18 PROCEDURE — 97110 THERAPEUTIC EXERCISES: CPT

## 2023-08-18 PROCEDURE — 97140 MANUAL THERAPY 1/> REGIONS: CPT

## 2023-08-18 PROCEDURE — 97530 THERAPEUTIC ACTIVITIES: CPT

## 2023-08-18 NOTE — PROGRESS NOTES
[address] THERAPY  [] EVALUATION  [x] DAILY NOTE (LAND) [] DAILY NOTE (AQUATIC ) [] PROGRESS NOTE/RE-CERTIFICATION [] DISCHARGE NOTE    [] [address] - [place]   [] [facility]    [] [address] [facility] [employer]   [x] Bryan Worthington    Date: 2023  Patient Name:  Teresa Rachel  : 1970  MRN: [id number]  CSN: [id number]    Referring Practitioner Yarelis Colby,    Diagnosis S/p L tendon transfer   Treatment Diagnosis L hand stiffness, edema, pain, and weakness s/p L hand tendon transfers (PL to EPL, FCR to EDC, PT to ECRB)   Date of Evaluation 23      Functional Outcome Measure Used [facility]   Functional Outcome Score 86.5 (23)       Insurance: Primary: Payor: China Humphrey /  /  / ,   Secondary:    Authorization Information: PRECERTIFICATION REQUIRED:  Pre-cert required after initial evaluation through  [facility]. If [facility] is secondary insurance no pre-cert is needed. INSURANCE THERAPY BENEFIT: Unlimited visits for anyone under the age of 21. For those 21 and above, 30 visits for OT, PT and ST each per calendar year are approved. Benefit will not cover maintenance or preventative treatment. FCE is a covered benefit. AQUATIC THERAPY COVERED:   Yes  MODALITIES COVERED:  Yes. Hot/Cold Packs are not covered. Visit # 22, 2/10 for PN, (PN 23)   Visits Allowed: RECEIVED AUTH FOR 80 UNITS(26 sessions at 3 units each) OF OT FROM 23-23 FOR CPT CODES 76027, 99754, [address], [address]59, 10699, 88233, 82694, 63996   Recertification Date: 2023   Physician Follow-Up: 2023   Physician Orders: Mikal Walton and treat   Pertinent History: Pt is 53 y/o F known to this clinic who sustained a dog bite and underwent prior therapy here to improve ROM in prep for tendon transfer surgery.  Pt underwent sx on 23 for tendon transfers of pronator teres to ECRB, PL to EPL, and FCR to EDC and also underwent

## 2023-08-22 ENCOUNTER — HOSPITAL ENCOUNTER (OUTPATIENT)
Dept: OCCUPATIONAL THERAPY | Age: 53
Setting detail: THERAPIES SERIES
Discharge: HOME | End: 2023-08-22
Payer: COMMERCIAL

## 2023-08-22 PROCEDURE — 97110 THERAPEUTIC EXERCISES: CPT

## 2023-08-22 PROCEDURE — 97140 MANUAL THERAPY 1/> REGIONS: CPT

## 2023-08-22 PROCEDURE — 97530 THERAPEUTIC ACTIVITIES: CPT

## 2023-08-22 NOTE — PROGRESS NOTES
Re-education, Manual Therapy - Soft Tissue Mobilization, Manual Therapy - Joint Manipulation, Pain Management, Home Exercise Program, Patient Education, Safety Education and Training, Self-Care Education and Training, Positioning, Modalities, Wound Care, and Splint Fabrications/Modifications    []  Plan of care initiated. Plan to see patient 2 times per week for 12 weeks to address the treatment planned outlined above.   [x]  Continue with current plan of care  []  Modify plan of care as follows:  1-2x per week x 4-6 weeks  []  Hold pending physician visit  []  Discharge    Time In 1456   Time Out 1539   Timed Code Minutes: 43 min   Total Treatment Time: 37 min     [address] [facility], OTR/L [id number]

## 2023-09-06 ENCOUNTER — HOSPITAL ENCOUNTER (OUTPATIENT)
Dept: OCCUPATIONAL THERAPY | Age: 53
Setting detail: THERAPIES SERIES
Discharge: HOME | End: 2023-09-06
Payer: COMMERCIAL

## 2023-09-06 PROCEDURE — 97530 THERAPEUTIC ACTIVITIES: CPT

## 2023-09-06 PROCEDURE — 97110 THERAPEUTIC EXERCISES: CPT

## 2023-09-06 PROCEDURE — 97140 MANUAL THERAPY 1/> REGIONS: CPT

## 2023-09-06 NOTE — PROGRESS NOTES
JARS/CONTAINERS REVISE GOAL- Pt will demo >28 lbs  strength AND > 6.5 lbs lateral pinch for increased ability to open jars and containers    LEFT UPPER EXTREMITY  STRENGTH    Strength Rating Comments   Shoulder Flexion     Shoulder Extension     Shoulder Abduction     Shoulder Adduction     Shoulder External Rotation     Shoulder Internal Rotation     []  Shoulder Strength is grossly WFL. Elbow Flexion     Elbow Extension     Forearm Pronation     Forearm Supination     [] Elbow Strength is grossly WFL. Wrist Flexion     Wrist Extension     Wrist Radial Deviation     Wrist Ulnar Deviation     []  Wrist Strength is grossly WFL. Right Left    Strength Settin 64 21   Pinch Strength Three point Pinch: 12 5    Lateral Pinch 13 10         If no ratings are recorded, no formal assessment was completed. Patient Education:   []  HEP/Education Completed: Plan of Care, Goals, HEP, precautions, pre-cert required after eval  Medbridge Access Code for HEP:   IP AROM within orthosis, removal of distal strap for full digit ext AROM (will add additional ex's in future sessions)  Pronation/wrist ext, wrist flex/digit and thumb ext AROM  Peach putty grasp  Hammer sup/pro for developing strength   23: L UE gentle strengthening with 1# weight  23-practicing holding plate in supination with light items  23- hammer sup/pro for control. Strengthening, and slight supination stretch  []  No new Education completed  [x]  Reviewed Prior HEP    [x]  Patient verbalized and/or demonstrated understanding of education provided. []  Patient unable to verbalize and/or demonstrate understanding of education provided. Will continue education.   [x]  Barriers to learning: none    PLAN:  Treatment Recommendations: Strengthening, Range of Motion, Neuromuscular Re-education, Manual Therapy - Soft Tissue Mobilization, Manual Therapy - Joint Manipulation, Pain Management, Home Exercise Program, Patient

## 2023-09-13 ENCOUNTER — HOSPITAL ENCOUNTER (OUTPATIENT)
Dept: OCCUPATIONAL THERAPY | Age: 53
Setting detail: THERAPIES SERIES
Discharge: HOME | End: 2023-09-13
Payer: COMMERCIAL

## 2023-09-13 PROCEDURE — 97140 MANUAL THERAPY 1/> REGIONS: CPT

## 2023-09-13 PROCEDURE — 97110 THERAPEUTIC EXERCISES: CPT

## 2023-09-13 NOTE — DISCHARGE SUMMARY
[address] THERAPY  [] EVALUATION  [] DAILY NOTE (LAND) [] DAILY NOTE (AQUATIC ) [] PROGRESS NOTE/RE-CERTIFICATION [x] DISCHARGE NOTE    [] OUTPATIENT REHABILITATION CENTER [facility]   [] [address] [facility]    [] [address] [employer]   [x] Lalo Valdes    Date: 2023  Patient Name:  Chema Yeh  : 1970  MRN: [id number]  CSN: [id number]    Referring Practitioner Carlos Shannon DO   Diagnosis S/p L tendon transfer   Treatment Diagnosis L hand stiffness, edema, pain, and weakness s/p L hand tendon transfers (PL to EPL, FCR to [address], PT to ECRB)   Date of Evaluation 23      Functional Outcome Measure Used [facility]   Functional Outcome Score 86.5 (23) 47 (23)      Insurance: Primary: Payor: Teresa Hyde /  /  / ,   Secondary:    Authorization Information: PRECERTIFICATION REQUIRED:  Pre-cert required after initial evaluation through  [facility]. If [facility] is secondary insurance no pre-cert is needed. INSURANCE THERAPY BENEFIT: Unlimited visits for anyone under the age of 21. For those 21 and above, 30 visits for OT, PT and ST each per calendar year are approved. Benefit will not cover maintenance or preventative treatment. FCE is a covered benefit. AQUATIC THERAPY COVERED:   Yes  MODALITIES COVERED:  Yes. Hot/Cold Packs are not covered. Visit # 25,  (PN 23)   Visits Allowed: RECEIVED AUTH FOR 80 UNITS(26 sessions at 3 units each) OF OT FROM 23-23 FOR CPT CODES 97160, 04940, [id number], 08607, 25270, 99543, 60129, 91164   Recertification Date: 2023   Physician Follow-Up: 2023   Physician Orders: Cheri Lock and treat   Pertinent History: Pt is 53 y/o F known to this clinic who sustained a dog bite and underwent prior therapy here to improve ROM in prep for tendon transfer surgery.  Pt underwent sx on 23 for tendon transfers of pronator teres to ECRB, PL to EPL, and FCR to EDC and also underwent

## 2023-10-04 ENCOUNTER — HOSPITAL ENCOUNTER (EMERGENCY)
Age: 53
Discharge: HOME | End: 2023-10-04
Payer: COMMERCIAL

## 2023-10-04 ENCOUNTER — APPOINTMENT (OUTPATIENT)
Dept: GENERAL RADIOLOGY | Age: 53
End: 2023-10-04
Payer: COMMERCIAL

## 2023-10-04 VITALS
SYSTOLIC BLOOD PRESSURE: 100 MMHG | RESPIRATION RATE: 20 BRPM | TEMPERATURE: 98.4 F | OXYGEN SATURATION: 97 % | HEART RATE: 82 BPM | DIASTOLIC BLOOD PRESSURE: 69 MMHG

## 2023-10-04 DIAGNOSIS — S63.502A SPRAIN OF LEFT WRIST, INITIAL ENCOUNTER: Primary | ICD-10-CM
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
## 2023-10-04 PROCEDURE — 73060 X-RAY EXAM OF HUMERUS: CPT

## 2023-10-04 PROCEDURE — 99283 EMERGENCY DEPT VISIT LOW MDM: CPT

## 2023-10-04 PROCEDURE — 73090 X-RAY EXAM OF FOREARM: CPT

## 2023-10-04 PROCEDURE — 6370000000 HC RX 637 (ALT 250 FOR IP): Performed by: NURSE PRACTITIONER

## 2023-10-04 PROCEDURE — 73030 X-RAY EXAM OF SHOULDER: CPT

## 2023-10-04 PROCEDURE — 73130 X-RAY EXAM OF HAND: CPT

## 2023-10-04 RX ORDER — HYDROCODONE BITARTRATE AND ACETAMINOPHEN 5; 325 MG/1; MG/1
1 TABLET ORAL ONCE
Status: COMPLETED | OUTPATIENT
Start: 2023-10-04 | End: 2023-10-04

## 2023-10-04 RX ADMIN — HYDROCODONE BITARTRATE AND ACETAMINOPHEN 1 TABLET: 5; 325 TABLET ORAL at 20:26

## 2023-10-04 ASSESSMENT — PAIN DESCRIPTION - ORIENTATION: ORIENTATION: LEFT

## 2023-10-04 ASSESSMENT — PAIN - FUNCTIONAL ASSESSMENT: PAIN_FUNCTIONAL_ASSESSMENT: 0-10

## 2023-10-04 ASSESSMENT — PAIN DESCRIPTION - LOCATION: LOCATION: ARM

## 2023-10-04 ASSESSMENT — PAIN SCALES - GENERAL: PAINLEVEL_OUTOF10: 7

## 2023-10-04 NOTE — ED TRIAGE NOTES
Pt comes to ED with c/o mechanical fall after tripping over a cord. Pt states she landed on her left side. PT states she is having shoulder and arm pain on the left side. Pt states she thinks she messed her neck up. Pt denies hitting head. Pt rates her pain a 7 out of 10. Pt states she can move her arm but it hurts really bad.

## 2023-10-06 NOTE — ED PROVIDER NOTES
Pneumonia of both lungs due to infectious organism J18.9    COPD exacerbation (HCC) J44.1    Diarrhea of presumed infectious origin R19.7    Sepsis due to pneumonia ([address]) J18.9, A41.9    Breast lump N63.0    Dyspnea R06.00    Epigastric pain R10.13    Gastritis due to Helicobacter species G22.05, B96.81    Headache R51.9    Intestinal infectious disease A09    Low back pain M54.50    Malaise and fatigue R53.81, R53.83    Microscopic hematuria R31.29    Polyuria R35.89    Solitary pulmonary nodule R91.1    Urinary incontinence R32    Dog bite of arm, left, initial encounter S41.152A, W54. 0XXA    Dog bite of forearm, left, initial encounter S51.852A, W54. 0XXA    Hypokalemia E87.6     SURGICAL HISTORY       Past Surgical History:   Procedure Laterality Date    ARM SURGERY Left 2022    LEFT FOREARM INCISION AND DRAINAGE performed by Courtney Segal MD at [address] [facility] Left 2022    LEFT ARM WASHOUT AND DEBRIDEMENT WITH WOUND VAC PLACEMENT performed by Keren Harkins DO at [address] [facility] 2023    BLADDER SURGERY  2014    BREAST BIOPSY  10/06/2017    [place]  2011    NO EVIDENCE OF PULMONARY HTN,NO EVIDENCE OF DD,NORMAL LVF    CARDIOVASCULAR STRESS TEST  2011    EF 60% MILD INFERIOR WALL REVERSIBLE STRESS-INDUCED ISCHEMIA      SECTION  1992    CHOLECYSTECTOMY  2015    CYST REMOVAL Right 2015    rt breast [facility]-benign breast cyst     ESOPHAGUS SURGERY  2014    HAND SURGERY Left 2022    Left forearm repeat irrigation and sharp excisional debridement of skin, subcutaneous tissue, fascia, muscle as well as superficial radial to lateral antebrachial cutaneous nerve transfer, lateral arm flap and split thickness skin graft from the anterior lateral thigh performed by Keren Harkins DO at [address] (CERVIX STATUS UNKNOWN)  [address] / REMOVAL / [address] Right

## 2024-01-03 NOTE — PROGRESS NOTES
electronic medical record.  The PMH, SH, and FH were also reviewed as noted on the EMR.        Past Medical History:   Diagnosis Date    Acid reflux     Anxiety     Asthma     Breast cancer (HCC)     COPD with asthma     Depression     Depression     Dizziness - light-headed     HX OF:    Emphysema     History of chest pain     History of therapeutic radiation     History of tinnitus     Hx antineoplastic chemo     Hx of blood clots     Joint pain     Numbness and tingling     Osteoarthritis     PONV (postoperative nausea and vomiting)     SOB (shortness of breath) on exertion       Past Surgical History:   Procedure Laterality Date    ARM SURGERY Left 2022    LEFT FOREARM INCISION AND DRAINAGE performed by Pradip Bernard MD at [facility] OR    ARM SURGERY Left 2022    LEFT ARM WASHOUT AND DEBRIDEMENT WITH WOUND VAC PLACEMENT performed by Jayden Porter DO at [facility] OR    ARM SURGERY Left 2023    BLADDER SURGERY  2014    BREAST BIOPSY  10/06/2017    [facility]    CARDIAC CATHETERIZATION  2011    NO EVIDENCE OF PULMONARY HTN,NO EVIDENCE OF DD,NORMAL LVF    CARDIOVASCULAR STRESS TEST  2011    EF 60% MILD INFERIOR WALL REVERSIBLE STRESS-INDUCED ISCHEMIA      SECTION  1992    CHOLECYSTECTOMY  2015    CYST REMOVAL Right 2015    rt breast [facility]-benign breast cyst     ESOPHAGUS SURGERY  2014    HAND SURGERY Left 2022    Left forearm repeat irrigation and sharp excisional debridement of skin, subcutaneous tissue, fascia, muscle as well as superficial radial to lateral antebrachial cutaneous nerve transfer, lateral arm flap and split thickness skin graft from the anterior lateral thigh performed by Jayden Porter DO at [facility] OR    HYSTERECTOMY (CERVIX STATUS UNKNOWN)      INSERTION / REMOVAL / REPLACEMENT VENOUS ACCESS CATHETER Right 2017    RIGHT SIDE SINGLE LUMEN POWERPORT INSERTION performed by Vianey Perez MD at [facility] OR    MASTECTOMY Left 2017

## 2024-01-05 ENCOUNTER — HOSPITAL ENCOUNTER (OUTPATIENT)
Dept: INFUSION THERAPY | Age: 54
Discharge: HOME | End: 2024-01-05

## 2024-01-05 ENCOUNTER — OFFICE VISIT (OUTPATIENT)
Dept: ONCOLOGY | Age: 54
End: 2024-01-05

## 2024-01-05 VITALS
RESPIRATION RATE: 20 BRPM | HEART RATE: 94 BPM | SYSTOLIC BLOOD PRESSURE: 150 MMHG | DIASTOLIC BLOOD PRESSURE: 70 MMHG | TEMPERATURE: 97 F | OXYGEN SATURATION: 98 % | BODY MASS INDEX: 25.34 KG/M2 | WEIGHT: 167.2 LBS | HEIGHT: 68 IN

## 2024-01-05 DIAGNOSIS — Z08 ENCOUNTER FOR FOLLOW-UP SURVEILLANCE OF BREAST CANCER: ICD-10-CM

## 2024-01-05 DIAGNOSIS — Z85.3 ENCOUNTER FOR FOLLOW-UP SURVEILLANCE OF BREAST CANCER: ICD-10-CM

## 2024-01-05 DIAGNOSIS — C50.912 MALIGNANT NEOPLASM OF LEFT BREAST IN FEMALE, ESTROGEN RECEPTOR POSITIVE, UNSPECIFIED SITE OF BREAST (HCC): ICD-10-CM

## 2024-01-05 DIAGNOSIS — Z79.811 PROPHYLACTIC USE OF LETROZOLE (FEMARA): ICD-10-CM

## 2024-01-05 DIAGNOSIS — Z17.0 MALIGNANT NEOPLASM OF LEFT BREAST IN FEMALE, ESTROGEN RECEPTOR POSITIVE, UNSPECIFIED SITE OF BREAST (HCC): ICD-10-CM

## 2024-01-05 DIAGNOSIS — C50.912 MALIGNANT NEOPLASM OF LEFT BREAST IN FEMALE, ESTROGEN RECEPTOR POSITIVE, UNSPECIFIED SITE OF BREAST (HCC): Primary | ICD-10-CM

## 2024-01-05 DIAGNOSIS — Z17.0 MALIGNANT NEOPLASM OF LEFT BREAST IN FEMALE, ESTROGEN RECEPTOR POSITIVE, UNSPECIFIED SITE OF BREAST (HCC): Primary | ICD-10-CM

## 2024-01-05 LAB
ABSOLUTE IMMATURE GRANULOCYTE: 0 THOU/MM3 (ref 0–0.07)
ALBUMIN SERPL BCG-MCNC: 4.2 G/DL (ref 3.5–5.1)
ALP SERPL-CCNC: 81 U/L (ref 38–126)
ALT SERPL W/O P-5'-P-CCNC: 14 U/L (ref 11–66)
AST SERPL-CCNC: 15 U/L (ref 5–40)
BASOPHILS ABSOLUTE: 0 THOU/MM3 (ref 0–0.1)
BASOPHILS NFR BLD AUTO: 0 % (ref 0–3)
BILIRUB CONJ SERPL-MCNC: < 0.2 MG/DL (ref 0–0.3)
BILIRUB SERPL-MCNC: 0.4 MG/DL (ref 0.3–1.2)
BUN BLDP-MCNC: 11 MG/DL (ref 8–26)
CHLORIDE BLD-SCNC: 107 MEQ/L (ref 98–109)
CREAT BLD-MCNC: 0.6 MG/DL (ref 0.5–1.2)
EOSINOPHIL NFR BLD AUTO: 3 % (ref 0–4)
EOSINOPHILS ABSOLUTE: 0.1 THOU/MM3 (ref 0–0.4)
ERYTHROCYTE [DISTWIDTH] IN BLOOD BY AUTOMATED COUNT: 12.9 % (ref 11.5–14.5)
GFR SERPL CREATININE-BSD FRML MDRD: > 60 ML/MIN/1.73M2
GLUCOSE BLD-MCNC: 108 MG/DL (ref 70–108)
HCT VFR BLD AUTO: 39.6 % (ref 37–47)
HGB BLD-MCNC: 13 GM/DL (ref 12–16)
IMMATURE GRANULOCYTES: 0 %
IONIZED CALCIUM, WHOLE BLOOD: 1.21 MMOL/L (ref 1.12–1.32)
LYMPHOCYTES ABSOLUTE: 1.2 THOU/MM3 (ref 1–4.8)
LYMPHOCYTES NFR BLD AUTO: 25 % (ref 15–47)
MCH RBC QN AUTO: 32.3 PG (ref 26–33)
MCHC RBC AUTO-ENTMCNC: 32.8 GM/DL (ref 32.2–35.5)
MCV RBC AUTO: 99 FL (ref 81–99)
MONOCYTES ABSOLUTE: 0.5 THOU/MM3 (ref 0.4–1.3)
MONOCYTES NFR BLD AUTO: 10 % (ref 0–12)
NEUTROPHILS NFR BLD AUTO: 62 % (ref 43–75)
PLATELET # BLD AUTO: 198 THOU/MM3 (ref 130–400)
PMV BLD AUTO: 9.7 FL (ref 9.4–12.4)
POTASSIUM BLD-SCNC: 4.1 MEQ/L (ref 3.5–4.9)
PROT SERPL-MCNC: 7 G/DL (ref 6.1–8)
RBC # BLD AUTO: 4.02 MILL/MM3 (ref 4.2–5.4)
SEGMENTED NEUTROPHILS ABSOLUTE COUNT: 2.9 THOU/MM3 (ref 1.8–7.7)
SODIUM BLD-SCNC: 142 MEQ/L (ref 138–146)
TOTAL CO2, WHOLE BLOOD: 27 MEQ/L (ref 23–33)
WBC # BLD AUTO: 4.7 THOU/MM3 (ref 4.8–10.8)

## 2024-01-05 PROCEDURE — 85025 COMPLETE CBC W/AUTO DIFF WBC: CPT

## 2024-01-05 PROCEDURE — 80076 HEPATIC FUNCTION PANEL: CPT

## 2024-01-05 PROCEDURE — 80047 BASIC METABLC PNL IONIZED CA: CPT

## 2024-01-05 PROCEDURE — 99211 OFF/OP EST MAY X REQ PHY/QHP: CPT

## 2024-01-05 PROCEDURE — 36415 COLL VENOUS BLD VENIPUNCTURE: CPT

## 2024-01-05 RX ORDER — LETROZOLE 2.5 MG/1
2.5 TABLET, FILM COATED ORAL DAILY
Qty: 90 TABLET | Refills: 1 | Status: SHIPPED | OUTPATIENT
Start: 2024-01-05

## 2024-01-05 NOTE — PATIENT INSTRUCTIONS
Continue Femara for a total of 7-10 years.  Refill sent per request.   Pt moving to [place], will need to establish with new oncologist.     Mammogram due May 2024.     DEXA scan due 9/2024

## 2024-02-28 NOTE — ED NOTES
Pt medicated and provided nieves crackers and gingerale upon request.     Griselda Baumgartner, RN  04/05/21 [facility]

## 2024-03-12 NOTE — PROGRESS NOTES
HEP    [x]  Patient verbalized and/or demonstrated understanding of education provided. []  Patient unable to verbalize and/or demonstrate understanding of education provided. Will continue education. [x]  Barriers to learning: none    PLAN:  Treatment Recommendations: Strengthening, Range of Motion, Neuromuscular Re-education, Manual Therapy - Soft Tissue Mobilization, Manual Therapy - Joint Manipulation, Pain Management, Home Exercise Program, Patient Education, Safety Education and Training, Self-Care Education and Training, Positioning, Modalities, Wound Care, and Splint Fabrications/Modifications    []  Plan of care initiated. Plan to see patient 2 times per week for 12 weeks to address the treatment planned outlined above.   [x]  Continue with current plan of care  []  Modify plan of care as follows:  1-2x per week x 4-6 weeks  []  Hold pending physician visit  []  Discharge    Time In 1511   Time Out 1551   Timed Code Minutes: 40 min   Total Treatment Time: 40 min     [address] [facility], [facility], OTR/L [id number] yes

## (undated) DEVICE — Z DISCONTINUED USE 2271144 DRAIN SURG W0.25XL18IN PRECUT UNIF WALL THICKNESS PENROSE

## (undated) DEVICE — SUTURE CHROMIC GUT SZ 4-0 L27IN ABSRB BRN FS-2 L19MM 3/8 635H

## (undated) DEVICE — SUTURE VCRL SZ 2-0 L27IN ABSRB UD L26MM SH 1/2 CIR J417H

## (undated) DEVICE — SUTURE VCRL + SZ 3-0 L27IN ABSRB UD L26MM SH 1/2 CIR VCP416H

## (undated) DEVICE — DRESSING,GAUZE,XEROFORM,CURAD,5"X9",ST: Brand: CURAD

## (undated) DEVICE — BANDAGE COMPR E SGL LAYERED CLSR BGE W/ CLP W4INXL15FT

## (undated) DEVICE — SOLUTION IV 1000ML LAC RINGERS PH 6.5 INJ USP VIAFLX PLAS

## (undated) DEVICE — DRAPE,TOP,102X53,STERILE: Brand: MEDLINE

## (undated) DEVICE — BANDAGE COMPR W4INXL12FT E DISP ESMARCH EVEN

## (undated) DEVICE — BANDAGE,GAUZE,4.5"X4.1YD,STERILE,LF: Brand: MEDLINE

## (undated) DEVICE — SPONGE GZ W4XL4IN COT 12 PLY TYP VII WVN C FLD DSGN

## (undated) DEVICE — DERMATOME BLADES: Brand: DERMATOME

## (undated) DEVICE — DRAPE,EXTREMITY,89X128,STERILE: Brand: MEDLINE

## (undated) DEVICE — COVER ARMBRD W13XL28.5IN IMPERV BLU FOR OP RM

## (undated) DEVICE — DRAPE,HIP,W/POUCHES,STERILE: Brand: MEDLINE

## (undated) DEVICE — DRAIN SURG 15FR L3 16IN DIA47MM SIL RND HUBLESS FULL FLUT

## (undated) DEVICE — Z DISCONTINUED USE 2558681 BANDAGE COMPR L MAMM COMFORTABLE HIGHLY ABSRB POST SURG

## (undated) DEVICE — GOWN,SIRUS,NONRNF,SETINSLV,XL,20/CS: Brand: MEDLINE

## (undated) DEVICE — SOLUTION IV IRRIG WATER 1000ML POUR BRL 2F7114

## (undated) DEVICE — 2000CC GUARDIAN II: Brand: GUARDIAN

## (undated) DEVICE — SKIN AFFIX SURG ADHESIVE 72/CS 0.55ML: Brand: MEDLINE

## (undated) DEVICE — APPLIER LIG CLP M L11IN TI STR RNG HNDL FOR 20 CLP DISP

## (undated) DEVICE — GLOVE SURG SZ 65 THK91MIL LTX FREE SYN POLYISOPRENE

## (undated) DEVICE — APPLICATOR MEDICATED 26 CC SOLUTION HI LT ORNG CHLORAPREP

## (undated) DEVICE — SPONGE LAP W18XL18IN WHT COT 4 PLY FLD STRUNG RADPQ DISP ST

## (undated) DEVICE — GLOVE ORANGE PI 7   MSG9070

## (undated) DEVICE — SUTURE PROL SZ 2-0 L18IN NONABSORBABLE BLU FS L26MM 3/8 CIR 8685H

## (undated) DEVICE — PLASMABLADE PS210-030S 3.0S LOCK: Brand: PLASMABLADE™

## (undated) DEVICE — DRAIN SURG 10FR PVC TB W/ TRCR MID PERF NO RESVR HUBLESS

## (undated) DEVICE — PADDING,UNDERCAST,COTTON, 4"X4YD STERILE: Brand: MEDLINE

## (undated) DEVICE — SOLUTION SURG PREP POV IOD 7.5% 4 OZ

## (undated) DEVICE — BASIC SINGLE BASIN BTC-LF: Brand: MEDLINE INDUSTRIES, INC.

## (undated) DEVICE — GLOVE ORANGE PI 8 1/2   MSG9085

## (undated) DEVICE — DRAPE C ARM W36XL30IN RECTANG BND BG AND TAPE

## (undated) DEVICE — AGENT HEMSTAT 3GM PURIFIED PLNT STARCH PWD ABSRB ARISTA AH

## (undated) DEVICE — POSITIONER HD W8XH4XL8.5IN RASPBERRY FOAM SLT

## (undated) DEVICE — INTENDED FOR TISSUE SEPARATION, AND OTHER PROCEDURES THAT REQUIRE A SHARP SURGICAL BLADE TO PUNCTURE OR CUT.: Brand: BARD-PARKER ® CARBON RIB-BACK BLADES

## (undated) DEVICE — DRAPE,U/SHT,SPLIT,FILM,60X84,STERILE: Brand: MEDLINE

## (undated) DEVICE — SUTURE MCRYL SZ 4-0 L27IN ABSRB UD L19MM PS-2 1/2 CIR PRIM Y426H

## (undated) DEVICE — PREMIUM DRY TRAY LF: Brand: MEDLINE INDUSTRIES, INC.

## (undated) DEVICE — BREAST HERNIA PACK: Brand: MEDLINE INDUSTRIES, INC.

## (undated) DEVICE — SHEET, ORTHO, SPLIT, STERILE: Brand: MEDLINE

## (undated) DEVICE — PACK-MAJOR

## (undated) DEVICE — SUTURE NONABSORBABLE MONOFILAMENT 4-0 FS-2 18 IN ETHILON 662H

## (undated) DEVICE — PREP SOL PVP IODINE 4%  4 OZ/BTL

## (undated) DEVICE — ROYAL SILK SURGICAL GOWN, XXL: Brand: CONVERTORS

## (undated) DEVICE — Device

## (undated) DEVICE — BLADE OPHTH ORNG GRINDLESS SMALLER ALTERNATIVE TO NO15 GEN

## (undated) DEVICE — SUTURE VCRL + SZ 2-0 L27IN ABSRB WHT SH 1/2 CIR TAPERCUT VCP417H

## (undated) DEVICE — AGENT HEMSTAT W3XL4IN OXIDIZED REGENERATED CELOS ABSRB FOR

## (undated) DEVICE — SUTURE VCRL SZ 3-0 L27IN ABSRB UD L26MM SH 1/2 CIR J416H

## (undated) DEVICE — GLOVE ORANGE PI 7 1/2   MSG9075

## (undated) DEVICE — 3M™ TRANSPORE™ WHITE SURGICAL TAPE 1534-2, 2 INCH X 10 YARD (5CM X 9,1M), 6 ROLLS/CARTON 10 CARTONS/CASE: Brand: 3M™ TRANSPORE™

## (undated) DEVICE — SUTURE VIC + LEN CP1 0 70CM VCP267H

## (undated) DEVICE — SUTURE VCRL + SZ 2-0 L27IN ABSRB UD CP-1 1/2 CIR REV CUT VCP266H

## (undated) DEVICE — PACK PROCEDURE SURG SET UP SRMC

## (undated) DEVICE — SOLUTION IV IRRIG POUR BRL 0.9% SODIUM CHL 2F7124

## (undated) DEVICE — GAUZE,SPONGE,3"X3",12PLY,STERILE,LF,2'S: Brand: MEDLINE

## (undated) DEVICE — SLING ARM L L165IN D75IN WHT POLY MESH ENVELOP MTL SIDE

## (undated) DEVICE — 450 ML BOTTLE OF 0.05% CHLORHEXIDINE GLUCONATE IN 99.95% STERILE WATER FOR IRRIGATION, USP AND APPLICATOR.: Brand: IRRISEPT ANTIMICROBIAL WOUND LAVAGE

## (undated) DEVICE — GLOVE SURG SZ 9 THK91MIL LTX FREE SYN POLYISOPRENE ANTI

## (undated) DEVICE — 3M™ BAIR HUGGER® MULTI ACCESS BLANKET, PEDIATRIC, FULL BODY, 10 PER CASE 31000: Brand: BAIR HUGGER™

## (undated) DEVICE — PENCIL SMK EVAC ALL IN 1 DSGN ENH VISIBILITY IMPROVED AIR

## (undated) DEVICE — PAD,ABDOMINAL,5"X9",ST,LF,25/BX: Brand: MEDLINE INDUSTRIES, INC.

## (undated) DEVICE — GLOVE ORANGE PI 8   MSG9080

## (undated) DEVICE — 3M™ TEGADERM™ TRANSPARENT FILM DRESSING FRAME STYLE, 1626W, 4 IN X 4-3/4 IN (10 CM X 12 CM), 50/CT 4CT/CASE: Brand: 3M™ TEGADERM™

## (undated) DEVICE — 4-PORT MANIFOLD: Brand: NEPTUNE 2

## (undated) DEVICE — 1010 S-DRAPE TOWEL DRAPE 10/BX: Brand: STERI-DRAPE™

## (undated) DEVICE — SUTURE ETHLN SZ 2-0 L30IN NONABSORBABLE BLK L36MM FSLX 3/8 1674H

## (undated) DEVICE — CUFF TRNQT 18IN SGL PRT AND BLDR POS LOK CONN PNEUMAT

## (undated) DEVICE — GOWN,SIRUS,NON REINFRCD,LARGE,SET IN SL: Brand: MEDLINE

## (undated) DEVICE — ZIMMER SKIN GRAFT CARRIER 8 INCH LENGTH: Brand: DERMACARRIERS

## (undated) DEVICE — GLOVE SURG SZ 75 L12IN THK75MIL DK GRN LTX FREE

## (undated) DEVICE — SUTURE PROL SZ 6-0 L24IN NONABSORBABLE BLU L9.3MM BV-1 3/8 8805H